# Patient Record
Sex: FEMALE | Race: WHITE | NOT HISPANIC OR LATINO | Employment: OTHER | URBAN - METROPOLITAN AREA
[De-identification: names, ages, dates, MRNs, and addresses within clinical notes are randomized per-mention and may not be internally consistent; named-entity substitution may affect disease eponyms.]

---

## 2017-05-23 ENCOUNTER — HOSPITAL ENCOUNTER (OUTPATIENT)
Dept: CARDIOLOGY | Facility: HOSPITAL | Age: 62
Discharge: HOME | End: 2017-05-23
Attending: SURGERY | Admitting: SURGERY

## 2017-12-20 ENCOUNTER — HOSPITAL ENCOUNTER (OUTPATIENT)
Dept: MAMMOGRAPHY | Facility: HOSPITAL | Age: 62
Discharge: HOME | End: 2017-12-20
Attending: FAMILY MEDICINE | Admitting: FAMILY MEDICINE

## 2018-01-25 ENCOUNTER — HOSPITAL ENCOUNTER (OUTPATIENT)
Dept: FAMILY MEDICINE CLINIC | Facility: CLINIC | Age: 63
Discharge: HOME | End: 2018-01-25
Attending: FAMILY MEDICINE | Admitting: FAMILY MEDICINE

## 2018-03-06 ENCOUNTER — HOSPITAL ENCOUNTER (OUTPATIENT)
Dept: OTHER | Facility: HOSPITAL | Age: 63
Setting detail: RECURRING SERIES
Discharge: HOME | End: 2018-03-07
Attending: FAMILY MEDICINE | Admitting: FAMILY MEDICINE

## 2018-03-15 ENCOUNTER — CLINICAL SUPPORT (OUTPATIENT)
Dept: ONCOLOGY | Facility: HOSPITAL | Age: 63
End: 2018-03-15

## 2018-03-15 ENCOUNTER — HOSPITAL ENCOUNTER (OUTPATIENT)
Dept: ONCOLOGY | Facility: CLINIC | Age: 63
Setting detail: INFUSION SERIES
Discharge: HOME | End: 2018-03-15
Attending: INTERNAL MEDICINE | Admitting: INTERNAL MEDICINE

## 2018-03-15 ENCOUNTER — HOSPITAL ENCOUNTER (OUTPATIENT)
Dept: ONCOLOGY | Facility: HOSPITAL | Age: 63
Discharge: HOME | End: 2018-03-15
Attending: INTERNAL MEDICINE | Admitting: INTERNAL MEDICINE

## 2018-03-15 NOTE — PROGRESS NOTES
PATIENTS ONCOLOGY RECORD LOCATED IN [facility]      Subjective     Name:  ALECIA STREETER     Date:  03/15/2018  Address:  [address] [place] [address]  Home: [phone]  :  1955 AGE:  62 y.o.        RECORDS OBTAINED:  Patients Oncology Record is located in [facility]

## 2018-03-27 ENCOUNTER — CLINICAL SUPPORT (OUTPATIENT)
Dept: ONCOLOGY | Facility: HOSPITAL | Age: 63
End: 2018-03-27

## 2018-03-27 ENCOUNTER — HOSPITAL ENCOUNTER (OUTPATIENT)
Dept: ONCOLOGY | Facility: CLINIC | Age: 63
Setting detail: INFUSION SERIES
Discharge: HOME | End: 2018-03-27
Attending: INTERNAL MEDICINE | Admitting: INTERNAL MEDICINE

## 2018-03-27 NOTE — PROGRESS NOTES
PATIENTS ONCOLOGY RECORD LOCATED IN [facility]      Subjective     Name:  ALECIA STREETER     Date:  2018  Address:  [address] [place] [address]  Home: [phone]  :  1955 AGE:  62 y.o.        RECORDS OBTAINED:  Patients Oncology Record is located in [facility]

## 2018-04-10 ENCOUNTER — HOSPITAL ENCOUNTER (OUTPATIENT)
Dept: ONCOLOGY | Facility: CLINIC | Age: 63
Discharge: HOME | End: 2018-04-10
Attending: INTERNAL MEDICINE | Admitting: INTERNAL MEDICINE

## 2018-05-03 ENCOUNTER — OFFICE (OUTPATIENT)
Dept: URBAN - METROPOLITAN AREA CLINIC 64 | Facility: CLINIC | Age: 63
End: 2018-05-03

## 2018-05-03 VITALS
SYSTOLIC BLOOD PRESSURE: 126 MMHG | HEIGHT: 61 IN | WEIGHT: 142 LBS | DIASTOLIC BLOOD PRESSURE: 56 MMHG | HEART RATE: 67 BPM

## 2018-05-03 DIAGNOSIS — D64.9 ANEMIA, UNSPECIFIED: ICD-10-CM

## 2018-05-03 DIAGNOSIS — N18.3 CHRONIC KIDNEY DISEASE, STAGE 3 (MODERATE): ICD-10-CM

## 2018-05-03 PROCEDURE — 99213 OFFICE O/P EST LOW 20 MIN: CPT | Performed by: NURSE PRACTITIONER

## 2018-06-04 ENCOUNTER — HOSPITAL ENCOUNTER (OUTPATIENT)
Dept: CARDIOLOGY | Facility: HOSPITAL | Age: 63
Discharge: HOME | End: 2018-06-04
Attending: SURGERY | Admitting: SURGERY

## 2018-06-14 ENCOUNTER — ON CAMPUS - OUTPATIENT (OUTPATIENT)
Dept: URBAN - METROPOLITAN AREA HOSPITAL 85 | Facility: HOSPITAL | Age: 63
End: 2018-06-14

## 2018-06-14 ENCOUNTER — HOSPITAL ENCOUNTER (OUTPATIENT)
Dept: PREOP | Facility: HOSPITAL | Age: 63
Setting detail: HOSPITAL OUTPATIENT SURGERY
Discharge: HOME | End: 2018-06-14
Attending: INTERNAL MEDICINE | Admitting: INTERNAL MEDICINE

## 2018-06-14 DIAGNOSIS — Q28.2 ARTERIOVENOUS MALFORMATION OF CEREBRAL VESSELS: ICD-10-CM

## 2018-06-14 DIAGNOSIS — K57.30 DIVERTICULOSIS OF LARGE INTESTINE WITHOUT PERFORATION OR ABS: ICD-10-CM

## 2018-06-14 DIAGNOSIS — K29.70 GASTRITIS, UNSPECIFIED, WITHOUT BLEEDING: ICD-10-CM

## 2018-06-14 DIAGNOSIS — K64.8 OTHER HEMORRHOIDS: ICD-10-CM

## 2018-06-14 DIAGNOSIS — D50.9 IRON DEFICIENCY ANEMIA, UNSPECIFIED: ICD-10-CM

## 2018-06-14 DIAGNOSIS — K25.4 CHRONIC OR UNSPECIFIED GASTRIC ULCER WITH HEMORRHAGE: ICD-10-CM

## 2018-06-14 LAB — GLUCOSE BLD-MCNC: 180 MG/DL (ref 70–105)

## 2018-06-14 PROCEDURE — 45382 COLONOSCOPY W/CONTROL BLEED: CPT | Performed by: INTERNAL MEDICINE

## 2018-06-14 PROCEDURE — 43255 EGD CONTROL BLEEDING ANY: CPT | Performed by: INTERNAL MEDICINE

## 2018-07-10 ENCOUNTER — HOSPITAL ENCOUNTER (OUTPATIENT)
Dept: ONCOLOGY | Facility: CLINIC | Age: 63
Discharge: HOME | End: 2018-07-10
Attending: INTERNAL MEDICINE | Admitting: INTERNAL MEDICINE

## 2019-01-29 ENCOUNTER — HOSPITAL ENCOUNTER (OUTPATIENT)
Dept: ONCOLOGY | Facility: CLINIC | Age: 64
Discharge: HOME | End: 2019-01-29
Attending: INTERNAL MEDICINE | Admitting: INTERNAL MEDICINE

## 2019-07-02 RX ORDER — CARVEDILOL 12.5 MG/1
TABLET ORAL
Qty: 180 TABLET | Refills: 1 | Status: SHIPPED | OUTPATIENT
Start: 2019-07-02 | End: 2019-12-23

## 2019-07-03 RX ORDER — AMLODIPINE BESYLATE 5 MG/1
TABLET ORAL
Qty: 90 TABLET | Refills: 1 | Status: SHIPPED | OUTPATIENT
Start: 2019-07-03 | End: 2021-04-15 | Stop reason: SDUPTHER

## 2019-07-10 PROBLEM — J44.9 CHRONIC OBSTRUCTIVE PULMONARY DISEASE (HCC): Status: ACTIVE | Noted: 2018-08-30

## 2019-07-10 PROBLEM — E11.9 TYPE 2 DIABETES MELLITUS: Status: ACTIVE | Noted: 2019-07-10

## 2019-07-10 PROBLEM — Z00.00 ENCOUNTER FOR GENERAL ADULT MEDICAL EXAMINATION WITHOUT ABNORMAL FINDINGS: Status: ACTIVE | Noted: 2018-01-25

## 2019-07-10 PROBLEM — E78.5 HYPERLIPIDEMIA: Status: ACTIVE | Noted: 2019-07-10

## 2019-07-10 PROBLEM — Q27.30 CONGENITAL ARTERIOVENOUS MALFORMATION: Status: ACTIVE | Noted: 2018-07-14

## 2019-07-10 PROBLEM — D50.9 IRON DEFICIENCY ANEMIA: Status: ACTIVE | Noted: 2017-11-15

## 2019-07-10 PROBLEM — N25.81 HYPERPARATHYROIDISM DUE TO RENAL INSUFFICIENCY (HCC): Status: ACTIVE | Noted: 2017-03-07

## 2019-07-10 PROBLEM — I73.9 PERIPHERAL VASCULAR DISEASE: Status: ACTIVE | Noted: 2018-06-06

## 2019-07-10 PROBLEM — I10 HYPERTENSION: Status: ACTIVE | Noted: 2019-07-10

## 2019-07-10 PROBLEM — M79.89 SWELLING OF LOWER EXTREMITY: Status: ACTIVE | Noted: 2018-06-25

## 2019-07-10 PROBLEM — Z23 NEED FOR OTHER PROPHYLACTIC VACCINATION AND INOCULATION AGAINST SINGLE DISEASES: Status: ACTIVE | Noted: 2018-10-04

## 2019-07-10 PROBLEM — J00 COMMON COLD: Status: ACTIVE | Noted: 2017-11-15

## 2019-07-10 PROBLEM — Z23 ENCOUNTER FOR IMMUNIZATION: Status: ACTIVE | Noted: 2018-10-04

## 2019-07-10 PROBLEM — R53.83 MALAISE AND FATIGUE: Status: ACTIVE | Noted: 2018-03-05

## 2019-07-10 PROBLEM — L03.119 CELLULITIS OF ANKLE: Status: ACTIVE | Noted: 2018-09-19

## 2019-07-10 PROBLEM — M10.9 GOUT: Status: ACTIVE | Noted: 2018-10-04

## 2019-07-10 PROBLEM — I25.10 CORONARY ARTERY DISEASE: Status: ACTIVE | Noted: 2019-07-10

## 2019-07-10 PROBLEM — I34.0 MITRAL REGURGITATION: Status: ACTIVE | Noted: 2018-10-04

## 2019-07-10 PROBLEM — M81.0 OSTEOPOROSIS: Status: ACTIVE | Noted: 2019-07-10

## 2019-07-10 PROBLEM — R53.81 MALAISE AND FATIGUE: Status: ACTIVE | Noted: 2018-03-05

## 2019-07-10 PROBLEM — R74.8 ELEVATED LIVER ENZYMES: Status: ACTIVE | Noted: 2018-06-25

## 2019-07-10 RX ORDER — ALBUTEROL SULFATE 90 UG/1
2 AEROSOL, METERED RESPIRATORY (INHALATION) 4 TIMES DAILY
COMMUNITY
Start: 2017-07-12 | End: 2019-07-10 | Stop reason: SDUPTHER

## 2019-07-10 RX ORDER — FUROSEMIDE 20 MG/1
1 TABLET ORAL EVERY 24 HOURS
COMMUNITY
Start: 2018-06-02 | End: 2019-11-07

## 2019-07-10 RX ORDER — ROSUVASTATIN CALCIUM 20 MG/1
1 TABLET, COATED ORAL EVERY 24 HOURS
COMMUNITY
Start: 2019-04-07 | End: 2019-08-05 | Stop reason: SDUPTHER

## 2019-07-10 RX ORDER — AMOXICILLIN 500 MG
1 CAPSULE ORAL 2 TIMES DAILY
COMMUNITY
Start: 2015-07-01 | End: 2019-07-11

## 2019-07-10 RX ORDER — CHOLECALCIFEROL (VITAMIN D3) 25 MCG
1 TABLET,CHEWABLE ORAL DAILY
COMMUNITY
Start: 2015-07-01

## 2019-07-10 RX ORDER — ALBUTEROL SULFATE 90 UG/1
2 AEROSOL, METERED RESPIRATORY (INHALATION) 4 TIMES DAILY
Qty: 3 INHALER | Refills: 1 | Status: SHIPPED | OUTPATIENT
Start: 2019-07-10 | End: 2019-07-11 | Stop reason: SDUPTHER

## 2019-07-10 RX ORDER — FERROUS SULFATE 325(65) MG
1 TABLET ORAL
COMMUNITY
Start: 2018-03-07 | End: 2020-08-04 | Stop reason: SDUPTHER

## 2019-07-10 NOTE — TELEPHONE ENCOUNTER
Pt calling stating that  She is coughing up phlegm and needs her albuterol solution sent in for her inhaler. I do not see it on medication list, but patient states that daisy has prescribed it in past for her nebulizer.  Pt also needing refill of her proair inhaler.

## 2019-07-11 ENCOUNTER — OFFICE VISIT (OUTPATIENT)
Dept: FAMILY MEDICINE CLINIC | Facility: CLINIC | Age: 64
End: 2019-07-11

## 2019-07-11 VITALS
HEART RATE: 63 BPM | HEIGHT: 62 IN | OXYGEN SATURATION: 95 % | TEMPERATURE: 97.5 F | WEIGHT: 136.8 LBS | DIASTOLIC BLOOD PRESSURE: 79 MMHG | RESPIRATION RATE: 20 BRPM | BODY MASS INDEX: 25.17 KG/M2 | SYSTOLIC BLOOD PRESSURE: 135 MMHG

## 2019-07-11 DIAGNOSIS — F17.210 CIGARETTE SMOKER: Primary | ICD-10-CM

## 2019-07-11 DIAGNOSIS — J30.9 ALLERGIC RHINITIS, UNSPECIFIED SEASONALITY, UNSPECIFIED TRIGGER: Chronic | ICD-10-CM

## 2019-07-11 DIAGNOSIS — J42 CHRONIC BRONCHITIS, UNSPECIFIED CHRONIC BRONCHITIS TYPE (HCC): ICD-10-CM

## 2019-07-11 DIAGNOSIS — J06.9 ACUTE UPPER RESPIRATORY INFECTION: ICD-10-CM

## 2019-07-11 PROCEDURE — 99213 OFFICE O/P EST LOW 20 MIN: CPT | Performed by: NURSE PRACTITIONER

## 2019-07-11 RX ORDER — CALCITRIOL 0.25 UG/1
1 CAPSULE, LIQUID FILLED ORAL DAILY
Refills: 3 | COMMUNITY
Start: 2019-07-03 | End: 2023-02-17

## 2019-07-11 RX ORDER — AZITHROMYCIN 250 MG/1
TABLET, FILM COATED ORAL
Qty: 6 TABLET | Refills: 0 | Status: SHIPPED | OUTPATIENT
Start: 2019-07-11 | End: 2019-09-03

## 2019-07-11 RX ORDER — IPRATROPIUM BROMIDE AND ALBUTEROL SULFATE 2.5; .5 MG/3ML; MG/3ML
3 SOLUTION RESPIRATORY (INHALATION) EVERY 4 HOURS PRN
Qty: 120 VIAL | Refills: 6 | Status: SHIPPED | OUTPATIENT
Start: 2019-07-11 | End: 2019-07-18 | Stop reason: SDUPTHER

## 2019-07-11 RX ORDER — CHLORAL HYDRATE 500 MG
1 CAPSULE ORAL 2 TIMES DAILY WITH MEALS
COMMUNITY

## 2019-07-11 RX ORDER — ALBUTEROL SULFATE 90 UG/1
2 AEROSOL, METERED RESPIRATORY (INHALATION) 4 TIMES DAILY
Qty: 3 INHALER | Refills: 1 | Status: SHIPPED | OUTPATIENT
Start: 2019-07-11 | End: 2019-07-18 | Stop reason: SDUPTHER

## 2019-07-11 NOTE — PROGRESS NOTES
Subjective   Nancy Suárez is a 63 y.o. female.       63-year-old white female smoker with history of COPD comes in with 1 week history of productive cough fever and dyspnea.  Exam reveals severe allergic rhinitis with diminished lungs but no wheezes.  She has not had any nebulizer medicine for while so I am going to reorder that for her as well as a Z-Kevan.  I encouraged her to use allergy medication as well  Blood pressure 134/78 heart rate 62 she denies any chest pain, tachycardia, dizziness or edema    Duo nebs q.i.d. As needed   Z-Kevan         The following portions of the patient's history were reviewed and updated as appropriate: allergies, current medications, past family history, past medical history, past social history, past surgical history and problem list.    Review of Systems   Constitutional: Positive for fever.   HENT: Positive for ear pain and postnasal drip.    Respiratory: Positive for cough and shortness of breath.    Cardiovascular: Negative.    Gastrointestinal: Negative.    Genitourinary: Negative.    Musculoskeletal: Negative.    Skin: Negative.    Neurological: Negative.    Psychiatric/Behavioral: Negative.        Objective   Physical Exam   Constitutional: She is oriented to person, place, and time. She appears well-developed and well-nourished.   HENT:    Heavy postnasal drip   Cardiovascular: Normal rate and regular rhythm.   Pulmonary/Chest: Effort normal.   Lungs very diminished without wheezes or rhonchi   Abdominal: Soft. Bowel sounds are normal.   Musculoskeletal: Normal range of motion.   Neurological: She is alert and oriented to person, place, and time.   Skin: Skin is warm and dry.         Assessment/Plan   Nancy was seen today for uri.    Diagnoses and all orders for this visit:    Cigarette smoker    Allergic rhinitis, unspecified seasonality, unspecified trigger    Acute upper respiratory infection    Chronic bronchitis, unspecified chronic bronchitis type (CMS/[facility])    Other  orders  -     ipratropium-albuterol (DUO-NEB) 0.5-2.5 mg/3 ml nebulizer; Take 3 mL by nebulization Every 4 (Four) Hours As Needed for Wheezing.  -     albuterol sulfate HFA (PROAIR HFA) 108 (90 Base) MCG/ACT inhaler; Inhale 2 puffs 4 (Four) Times a Day.  -     azithromycin (ZITHROMAX Z-HERB) 250 MG tablet; Take 2 tablets the first day, then 1 tablet daily for 4 days.

## 2019-07-11 NOTE — PATIENT INSTRUCTIONS
Take antibiotic as directed   Take allergy medication as directed  Nebulizer treatments 1 - 4 times a day

## 2019-07-17 ENCOUNTER — TELEPHONE (OUTPATIENT)
Dept: FAMILY MEDICINE CLINIC | Facility: CLINIC | Age: 64
End: 2019-07-17

## 2019-07-17 NOTE — TELEPHONE ENCOUNTER
Linda from [facility] in [place] called and said pts insurance will only cover Brand Name Proair Inhaler and they also don't want to cover the Duoneb.  They want Comvient Resp or Albuteral Sulfate.  SG

## 2019-07-18 RX ORDER — ALBUTEROL SULFATE 90 UG/1
2 AEROSOL, METERED RESPIRATORY (INHALATION) 4 TIMES DAILY
Qty: 3 INHALER | Refills: 1 | Status: SHIPPED | OUTPATIENT
Start: 2019-07-18 | End: 2022-07-05 | Stop reason: SDUPTHER

## 2019-07-18 RX ORDER — IPRATROPIUM BROMIDE AND ALBUTEROL SULFATE 2.5; .5 MG/3ML; MG/3ML
3 SOLUTION RESPIRATORY (INHALATION) EVERY 4 HOURS PRN
Qty: 120 VIAL | Refills: 6 | Status: SHIPPED | OUTPATIENT
Start: 2019-07-18 | End: 2019-07-19

## 2019-07-19 RX ORDER — ALBUTEROL SULFATE 2.5 MG/3ML
2.5 SOLUTION RESPIRATORY (INHALATION) EVERY 4 HOURS PRN
Qty: 120 VIAL | Refills: 5 | Status: SHIPPED | OUTPATIENT
Start: 2019-07-19 | End: 2019-12-28 | Stop reason: ALTCHOICE

## 2019-08-06 ENCOUNTER — APPOINTMENT (OUTPATIENT)
Dept: ONCOLOGY | Facility: CLINIC | Age: 64
End: 2019-08-06

## 2019-08-06 RX ORDER — ROSUVASTATIN CALCIUM 20 MG/1
TABLET, COATED ORAL
Qty: 30 TABLET | Refills: 2 | Status: SHIPPED | OUTPATIENT
Start: 2019-08-06 | End: 2019-10-30 | Stop reason: SDUPTHER

## 2019-08-28 LAB
BASOPHILS # BLD AUTO: 0 X10E3/UL (ref 0–0.2)
BASOPHILS NFR BLD AUTO: 0 %
EOSINOPHIL # BLD AUTO: 0.1 X10E3/UL (ref 0–0.4)
EOSINOPHIL NFR BLD AUTO: 3 %
ERYTHROCYTE [DISTWIDTH] IN BLOOD BY AUTOMATED COUNT: 14.1 % (ref 12.3–15.4)
FERRITIN SERPL-MCNC: 62 NG/ML (ref 15–150)
HCT VFR BLD AUTO: 37.4 % (ref 34–46.6)
HGB BLD-MCNC: 12.3 G/DL (ref 11.1–15.9)
IMM GRANULOCYTES # BLD AUTO: 0 X10E3/UL (ref 0–0.1)
IMM GRANULOCYTES NFR BLD AUTO: 0 %
IRON SATN MFR SERPL: 77 % (ref 15–55)
IRON SERPL-MCNC: 203 UG/DL (ref 27–139)
LYMPHOCYTES # BLD AUTO: 1.4 X10E3/UL (ref 0.7–3.1)
LYMPHOCYTES NFR BLD AUTO: 26 %
MCH RBC QN AUTO: 32 PG (ref 26.6–33)
MCHC RBC AUTO-ENTMCNC: 32.9 G/DL (ref 31.5–35.7)
MCV RBC AUTO: 97 FL (ref 79–97)
MONOCYTES # BLD AUTO: 0.4 X10E3/UL (ref 0.1–0.9)
MONOCYTES NFR BLD AUTO: 7 %
NEUTROPHILS # BLD AUTO: 3.4 X10E3/UL (ref 1.4–7)
NEUTROPHILS NFR BLD AUTO: 64 %
PLATELET # BLD AUTO: 189 X10E3/UL (ref 150–450)
RBC # BLD AUTO: 3.84 X10E6/UL (ref 3.77–5.28)
TIBC SERPL-MCNC: 263 UG/DL (ref 250–450)
UIBC SERPL-MCNC: 60 UG/DL (ref 118–369)
WBC # BLD AUTO: 5.3 X10E3/UL (ref 3.4–10.8)

## 2019-08-29 LAB
25(OH)D3+25(OH)D2 SERPL-MCNC: 25.9 NG/ML (ref 30–100)
BUN SERPL-MCNC: 14 MG/DL (ref 8–27)
BUN/CREAT SERPL: 10 (ref 12–28)
CALCIUM SERPL-MCNC: 9 MG/DL (ref 8.7–10.3)
CHLORIDE SERPL-SCNC: 103 MMOL/L (ref 96–106)
CO2 SERPL-SCNC: 22 MMOL/L (ref 20–29)
CREAT SERPL-MCNC: 1.38 MG/DL (ref 0.57–1)
ERYTHROCYTE [DISTWIDTH] IN BLOOD BY AUTOMATED COUNT: 14.5 % (ref 12.3–15.4)
GLUCOSE SERPL-MCNC: 109 MG/DL (ref 65–99)
HCT VFR BLD AUTO: 39.9 % (ref 34–46.6)
HGB BLD-MCNC: 13.2 G/DL (ref 11.1–15.9)
IRON SATN MFR SERPL: 64 % SATURATION
IRON SERPL-MCNC: 199 UG/DL
MCH RBC QN AUTO: 31.5 PG (ref 26.6–33)
MCHC RBC AUTO-ENTMCNC: 33.1 G/DL (ref 31.5–35.7)
MCV RBC AUTO: 95 FL (ref 79–97)
PHOSPHATE SERPL-MCNC: 3.8 MG/DL (ref 2.5–4.5)
POTASSIUM SERPL-SCNC: 4.3 MMOL/L (ref 3.5–5.2)
PTH-INTACT SERPL-MCNC: 45 PG/ML (ref 15–65)
RBC # BLD AUTO: 4.19 X10E6/UL (ref 3.77–5.28)
SODIUM SERPL-SCNC: 139 MMOL/L (ref 134–144)
TRANSFERRIN SERPL-MCNC: 223 MG/DL
URATE SERPL-MCNC: 6.2 MG/DL (ref 2.5–7.1)
WBC # BLD AUTO: 5.6 X10E3/UL (ref 3.4–10.8)

## 2019-09-03 ENCOUNTER — OFFICE VISIT (OUTPATIENT)
Dept: ONCOLOGY | Facility: CLINIC | Age: 64
End: 2019-09-03

## 2019-09-03 VITALS
HEART RATE: 62 BPM | SYSTOLIC BLOOD PRESSURE: 169 MMHG | DIASTOLIC BLOOD PRESSURE: 79 MMHG | BODY MASS INDEX: 25.86 KG/M2 | RESPIRATION RATE: 18 BRPM | WEIGHT: 137 LBS | TEMPERATURE: 97.7 F | HEIGHT: 61 IN

## 2019-09-03 DIAGNOSIS — D50.9 IRON DEFICIENCY ANEMIA, UNSPECIFIED IRON DEFICIENCY ANEMIA TYPE: Primary | ICD-10-CM

## 2019-09-03 DIAGNOSIS — R79.89 LOW VITAMIN D LEVEL: ICD-10-CM

## 2019-09-03 PROBLEM — R04.0 BLEEDING FROM THE NOSE: Status: ACTIVE | Noted: 2019-09-03

## 2019-09-03 PROCEDURE — 99213 OFFICE O/P EST LOW 20 MIN: CPT | Performed by: INTERNAL MEDICINE

## 2019-09-03 NOTE — PROGRESS NOTES
HEMATOLOGY ONCOLOGY FOLLOW UP        Patient name: Nancy Suárez  : 1955  MRN: [id number]  Primary Care Physician: Nya Peraza DO  Referring Physician: Nya Peraza DO  Reason For Consult:     Chief Complaint   Patient presents with   • Follow-up     hx of KIRIT       History of Present Illness:  Ms. Suárez is a 63-year-old female who presented to her Primary Care Physician, Dr. Garcia’s office with symptoms of fatigue and weakness.  She was initially seen on 6/23/15 and her hemoglobin was 10.6 with an MCV of 102 at that time.  Repeat CBC on 8/5/15 showed a declining hemoglobin to 7.5 and MCV was 99.4.  Platelets and hemoglobin were again normal.  She was started on iron supplementation and repeat CBC on 8/18/15 showed a slight increase to 7.9.  She does have chronic kidney disease.  Her ferritin was low at 20 on 9/1/15.      However, when CBC was repeated on 9/22/15 hemoglobin was down to 4.9 and MVC also trended down to 85.  Patient was referred to us for further evaluation.  She already was scheduled to see gastroenterology.  Dr. Garcia ordered a stool hemoccult test that was apparently positive as well.      9/22/15 - Patient presented for hematologic consultation.  She reported being very tired.  She also felt dizzy and had unsteady gait.  Dizziness was more profound when she stood up or sat up from a lying posture.  Her blood pressure had also been running low.  Hypertension medications were also being held by her Primary Care Physician.  The patient denied noticing any bleeding per rectum.  She did have black stools ever since she had been taking iron.  She did not report weight loss.  She had a good appetite.       • 6/23/15 - WBC 7.5, hemoglobin 10.6, .3, platelet count 184,000.    • 7/8/15 - Vitamin B12 491 (N).   • 8/5/15 - WBC 5.5, hemoglobin 7.5, platelet count 220,000, MCV 99.4.  • 8/18/15 - WBC 6.5, hemoglobin 7.9, platelet count 212,000.    • 9/22/15 -  Creatinine 1.72, hemoglobin 4.9, WBC 8.8, platelet count 246,000, MCV 85.0.    • 9/23/15 - Patient underwent EGD and colonoscopy.  EGD was unremarkable up to the third part of the duodenum.  There were no AVM’s or any source of bleeding.  Colonoscopy was unremarkable except for small to moderate sized hemorrhoids and scattered diverticulosis.  Patient also received IV Venofer in the hospital.    • 9/22/15 to 9/25/15 - Patient admitted to [facility] for severe anemia.  Hemoglobin 4.1.  She was transfused about 4 units.    • 9/25/15 - M2A capsule was performed in the hospital and that also was negative for any bleeding source.    • 9/29/15 - Creatinine 1.4, BUN 13, TSH 0.7.    • 10/1/15 - WBC 7.5, hemoglobin 11.1, platelet count 213,000, MCV 90.1.  Creatinine 1.7.   • 10/22/15 - Creatinine 1.68.  Iron saturation 49%.  TIBC 287.  Serum iron 140.  PTH intact 108 (H).   • 10/30/15 - WBC 5.3, hemoglobin 11.1, platelet count 171,000.  • 11/3/15 - WBC 4.8, hemoglobin 9.5, platelet count 159,000, MCV 99.2.  • 12/28/15 - Iron saturation 43%.  TIBC 316, serum iron 137.  WBC 6.4, hemoglobin 11.3, platelet count 151,000, .8.  • 4/26/16 - WBC 6.1, hemoglobin 12.1, platelet count 205,000, .1.  Iron saturation 21%, TIBC 302, serum iron 63 (patient taking two iron tablets a day).  • 10/26/16 - Ferritin 36, iron saturation 14%, TIBC 347, serum iron 49.  WBC 6.0, hemoglobin 10.9, .7, platelet count 222,000.  • November 2016 to March 2018 - Patient has not followed up in our office.    • 3/27/17 - Creatinine 1.4.    • 11/15/17 - Ferritin 34, iron saturation 88% (H), TIBC 308, serum iron 270 (H).    • 12/20/17 - DEXAscan:  No evidence of osteopenia or osteoporosis.    • 3/6/18 - Hemoglobin 6.3.  Patient transfused 2 units.    • 3/15/18 - WBC 6.1, hemoglobin 10.6, platelet count 235,000, MCV 90.7.  Iron saturation 49%, TIBC 346, serum iron 175.  B12 1500.  Ferritin 124.  Folate 15.9.  Retic count 5.29%  (H).  • 3/27/18 - Patient evaluated by ENT, Dr. Doshi.  Examination revealed small bleeding sites from the left mid and posterior septum.  Patient had cauterization.  She was also diagnosed with some allergic sinusitis.    • 3/27/18 - Stool Hemoccult test positive x3.    • 4/4/18 - WBC 4.6, hemoglobin 10.9, platelet count 188,000, MCV 94.7.  Ferritin 48.  Iron saturation 42%, TIBC 297, serum iron 126.  • 6/14/18 - EGD and colonoscopy:  Multiple AV malformations in the upper GI tract and ascending colon, which could be the source of her chronic blood loss.  The AVMs in the colon were cauterized after injecting epinephrine and one Endoclip was placed.  Another small AVM was cauterized with APC.  There was scattered diverticulosis and moderate sized hemorrhoid.  AVMs in the duodenum were also cauterized with APC.    • 6/25/18 - Ferritin 57.  Iron saturation 39%, TIBC 302, serum iron 118.  PTH intact 42.  Uric acid 7.2.  WBC 5.6, hemoglobin 14.1, platelet count 220,000, MCV 97.8.    • 1/17/19 - Creatinine 1.49, BUN 18.  LFTs are normal.    • 8/27/2019 creatinine 1.38, BUN is 14, PTH is 45, serum iron 203 high, ferritin 62, iron saturation 77% high, TIBC 263, WBC 5.3, hemoglobin 12.3, platelets 189, MCV 97, vitamin D 25.9 low          Subjective:09/03/19  Patient is here for a follow up appointment with her grand daughter. She states she feels good. She has not had any nose bleeds for awhile. She is taking iron twice daily and is tolerating that well.. She is having laser surgery on her eyes soon. She states that she has a leaky heart valve. She denies any blood in her stool. She is taking 1000 mg Vitamin D3 daily, but her vitamin D remains low..          The following portions of the patient's history were reviewed and updated as appropriate: allergies, current medications, past family history, past medical history, past social history, past surgical history and problem list.         Past Medical History:    Diagnosis Date   • Artery stenosis (CMS/HCC)     Bilateral subclavian s/p stent    • Arthritis    • AVM (arteriovenous malformation)    • CAD (coronary artery disease)    • CKD (chronic kidney disease)     Stage three    • COPD (chronic obstructive pulmonary disease) (CMS/HCC)    • DM2 (diabetes mellitus, type 2) (CMS/HCC)    • Eye pressure     Elevated eye pressure   • Gout    • Hepatitis     Hepatitis c    • HTN (hypertension)    • Hyperparathyroidism (CMS/HCC)    • KIRIT (iron deficiency anemia)    • Iron deficiency anemia    • Osteoporosis    • PVD (peripheral vascular disease) (CMS/HCC)        Past Surgical History:   Procedure Laterality Date   • CHOLECYSTECTOMY     • TOTAL ABDOMINAL HYSTERECTOMY WITH SALPINGO OOPHORECTOMY           Current Outpatient Medications:   •  albuterol (PROVENTIL) (2.5 MG/3ML) 0.083% nebulizer solution, Take 2.5 mg by nebulization Every 4 (Four) Hours As Needed for Wheezing or Shortness of Air. DX CODE J44.9, Disp: 120 vial, Rfl: 5  •  albuterol sulfate HFA (PROAIR HFA) 108 (90 Base) MCG/ACT inhaler, Inhale 2 puffs 4 (Four) Times a Day. Proair - DX CODE J44.9, Disp: 3 inhaler, Rfl: 1  •  amLODIPine (NORVASC) 5 MG tablet, TAKE 1 TABLET BY MOUTH EVERY DAY, Disp: 90 tablet, Rfl: 1  •  aspirin 81 MG tablet, Take 1 tablet by mouth Daily., Disp: , Rfl:   •  calcitriol (ROCALTROL) 0.25 MCG capsule, Take 1 capsule by mouth 3 (Three) Times a Week. Monday, Wednesday, AND Friday, Disp: , Rfl: 3  •  carvedilol (COREG) 12.5 MG tablet, TAKE 1 TABLET BY MOUTH TWICE A DAY, Disp: 180 tablet, Rfl: 1  •  Cholecalciferol 1000 units capsule, Take 1 capsule by mouth Daily., Disp: , Rfl:   •  Cyanocobalamin (B-12) 1000 MCG capsule, Take 1 tablet by mouth Daily., Disp: , Rfl:   •  ferrous sulfate 325 (65 FE) MG tablet, Take 1 tablet by mouth 2 (Two) Times a Day., Disp: , Rfl:   •  furosemide (LASIX) 20 MG tablet, Take 1 tablet by mouth Daily., Disp: , Rfl:   •  Omega-3 Fatty Acids (FISH OIL) 1000 MG capsule  capsule, Take 1 capsule by mouth 2 (Two) Times a Day With Meals., Disp: , Rfl:   •  rosuvastatin (CRESTOR) 20 MG tablet, TAKE 1 TABLET BY MOUTH EVERY DAY, Disp: 30 tablet, Rfl: 2    No Known Allergies    Family History   Problem Relation Age of Onset   • Diabetes Mother    • Coronary artery disease Sister    • Diabetes Sister    • Diabetes Other    • Hypertension Other    • Breast cancer Paternal Aunt 50       Cancer-related family history includes Breast cancer (age of onset: 50) in her paternal aunt.    Social History     Tobacco Use   • Smoking status: Current Every Day Smoker     Packs/day: 0.25     Types: Cigarettes     Start date: 1975   • Smokeless tobacco: Never Used   Substance Use Topics   • Alcohol use: No     Frequency: Never   • Drug use: No         I have reviewed the history of present illness, past medical history, family history, social history, lab results, all notes and other records since the patient was last seen on  Visit date not found.    ROS:     Review of Systems   Constitutional: Negative for chills and fever.   HENT: Negative for ear pain, mouth sores, nosebleeds and sore throat.    Eyes: Negative for photophobia and visual disturbance.   Respiratory: Negative for wheezing and stridor.    Cardiovascular: Negative for chest pain and palpitations.   Gastrointestinal: Negative for abdominal pain, blood in stool, constipation, diarrhea, nausea and vomiting.   Endocrine: Negative for cold intolerance and heat intolerance.   Genitourinary: Negative for dysuria and hematuria.   Musculoskeletal: Negative for joint swelling and neck stiffness.   Skin: Negative for color change and rash.   Neurological: Negative for seizures and syncope.   Hematological: Negative for adenopathy.        No obvious bleeding   Psychiatric/Behavioral: Negative for agitation, confusion and hallucinations.       Objective:    Vitals:    09/03/19 1056   BP: 169/79   Pulse: 62   Resp: 18   Temp: 97.7 °F (36.5 °C)  "  [facility]: Oral   Weight: 62.1 kg (137 lb)   Height: 154.9 cm (61\")   PainSc: 0-No pain     ECOG  (1) Restricted in physically strenuous activity, ambulatory and able to do work of light nature    Physical Exam:   Physical Exam   Constitutional: She is oriented to person, place, and time. She appears well-developed and well-nourished. No distress.   HENT:   Head: Normocephalic and atraumatic.   Eyes: Conjunctivae and EOM are normal. Right eye exhibits no discharge. Left eye exhibits no discharge. No scleral icterus.   Neck: Normal range of motion. Neck supple. No thyromegaly present.   Cardiovascular: Normal rate, regular rhythm and normal heart sounds. Exam reveals no gallop and no friction rub.   Ejection systolic murmur heard.   Pulmonary/Chest: Effort normal. No stridor. No respiratory distress. She has no wheezes.   Abdominal: Soft. Bowel sounds are normal. She exhibits no mass. There is no tenderness. There is no rebound and no guarding.   Musculoskeletal: Normal range of motion. She exhibits no tenderness.   Lymphadenopathy:     She has no cervical adenopathy.   Neurological: She is alert and oriented to person, place, and time. She exhibits normal muscle tone.   Skin: Skin is warm. No rash noted. She is not diaphoretic. No erythema.   Psychiatric: She has a normal mood and affect. Her behavior is normal.   Nursing note and vitals reviewed.            Lab Results - Last 18 Months   Lab Units 08/27/19  1006 08/27/19  1000 02/27/19  0915 09/20/18  0404   WBC x10E3/uL 5.3 5.6 5.1 THOUSAND/UL 4.3*   HEMOGLOBIN g/dL 12.3 13.2 11.3* 10.2*   HEMATOCRIT % 37.4 39.9 32.9* 30.3*   PLATELETS x10E3/uL 189  --  215 THOUSAND/   MCV fL 97 95 95.9 99.7*     Lab Results - Last 18 Months   Lab Units 08/27/19  1000 04/09/19  0841 02/27/19  0915 01/16/19  1054 09/20/18  0404   SODIUM mmol/L 139 138 139 138 141   POTASSIUM mmol/L 4.3 4.3 4.4 4.2 3.5*   CHLORIDE mmol/L 103 100 105 101 106   TOTAL CO2 mmol/L 22 23  --   --  " 26   BUN mg/dL 14 15 18 18 17   CREATININE mg/dL 1.38* 1.3* 1.41* 1.49* 1.4*   CALCIUM mg/dL 9.0 8.9 8.9 9.3 8.4*   BILIRUBIN mg/dL  --  0.8 0.3 0.3  --    ALK PHOS units/L  --  55 55 66  --    ALT (SGPT) units/L  --  14 13 8  --    AST (SGOT) units/L  --  18 14 16  --    GLUCOSE mg/dL  --  102* 133* 121* 97       Lab Results   Component Value Date    GLUCOSE 102 (H) 04/09/2019    BUN 14 08/27/2019    CREATININE 1.38 (H) 08/27/2019    EGFRIFNONA 40 (L) 08/27/2019    EGFRIFAFRI 46 (L) 08/27/2019    BCR 10 (L) 08/27/2019    K 4.3 08/27/2019    CO2 22 08/27/2019    CALCIUM 9.0 08/27/2019    ALBUMIN 3.9 04/09/2019    LABIL2 1.8 (calc) 04/09/2019    AST 18 04/09/2019    ALT 14 04/09/2019       No results for input(s): APTT, INR, PTT in the last 57477 hours.    Lab Results   Component Value Date    IRON 199 (H) 08/27/2019    TIBC 263 08/27/2019    FERRITIN 62 08/27/2019       Lab Results   Component Value Date    FOLATE 21.4 02/27/2019       No results found for: OCCULTBLD    Lab Results   Component Value Date    RETICCTPCT 3.4 03/05/2018       Lab Results   Component Value Date    TYDXNJLV64 >2000 pg/mL (H) 02/27/2019     No results found for: SPEP, UPEP  Uric Acid   Date Value Ref Range Status   08/27/2019 6.2 2.5 - 7.1 mg/dL Final     Comment:                Therapeutic target for gout patients: <6.0     Lab Results   Component Value Date    SEDRATE 115 (H) 09/19/2018     No results found for: FIBRINOGEN, HAPTOGLOBIN  No results found for: PTT, INR  No results found for:   No results found for: CEA  No components found for: CA-19-9  No results found for: PSA          Assessment/Plan     Assessment:  1. History of iron deficiency anemia:  She is currently taking iron p.o. two times a day.  Her EGD and colonoscopy showed several AVMs in the duodenum as well as in the colon, which were cauterized.  AVMs are in the mid ascending colon and in the duodenum.  There was also a small erosion in the stomach and gastritis  (June 2018).  She continues on iron supplements.    2. History of nose bleeds:  Resolved.    3. History of occult GI bleeding:  Stool Hemoccult test was positive x3 in March 2018 and she was sent to see GI.      PLAN:    1. Continue iron p.o. two times a day.  Check CBC and iron levels in 6 months.  2. Advised Patient to have his Vit D dose increased discussing with his primary care physician.  3. Repeat labs before next visit in six months.              Iron deficiency anemia, unspecified iron deficiency anemia type    No orders of the defined types were placed in this encounter.                    Thank you very much for providing the opportunity to participate in this patient’s care. Please do not hesitate to call if there are any other questions.    I have reviewed all relevant labs results, imaging,Outside reports,notes, vitals, medications and plan with the patient today.    Portions of the note have been Scribed by medical assistant/Nurse.  I have reviewed and made changes accordingly.

## 2019-09-04 LAB
APPEARANCE UR: CLEAR
BACTERIA #/AREA URNS HPF: NORMAL /[HPF]
BILIRUB UR QL STRIP: NEGATIVE
COLOR UR: YELLOW
EPI CELLS #/AREA URNS HPF: NORMAL /HPF
GLUCOSE UR QL: NEGATIVE
HGB UR QL STRIP: NEGATIVE
KETONES UR QL STRIP: NEGATIVE
LEUKOCYTE ESTERASE UR QL STRIP: NEGATIVE
MICRO URNS: (no result)
MICRO URNS: (no result)
MUCOUS THREADS URNS QL MICRO: PRESENT
NITRITE UR QL STRIP: NEGATIVE
PH UR STRIP: 6 [PH] (ref 5–7.5)
PROT UR QL STRIP: NEGATIVE
RBC #/AREA URNS HPF: NORMAL /HPF
SP GR UR: 1.01 (ref 1–1.03)
UROBILINOGEN UR STRIP-MCNC: 0.2 MG/DL (ref 0.2–1)
WBC #/AREA URNS HPF: NORMAL /HPF

## 2019-10-04 LAB
ALBUMIN SERPL-MCNC: 4.4 G/DL (ref 3.6–4.8)
ALBUMIN/GLOB SERPL: 1.9 {RATIO} (ref 1.2–2.2)
ALP SERPL-CCNC: 69 IU/L (ref 39–117)
ALT SERPL-CCNC: 12 IU/L (ref 0–32)
AMBIG ABBREV CMP14 DEFAULT: NORMAL
AMBIG ABBREV LP DEFAULT: NORMAL
AST SERPL-CCNC: 18 IU/L (ref 0–40)
BILIRUB SERPL-MCNC: 0.2 MG/DL (ref 0–1.2)
BUN SERPL-MCNC: 17 MG/DL (ref 8–27)
BUN/CREAT SERPL: 12 (ref 12–28)
CALCIUM SERPL-MCNC: 8.6 MG/DL (ref 8.7–10.3)
CHLORIDE SERPL-SCNC: 104 MMOL/L (ref 96–106)
CHOLEST SERPL-MCNC: 201 MG/DL (ref 100–199)
CK SERPL-CCNC: 89 U/L (ref 24–173)
CO2 SERPL-SCNC: 18 MMOL/L (ref 20–29)
CREAT SERPL-MCNC: 1.41 MG/DL (ref 0.57–1)
GLOBULIN SER CALC-MCNC: 2.3 G/DL (ref 1.5–4.5)
GLUCOSE SERPL-MCNC: 93 MG/DL (ref 65–99)
HDLC SERPL-MCNC: 50 MG/DL
LDLC SERPL CALC-MCNC: 101 MG/DL (ref 0–99)
POTASSIUM SERPL-SCNC: 4.5 MMOL/L (ref 3.5–5.2)
PROT SERPL-MCNC: 6.7 G/DL (ref 6–8.5)
SODIUM SERPL-SCNC: 144 MMOL/L (ref 134–144)
TRIGL SERPL-MCNC: 249 MG/DL (ref 0–149)
VLDLC SERPL CALC-MCNC: 50 MG/DL (ref 5–40)

## 2019-10-07 RX ORDER — PREDNISOLONE ACETATE 10 MG/ML
SUSPENSION/ DROPS OPHTHALMIC
Refills: 1 | COMMUNITY
Start: 2019-09-11 | End: 2020-06-11

## 2019-10-07 RX ORDER — DICLOFENAC SODIUM 1 MG/ML
SOLUTION/ DROPS OPHTHALMIC
Refills: 1 | COMMUNITY
Start: 2019-09-11 | End: 2019-11-07

## 2019-10-07 RX ORDER — OFLOXACIN 3 MG/ML
SOLUTION/ DROPS OPHTHALMIC
Refills: 1 | COMMUNITY
Start: 2019-09-11 | End: 2020-06-11

## 2019-10-07 NOTE — PROGRESS NOTES
Subjective:     Encounter Date:10/10/2019      Patient ID: Nancy Suárez is a 63 y.o. female.    Chief Complaint: 6 mo f/u for CAD  History of Present Illness     63 -year-old white female patient with a known history of CAD status post cardiac catheterization December 2014 showed chronically occluded RCA with left to right collaterals. patient left system has less than 30% disease.      patient was also diagnosed with severe anemia due to bone marrow problem rather than any GI bleed   EGD colposcopy and small-bowel capsule study   was negative. last hemoglobin 12          CAROLINE negative Bilateral  lower extremity  of 2/2015   patient known to have carotid disease status post PCI previously     The last  carotid Doppler showed 50-74 % bilateral disease  Patient followed by vascular surgeon    patient started smoking again   she does have some chronic renal insufficiency  followed by Nephrology   patient denies of any active cardiac symptoms,      echocardiogram 9/ 2018, EF is around 60% severely calcified posterior leaflet of the mitral valve with moderate mitral insufficiency noted no aortic stenosis   cardiac-wise she is doing well EKG today showed sinus rhythm left bundle-branch block unchanged   patient was strongly advised to stop smoking      Past Medical History:   Diagnosis Date   • Artery stenosis (CMS/HCC)     Bilateral subclavian s/p stent    • Arthritis    • AVM (arteriovenous malformation)    • CAD (coronary artery disease)    • CKD (chronic kidney disease)     Stage three    • COPD (chronic obstructive pulmonary disease) (CMS/HCC)    • DM2 (diabetes mellitus, type 2) (CMS/HCC)    • Eye pressure     Elevated eye pressure   • Gout    • Hepatitis     Hepatitis c    • HTN (hypertension)    • Hyperparathyroidism (CMS/HCC)    • KIRIT (iron deficiency anemia)    • Iron deficiency anemia    • Osteoporosis    • PVD (peripheral vascular disease) (CMS/HCC)      Past Surgical History:   Procedure Laterality Date    • CHOLECYSTECTOMY     • TOTAL ABDOMINAL HYSTERECTOMY WITH SALPINGO OOPHORECTOMY       There were no vitals taken for this visit.  Family History   Problem Relation Age of Onset   • Diabetes Mother    • Coronary artery disease Sister    • Diabetes Sister    • Diabetes Other    • Hypertension Other    • Breast cancer Paternal Aunt 50       Current Outpatient Medications:   •  albuterol (PROVENTIL) (2.5 MG/3ML) 0.083% nebulizer solution, Take 2.5 mg by nebulization Every 4 (Four) Hours As Needed for Wheezing or Shortness of Air. DX CODE J44.9, Disp: 120 vial, Rfl: 5  •  albuterol sulfate HFA (PROAIR HFA) 108 (90 Base) MCG/ACT inhaler, Inhale 2 puffs 4 (Four) Times a Day. Proair - DX CODE J44.9, Disp: 3 inhaler, Rfl: 1  •  amLODIPine (NORVASC) 5 MG tablet, TAKE 1 TABLET BY MOUTH EVERY DAY, Disp: 90 tablet, Rfl: 1  •  aspirin 81 MG tablet, Take 1 tablet by mouth Daily., Disp: , Rfl:   •  calcitriol (ROCALTROL) 0.25 MCG capsule, Take 1 capsule by mouth 3 (Three) Times a Week. Monday, Wednesday, AND Friday, Disp: , Rfl: 3  •  carvedilol (COREG) 12.5 MG tablet, TAKE 1 TABLET BY MOUTH TWICE A DAY, Disp: 180 tablet, Rfl: 1  •  Cholecalciferol 1000 units capsule, Take 1 capsule by mouth Daily., Disp: , Rfl:   •  Cyanocobalamin (B-12) 1000 MCG capsule, Take 1 tablet by mouth Daily., Disp: , Rfl:   •  diclofenac (VOLTAREN) 0.1 % ophthalmic solution, PLEASE SEE ATTACHED FOR DETAILED DIRECTIONS, Disp: , Rfl: 1  •  ferrous sulfate 325 (65 FE) MG tablet, Take 1 tablet by mouth 2 (Two) Times a Day., Disp: , Rfl:   •  furosemide (LASIX) 20 MG tablet, Take 1 tablet by mouth Daily., Disp: , Rfl:   •  ofloxacin (OCUFLOX) 0.3 % ophthalmic solution, PLEASE SEE ATTACHED FOR DETAILED DIRECTIONS, Disp: , Rfl: 1  •  Omega-3 Fatty Acids (FISH OIL) 1000 MG capsule capsule, Take 1 capsule by mouth 2 (Two) Times a Day With Meals., Disp: , Rfl:   •  prednisoLONE acetate (PRED FORTE) 1 % ophthalmic suspension, INSTILL 1 BY OPHTHALMIC ROUTE 1 GTTS  QID BEGINING AFTER SURGERY ACCORDING TO GTTS SHEET, Disp: , Rfl: 1  •  rosuvastatin (CRESTOR) 20 MG tablet, TAKE 1 TABLET BY MOUTH EVERY DAY, Disp: 30 tablet, Rfl: 2  Social History     Socioeconomic History   • Marital status: Legally      Spouse name: Not on file   • Number of children: Not on file   • Years of education: Not on file   • Highest education level: Not on file   Tobacco Use   • Smoking status: Current Every Day Smoker     Packs/day: 0.25     Types: Cigarettes     Start date: 1975   • Smokeless tobacco: Never Used   Substance and Sexual Activity   • Alcohol use: No     Frequency: Never   • Drug use: No   • Sexual activity: Defer   Social History Narrative    She is single, retired from [employer], she lives in [place] and has two grown daughters.     No Known Allergies  ROS           Objective:     Physical Exam    Procedures    Lab Review:       Assessment:         No diagnosis found.       Plan:

## 2019-10-10 ENCOUNTER — OFFICE VISIT (OUTPATIENT)
Dept: CARDIOLOGY | Facility: CLINIC | Age: 64
End: 2019-10-10

## 2019-10-10 VITALS
DIASTOLIC BLOOD PRESSURE: 79 MMHG | HEART RATE: 60 BPM | WEIGHT: 137.6 LBS | HEIGHT: 61 IN | OXYGEN SATURATION: 100 % | SYSTOLIC BLOOD PRESSURE: 170 MMHG | BODY MASS INDEX: 25.98 KG/M2

## 2019-10-10 DIAGNOSIS — E78.2 MIXED HYPERLIPIDEMIA: ICD-10-CM

## 2019-10-10 DIAGNOSIS — I34.0 NONRHEUMATIC MITRAL VALVE REGURGITATION: ICD-10-CM

## 2019-10-10 DIAGNOSIS — N28.9 RENAL INSUFFICIENCY: ICD-10-CM

## 2019-10-10 DIAGNOSIS — J42 CHRONIC BRONCHITIS, UNSPECIFIED CHRONIC BRONCHITIS TYPE (HCC): ICD-10-CM

## 2019-10-10 DIAGNOSIS — I25.10 CORONARY ARTERY DISEASE INVOLVING NATIVE CORONARY ARTERY OF NATIVE HEART WITHOUT ANGINA PECTORIS: ICD-10-CM

## 2019-10-10 DIAGNOSIS — I44.7 LEFT BUNDLE BRANCH BLOCK: Primary | ICD-10-CM

## 2019-10-10 DIAGNOSIS — I10 ESSENTIAL HYPERTENSION: ICD-10-CM

## 2019-10-10 PROCEDURE — 99214 OFFICE O/P EST MOD 30 MIN: CPT | Performed by: INTERNAL MEDICINE

## 2019-10-10 NOTE — PROGRESS NOTES
Subjective:     Encounter Date:10/10/2019      Patient ID: Nancy Suárez is a 63 y.o. female.    Chief Complaint: Follow-up for CAD  History of Present Illness     63 -year-old white female patient with a known history of CAD status post cardiac catheterization December 2014 showed chronically occluded RCA with left to right collaterals. patient left system has less than 30% disease.      patient was also diagnosed with severe anemia due to bone marrow problem rather than any GI bleed   EGD colposcopy and small-bowel capsule study   was negative. last hemoglobin 12          CAROLINE negative Bilateral  lower extremity  of 2/2015   patient known to have carotid disease status post PCI previously     The last  carotid Doppler showed 50-74 % bilateral disease  Patient followed by vascular surgeon    patient started smoking again   she does have some chronic renal insufficiency  followed by Nephrology   patient denies of any active cardiac symptoms,      echocardiogram 9/ 2018, EF is around 60% severely calcified posterior leaflet of the mitral valve with moderate mitral insufficiency noted no aortic stenosis  The blood pressure is running high Will start hydralazine 25 mg twice daily  Decrease the salt intake   patient was strongly advised to stop smoking  Follow-up in 6 months with labs and EKG  Recent labs showed creatinine is elevated LDL around 100 but triglycerides elevatedPatient will be followed by PCP    Past Medical History:   Diagnosis Date   • Artery stenosis (CMS/HCC)     Bilateral subclavian s/p stent    • Arthritis    • AVM (arteriovenous malformation)    • CAD (coronary artery disease)    • CKD (chronic kidney disease)     Stage three    • COPD (chronic obstructive pulmonary disease) (CMS/HCC)    • DM2 (diabetes mellitus, type 2) (CMS/HCC)    • Eye pressure     Elevated eye pressure   • Gout    • Hepatitis     Hepatitis c    • HTN (hypertension)    • Hyperparathyroidism (CMS/HCC)    • KIRIT (iron  "deficiency anemia)    • Iron deficiency anemia    • Osteoporosis    • PVD (peripheral vascular disease) (CMS/HCC)      Past Surgical History:   Procedure Laterality Date   • CHOLECYSTECTOMY     • TOTAL ABDOMINAL HYSTERECTOMY WITH SALPINGO OOPHORECTOMY       /79 (BP Location: Left arm, Patient Position: Sitting, Cuff Size: Large Adult)   Pulse 60   Ht 154.9 cm (61\")   Wt 62.4 kg (137 lb 9.6 oz)   SpO2 100%   BMI 26.00 kg/m²   Family History   Problem Relation Age of Onset   • Diabetes Mother    • Coronary artery disease Sister    • Diabetes Sister    • Diabetes Other    • Hypertension Other    • Breast cancer Paternal Aunt 50       Current Outpatient Medications:   •  albuterol (PROVENTIL) (2.5 MG/3ML) 0.083% nebulizer solution, Take 2.5 mg by nebulization Every 4 (Four) Hours As Needed for Wheezing or Shortness of Air. DX CODE J44.9, Disp: 120 vial, Rfl: 5  •  albuterol sulfate HFA (PROAIR HFA) 108 (90 Base) MCG/ACT inhaler, Inhale 2 puffs 4 (Four) Times a Day. Proair - DX CODE J44.9, Disp: 3 inhaler, Rfl: 1  •  amLODIPine (NORVASC) 5 MG tablet, TAKE 1 TABLET BY MOUTH EVERY DAY, Disp: 90 tablet, Rfl: 1  •  aspirin 81 MG tablet, Take 1 tablet by mouth Daily., Disp: , Rfl:   •  calcitriol (ROCALTROL) 0.25 MCG capsule, Take 1 capsule by mouth 3 (Three) Times a Week. Monday, Wednesday, AND Friday, Disp: , Rfl: 3  •  carvedilol (COREG) 12.5 MG tablet, TAKE 1 TABLET BY MOUTH TWICE A DAY, Disp: 180 tablet, Rfl: 1  •  Cholecalciferol 1000 units capsule, Take 1 capsule by mouth Daily., Disp: , Rfl:   •  Cyanocobalamin (B-12) 1000 MCG capsule, Take 1 tablet by mouth Daily., Disp: , Rfl:   •  diclofenac (VOLTAREN) 0.1 % ophthalmic solution, PLEASE SEE ATTACHED FOR DETAILED DIRECTIONS, Disp: , Rfl: 1  •  ferrous sulfate 325 (65 FE) MG tablet, Take 1 tablet by mouth 2 (Two) Times a Day., Disp: , Rfl:   •  furosemide (LASIX) 20 MG tablet, Take 1 tablet by mouth Daily., Disp: , Rfl:   •  ofloxacin (OCUFLOX) 0.3 % " ophthalmic solution, PLEASE SEE ATTACHED FOR DETAILED DIRECTIONS, Disp: , Rfl: 1  •  Omega-3 Fatty Acids (FISH OIL) 1000 MG capsule capsule, Take 1 capsule by mouth 2 (Two) Times a Day With Meals., Disp: , Rfl:   •  prednisoLONE acetate (PRED FORTE) 1 % ophthalmic suspension, INSTILL 1 BY OPHTHALMIC ROUTE 1 GTTS QID BEGINING AFTER SURGERY ACCORDING TO GTTS SHEET, Disp: , Rfl: 1  •  rosuvastatin (CRESTOR) 20 MG tablet, TAKE 1 TABLET BY MOUTH EVERY DAY, Disp: 30 tablet, Rfl: 2  Social History     Socioeconomic History   • Marital status: Legally      Spouse name: Not on file   • Number of children: Not on file   • Years of education: Not on file   • Highest education level: Not on file   Tobacco Use   • Smoking status: Current Every Day Smoker     Packs/day: 0.25     Types: Cigarettes     Start date: 1975   • Smokeless tobacco: Never Used   Substance and Sexual Activity   • Alcohol use: No     Frequency: Never   • Drug use: No   • Sexual activity: Defer   Social History Narrative    She is single, retired from [employer], she lives in [place] and has two grown daughters.     No Known Allergies  Review of Systems   Constitution: Negative for fever and malaise/fatigue.   HENT: Negative for congestion and hearing loss.    Eyes: Negative for double vision and visual disturbance.   Cardiovascular: Negative for chest pain, claudication, dyspnea on exertion, leg swelling and syncope.   Respiratory: Negative for cough and shortness of breath.    Endocrine: Negative for cold intolerance.   Skin: Negative for color change and rash.   Musculoskeletal: Negative for arthritis and joint pain.   Gastrointestinal: Negative for abdominal pain and heartburn.   Genitourinary: Negative for hematuria.   Neurological: Negative for excessive daytime sleepiness and dizziness.   Psychiatric/Behavioral: Negative for depression. The patient is not nervous/anxious.    All other systems reviewed and are negative.              Objective:     Physical Exam   Constitutional: She is oriented to person, place, and time. She appears well-developed and well-nourished. She is cooperative.   HENT:   Head: Normocephalic and atraumatic.   Mouth/Throat: Uvula is midline and oropharynx is clear and moist. No oral lesions.   Eyes: Conjunctivae are normal. No scleral icterus.   Neck: Trachea normal. Neck supple. No JVD present. Carotid bruit is not present. No thyromegaly present.   Cardiovascular: Normal rate, regular rhythm, S1 normal, S2 normal, normal heart sounds, intact distal pulses and normal pulses. PMI is not displaced. Exam reveals no gallop and no friction rub.   No murmur heard.  Pulmonary/Chest: Effort normal and breath sounds normal.   Abdominal: Soft. Bowel sounds are normal.   Musculoskeletal: Normal range of motion.   Neurological: She is alert and oriented to person, place, and time. She has normal strength.   No focal deficits   Skin: Skin is warm. No cyanosis.   Psychiatric: She has a normal mood and affect.       Procedures    Lab Review:       Assessment:          Diagnosis Plan   1. Left bundle branch block     2. Coronary artery disease involving native coronary artery of native heart without angina pectoris     3. Mixed hyperlipidemia     4. Essential hypertension     5. Nonrheumatic mitral valve regurgitation     6. Chronic bronchitis, unspecified chronic bronchitis type (CMS/HCC)     7. Renal insufficiency            Plan:     Chronic left bundle branch block stable  History of CAD patient was advised to modify cardiac risk factors aggressively stop smoking  Dyslipidemia elevated triglyceridesWill be followed by PCP  Hypertension uncontrolled we will start hydralazine  Chronic renal insufficiency followed by nephrology  COPD patient was strongly advised to stop smoking

## 2019-10-30 RX ORDER — ROSUVASTATIN CALCIUM 20 MG/1
TABLET, COATED ORAL
Qty: 90 TABLET | Refills: 0 | Status: SHIPPED | OUTPATIENT
Start: 2019-10-30 | End: 2020-08-04 | Stop reason: ALTCHOICE

## 2019-11-07 ENCOUNTER — OFFICE VISIT (OUTPATIENT)
Dept: FAMILY MEDICINE CLINIC | Facility: CLINIC | Age: 64
End: 2019-11-07

## 2019-11-07 VITALS
BODY MASS INDEX: 25.95 KG/M2 | DIASTOLIC BLOOD PRESSURE: 78 MMHG | TEMPERATURE: 97.4 F | HEIGHT: 62 IN | WEIGHT: 141 LBS | OXYGEN SATURATION: 100 % | HEART RATE: 67 BPM | SYSTOLIC BLOOD PRESSURE: 178 MMHG

## 2019-11-07 DIAGNOSIS — Z78.0 POSTMENOPAUSAL: ICD-10-CM

## 2019-11-07 DIAGNOSIS — J30.9 ALLERGIC RHINITIS, UNSPECIFIED SEASONALITY, UNSPECIFIED TRIGGER: Chronic | ICD-10-CM

## 2019-11-07 DIAGNOSIS — Z12.31 OTHER SCREENING MAMMOGRAM: ICD-10-CM

## 2019-11-07 DIAGNOSIS — E11.9 TYPE 2 DIABETES MELLITUS WITHOUT COMPLICATION, WITH LONG-TERM CURRENT USE OF INSULIN (HCC): Primary | ICD-10-CM

## 2019-11-07 DIAGNOSIS — Z79.4 TYPE 2 DIABETES MELLITUS WITHOUT COMPLICATION, WITH LONG-TERM CURRENT USE OF INSULIN (HCC): Primary | ICD-10-CM

## 2019-11-07 PROCEDURE — 99213 OFFICE O/P EST LOW 20 MIN: CPT | Performed by: NURSE PRACTITIONER

## 2019-11-07 RX ORDER — HYDRALAZINE HYDROCHLORIDE 25 MG/1
1 TABLET, FILM COATED ORAL 2 TIMES DAILY
Refills: 5 | COMMUNITY
Start: 2019-10-10 | End: 2020-01-27

## 2019-11-07 RX ORDER — PSEUDOEPHEDRINE HCL 30 MG
30 TABLET ORAL EVERY 4 HOURS PRN
Qty: 60 TABLET | Refills: 2 | Status: SHIPPED | OUTPATIENT
Start: 2019-11-07 | End: 2020-06-11

## 2019-11-07 RX ORDER — FUROSEMIDE 20 MG/1
20 TABLET ORAL DAILY
COMMUNITY
End: 2019-12-04 | Stop reason: SDUPTHER

## 2019-11-07 RX ORDER — LORATADINE 10 MG/1
10 TABLET ORAL DAILY
Qty: 30 TABLET | Refills: 2 | Status: SHIPPED | OUTPATIENT
Start: 2019-11-07 | End: 2020-01-27

## 2019-11-07 RX ORDER — FLUTICASONE PROPIONATE 50 MCG
2 SPRAY, SUSPENSION (ML) NASAL DAILY
Qty: 1 BOTTLE | Refills: 2 | Status: SHIPPED | OUTPATIENT
Start: 2019-11-07 | End: 2019-11-29 | Stop reason: SDUPTHER

## 2019-11-07 NOTE — PATIENT INSTRUCTIONS
Take allergy medication as directed if not better  Call office    Keep appointment for DEXA scan and mammogram

## 2019-11-07 NOTE — PROGRESS NOTES
Subjective   Nancy Suárez is a 64 y.o. female.     64-year-old white female smoker with history of COPD, CAD, CHF, hyperlipidemia, neurogenic claudication and iron deficiency anemia who comes in today with several day history of sinus pressure and a productive cough mostly at night when she first gets up in the morning.  Exam reveals severe allergic rhinitis and placing her on allergy medication if she is not better by Monday I will send her antibiotic  Blood pressure a little elevated 170/78 heart rate 64 but she states it runs lower at home she denies any chest pain, dyspnea, tachycardia, dizziness or edema  Patient is up-to-date on immunizations but needs a mammogram and DEXA scan and we are scheduling her for her       Claritin 10 mg liya/ly Flonase nasal spray/ Sudafed 30 mg three times a day as needed   DEXA scan/ mammogram         The following portions of the patient's history were reviewed and updated as appropriate: allergies, current medications, past family history, past medical history, past social history, past surgical history and problem list.    Review of Systems   Constitutional: Negative.    HENT: Positive for postnasal drip, rhinorrhea and sinus pressure.    Eyes: Negative.    Respiratory: Positive for cough.    Cardiovascular: Negative.    Gastrointestinal: Negative.    Genitourinary: Negative.    Musculoskeletal: Negative.    Psychiatric/Behavioral: Negative.        Objective   Physical Exam   Constitutional: She is oriented to person, place, and time. She appears well-developed and well-nourished.   HENT:   Heavy postnasal drip,  Inflamed terminates and fluid behind the ears   Cardiovascular: Normal rate and regular rhythm.   Pulmonary/Chest: Effort normal and breath sounds normal.   Abdominal: Soft. Bowel sounds are normal.   Musculoskeletal: Normal range of motion.   Neurological: She is oriented to person, place, and time.   Skin: Skin is warm and dry.   Psychiatric: She has a normal mood  and affect.         Assessment/Plan   Nancy was seen today for cough.    Diagnoses and all orders for this visit:    Type 2 diabetes mellitus without complication, with long-term current use of insulin (CMS/[facility])  -     Hemoglobin A1c    Allergic rhinitis, unspecified seasonality, unspecified trigger    Other screening mammogram  -     Mammo Screening Digital Tomosynthesis Bilateral With CAD; Future    Postmenopausal  -     DEXA Bone Density Axial; Future    Other orders  -     fluticasone (FLONASE) 50 MCG/ACT nasal spray; 2 sprays into the nostril(s) as directed by provider Daily.  -     pseudoephedrine (SUDAFED) 30 MG tablet; Take 1 tablet by mouth Every 4 (Four) Hours As Needed for Congestion.  -     loratadine (CLARITIN) 10 MG tablet; Take 1 tablet by mouth Daily.

## 2019-11-08 LAB — HBA1C MFR BLD: 5.5 % (ref 4.8–5.6)

## 2019-11-11 ENCOUNTER — TELEPHONE (OUTPATIENT)
Dept: FAMILY MEDICINE CLINIC | Facility: CLINIC | Age: 64
End: 2019-11-11

## 2019-11-12 RX ORDER — AZITHROMYCIN 250 MG/1
TABLET, FILM COATED ORAL
Qty: 6 TABLET | Refills: 0 | Status: SHIPPED | OUTPATIENT
Start: 2019-11-12 | End: 2020-03-03

## 2019-11-21 ENCOUNTER — HOSPITAL ENCOUNTER (OUTPATIENT)
Dept: BONE DENSITY | Facility: HOSPITAL | Age: 64
Discharge: HOME | End: 2019-11-21

## 2019-11-21 ENCOUNTER — HOSPITAL ENCOUNTER (OUTPATIENT)
Dept: MAMMOGRAPHY | Facility: HOSPITAL | Age: 64
Discharge: HOME | End: 2019-11-21
Admitting: NURSE PRACTITIONER

## 2019-11-21 DIAGNOSIS — Z12.31 OTHER SCREENING MAMMOGRAM: ICD-10-CM

## 2019-11-21 DIAGNOSIS — Z78.0 POSTMENOPAUSAL: ICD-10-CM

## 2019-11-21 PROCEDURE — 77067 SCR MAMMO BI INCL CAD: CPT

## 2019-11-21 PROCEDURE — 77063 BREAST TOMOSYNTHESIS BI: CPT

## 2019-12-02 RX ORDER — FLUTICASONE PROPIONATE 50 MCG
SPRAY, SUSPENSION (ML) NASAL
Qty: 16 ML | Refills: 2 | Status: SHIPPED | OUTPATIENT
Start: 2019-12-02 | End: 2020-06-11

## 2019-12-04 RX ORDER — FUROSEMIDE 20 MG/1
TABLET ORAL
Qty: 90 TABLET | Refills: 1 | Status: SHIPPED | OUTPATIENT
Start: 2019-12-04 | End: 2020-08-04 | Stop reason: SDUPTHER

## 2019-12-23 RX ORDER — CARVEDILOL 12.5 MG/1
TABLET ORAL
Qty: 180 TABLET | Refills: 1 | Status: SHIPPED | OUTPATIENT
Start: 2019-12-23 | End: 2020-06-15

## 2019-12-26 ENCOUNTER — TELEPHONE (OUTPATIENT)
Dept: FAMILY MEDICINE CLINIC | Facility: CLINIC | Age: 64
End: 2019-12-26

## 2019-12-26 NOTE — TELEPHONE ENCOUNTER
Received fax from [facility] stating that they had s/w this patient in re: to her asthma care and that she is not on a daily controller medication currently, but has been on Ipratropium Br 0.02% q6h prn and it was last filled in 2018. Would you like to place patient back on this medication? Thanks.

## 2019-12-28 RX ORDER — IPRATROPIUM BROMIDE AND ALBUTEROL SULFATE 2.5; .5 MG/3ML; MG/3ML
3 SOLUTION RESPIRATORY (INHALATION) EVERY 4 HOURS PRN
Qty: 120 VIAL | Refills: 6 | Status: SHIPPED | OUTPATIENT
Start: 2019-12-28 | End: 2020-03-06 | Stop reason: SDUPTHER

## 2019-12-28 NOTE — TELEPHONE ENCOUNTER
I stop her albuterol nebulizer treatment and placed her on duo nebs q.6 hours which were called to her pharmacy

## 2020-01-21 ENCOUNTER — HOSPITAL ENCOUNTER (OUTPATIENT)
Dept: BONE DENSITY | Facility: HOSPITAL | Age: 65
Discharge: HOME | End: 2020-01-21
Admitting: NURSE PRACTITIONER

## 2020-01-21 PROCEDURE — 77080 DXA BONE DENSITY AXIAL: CPT

## 2020-01-27 RX ORDER — HYDRALAZINE HYDROCHLORIDE 25 MG/1
TABLET, FILM COATED ORAL
Qty: 180 TABLET | Refills: 0 | Status: SHIPPED | OUTPATIENT
Start: 2020-01-27 | End: 2020-04-23

## 2020-01-27 RX ORDER — LORATADINE 10 MG/1
TABLET ORAL
Qty: 30 TABLET | Refills: 2 | Status: SHIPPED | OUTPATIENT
Start: 2020-01-27 | End: 2022-08-16

## 2020-02-24 ENCOUNTER — TELEPHONE (OUTPATIENT)
Dept: ONCOLOGY | Facility: CLINIC | Age: 65
End: 2020-02-24

## 2020-02-24 NOTE — TELEPHONE ENCOUNTER
Pt requesting her lab orders be sent over before her next appt.    Pt Contact: [phone]  Fax: [phone]

## 2020-02-28 LAB
BASOPHILS # BLD AUTO: 0 X10E3/UL (ref 0–0.2)
BASOPHILS NFR BLD AUTO: 0 %
EOSINOPHIL # BLD AUTO: 0.1 X10E3/UL (ref 0–0.4)
EOSINOPHIL NFR BLD AUTO: 3 %
ERYTHROCYTE [DISTWIDTH] IN BLOOD BY AUTOMATED COUNT: 13.9 % (ref 11.7–15.4)
FERRITIN SERPL-MCNC: 57 NG/ML (ref 15–150)
HCT VFR BLD AUTO: 39.1 % (ref 34–46.6)
HGB BLD-MCNC: 13.1 G/DL (ref 11.1–15.9)
IMM GRANULOCYTES # BLD AUTO: 0 X10E3/UL (ref 0–0.1)
IMM GRANULOCYTES NFR BLD AUTO: 0 %
IRON SATN MFR SERPL: 65 % (ref 15–55)
IRON SERPL-MCNC: 174 UG/DL (ref 27–139)
LYMPHOCYTES # BLD AUTO: 1.6 X10E3/UL (ref 0.7–3.1)
LYMPHOCYTES NFR BLD AUTO: 29 %
MCH RBC QN AUTO: 33.9 PG (ref 26.6–33)
MCHC RBC AUTO-ENTMCNC: 33.5 G/DL (ref 31.5–35.7)
MCV RBC AUTO: 101 FL (ref 79–97)
MONOCYTES # BLD AUTO: 0.5 X10E3/UL (ref 0.1–0.9)
MONOCYTES NFR BLD AUTO: 9 %
NEUTROPHILS # BLD AUTO: 3.3 X10E3/UL (ref 1.4–7)
NEUTROPHILS NFR BLD AUTO: 59 %
PLATELET # BLD AUTO: 198 X10E3/UL (ref 150–450)
RBC # BLD AUTO: 3.87 X10E6/UL (ref 3.77–5.28)
TIBC SERPL-MCNC: 269 UG/DL (ref 250–450)
UIBC SERPL-MCNC: 95 UG/DL (ref 118–369)
WBC # BLD AUTO: 5.6 X10E3/UL (ref 3.4–10.8)

## 2020-03-01 ENCOUNTER — RESULTS ENCOUNTER (OUTPATIENT)
Dept: ONCOLOGY | Facility: CLINIC | Age: 65
End: 2020-03-01

## 2020-03-01 DIAGNOSIS — D50.9 IRON DEFICIENCY ANEMIA, UNSPECIFIED IRON DEFICIENCY ANEMIA TYPE: ICD-10-CM

## 2020-03-02 NOTE — PROGRESS NOTES
HEMATOLOGY ONCOLOGY FOLLOW UP        Patient name: Nancy Suárez  : 1955  MRN: [id number]  Primary Care Physician: Ingrid De Anda APRN  Referring Physician: Ingrid De Anda APRN  Reason For Consult:     Chief Complaint   Patient presents with   • Follow-up     Iron deficiency anemia, unspecified iron deficiency anemia type       History of Present Illness:  Ms. Suárez is a 63-year-old female who presented to her Primary Care Physician, Dr. Garcia’s office with symptoms of fatigue and weakness.  She was initially seen on 6/23/15 and her hemoglobin was 10.6 with an MCV of 102 at that time.  Repeat CBC on 8/5/15 showed a declining hemoglobin to 7.5 and MCV was 99.4.  Platelets and hemoglobin were again normal.  She was started on iron supplementation and repeat CBC on 8/18/15 showed a slight increase to 7.9.  She does have chronic kidney disease.  Her ferritin was low at 20 on 9/1/15.      However, when CBC was repeated on 9/22/15 hemoglobin was down to 4.9 and MVC also trended down to 85.  Patient was referred to us for further evaluation.  She already was scheduled to see gastroenterology.  Dr. Garcia ordered a stool hemoccult test that was apparently positive as well.      9/22/15 - Patient presented for hematologic consultation.  She reported being very tired.  She also felt dizzy and had unsteady gait.  Dizziness was more profound when she stood up or sat up from a lying posture.  Her blood pressure had also been running low.  Hypertension medications were also being held by her Primary Care Physician.  The patient denied noticing any bleeding per rectum.  She did have black stools ever since she had been taking iron.  She did not report weight loss.  She had a good appetite.       • 6/23/15 - WBC 7.5, hemoglobin 10.6, .3, platelet count 184,000.    • 7/8/15 - Vitamin B12 491 (N).   • 8/5/15 - WBC 5.5, hemoglobin 7.5, platelet count 220,000, MCV 99.4.  • 8/18/15 - WBC 6.5,  hemoglobin 7.9, platelet count 212,000.    • 9/22/15 - Creatinine 1.72, hemoglobin 4.9, WBC 8.8, platelet count 246,000, MCV 85.0.    • 9/23/15 - Patient underwent EGD and colonoscopy.  EGD was unremarkable up to the third part of the duodenum.  There were no AVM’s or any source of bleeding.  Colonoscopy was unremarkable except for small to moderate sized hemorrhoids and scattered diverticulosis.  Patient also received IV Venofer in the hospital.    • 9/22/15 to 9/25/15 - Patient admitted to [facility] for severe anemia.  Hemoglobin 4.1.  She was transfused about 4 units.    • 9/25/15 - M2A capsule was performed in the hospital and that also was negative for any bleeding source.    • 9/29/15 - Creatinine 1.4, BUN 13, TSH 0.7.    • 10/1/15 - WBC 7.5, hemoglobin 11.1, platelet count 213,000, MCV 90.1.  Creatinine 1.7.   • 10/22/15 - Creatinine 1.68.  Iron saturation 49%.  TIBC 287.  Serum iron 140.  PTH intact 108 (H).   • 10/30/15 - WBC 5.3, hemoglobin 11.1, platelet count 171,000.  • 11/3/15 - WBC 4.8, hemoglobin 9.5, platelet count 159,000, MCV 99.2.  • 12/28/15 - Iron saturation 43%.  TIBC 316, serum iron 137.  WBC 6.4, hemoglobin 11.3, platelet count 151,000, .8.  • 4/26/16 - WBC 6.1, hemoglobin 12.1, platelet count 205,000, .1.  Iron saturation 21%, TIBC 302, serum iron 63 (patient taking two iron tablets a day).  • 10/26/16 - Ferritin 36, iron saturation 14%, TIBC 347, serum iron 49.  WBC 6.0, hemoglobin 10.9, .7, platelet count 222,000.  • November 2016 to March 2018 - Patient has not followed up in our office.    • 3/27/17 - Creatinine 1.4.    • 11/15/17 - Ferritin 34, iron saturation 88% (H), TIBC 308, serum iron 270 (H).    • 12/20/17 - DEXAscan:  No evidence of osteopenia or osteoporosis.    • 3/6/18 - Hemoglobin 6.3.  Patient transfused 2 units.    • 3/15/18 - WBC 6.1, hemoglobin 10.6, platelet count 235,000, MCV 90.7.  Iron saturation 49%, TIBC 346, serum iron 175.  B12  1500.  Ferritin 124.  Folate 15.9.  Retic count 5.29% (H).  • 3/27/18 - Patient evaluated by ENT, Dr. Doshi.  Examination revealed small bleeding sites from the left mid and posterior septum.  Patient had cauterization.  She was also diagnosed with some allergic sinusitis.    • 3/27/18 - Stool Hemoccult test positive x3.    • 4/4/18 - WBC 4.6, hemoglobin 10.9, platelet count 188,000, MCV 94.7.  Ferritin 48.  Iron saturation 42%, TIBC 297, serum iron 126.  • 6/14/18 - EGD and colonoscopy:  Multiple AV malformations in the upper GI tract and ascending colon, which could be the source of her chronic blood loss.  The AVMs in the colon were cauterized after injecting epinephrine and one Endoclip was placed.  Another small AVM was cauterized with APC.  There was scattered diverticulosis and moderate sized hemorrhoid.  AVMs in the duodenum were also cauterized with APC.    • 6/25/18 - Ferritin 57.  Iron saturation 39%, TIBC 302, serum iron 118.  PTH intact 42.  Uric acid 7.2.  WBC 5.6, hemoglobin 14.1, platelet count 220,000, MCV 97.8.    • 1/17/19 - Creatinine 1.49, BUN 18.  LFTs are normal.    • 8/27/2019 creatinine 1.38, BUN is 14, PTH is 45, serum iron 203 high, ferritin 62, iron saturation 77% high, TIBC 263, WBC 5.3, hemoglobin 12.3, platelets 189, MCV 97, vitamin D 25.9 low  • 10/3/2019: Creatinine 1.4   • 11/21/2019: DEXA scan: Osteopenia.  T score in favor is less than 1.3  • 2/27/2020: Iron 174 high, ferritin 57, iron saturation 65% high, TIBC 269, WBC 5.6, hemoglobin 13.1, platelets 198, ,          Subjective:03/03/2020  Patient is here for a follow up appointment.She states that she has had no changes in the past six months. She states she can tell when she gets low on blood and she is feeling good today. She states at the first of the year her insurance changed and would no longer cover the Iron we prescribed, so she has been using over the counter BID. She continues to take Vitamin D. She denies  any SOA or GI issues.  She has osteopenia on recent DEXA scan and is taking calcium.  Does not report any blood in the stool.    The following portions of the patient's history were reviewed and updated as appropriate: allergies, current medications, past family history, past medical history, past social history, past surgical history and problem list.         Past Medical History:   Diagnosis Date   • Artery stenosis (CMS/HCC)     Bilateral subclavian s/p stent    • Arthritis    • AVM (arteriovenous malformation)    • CAD (coronary artery disease)    • CKD (chronic kidney disease)     Stage three    • COPD (chronic obstructive pulmonary disease) (CMS/HCC)    • DM2 (diabetes mellitus, type 2) (CMS/HCC)    • Eye pressure     Elevated eye pressure   • Gout    • Hepatitis     Hepatitis c    • HTN (hypertension)    • Hyperparathyroidism (CMS/HCC)    • KIRIT (iron deficiency anemia)    • Iron deficiency anemia    • Osteoporosis    • PVD (peripheral vascular disease) (CMS/HCC)        Past Surgical History:   Procedure Laterality Date   • CATARACT EXTRACTION Left    • CHOLECYSTECTOMY     • EYE SURGERY     • TOTAL ABDOMINAL HYSTERECTOMY WITH SALPINGO OOPHORECTOMY           Current Outpatient Medications:   •  albuterol sulfate HFA (PROAIR HFA) 108 (90 Base) MCG/ACT inhaler, Inhale 2 puffs 4 (Four) Times a Day. Proair - DX CODE J44.9, Disp: 3 inhaler, Rfl: 1  •  amLODIPine (NORVASC) 5 MG tablet, TAKE 1 TABLET BY MOUTH EVERY DAY, Disp: 90 tablet, Rfl: 1  •  aspirin 81 MG tablet, Take 1 tablet by mouth Daily., Disp: , Rfl:   •  calcitriol (ROCALTROL) 0.25 MCG capsule, Take 1 capsule by mouth 3 (Three) Times a Week. Monday, Wednesday, AND Friday, Disp: , Rfl: 3  •  carvedilol (COREG) 12.5 MG tablet, TAKE 1 TABLET BY MOUTH TWICE A DAY, Disp: 180 tablet, Rfl: 1  •  Cholecalciferol 1000 units capsule, Take 1 capsule by mouth Daily., Disp: , Rfl:   •  Cyanocobalamin (B-12) 1000 MCG capsule, Take 1 tablet by mouth Daily., Disp: , Rfl:    •  ferrous sulfate 325 (65 FE) MG tablet, Take 1 tablet by mouth 2 (Two) Times a Day., Disp: , Rfl:   •  fluticasone (FLONASE) 50 MCG/ACT nasal spray, USE 2 SPRAYS IN EACH NOSTRIL DAILY AS DIRECTED, Disp: 16 mL, Rfl: 2  •  furosemide (LASIX) 20 MG tablet, TAKE 1 TABLET BY MOUTH EVERY DAY, Disp: 90 tablet, Rfl: 1  •  hydrALAZINE (APRESOLINE) 25 MG tablet, TAKE 1 TABLET TWICE DAILY, Disp: 180 tablet, Rfl: 0  •  ipratropium-albuterol (DUO-NEB) 0.5-2.5 mg/3 ml nebulizer, Take 3 mL by nebulization Every 4 (Four) Hours As Needed for Wheezing., Disp: 120 vial, Rfl: 6  •  loratadine (CLARITIN) 10 MG tablet, TAKE 1 TABLET BY MOUTH EVERY DAY, Disp: 30 tablet, Rfl: 2  •  ofloxacin (OCUFLOX) 0.3 % ophthalmic solution, PLEASE SEE ATTACHED FOR DETAILED DIRECTIONS, Disp: , Rfl: 1  •  Omega-3 Fatty Acids (FISH OIL) 1000 MG capsule capsule, Take 1 capsule by mouth 2 (Two) Times a Day With Meals., Disp: , Rfl:   •  prednisoLONE acetate (PRED FORTE) 1 % ophthalmic suspension, INSTILL 1 BY OPHTHALMIC ROUTE 1 GTTS QID BEGINING AFTER SURGERY ACCORDING TO GTTS SHEET, Disp: , Rfl: 1  •  pseudoephedrine (SUDAFED) 30 MG tablet, Take 1 tablet by mouth Every 4 (Four) Hours As Needed for Congestion., Disp: 60 tablet, Rfl: 2  •  rosuvastatin (CRESTOR) 20 MG tablet, TAKE 1 TABLET BY MOUTH EVERY DAY, Disp: 90 tablet, Rfl: 0    No Known Allergies    Family History   Problem Relation Age of Onset   • Diabetes Mother    • Coronary artery disease Sister    • Diabetes Sister    • Breast cancer Paternal Aunt 50       Cancer-related family history includes Breast cancer (age of onset: 50) in her paternal aunt.    Social History     Tobacco Use   • Smoking status: Current Every Day Smoker     Packs/day: 0.25     Types: Cigarettes     Start date: 1975   • Smokeless tobacco: Never Used   Substance Use Topics   • Alcohol use: No     Frequency: Never   • Drug use: No         I have reviewed the history of present illness, past medical history, family  "history, social history, lab results, all notes and other records since the patient was last seen on  Visit date not found.    ROS:     Review of Systems   Constitutional: Positive for fatigue. Negative for chills and fever.   HENT: Negative for ear pain, mouth sores, nosebleeds and sore throat.    Eyes: Negative for photophobia and visual disturbance.   Respiratory: Negative for wheezing and stridor.    Cardiovascular: Negative for chest pain and palpitations.   Gastrointestinal: Negative for abdominal pain, blood in stool, constipation, diarrhea, nausea and vomiting.   Endocrine: Negative for cold intolerance and heat intolerance.   Genitourinary: Negative for dysuria and hematuria.   Musculoskeletal: Negative for joint swelling and neck stiffness.   Skin: Negative for color change and rash.   Neurological: Negative for seizures and syncope.   Hematological: Negative for adenopathy.        No obvious bleeding   Psychiatric/Behavioral: Negative for agitation, confusion and hallucinations.       Objective:    Vitals:    03/03/20 1021   BP: 162/81   Pulse: 88   Resp: 18   Temp: 97.8 °F (36.6 °C)   Weight: 65.5 kg (144 lb 8 oz)   Height: 156.2 cm (61.5\")   PainSc: 0-No pain     ECOG  (1) Restricted in physically strenuous activity, ambulatory and able to do work of light nature    Physical Exam:   Physical Exam   Constitutional: She is oriented to person, place, and time. She appears well-developed and well-nourished. No distress.   HENT:   Head: Normocephalic and atraumatic.   Eyes: Conjunctivae and EOM are normal. Right eye exhibits no discharge. Left eye exhibits no discharge. No scleral icterus.   Neck: Normal range of motion. Neck supple. No thyromegaly present.   Cardiovascular: Normal rate, regular rhythm and normal heart sounds. Exam reveals no gallop and no friction rub.   Ejection systolic murmur heard.   Pulmonary/Chest: Effort normal. No stridor. No respiratory distress. She has no wheezes.   Abdominal: " Soft. Bowel sounds are normal. She exhibits no mass. There is no tenderness. There is no rebound and no guarding.   Musculoskeletal: Normal range of motion. She exhibits no tenderness.   Lymphadenopathy:     She has no cervical adenopathy.   Neurological: She is alert and oriented to person, place, and time. She exhibits normal muscle tone.   Skin: Skin is warm. No rash noted. She is not diaphoretic. No erythema.   Psychiatric: She has a normal mood and affect. Her behavior is normal.   Nursing note and vitals reviewed.            Lab Results - Last 18 Months   Lab Units 02/27/20  0906 08/27/19  1006 08/27/19  1000 02/27/19  0915   WBC x10E3/uL 5.6 5.3 5.6 5.1 THOUSAND/UL   HEMOGLOBIN g/dL 13.1 12.3 13.2 11.3*   HEMATOCRIT % 39.1 37.4 39.9 32.9*   PLATELETS x10E3/uL 198 189  --  215 THOUSAND/UL   MCV fL 101* 97 95 95.9     Lab Results - Last 18 Months   Lab Units 10/03/19  0903 08/27/19  1000 04/09/19  0841 02/27/19  0915 01/16/19  1054   SODIUM mmol/L 144 139 138 139 138   POTASSIUM mmol/L 4.5 4.3 4.3 4.4 4.2   CHLORIDE mmol/L 104 103 100 105 101   TOTAL CO2 mmol/L 18* 22 23  --   --    BUN mg/dL 17 14 15 18 18   CREATININE mg/dL 1.41* 1.38* 1.3* 1.41* 1.49*   CALCIUM mg/dL 8.6* 9.0 8.9 8.9 9.3   BILIRUBIN mg/dL 0.2  --  0.8 0.3 0.3   ALK PHOS IU/L 69  --  55 55 66   ALT (SGPT) IU/L 12  --  14 13 8   AST (SGOT) IU/L 18  --  18 14 16   GLUCOSE mg/dL  --   --  102* 133* 121*       Lab Results   Component Value Date    GLUCOSE 102 (H) 04/09/2019    BUN 17 10/03/2019    CREATININE 1.41 (H) 10/03/2019    EGFRIFNONA 39 (L) 10/03/2019    EGFRIFAFRI 45 (L) 10/03/2019    BCR 12 10/03/2019    K 4.5 10/03/2019    CO2 18 (L) 10/03/2019    CALCIUM 8.6 (L) 10/03/2019    PROTENTOTREF 6.7 10/03/2019    ALBUMIN 4.4 10/03/2019    LABIL2 1.9 10/03/2019    AST 18 10/03/2019    ALT 12 10/03/2019       No results for input(s): APTT, INR, PTT in the last 66780 hours.    Lab Results   Component Value Date    IRON 199 (H) 08/27/2019     TIBC 269 02/27/2020    FERRITIN 57 02/27/2020       Lab Results   Component Value Date    FOLATE 21.4 02/27/2019       No results found for: OCCULTBLD    Lab Results   Component Value Date    RETICCTPCT 3.4 03/05/2018       Lab Results   Component Value Date    AGVTXIIJ51 >2000 pg/mL (H) 02/27/2019     No results found for: SPEP, UPEP  Uric Acid   Date Value Ref Range Status   08/27/2019 6.2 2.5 - 7.1 mg/dL Final     Comment:                Therapeutic target for gout patients: <6.0     Lab Results   Component Value Date    SEDRATE 115 (H) 09/19/2018     No results found for: FIBRINOGEN, HAPTOGLOBIN  No results found for: PTT, INR  No results found for:   No results found for: CEA  No components found for: CA-19-9  No results found for: PSA          Assessment/Plan     Assessment:  1. History of iron deficiency anemia:  She is currently taking iron p.o. two times a day.  Her EGD and colonoscopy showed several AVMs in the duodenum as well as in the colon, which were cauterized.  AVMs are in the mid ascending colon and in the duodenum.  There was also a small erosion in the stomach and gastritis (June 2018).  She continues on iron supplements.    2. Chronic kidney disease: Creatinine has ranged around 1.3-1.4  3. History of nose bleeds:  Resolved.    4. History of occult GI bleeding:  Stool Hemoccult test was positive x3 in March 2018 and she was sent to see GI.  5. Osteopenia    PLAN:    1. Iron levels have been trending up.  Reduce iron supplement to 1 tablet daily  2.  Check CBC and iron levels in 6 months.  3. Recommended to take vitamin D and calcium for her osteopenia                Iron deficiency anemia, unspecified iron deficiency anemia type  - CBC & Differential  - Iron Profile  - Ferritin    Orders Placed This Encounter   Procedures   • Iron Profile     1 week prior to appt     Standing Status:   Future     Scheduling Instructions:      Please draw these labs prior to the next visit.   • Ferritin      1 week prior to appt     Standing Status:   Future     Scheduling Instructions:      Please draw these labs prior to the next visit.   • CBC & Differential     Please draw these labs prior to the next visit.  1 week prior to appt     Standing Status:   Future     Scheduling Instructions:      Please draw these labs prior to the next visit.     Order Specific Question:   Manual Differential     Answer:   No                     Thank you very much for providing the opportunity to participate in this patient’s care. Please do not hesitate to call if there are any other questions.    I have reviewed all relevant labs results, imaging,Outside reports,notes, vitals, medications and plan with the patient today.    Portions of the note have been Scribed by medical assistant/Nurse.  I have reviewed and made changes accordingly.

## 2020-03-03 ENCOUNTER — OFFICE VISIT (OUTPATIENT)
Dept: ONCOLOGY | Facility: CLINIC | Age: 65
End: 2020-03-03

## 2020-03-03 VITALS
BODY MASS INDEX: 26.59 KG/M2 | SYSTOLIC BLOOD PRESSURE: 162 MMHG | TEMPERATURE: 97.8 F | HEIGHT: 62 IN | HEART RATE: 88 BPM | WEIGHT: 144.5 LBS | RESPIRATION RATE: 18 BRPM | DIASTOLIC BLOOD PRESSURE: 81 MMHG

## 2020-03-03 DIAGNOSIS — D50.9 IRON DEFICIENCY ANEMIA, UNSPECIFIED IRON DEFICIENCY ANEMIA TYPE: Primary | ICD-10-CM

## 2020-03-03 PROCEDURE — 99213 OFFICE O/P EST LOW 20 MIN: CPT | Performed by: INTERNAL MEDICINE

## 2020-03-07 RX ORDER — IPRATROPIUM BROMIDE AND ALBUTEROL SULFATE 2.5; .5 MG/3ML; MG/3ML
3 SOLUTION RESPIRATORY (INHALATION) EVERY 4 HOURS PRN
Qty: 120 VIAL | Refills: 6 | Status: SHIPPED | OUTPATIENT
Start: 2020-03-07 | End: 2023-03-09 | Stop reason: SDUPTHER

## 2020-04-23 RX ORDER — HYDRALAZINE HYDROCHLORIDE 25 MG/1
TABLET, FILM COATED ORAL
Qty: 180 TABLET | Refills: 0 | Status: SHIPPED | OUTPATIENT
Start: 2020-04-23 | End: 2020-12-14

## 2020-05-27 RX ORDER — FUROSEMIDE 20 MG/1
TABLET ORAL
Qty: 90 TABLET | Refills: 1 | OUTPATIENT
Start: 2020-05-27

## 2020-06-10 LAB
BUN SERPL-MCNC: 17 MG/DL (ref 8–27)
BUN/CREAT SERPL: 11 (ref 12–28)
CALCIUM SERPL-MCNC: 8.8 MG/DL (ref 8.7–10.3)
CHLORIDE SERPL-SCNC: 102 MMOL/L (ref 96–106)
CHOLEST SERPL-MCNC: 355 MG/DL (ref 100–199)
CK BB CFR SERPL ELPH: 0 %
CK MACRO1 CFR SERPL: 0 %
CK MACRO2 CFR SERPL: 0 %
CK MB CFR SERPL ELPH: 0 % (ref 0–3)
CK MM CFR SERPL ELPH: 100 % (ref 97–100)
CK SERPL-CCNC: 86 U/L (ref 32–182)
CO2 SERPL-SCNC: 21 MMOL/L (ref 20–29)
CREAT SERPL-MCNC: 1.52 MG/DL (ref 0.57–1)
GLUCOSE SERPL-MCNC: 87 MG/DL (ref 65–99)
HDLC SERPL-MCNC: 38 MG/DL
LDLC SERPL CALC-MCNC: 262 MG/DL (ref 0–99)
POTASSIUM SERPL-SCNC: 4.5 MMOL/L (ref 3.5–5.2)
SODIUM SERPL-SCNC: 140 MMOL/L (ref 134–144)
TRIGL SERPL-MCNC: 277 MG/DL (ref 0–149)
VLDLC SERPL CALC-MCNC: 55 MG/DL (ref 5–40)

## 2020-06-11 ENCOUNTER — OFFICE VISIT (OUTPATIENT)
Dept: CARDIOLOGY | Facility: CLINIC | Age: 65
End: 2020-06-11

## 2020-06-11 VITALS
HEART RATE: 71 BPM | DIASTOLIC BLOOD PRESSURE: 66 MMHG | HEIGHT: 62 IN | SYSTOLIC BLOOD PRESSURE: 144 MMHG | OXYGEN SATURATION: 98 % | WEIGHT: 149 LBS | BODY MASS INDEX: 27.42 KG/M2

## 2020-06-11 DIAGNOSIS — I25.10 CORONARY ARTERY DISEASE INVOLVING NATIVE CORONARY ARTERY OF NATIVE HEART WITHOUT ANGINA PECTORIS: ICD-10-CM

## 2020-06-11 DIAGNOSIS — I10 ESSENTIAL HYPERTENSION: ICD-10-CM

## 2020-06-11 DIAGNOSIS — I73.9 PERIPHERAL VASCULAR DISEASE (HCC): ICD-10-CM

## 2020-06-11 DIAGNOSIS — I50.32 CHRONIC DIASTOLIC CONGESTIVE HEART FAILURE (HCC): Primary | ICD-10-CM

## 2020-06-11 DIAGNOSIS — I44.7 LEFT BUNDLE BRANCH BLOCK: ICD-10-CM

## 2020-06-11 DIAGNOSIS — E78.2 MIXED HYPERLIPIDEMIA: ICD-10-CM

## 2020-06-11 PROCEDURE — 99214 OFFICE O/P EST MOD 30 MIN: CPT | Performed by: INTERNAL MEDICINE

## 2020-06-11 PROCEDURE — 93010 ELECTROCARDIOGRAM REPORT: CPT | Performed by: INTERNAL MEDICINE

## 2020-06-11 NOTE — PROGRESS NOTES
Subjective:     Encounter Date:06/11/2020      Patient ID: Nancy Suárez is a 64 y.o. female.    Chief Complaint: Coronary Artery Disease  Shortness of breath  History of Present Illness     64 -year-old white female patient with a known history of CAD status post cardiac catheterization December 2014 showed chronically occluded RCA with left to right collaterals. patient left system has less than 30% disease.      patient was also diagnosed with severe anemia due to bone marrow problem rather than any GI bleed   EGD colposcopy and small-bowel capsule study   was negative.          CAROLINE negative Bilateral  lower extremity  of 2/2015   patient known to have carotid disease status post PCI previously     The last  carotid Doppler showed 50-74 % bilateral disease  Patient supposed to be followed by vascular surgeon has not done recently   patient started smoking again   she does have some chronic renal insufficiency  followed by Nephrology  Patient is complaining of shortness of breath     Follow-up echocardiogram and carotid Doppler in the next couple of weeks  EKG today sinus rhythm left bundle branch block unchanged   patient was strongly advised to stop smoking      The following portions of the patient's history were reviewed and updated as appropriate: Allergies current medications past family history past medical history past social history past surgical history problem list and review of systems  Past Medical History:   Diagnosis Date   • Artery stenosis (CMS/HCC)     Bilateral subclavian s/p stent    • Arthritis    • AVM (arteriovenous malformation)    • CAD (coronary artery disease)    • CKD (chronic kidney disease)     Stage three    • COPD (chronic obstructive pulmonary disease) (CMS/HCC)    • DM2 (diabetes mellitus, type 2) (CMS/HCC)    • Eye pressure     Elevated eye pressure   • Gout    • Hepatitis     Hepatitis c    • HTN (hypertension)    • Hyperparathyroidism (CMS/HCC)    • KIRIT (iron deficiency  "anemia)    • Iron deficiency anemia    • Osteoporosis    • PVD (peripheral vascular disease) (CMS/HCC)      Past Surgical History:   Procedure Laterality Date   • CATARACT EXTRACTION Left    • CHOLECYSTECTOMY     • EYE SURGERY     • TOTAL ABDOMINAL HYSTERECTOMY WITH SALPINGO OOPHORECTOMY       /66 (BP Location: Left arm, Patient Position: Sitting, Cuff Size: Adult)   Pulse 71   Ht 156.2 cm (61.5\")   Wt 67.6 kg (149 lb)   LMP  (LMP Unknown)   SpO2 98%   BMI 27.70 kg/m²   Family History   Problem Relation Age of Onset   • Diabetes Mother    • Coronary artery disease Sister    • Diabetes Sister    • Breast cancer Paternal Aunt 50       Current Outpatient Medications:   •  albuterol sulfate HFA (PROAIR HFA) 108 (90 Base) MCG/ACT inhaler, Inhale 2 puffs 4 (Four) Times a Day. Proair - DX CODE J44.9, Disp: 3 inhaler, Rfl: 1  •  amLODIPine (NORVASC) 5 MG tablet, TAKE 1 TABLET BY MOUTH EVERY DAY, Disp: 90 tablet, Rfl: 1  •  aspirin 81 MG tablet, Take 1 tablet by mouth Daily., Disp: , Rfl:   •  calcitriol (ROCALTROL) 0.25 MCG capsule, Take 1 capsule by mouth 3 (Three) Times a Week. Monday, Wednesday, AND Friday, Disp: , Rfl: 3  •  carvedilol (COREG) 12.5 MG tablet, TAKE 1 TABLET BY MOUTH TWICE A DAY, Disp: 180 tablet, Rfl: 1  •  Cholecalciferol 1000 units capsule, Take 1 capsule by mouth Daily., Disp: , Rfl:   •  Cyanocobalamin (B-12) 1000 MCG capsule, Take 1 tablet by mouth Daily., Disp: , Rfl:   •  ferrous sulfate 325 (65 FE) MG tablet, Take 1 tablet by mouth 2 (Two) Times a Day., Disp: , Rfl:   •  furosemide (LASIX) 20 MG tablet, TAKE 1 TABLET BY MOUTH EVERY DAY, Disp: 90 tablet, Rfl: 1  •  hydrALAZINE (APRESOLINE) 25 MG tablet, TAKE 1 TABLET BY MOUTH TWICE A DAY, Disp: 180 tablet, Rfl: 0  •  ipratropium-albuterol (DUO-NEB) 0.5-2.5 mg/3 ml nebulizer, Take 3 mL by nebulization Every 4 (Four) Hours As Needed for Wheezing., Disp: 120 vial, Rfl: 6  •  loratadine (CLARITIN) 10 MG tablet, TAKE 1 TABLET BY MOUTH EVERY " DAY, Disp: 30 tablet, Rfl: 2  •  Omega-3 Fatty Acids (FISH OIL) 1000 MG capsule capsule, Take 1 capsule by mouth 2 (Two) Times a Day With Meals., Disp: , Rfl:   •  rosuvastatin (CRESTOR) 20 MG tablet, TAKE 1 TABLET BY MOUTH EVERY DAY, Disp: 90 tablet, Rfl: 0  Social History     Socioeconomic History   • Marital status: Single     Spouse name: Not on file   • Number of children: Not on file   • Years of education: Not on file   • Highest education level: Not on file   Tobacco Use   • Smoking status: Current Every Day Smoker     Packs/day: 0.25     Types: Cigarettes     Start date: 1975   • Smokeless tobacco: Never Used   Substance and Sexual Activity   • Alcohol use: No     Frequency: Never   • Drug use: No   • Sexual activity: Defer   Social History Narrative    She is single, retired from [employer], she lives in [place] and has two grown daughters.     No Known Allergies  Review of Systems   Constitution: Negative for fever and malaise/fatigue.   HENT: Negative for congestion and hearing loss.    Eyes: Negative for double vision and visual disturbance.   Cardiovascular: Negative for chest pain, claudication, dyspnea on exertion, leg swelling and syncope.   Respiratory: Negative for cough and shortness of breath.    Endocrine: Negative for cold intolerance.   Skin: Negative for color change and rash.   Musculoskeletal: Negative for arthritis and joint pain.   Gastrointestinal: Negative for abdominal pain and heartburn.   Genitourinary: Negative for hematuria.   Neurological: Negative for excessive daytime sleepiness and dizziness.   Psychiatric/Behavioral: Negative for depression. The patient is not nervous/anxious.    All other systems reviewed and are negative.             Objective:     Physical Exam   Constitutional: She is oriented to person, place, and time. She appears well-developed and well-nourished. She is cooperative.   HENT:   Head: Normocephalic and atraumatic.   Mouth/Throat: Uvula is midline  and oropharynx is clear and moist. No oral lesions.   Eyes: Conjunctivae are normal. No scleral icterus.   Neck: Trachea normal. Neck supple. No JVD present. Carotid bruit is not present. No thyromegaly present.   Cardiovascular: Normal rate, regular rhythm, S1 normal, S2 normal, normal heart sounds, intact distal pulses and normal pulses. PMI is not displaced. Exam reveals no gallop and no friction rub.   No murmur heard.  Pulmonary/Chest: Effort normal and breath sounds normal.   Abdominal: Soft. Bowel sounds are normal.   Musculoskeletal: Normal range of motion.   Neurological: She is alert and oriented to person, place, and time. She has normal strength.   No focal deficits   Skin: Skin is warm. No cyanosis.   Psychiatric: She has a normal mood and affect.       Procedures    Lab Review:       Assessment:          Diagnosis Plan   1. Chronic diastolic congestive heart failure (CMS/HCC)     2. Left bundle branch block     3. Coronary artery disease involving native coronary artery of native heart without angina pectoris     4. Mixed hyperlipidemia     5. Essential hypertension     6. Peripheral vascular disease (CMS/HCC)            Plan:     Patient is having problems with increasing dyspnea on exertion will have a follow-up echocardiogram in the near future  History of peripheral vascular disease carotid stenosis follow-up carotid Doppler patient was advised to make an appointment with vascular surgery for regular follow-up  History of CAD needs to modify cardiac risk factors aggressively patient advised to stop smoking  Needs aggressive control of hypertension dyslipidemia

## 2020-06-15 RX ORDER — CARVEDILOL 12.5 MG/1
TABLET ORAL
Qty: 180 TABLET | Refills: 1 | Status: SHIPPED | OUTPATIENT
Start: 2020-06-15 | End: 2021-07-20

## 2020-07-09 ENCOUNTER — OFFICE VISIT (OUTPATIENT)
Dept: CARDIOLOGY | Facility: CLINIC | Age: 65
End: 2020-07-09

## 2020-07-09 VITALS
HEIGHT: 61 IN | WEIGHT: 149 LBS | BODY MASS INDEX: 28.13 KG/M2 | HEART RATE: 66 BPM | OXYGEN SATURATION: 98 % | SYSTOLIC BLOOD PRESSURE: 167 MMHG | DIASTOLIC BLOOD PRESSURE: 69 MMHG

## 2020-07-09 DIAGNOSIS — I34.0 NONRHEUMATIC MITRAL VALVE REGURGITATION: ICD-10-CM

## 2020-07-09 DIAGNOSIS — I10 ESSENTIAL HYPERTENSION: ICD-10-CM

## 2020-07-09 DIAGNOSIS — E78.2 MIXED HYPERLIPIDEMIA: ICD-10-CM

## 2020-07-09 DIAGNOSIS — I44.7 LEFT BUNDLE BRANCH BLOCK: ICD-10-CM

## 2020-07-09 DIAGNOSIS — I73.9 PVD (PERIPHERAL VASCULAR DISEASE) WITH CLAUDICATION (HCC): Primary | ICD-10-CM

## 2020-07-09 DIAGNOSIS — I25.10 CORONARY ARTERY DISEASE INVOLVING NATIVE CORONARY ARTERY OF NATIVE HEART WITHOUT ANGINA PECTORIS: ICD-10-CM

## 2020-07-09 DIAGNOSIS — I73.9 PERIPHERAL VASCULAR DISEASE (HCC): ICD-10-CM

## 2020-07-09 PROCEDURE — 99213 OFFICE O/P EST LOW 20 MIN: CPT | Performed by: INTERNAL MEDICINE

## 2020-07-09 NOTE — PROGRESS NOTES
Subjective:     Encounter Date:07/09/2020      Patient ID: Nancy Suárez is a 64 y.o. female.    Chief Complaint: Test Results  History of Present Illness     64 -year-old white female patient with a known history of CAD status post cardiac catheterization December 2014 showed chronically occluded RCA with left to right collaterals. patient left system has less than 30% disease.      patient was also diagnosed with severe anemia due to bone marrow problem rather than any GI bleed   EGD colposcopy and small-bowel capsule study   was negative.          CAROLINE negative Bilateral  lower extremity  of 2/2015   patient known to have carotid disease status post PCI previously     The last  carotid Doppler showed 50-74 % bilateral disease  Patient supposed to be followed by vascular surgeon has not done recently   patient started smoking again   she does have some chronic renal insufficiency  followed by Nephrology  Patient is complaining of shortness of breath     June 2020 follow-up echocardiogram low normal EF 50% severe bulky calcification of the posterior leaflet of the mitral valve causing mild mitral stenosis moderate mitral insufficiency diastolic LV dysfunction noted  June 2020 bilateral carotid Doppler showed 50 to 70% bilateral internal carotid artery stenosis  Patient was advised to follow-up with vascular surgery  I will see her back in 6 months with labs EKG  The following portions of the patient's history were reviewed and updated as appropriate: Allergies current medications past family history past medical history past social history past surgical history problem list and review of systems  Past Medical History:   Diagnosis Date   • Artery stenosis (CMS/HCC)     Bilateral subclavian s/p stent    • Arthritis    • AVM (arteriovenous malformation)    • CAD (coronary artery disease)    • CKD (chronic kidney disease)     Stage three    • COPD (chronic obstructive pulmonary disease) (CMS/HCC)    • DM2 (diabetes  "mellitus, type 2) (CMS/HCC)    • Eye pressure     Elevated eye pressure   • Gout    • Hepatitis     Hepatitis c    • HTN (hypertension)    • Hyperparathyroidism (CMS/HCC)    • KIRIT (iron deficiency anemia)    • Iron deficiency anemia    • Osteoporosis    • PVD (peripheral vascular disease) (CMS/HCC)      Past Surgical History:   Procedure Laterality Date   • CATARACT EXTRACTION Left    • CHOLECYSTECTOMY     • EYE SURGERY     • TOTAL ABDOMINAL HYSTERECTOMY WITH SALPINGO OOPHORECTOMY       /69 (BP Location: Left arm, Patient Position: Sitting, Cuff Size: Adult)   Pulse 66   Ht 154.9 cm (61\")   Wt 67.6 kg (149 lb)   LMP  (LMP Unknown)   SpO2 98%   BMI 28.15 kg/m²   Family History   Problem Relation Age of Onset   • Diabetes Mother    • Coronary artery disease Sister    • Diabetes Sister    • Breast cancer Paternal Aunt 50       Current Outpatient Medications:   •  albuterol sulfate HFA (PROAIR HFA) 108 (90 Base) MCG/ACT inhaler, Inhale 2 puffs 4 (Four) Times a Day. Proair - DX CODE J44.9, Disp: 3 inhaler, Rfl: 1  •  amLODIPine (NORVASC) 5 MG tablet, TAKE 1 TABLET BY MOUTH EVERY DAY, Disp: 90 tablet, Rfl: 1  •  aspirin 81 MG tablet, Take 1 tablet by mouth Daily., Disp: , Rfl:   •  calcitriol (ROCALTROL) 0.25 MCG capsule, Take 1 capsule by mouth 3 (Three) Times a Week. Monday, Wednesday, AND Friday, Disp: , Rfl: 3  •  carvedilol (COREG) 12.5 MG tablet, TAKE 1 TABLET BY MOUTH TWICE A DAY, Disp: 180 tablet, Rfl: 1  •  Cholecalciferol 1000 units capsule, Take 1 capsule by mouth Daily., Disp: , Rfl:   •  Cyanocobalamin (B-12) 1000 MCG capsule, Take 1 tablet by mouth Daily., Disp: , Rfl:   •  ferrous sulfate 325 (65 FE) MG tablet, Take 1 tablet by mouth 2 (Two) Times a Day., Disp: , Rfl:   •  furosemide (LASIX) 20 MG tablet, TAKE 1 TABLET BY MOUTH EVERY DAY, Disp: 90 tablet, Rfl: 1  •  hydrALAZINE (APRESOLINE) 25 MG tablet, TAKE 1 TABLET BY MOUTH TWICE A DAY, Disp: 180 tablet, Rfl: 0  •  ipratropium-albuterol " (DUO-NEB) 0.5-2.5 mg/3 ml nebulizer, Take 3 mL by nebulization Every 4 (Four) Hours As Needed for Wheezing., Disp: 120 vial, Rfl: 6  •  loratadine (CLARITIN) 10 MG tablet, TAKE 1 TABLET BY MOUTH EVERY DAY, Disp: 30 tablet, Rfl: 2  •  Omega-3 Fatty Acids (FISH OIL) 1000 MG capsule capsule, Take 1 capsule by mouth 2 (Two) Times a Day With Meals., Disp: , Rfl:   •  rosuvastatin (CRESTOR) 20 MG tablet, TAKE 1 TABLET BY MOUTH EVERY DAY, Disp: 90 tablet, Rfl: 0  Social History     Socioeconomic History   • Marital status: Single     Spouse name: Not on file   • Number of children: Not on file   • Years of education: Not on file   • Highest education level: Not on file   Tobacco Use   • Smoking status: Current Every Day Smoker     Packs/day: 0.25     Types: Cigarettes     Start date: 1975   • Smokeless tobacco: Never Used   Substance and Sexual Activity   • Alcohol use: No     Frequency: Never   • Drug use: No   • Sexual activity: Defer   Social History Narrative    She is single, retired from [employer], she lives in [place] and has two grown daughters.     No Known Allergies  Review of Systems   Constitution: Negative for fever and malaise/fatigue.   HENT: Negative for congestion and hearing loss.    Eyes: Negative for double vision and visual disturbance.   Cardiovascular: Negative for chest pain, claudication, dyspnea on exertion, leg swelling and syncope.   Respiratory: Negative for cough and shortness of breath.    Endocrine: Negative for cold intolerance.   Skin: Negative for color change and rash.   Musculoskeletal: Negative for arthritis and joint pain.   Gastrointestinal: Negative for abdominal pain and heartburn.   Genitourinary: Negative for hematuria.   Neurological: Negative for excessive daytime sleepiness and dizziness.   Psychiatric/Behavioral: Negative for depression. The patient is not nervous/anxious.    All other systems reviewed and are negative.             Objective:     Physical Exam    Constitutional: She is oriented to person, place, and time. She appears well-developed and well-nourished. She is cooperative.   HENT:   Head: Normocephalic and atraumatic.   Mouth/Throat: Uvula is midline and oropharynx is clear and moist. No oral lesions.   Eyes: Conjunctivae are normal. No scleral icterus.   Neck: Trachea normal. Neck supple. No JVD present. Carotid bruit is not present. No thyromegaly present.   Cardiovascular: Normal rate, regular rhythm, S1 normal, S2 normal, intact distal pulses and normal pulses. PMI is not displaced. Exam reveals no gallop and no friction rub.   Murmur heard.  Pulmonary/Chest: Effort normal and breath sounds normal.   Abdominal: Soft. Bowel sounds are normal.   Musculoskeletal: Normal range of motion.   Neurological: She is alert and oriented to person, place, and time. She has normal strength.   No focal deficits   Skin: Skin is warm. No cyanosis.   Psychiatric: She has a normal mood and affect.       Procedures    Lab Review:       Assessment:          Diagnosis Plan   1. PVD (peripheral vascular disease) with claudication (CMS/HCC)  Ambulatory Referral to Vascular Surgery   2. Left bundle branch block     3. Coronary artery disease involving native coronary artery of native heart without angina pectoris     4. Essential hypertension     5. Mixed hyperlipidemia     6. Peripheral vascular disease (CMS/HCC)     7. Nonrheumatic mitral valve regurgitation            Plan:       Carotid disease patient will follow-up with vascular surgery  Hypertension dyslipidemia needs aggressive control  Mitral insufficiency continue current conservative treatment  Coronary artery disease patient was advised to stop smoking modify cardiac risk factors

## 2020-08-04 ENCOUNTER — OFFICE VISIT (OUTPATIENT)
Dept: FAMILY MEDICINE CLINIC | Facility: CLINIC | Age: 65
End: 2020-08-04

## 2020-08-04 VITALS
HEIGHT: 61 IN | TEMPERATURE: 97.3 F | SYSTOLIC BLOOD PRESSURE: 130 MMHG | HEART RATE: 62 BPM | WEIGHT: 147 LBS | BODY MASS INDEX: 27.75 KG/M2 | OXYGEN SATURATION: 98 % | DIASTOLIC BLOOD PRESSURE: 74 MMHG

## 2020-08-04 DIAGNOSIS — M85.80 OSTEOPENIA, UNSPECIFIED LOCATION: ICD-10-CM

## 2020-08-04 DIAGNOSIS — F17.210 CIGARETTE SMOKER: ICD-10-CM

## 2020-08-04 DIAGNOSIS — Z12.31 OTHER SCREENING MAMMOGRAM: ICD-10-CM

## 2020-08-04 DIAGNOSIS — E78.2 MIXED HYPERLIPIDEMIA: Primary | ICD-10-CM

## 2020-08-04 PROBLEM — M81.0 OSTEOPOROSIS: Status: RESOLVED | Noted: 2019-07-10 | Resolved: 2020-08-04

## 2020-08-04 PROCEDURE — 99214 OFFICE O/P EST MOD 30 MIN: CPT | Performed by: NURSE PRACTITIONER

## 2020-08-04 RX ORDER — FERROUS SULFATE 325(65) MG
1 TABLET ORAL
Qty: 30 TABLET | Refills: 2 | Status: SHIPPED | OUTPATIENT
Start: 2020-08-04 | End: 2020-08-26

## 2020-08-04 RX ORDER — PRAVASTATIN SODIUM 40 MG
40 TABLET ORAL DAILY
Qty: 30 TABLET | Refills: 2 | Status: SHIPPED | OUTPATIENT
Start: 2020-08-04 | End: 2020-08-31 | Stop reason: SDUPTHER

## 2020-08-04 RX ORDER — FUROSEMIDE 20 MG/1
20 TABLET ORAL DAILY
Qty: 90 TABLET | Refills: 1 | Status: SHIPPED | OUTPATIENT
Start: 2020-08-04 | End: 2021-01-22

## 2020-08-04 NOTE — PROGRESS NOTES
"    Nancy Suárez is a 64 y.o. female.     64-year-old white female smoker with history of COPD, CAD/PTCA, CHF, hysterectomy, hyperlipidemia, neurogenic claudication and iron deficiency anemia who comes in today for follow-up visit  Blood pressure 130/74 heart rate 62 she denies any chest pain, dyspnea, tachycardia or dizziness.  Patient states she may be possibly having bowel surgery goes to see a surgeon in a couple of weeks.  She also has bilateral carotid blockage 70%  Patient's last bone scan showed osteopenia in place her on calcium vitamin D and weightbearing exercise DEXA scan due in 2 years  Patient has not been taking cholesterol medicine she went off it about 4 months ago did not get it refilled for no particular reason other than she does not like taking medications.  Her last cholesterol was 355 triglycerides 277 .  I reordered medication and explained to patient particularly with her blockage she needs to get a cholesterol panel on a control as she has agreed to start taking medications again.  We will recheck fasting in 3 months  Weight is up 6 pounds at 147 we discussed diet and weight loss  Patient up-to-date on eye exam colonoscopy was 2 years ago I am scheduling her mammogram she is up-to-date on DEXA scan    Mammogram at [place]  Follow-up fasting blood work 3 months  Take cholesterol medication as directed  Take vitamin D and calcium as directed with weightbearing exercise       The following portions of the patient's history were reviewed and updated as appropriate: allergies, current medications, past family history, past medical history, past social history, past surgical history and problem list.    Vitals:    08/04/20 0850   BP: 130/74   BP Location: Right arm   Patient Position: Sitting   Cuff Size: Adult   Pulse: 62   Temp: 97.3 °F (36.3 °C)   TempSrc: Temporal   SpO2: 98%   Weight: 66.7 kg (147 lb)   Height: 154.9 cm (61\")     Body mass index is 27.78 kg/m².    Past Medical " History:   Diagnosis Date   • Artery stenosis (CMS/HCC)     Bilateral subclavian s/p stent    • Arthritis    • AVM (arteriovenous malformation)    • CAD (coronary artery disease)    • CKD (chronic kidney disease)     Stage three    • COPD (chronic obstructive pulmonary disease) (CMS/HCC)    • DM2 (diabetes mellitus, type 2) (CMS/HCC)    • Eye pressure     Elevated eye pressure   • Gout    • Hepatitis     Hepatitis c    • HTN (hypertension)    • Hyperparathyroidism (CMS/HCC)    • KIRIT (iron deficiency anemia)    • Iron deficiency anemia    • Osteoporosis    • PVD (peripheral vascular disease) (CMS/HCC)      Past Surgical History:   Procedure Laterality Date   • CATARACT EXTRACTION Left    • CHOLECYSTECTOMY     • EYE SURGERY     • TOTAL ABDOMINAL HYSTERECTOMY WITH SALPINGO OOPHORECTOMY       Family History   Problem Relation Age of Onset   • Diabetes Mother    • Coronary artery disease Sister    • Diabetes Sister    • Breast cancer Paternal Aunt 50     Immunization History   Administered Date(s) Administered   • Flu Vaccine Intradermal Quad 18-64YR 10/04/2018   • Flu Vaccine Quad PF >36MO 10/10/2017   • Pneumococcal Conjugate 13-Valent (PCV13) 01/08/2016   • Pneumococcal Polysaccharide (PPSV23) 03/07/2017       Orders Only on 06/09/2020   Component Date Value Ref Range Status   • Glucose 06/09/2020 87  65 - 99 mg/dL Final   • BUN 06/09/2020 17  8 - 27 mg/dL Final   • Creatinine 06/09/2020 1.52* 0.57 - 1.00 mg/dL Final   • eGFR Non African Am 06/09/2020 35* >59 mL/min/1.73 Final   • eGFR African Am 06/09/2020 41* >59 mL/min/1.73 Final   • BUN/Creatinine Ratio 06/09/2020 11* 12 - 28 Final   • Sodium 06/09/2020 140  134 - 144 mmol/L Final   • Potassium 06/09/2020 4.5  3.5 - 5.2 mmol/L Final   • Chloride 06/09/2020 102  96 - 106 mmol/L Final   • Total CO2 06/09/2020 21  20 - 29 mmol/L Final   • Calcium 06/09/2020 8.8  8.7 - 10.3 mg/dL Final   • Creatine Kinase 06/09/2020 86  32 - 182 U/L Final                  **Please  note reference interval change**   • Macro Type 2 06/09/2020 0  Not Observed % Final   • CKMM 06/09/2020 100  97 - 100 % Final   • Macro Type 1 06/09/2020 0  Not Observed % Final   • CKMB 06/09/2020 0  0 - 3 % Final   • CKBB 06/09/2020 0  0 % Final   • Total Cholesterol 06/09/2020 355* 100 - 199 mg/dL Final   • Triglycerides 06/09/2020 277* 0 - 149 mg/dL Final   • HDL Cholesterol 06/09/2020 38* >39 mg/dL Final   • VLDL Cholesterol 06/09/2020 55* 5 - 40 mg/dL Final   • LDL Cholesterol  06/09/2020 262* 0 - 99 mg/dL Final         Review of Systems   Constitutional: Negative.    HENT: Negative.    Respiratory: Negative.    Cardiovascular: Negative.    Gastrointestinal: Negative.    Genitourinary: Negative.    Musculoskeletal: Negative.    Skin: Negative.    Neurological: Negative.    Psychiatric/Behavioral: Negative.        Objective   Physical Exam   Constitutional: She is oriented to person, place, and time. She appears well-developed and well-nourished.   Cardiovascular: Normal rate and regular rhythm.   Pulmonary/Chest: Effort normal and breath sounds normal.   Abdominal: Soft. Bowel sounds are normal.   Musculoskeletal: Normal range of motion.   Neurological: She is alert and oriented to person, place, and time.   Skin: Skin is warm and dry.   Psychiatric: She has a normal mood and affect.       Procedures    Assessment/Plan   Nancy was seen today for hypertension, hyperlipidemia and diabetes.    Diagnoses and all orders for this visit:    Mixed hyperlipidemia    Cigarette smoker    BMI 27.0-27.9,adult    Osteopenia, unspecified location    Other orders  -     pravastatin (Pravachol) 40 MG tablet; Take 1 tablet by mouth Daily.  -     furosemide (LASIX) 20 MG tablet; Take 1 tablet by mouth Daily.  -     ferrous sulfate 325 (65 FE) MG tablet; Take 1 tablet by mouth Daily With Breakfast.          Current Outpatient Medications:   •  albuterol sulfate HFA (PROAIR HFA) 108 (90 Base) MCG/ACT inhaler, Inhale 2 puffs 4  (Four) Times a Day. Proair - DX CODE J44.9, Disp: 3 inhaler, Rfl: 1  •  amLODIPine (NORVASC) 5 MG tablet, TAKE 1 TABLET BY MOUTH EVERY DAY, Disp: 90 tablet, Rfl: 1  •  aspirin 81 MG tablet, Take 1 tablet by mouth Daily., Disp: , Rfl:   •  calcitriol (ROCALTROL) 0.25 MCG capsule, Take 1 capsule by mouth Daily., Disp: , Rfl: 3  •  carvedilol (COREG) 12.5 MG tablet, TAKE 1 TABLET BY MOUTH TWICE A DAY, Disp: 180 tablet, Rfl: 1  •  Cholecalciferol 1000 units capsule, Take 1 capsule by mouth Daily., Disp: , Rfl:   •  Cyanocobalamin (B-12) 1000 MCG capsule, Take 1 tablet by mouth Daily., Disp: , Rfl:   •  ferrous sulfate 325 (65 FE) MG tablet, Take 1 tablet by mouth Daily With Breakfast., Disp: 30 tablet, Rfl: 2  •  furosemide (LASIX) 20 MG tablet, Take 1 tablet by mouth Daily., Disp: 90 tablet, Rfl: 1  •  hydrALAZINE (APRESOLINE) 25 MG tablet, TAKE 1 TABLET BY MOUTH TWICE A DAY, Disp: 180 tablet, Rfl: 0  •  ipratropium-albuterol (DUO-NEB) 0.5-2.5 mg/3 ml nebulizer, Take 3 mL by nebulization Every 4 (Four) Hours As Needed for Wheezing., Disp: 120 vial, Rfl: 6  •  loratadine (CLARITIN) 10 MG tablet, TAKE 1 TABLET BY MOUTH EVERY DAY, Disp: 30 tablet, Rfl: 2  •  Omega-3 Fatty Acids (FISH OIL) 1000 MG capsule capsule, Take 1 capsule by mouth 2 (Two) Times a Day With Meals., Disp: , Rfl:   •  pravastatin (Pravachol) 40 MG tablet, Take 1 tablet by mouth Daily., Disp: 30 tablet, Rfl: 2

## 2020-08-04 NOTE — PATIENT INSTRUCTIONS
Keep appointment for mammogram  Take cholesterol medication as directed  Fasting blood work in 3 months  Keep appointment with cardiovascular surgeon  Stop smoking , consider smoking cessation

## 2020-08-13 ENCOUNTER — APPOINTMENT (OUTPATIENT)
Dept: MAMMOGRAPHY | Facility: HOSPITAL | Age: 65
End: 2020-08-13

## 2020-08-26 RX ORDER — FERROUS SULFATE 325(65) MG
TABLET ORAL
Qty: 30 TABLET | Refills: 2 | Status: SHIPPED | OUTPATIENT
Start: 2020-08-26 | End: 2020-12-12

## 2020-08-31 RX ORDER — PRAVASTATIN SODIUM 40 MG
40 TABLET ORAL DAILY
Qty: 30 TABLET | Refills: 2 | Status: SHIPPED | OUTPATIENT
Start: 2020-08-31 | End: 2020-12-12

## 2020-09-01 ENCOUNTER — APPOINTMENT (OUTPATIENT)
Dept: ONCOLOGY | Facility: CLINIC | Age: 65
End: 2020-09-01

## 2020-09-01 ENCOUNTER — TELEPHONE (OUTPATIENT)
Dept: ONCOLOGY | Facility: CLINIC | Age: 65
End: 2020-09-01

## 2020-09-01 DIAGNOSIS — D50.9 IRON DEFICIENCY ANEMIA, UNSPECIFIED IRON DEFICIENCY ANEMIA TYPE: ICD-10-CM

## 2020-09-01 NOTE — TELEPHONE ENCOUNTER
Patient wants to get her labs today at Dr. De Anda's office within [facility]. Patient wanted to make sure these orders are put in so Dr. De Anda can access.    Callback# [phone]

## 2020-09-01 NOTE — TELEPHONE ENCOUNTER
Called patient to advise that the ordered labs are in the system.  As long as the provider is within our system those can be accessed.  She MULU.

## 2020-09-02 LAB
BASOPHILS # BLD AUTO: 0 X10E3/UL (ref 0–0.2)
BASOPHILS NFR BLD AUTO: 0 %
EOSINOPHIL # BLD AUTO: 0.1 X10E3/UL (ref 0–0.4)
EOSINOPHIL NFR BLD AUTO: 2 %
ERYTHROCYTE [DISTWIDTH] IN BLOOD BY AUTOMATED COUNT: 13.1 % (ref 11.7–15.4)
FERRITIN SERPL-MCNC: 50 NG/ML (ref 15–150)
HCT VFR BLD AUTO: 38.8 % (ref 34–46.6)
HGB BLD-MCNC: 12.9 G/DL (ref 11.1–15.9)
IMM GRANULOCYTES # BLD AUTO: 0 X10E3/UL (ref 0–0.1)
IMM GRANULOCYTES NFR BLD AUTO: 0 %
IRON SATN MFR SERPL: 56 % (ref 15–55)
IRON SERPL-MCNC: 168 UG/DL (ref 27–139)
LYMPHOCYTES # BLD AUTO: 1.4 X10E3/UL (ref 0.7–3.1)
LYMPHOCYTES NFR BLD AUTO: 21 %
MCH RBC QN AUTO: 33 PG (ref 26.6–33)
MCHC RBC AUTO-ENTMCNC: 33.2 G/DL (ref 31.5–35.7)
MCV RBC AUTO: 99 FL (ref 79–97)
MONOCYTES # BLD AUTO: 0.4 X10E3/UL (ref 0.1–0.9)
MONOCYTES NFR BLD AUTO: 6 %
NEUTROPHILS # BLD AUTO: 4.6 X10E3/UL (ref 1.4–7)
NEUTROPHILS NFR BLD AUTO: 71 %
PLATELET # BLD AUTO: 241 X10E3/UL (ref 150–450)
RBC # BLD AUTO: 3.91 X10E6/UL (ref 3.77–5.28)
TIBC SERPL-MCNC: 298 UG/DL (ref 250–450)
UIBC SERPL-MCNC: 130 UG/DL (ref 118–369)
WBC # BLD AUTO: 6.5 X10E3/UL (ref 3.4–10.8)

## 2020-09-08 ENCOUNTER — OFFICE VISIT (OUTPATIENT)
Dept: ONCOLOGY | Facility: CLINIC | Age: 65
End: 2020-09-08

## 2020-09-08 VITALS
SYSTOLIC BLOOD PRESSURE: 168 MMHG | HEART RATE: 84 BPM | HEIGHT: 61 IN | BODY MASS INDEX: 27.56 KG/M2 | TEMPERATURE: 98.5 F | RESPIRATION RATE: 16 BRPM | WEIGHT: 146 LBS | DIASTOLIC BLOOD PRESSURE: 86 MMHG

## 2020-09-08 DIAGNOSIS — D50.9 IRON DEFICIENCY ANEMIA, UNSPECIFIED IRON DEFICIENCY ANEMIA TYPE: Primary | ICD-10-CM

## 2020-09-08 DIAGNOSIS — R79.89 LOW VITAMIN D LEVEL: ICD-10-CM

## 2020-09-08 PROCEDURE — 99213 OFFICE O/P EST LOW 20 MIN: CPT | Performed by: INTERNAL MEDICINE

## 2020-09-08 NOTE — PROGRESS NOTES
HEMATOLOGY ONCOLOGY FOLLOW UP        Patient name: Nancy Suárez  : 1955  MRN: [id number]  Primary Care Physician: Ingrid De Anda APRN  Referring Physician: Ingrid De Anda APRN  Reason For Consult:     Chief Complaint   Patient presents with   • Follow-up     Iron deficiency anemia, unspecified iron deficiency anemia type       History of Present Illness:  Ms. Suárez is a 64-year-old female who presented to her Primary Care Physician, Dr. Garcia’s office with symptoms of fatigue and weakness.  She was initially seen on 6/23/15 and her hemoglobin was 10.6 with an MCV of 102 at that time.  Repeat CBC on 8/5/15 showed a declining hemoglobin to 7.5 and MCV was 99.4.  Platelets and hemoglobin were again normal.  She was started on iron supplementation and repeat CBC on 8/18/15 showed a slight increase to 7.9.  She does have chronic kidney disease.  Her ferritin was low at 20 on 9/1/15.      However, when CBC was repeated on 9/22/15 hemoglobin was down to 4.9 and MVC also trended down to 85.  Patient was referred to us for further evaluation.  She already was scheduled to see gastroenterology.  Dr. Garcia ordered a stool hemoccult test that was apparently positive as well.      9/22/15 - Patient presented for hematologic consultation.  She reported being very tired.  She also felt dizzy and had unsteady gait.  Dizziness was more profound when she stood up or sat up from a lying posture.  Her blood pressure had also been running low.  Hypertension medications were also being held by her Primary Care Physician.  The patient denied noticing any bleeding per rectum.  She did have black stools ever since she had been taking iron.  She did not report weight loss.  She had a good appetite.       • 6/23/15 - WBC 7.5, hemoglobin 10.6, .3, platelet count 184,000.    • 7/8/15 - Vitamin B12 491 (N).   • 8/5/15 - WBC 5.5, hemoglobin 7.5, platelet count 220,000, MCV 99.4.  • 8/18/15 - WBC 6.5,  hemoglobin 7.9, platelet count 212,000.    • 9/22/15 - Creatinine 1.72, hemoglobin 4.9, WBC 8.8, platelet count 246,000, MCV 85.0.    • 9/23/15 - Patient underwent EGD and colonoscopy.  EGD was unremarkable up to the third part of the duodenum.  There were no AVM’s or any source of bleeding.  Colonoscopy was unremarkable except for small to moderate sized hemorrhoids and scattered diverticulosis.  Patient also received IV Venofer in the hospital.    • 9/22/15 to 9/25/15 - Patient admitted to [facility] for severe anemia.  Hemoglobin 4.1.  She was transfused about 4 units.    • 9/25/15 - M2A capsule was performed in the hospital and that also was negative for any bleeding source.    • 9/29/15 - Creatinine 1.4, BUN 13, TSH 0.7.    • 10/1/15 - WBC 7.5, hemoglobin 11.1, platelet count 213,000, MCV 90.1.  Creatinine 1.7.   • 10/22/15 - Creatinine 1.68.  Iron saturation 49%.  TIBC 287.  Serum iron 140.  PTH intact 108 (H).   • 10/30/15 - WBC 5.3, hemoglobin 11.1, platelet count 171,000.  • 11/3/15 - WBC 4.8, hemoglobin 9.5, platelet count 159,000, MCV 99.2.  • 12/28/15 - Iron saturation 43%.  TIBC 316, serum iron 137.  WBC 6.4, hemoglobin 11.3, platelet count 151,000, .8.  • 4/26/16 - WBC 6.1, hemoglobin 12.1, platelet count 205,000, .1.  Iron saturation 21%, TIBC 302, serum iron 63 (patient taking two iron tablets a day).  • 10/26/16 - Ferritin 36, iron saturation 14%, TIBC 347, serum iron 49.  WBC 6.0, hemoglobin 10.9, .7, platelet count 222,000.  • November 2016 to March 2018 - Patient has not followed up in our office.    • 3/27/17 - Creatinine 1.4.    • 11/15/17 - Ferritin 34, iron saturation 88% (H), TIBC 308, serum iron 270 (H).    • 12/20/17 - DEXAscan:  No evidence of osteopenia or osteoporosis.    • 3/6/18 - Hemoglobin 6.3.  Patient transfused 2 units.    • 3/15/18 - WBC 6.1, hemoglobin 10.6, platelet count 235,000, MCV 90.7.  Iron saturation 49%, TIBC 346, serum iron 175.  B12  1500.  Ferritin 124.  Folate 15.9.  Retic count 5.29% (H).  • 3/27/18 - Patient evaluated by ENT, Dr. Doshi.  Examination revealed small bleeding sites from the left mid and posterior septum.  Patient had cauterization.  She was also diagnosed with some allergic sinusitis.    • 3/27/18 - Stool Hemoccult test positive x3.    • 4/4/18 - WBC 4.6, hemoglobin 10.9, platelet count 188,000, MCV 94.7.  Ferritin 48.  Iron saturation 42%, TIBC 297, serum iron 126.  • 6/14/18 - EGD and colonoscopy:  Multiple AV malformations in the upper GI tract and ascending colon, which could be the source of her chronic blood loss.  The AVMs in the colon were cauterized after injecting epinephrine and one Endoclip was placed.  Another small AVM was cauterized with APC.  There was scattered diverticulosis and moderate sized hemorrhoid.  AVMs in the duodenum were also cauterized with APC.    • 6/25/18 - Ferritin 57.  Iron saturation 39%, TIBC 302, serum iron 118.  PTH intact 42.  Uric acid 7.2.  WBC 5.6, hemoglobin 14.1, platelet count 220,000, MCV 97.8.    • 1/17/19 - Creatinine 1.49, BUN 18.  LFTs are normal.    • 8/27/2019 creatinine 1.38, BUN is 14, PTH is 45, serum iron 203 high, ferritin 62, iron saturation 77% high, TIBC 263, WBC 5.3, hemoglobin 12.3, platelets 189, MCV 97, vitamin D 25.9 low  • 10/3/2019: Creatinine 1.4   • 11/21/2019: DEXA scan: Osteopenia.  T score in favor is less than 1.3  • 2/27/2020: Iron 174 high, ferritin 57, iron saturation 65% high, TIBC 269, WBC 5.6, hemoglobin 13.1, platelets 198,   • 9/1/2020: Ferritin 50, hemoglobin 12.9, MCV 99, platelets 241, serum iron 168 high, iron saturation 56% high, TIBC 298, creatinine 1.52, BUN 17,        Subjective:  Patient is here for a follow up appointment regarding iron deficiency anemia.  She is taking 1 iron tablet daily.  Does not have any problems.  Denies any bleeding per rectum.  She is smoking half pack a day .. Does not have any bleeding per rectum.   She continues to take Vitamin D. She denies any SOA or GI issues.  She has osteopenia on recent DEXA scan and is taking calcium.  Does not report any blood in the stool.    The following portions of the patient's history were reviewed and updated as appropriate: allergies, current medications, past family history, past medical history, past social history, past surgical history and problem list.       Past Medical History:   Diagnosis Date   • Artery stenosis (CMS/HCC)     Bilateral subclavian s/p stent    • Arthritis    • AVM (arteriovenous malformation)    • CAD (coronary artery disease)    • CKD (chronic kidney disease)     Stage three    • COPD (chronic obstructive pulmonary disease) (CMS/HCC)    • DM2 (diabetes mellitus, type 2) (CMS/HCC)    • Eye pressure     Elevated eye pressure   • Gout    • Hepatitis     Hepatitis c    • HTN (hypertension)    • Hyperparathyroidism (CMS/HCC)    • KIRIT (iron deficiency anemia)    • Iron deficiency anemia    • Osteoporosis    • PVD (peripheral vascular disease) (CMS/HCC)        Past Surgical History:   Procedure Laterality Date   • CATARACT EXTRACTION Left    • CHOLECYSTECTOMY     • EYE SURGERY     • TOTAL ABDOMINAL HYSTERECTOMY WITH SALPINGO OOPHORECTOMY         Current Outpatient Medications:   •  albuterol sulfate HFA (PROAIR HFA) 108 (90 Base) MCG/ACT inhaler, Inhale 2 puffs 4 (Four) Times a Day. Proair - DX CODE J44.9, Disp: 3 inhaler, Rfl: 1  •  amLODIPine (NORVASC) 5 MG tablet, TAKE 1 TABLET BY MOUTH EVERY DAY, Disp: 90 tablet, Rfl: 1  •  aspirin 81 MG tablet, Take 1 tablet by mouth Daily., Disp: , Rfl:   •  calcitriol (ROCALTROL) 0.25 MCG capsule, Take 1 capsule by mouth Daily., Disp: , Rfl: 3  •  carvedilol (COREG) 12.5 MG tablet, TAKE 1 TABLET BY MOUTH TWICE A DAY, Disp: 180 tablet, Rfl: 1  •  Cholecalciferol 1000 units capsule, Take 1 capsule by mouth Daily., Disp: , Rfl:   •  Cyanocobalamin (B-12) 1000 MCG capsule, Take 1 tablet by mouth Daily., Disp: , Rfl:   •   ferrous sulfate 325 (65 FE) MG tablet, TAKE 1 TABLET BY MOUTH EVERY DAY WITH BREAKFAST, Disp: 30 tablet, Rfl: 2  •  furosemide (LASIX) 20 MG tablet, Take 1 tablet by mouth Daily., Disp: 90 tablet, Rfl: 1  •  hydrALAZINE (APRESOLINE) 25 MG tablet, TAKE 1 TABLET BY MOUTH TWICE A DAY, Disp: 180 tablet, Rfl: 0  •  ipratropium-albuterol (DUO-NEB) 0.5-2.5 mg/3 ml nebulizer, Take 3 mL by nebulization Every 4 (Four) Hours As Needed for Wheezing., Disp: 120 vial, Rfl: 6  •  loratadine (CLARITIN) 10 MG tablet, TAKE 1 TABLET BY MOUTH EVERY DAY, Disp: 30 tablet, Rfl: 2  •  Omega-3 Fatty Acids (FISH OIL) 1000 MG capsule capsule, Take 1 capsule by mouth 2 (Two) Times a Day With Meals., Disp: , Rfl:   •  pravastatin (Pravachol) 40 MG tablet, Take 1 tablet by mouth Daily., Disp: 30 tablet, Rfl: 2    No Known Allergies    Family History   Problem Relation Age of Onset   • Diabetes Mother    • Coronary artery disease Sister    • Diabetes Sister    • Breast cancer Paternal Aunt 50     Cancer-related family history includes Breast cancer (age of onset: 50) in her paternal aunt.    Social History     Tobacco Use   • Smoking status: Current Every Day Smoker     Packs/day: 0.25     Types: Cigarettes     Start date: 1975   • Smokeless tobacco: Never Used   Substance Use Topics   • Alcohol use: No     Frequency: Never   • Drug use: No     I have reviewed the history of present illness, past medical history, family history, social history, lab results, all notes and other records since the patient was last seen on  Visit date not found.    ROS:   Review of Systems   Constitutional: Positive for fatigue. Negative for chills and fever.   HENT: Negative for ear pain, mouth sores, nosebleeds and sore throat.    Eyes: Negative for photophobia and visual disturbance.   Respiratory: Negative for wheezing and stridor.    Cardiovascular: Negative for chest pain and palpitations.   Gastrointestinal: Negative for abdominal pain, blood in stool,  "constipation, diarrhea, nausea and vomiting.   Endocrine: Negative for cold intolerance and heat intolerance.   Genitourinary: Negative for dysuria and hematuria.   Musculoskeletal: Negative for joint swelling and neck stiffness.   Skin: Negative for color change and rash.   Neurological: Negative for seizures and syncope.   Hematological: Negative for adenopathy.        No obvious bleeding   Psychiatric/Behavioral: Negative for agitation, confusion and hallucinations.       Objective:    Vitals:    09/08/20 1102   BP: 168/86   Pulse: 84   Resp: 16   Temp: 98.5 °F (36.9 °C)   Weight: 66.2 kg (146 lb)   Height: 154.9 cm (61\")     ECOG  (1) Restricted in physically strenuous activity, ambulatory and able to do work of light nature    Physical Exam:   Physical Exam   Constitutional: She is oriented to person, place, and time. She appears well-developed and well-nourished. No distress.   HENT:   Head: Normocephalic and atraumatic.   Eyes: Conjunctivae and EOM are normal. Right eye exhibits no discharge. Left eye exhibits no discharge. No scleral icterus.   Neck: Normal range of motion. Neck supple. No thyromegaly present.   Cardiovascular: Normal rate, regular rhythm and normal heart sounds. Exam reveals no gallop and no friction rub.   Ejection systolic murmur heard.   Pulmonary/Chest: Effort normal. No stridor. No respiratory distress. She has no wheezes.   Decreased air entry bilaterally   Abdominal: Soft. Bowel sounds are normal. She exhibits no mass. There is no tenderness. There is no rebound and no guarding.   Musculoskeletal: Normal range of motion. She exhibits no tenderness.   Lymphadenopathy:     She has no cervical adenopathy.   Neurological: She is alert and oriented to person, place, and time. She exhibits normal muscle tone.   Skin: Skin is warm. No rash noted. She is not diaphoretic. No erythema.   Psychiatric: She has a normal mood and affect. Her behavior is normal.   Nursing note and vitals " reviewed.    I have reexamined the patient and the results are consistent with the previously documented exam. James Manley MD       Lab Results - Last 18 Months   Lab Units 09/01/20  1030 02/27/20  0906 08/27/19  1006   WBC x10E3/uL 6.5 5.6 5.3   HEMOGLOBIN g/dL 12.9 13.1 12.3   HEMATOCRIT % 38.8 39.1 37.4   PLATELETS x10E3/uL 241 198 189   MCV fL 99* 101* 97     Lab Results - Last 18 Months   Lab Units 06/09/20  0859 10/03/19  0903 08/27/19  1000 04/09/19  0841   SODIUM mmol/L 140 144 139 138   POTASSIUM mmol/L 4.5 4.5 4.3 4.3   CHLORIDE mmol/L 102 104 103 100   TOTAL CO2 mmol/L 21 18* 22 23   BUN mg/dL 17 17 14 15   CREATININE mg/dL 1.52* 1.41* 1.38* 1.3*   CALCIUM mg/dL 8.8 8.6* 9.0 8.9   BILIRUBIN mg/dL  --  0.2  --  0.8   ALK PHOS IU/L  --  69  --  55   ALT (SGPT) IU/L  --  12  --  14   AST (SGOT) IU/L  --  18  --  18   GLUCOSE mg/dL  --   --   --  102*       Lab Results   Component Value Date    GLUCOSE 102 (H) 04/09/2019    BUN 17 06/09/2020    CREATININE 1.52 (H) 06/09/2020    EGFRIFNONA 35 (L) 06/09/2020    EGFRIFAFRI 41 (L) 06/09/2020    BCR 11 (L) 06/09/2020    K 4.5 06/09/2020    CO2 21 06/09/2020    CALCIUM 8.8 06/09/2020    PROTENTOTREF 6.7 10/03/2019    ALBUMIN 4.4 10/03/2019    LABIL2 1.9 10/03/2019    AST 18 10/03/2019    ALT 12 10/03/2019       No results for input(s): APTT, INR, PTT in the last 18640 hours.    Lab Results   Component Value Date    IRON 199 (H) 08/27/2019    TIBC 298 09/01/2020    FERRITIN 50 09/01/2020       Lab Results   Component Value Date    FOLATE 21.4 02/27/2019       No results found for: OCCULTBLD    Lab Results   Component Value Date    RETICCTPCT 3.4 03/05/2018       Lab Results   Component Value Date    VNGBRPXN38 >2000 pg/mL (H) 02/27/2019     No results found for: SPEP, UPEP  Uric Acid   Date Value Ref Range Status   08/27/2019 6.2 2.5 - 7.1 mg/dL Final     Comment:                Therapeutic target for gout patients: <6.0     Lab Results   Component Value Date     SEDRATE 115 (H) 09/19/2018     No results found for: FIBRINOGEN, HAPTOGLOBIN  No results found for: PTT, INR  No results found for:   No results found for: CEA  No components found for: CA-19-9  No results found for: PSA          Assessment/Plan     Assessment:  1. History of iron deficiency anemia:  She is currently taking iron p.o. two times a day.  Her EGD and colonoscopy showed several AVMs in the duodenum as well as in the colon, which were cauterized.  AVMs are in the mid ascending colon and in the duodenum.  There was also a small erosion in the stomach and gastritis (June 2018).  She continues on iron supplements.  Iron levels have improved.  2. Chronic kidney disease: Creatinine has ranged around 1.3-1.4  3. History of nose bleeds:  Resolved.    4. History of occult GI bleeding:  Stool Hemoccult test was positive x3 in March 2018 and she was sent to see GI.  5. Osteopenia    PLAN:    1. Iron levels continues to stay in a good range.  Iron saturation is high but ferritin is normal.  Continue 1 tablet daily.  2.  Check CBC and iron levels in 6 months.  3. Recommended to take vitamin D and calcium for her osteopenia  4. Smoking cessation counseling provided.  Patient not interested in any nicotine patches or any pharmacotherapy.  She would like to quit cold turkey by herself.  She did not pick a quit date.    I have reviewed and confirmed the accuracy of the patient's history: Chief complaint, HPI, ROS and Subjective as entered by the MA/RAINAN/RN. James Manley MD 09/08/20       I counselled Nancy of the risks of continuing to use tobacco and cessation.    During this visit, I spent 3-10 minutes counseling the patient regarding tobacco cessation.     Iron deficiency anemia, unspecified iron deficiency anemia type  - CBC & Differential  - Ferritin  - Iron Profile    Orders Placed This Encounter   Procedures   • Ferritin     Standing Status:   Future     Standing Expiration Date:   9/8/2021   • Iron Profile      Standing Status:   Future     Standing Expiration Date:   9/8/2021   • CBC & Differential     Standing Status:   Future     Standing Expiration Date:   9/8/2021     Order Specific Question:   Manual Differential     Answer:   No        Thank you very much for providing the opportunity to participate in this patient’s care. Please do not hesitate to call if there are any other questions.    I have reviewed all relevant labs results, imaging,Outside reports,notes, vitals, medications and plan with the patient today.    Portions of the note have been Scribed by medical assistant/Nurse.  I have reviewed and made changes accordingly.      Electronically signed by James Manley MD, 09/08/20, 11:14 AM.

## 2020-09-10 ENCOUNTER — TELEPHONE (OUTPATIENT)
Dept: FAMILY MEDICINE CLINIC | Facility: CLINIC | Age: 65
End: 2020-09-10

## 2020-09-10 DIAGNOSIS — F17.210 CIGARETTE SMOKER: Primary | ICD-10-CM

## 2020-09-10 DIAGNOSIS — J42 CHRONIC BRONCHITIS, UNSPECIFIED CHRONIC BRONCHITIS TYPE (HCC): ICD-10-CM

## 2020-09-29 ENCOUNTER — APPOINTMENT (OUTPATIENT)
Dept: VASCULAR SURGERY | Facility: HOSPITAL | Age: 65
End: 2020-09-29

## 2020-09-29 PROCEDURE — G0463 HOSPITAL OUTPT CLINIC VISIT: HCPCS

## 2020-09-30 ENCOUNTER — TRANSCRIBE ORDERS (OUTPATIENT)
Dept: ADMINISTRATIVE | Facility: HOSPITAL | Age: 65
End: 2020-09-30

## 2020-09-30 DIAGNOSIS — I65.23 BILATERAL CAROTID ARTERY OCCLUSION: Primary | ICD-10-CM

## 2020-09-30 DIAGNOSIS — I73.9 PERIPHERAL VASCULAR DISEASE, UNSPECIFIED (HCC): ICD-10-CM

## 2020-10-21 ENCOUNTER — TELEPHONE (OUTPATIENT)
Dept: FAMILY MEDICINE CLINIC | Facility: CLINIC | Age: 65
End: 2020-10-21

## 2020-10-21 NOTE — TELEPHONE ENCOUNTER
Patient coming in November for a follow up, she said you wanted fasting labs but there are no orders in chart.

## 2020-10-28 ENCOUNTER — HOSPITAL ENCOUNTER (OUTPATIENT)
Dept: CARDIOLOGY | Facility: HOSPITAL | Age: 65
Discharge: HOME | End: 2020-10-28

## 2020-10-28 DIAGNOSIS — I73.9 PERIPHERAL VASCULAR DISEASE, UNSPECIFIED (HCC): ICD-10-CM

## 2020-10-28 DIAGNOSIS — I65.23 BILATERAL CAROTID ARTERY OCCLUSION: ICD-10-CM

## 2020-10-28 LAB
BH CV LOWER ARTERIAL LEFT ABI RATIO: 0.97
BH CV LOWER ARTERIAL LEFT DORSALIS PEDIS SYS MAX: 157 MMHG
BH CV LOWER ARTERIAL LEFT GREAT TOE SYS MAX: 94 MMHG
BH CV LOWER ARTERIAL LEFT POST TIBIAL SYS MAX: 166 MMHG
BH CV LOWER ARTERIAL LEFT TBI RATIO: 0.55
BH CV LOWER ARTERIAL RIGHT ABI RATIO: 0.99
BH CV LOWER ARTERIAL RIGHT DORSALIS PEDIS SYS MAX: 161 MMHG
BH CV LOWER ARTERIAL RIGHT GREAT TOE SYS MAX: 124 MMHG
BH CV LOWER ARTERIAL RIGHT POST TIBIAL SYS MAX: 170 MMHG
BH CV LOWER ARTERIAL RIGHT TBI RATIO: 0.73
BH CV XLRA MEAS LEFT BULB EDV: 38.1 CM/SEC
BH CV XLRA MEAS LEFT BULB PSV: 179 CM/SEC
BH CV XLRA MEAS LEFT CCA RATIO VEL: -140 CM/SEC
BH CV XLRA MEAS LEFT DIST CCA EDV: -30.8 CM/SEC
BH CV XLRA MEAS LEFT DIST CCA PSV: -140 CM/SEC
BH CV XLRA MEAS LEFT DIST ICA EDV: -19.7 CM/SEC
BH CV XLRA MEAS LEFT DIST ICA PSV: -81.6 CM/SEC
BH CV XLRA MEAS LEFT ICA RATIO VEL: 195 CM/SEC
BH CV XLRA MEAS LEFT ICA/CCA RATIO: -1.4
BH CV XLRA MEAS LEFT MID ICA EDV: -38.5 CM/SEC
BH CV XLRA MEAS LEFT MID ICA PSV: -131 CM/SEC
BH CV XLRA MEAS LEFT PROX CCA EDV: 26.6 CM/SEC
BH CV XLRA MEAS LEFT PROX CCA PSV: 123 CM/SEC
BH CV XLRA MEAS LEFT PROX ECA PSV: -248 CM/SEC
BH CV XLRA MEAS LEFT PROX ICA EDV: 31 CM/SEC
BH CV XLRA MEAS LEFT PROX ICA PSV: 195 CM/SEC
BH CV XLRA MEAS LEFT PROX SCLA PSV: 248 CM/SEC
BH CV XLRA MEAS LEFT VERTEBRAL A PSV: 76.2 CM/SEC
BH CV XLRA MEAS RIGHT DIST CCA EDV: -18.6 CM/SEC
BH CV XLRA MEAS RIGHT DIST CCA PSV: -105 CM/SEC
BH CV XLRA MEAS RIGHT DIST ICA EDV: -19.8 CM/SEC
BH CV XLRA MEAS RIGHT DIST ICA PSV: -93.3 CM/SEC
BH CV XLRA MEAS RIGHT ICA/CCA RATIO: 1.4
BH CV XLRA MEAS RIGHT MID ICA EDV: 34.8 CM/SEC
BH CV XLRA MEAS RIGHT MID ICA PSV: 150 CM/SEC
BH CV XLRA MEAS RIGHT PROX CCA EDV: -15.5 CM/SEC
BH CV XLRA MEAS RIGHT PROX CCA PSV: -101 CM/SEC
BH CV XLRA MEAS RIGHT PROX ECA PSV: 217 CM/SEC
BH CV XLRA MEAS RIGHT PROX ICA EDV: -30.8 CM/SEC
BH CV XLRA MEAS RIGHT PROX ICA PSV: -139 CM/SEC
BH CV XLRA MEAS RIGHT PROX SCLA PSV: 272 CM/SEC
BH CV XLRA MEAS RIGHT VERTEBRAL A PSV: -84.5 CM/SEC
UPPER ARTERIAL LEFT ARM BRACHIAL SYS MAX: 171 MMHG
UPPER ARTERIAL RIGHT ARM BRACHIAL SYS MAX: 167 MMHG

## 2020-10-28 PROCEDURE — 93922 UPR/L XTREMITY ART 2 LEVELS: CPT

## 2020-10-28 PROCEDURE — 93880 EXTRACRANIAL BILAT STUDY: CPT

## 2020-11-03 ENCOUNTER — APPOINTMENT (OUTPATIENT)
Dept: VASCULAR SURGERY | Facility: HOSPITAL | Age: 65
End: 2020-11-03

## 2020-11-04 ENCOUNTER — APPOINTMENT (OUTPATIENT)
Dept: VASCULAR SURGERY | Facility: HOSPITAL | Age: 65
End: 2020-11-04

## 2020-11-04 ENCOUNTER — OFFICE VISIT (OUTPATIENT)
Dept: FAMILY MEDICINE CLINIC | Facility: CLINIC | Age: 65
End: 2020-11-04

## 2020-11-04 VITALS
SYSTOLIC BLOOD PRESSURE: 134 MMHG | BODY MASS INDEX: 27.56 KG/M2 | OXYGEN SATURATION: 97 % | TEMPERATURE: 97.1 F | HEIGHT: 61 IN | DIASTOLIC BLOOD PRESSURE: 74 MMHG | HEART RATE: 66 BPM | WEIGHT: 146 LBS

## 2020-11-04 DIAGNOSIS — Z79.4 TYPE 2 DIABETES MELLITUS WITHOUT COMPLICATION, WITH LONG-TERM CURRENT USE OF INSULIN (HCC): ICD-10-CM

## 2020-11-04 DIAGNOSIS — E78.2 MIXED HYPERLIPIDEMIA: Primary | ICD-10-CM

## 2020-11-04 DIAGNOSIS — I10 ESSENTIAL HYPERTENSION: ICD-10-CM

## 2020-11-04 DIAGNOSIS — D64.9 ANEMIA, UNSPECIFIED TYPE: ICD-10-CM

## 2020-11-04 DIAGNOSIS — N18.31 CHRONIC RENAL IMPAIRMENT, STAGE 3A (HCC): ICD-10-CM

## 2020-11-04 DIAGNOSIS — F17.210 CIGARETTE SMOKER: ICD-10-CM

## 2020-11-04 DIAGNOSIS — E11.9 TYPE 2 DIABETES MELLITUS WITHOUT COMPLICATION, WITH LONG-TERM CURRENT USE OF INSULIN (HCC): ICD-10-CM

## 2020-11-04 PROCEDURE — 99213 OFFICE O/P EST LOW 20 MIN: CPT | Performed by: NURSE PRACTITIONER

## 2020-11-04 RX ORDER — ALLOPURINOL 100 MG/1
100 TABLET ORAL DAILY
COMMUNITY
Start: 2020-10-29

## 2020-11-04 NOTE — PROGRESS NOTES
"    Nancy Suárez is a 65 y.o. female.     65-year-old white female smoker with history of osteopenia, COPD, CAD/PTCA, CHF, hysterectomy, hyperlipidemia, neurogenic claudication and iron deficiency anemia who comes in today for follow-up visit  Blood pressure 134/74 heart rate 66 denies any chest pain, dyspnea, tachycardia or dizziness  Patient has no complaints is doing well  Her last cholesterol was 355 triglycerides 277  will be checking today  Weight is 146 with a BMI of 27.6  Patient is up-to-date on all preventative but she refused a flu shot today     fasting blood work  Reconsider flu shot  Low-cholesterol diet  Follow-up 6 months           The following portions of the patient's history were reviewed and updated as appropriate: allergies, current medications, past family history, past medical history, past social history, past surgical history and problem list.    Vitals:    11/04/20 0853   BP: 134/74   BP Location: Right arm   Patient Position: Sitting   Cuff Size: Adult   Pulse: 66   Temp: 97.1 °F (36.2 °C)   TempSrc: Temporal   SpO2: 97%   Weight: 66.2 kg (146 lb)   Height: 154.9 cm (61\")     Body mass index is 27.59 kg/m².    Past Medical History:   Diagnosis Date   • Artery stenosis (CMS/HCC)     Bilateral subclavian s/p stent    • Arthritis    • AVM (arteriovenous malformation)    • CAD (coronary artery disease)    • CKD (chronic kidney disease)     Stage three    • COPD (chronic obstructive pulmonary disease) (CMS/HCC)    • DM2 (diabetes mellitus, type 2) (CMS/HCC)    • Eye pressure     Elevated eye pressure   • Gout    • Hepatitis     Hepatitis c    • HTN (hypertension)    • Hyperparathyroidism (CMS/HCC)    • KIRIT (iron deficiency anemia)    • Iron deficiency anemia    • Osteoporosis    • PVD (peripheral vascular disease) (CMS/HCC)      Past Surgical History:   Procedure Laterality Date   • CATARACT EXTRACTION Left    • CHOLECYSTECTOMY     • EYE SURGERY     • TOTAL ABDOMINAL HYSTERECTOMY WITH " SALPINGO OOPHORECTOMY       Family History   Problem Relation Age of Onset   • Diabetes Mother    • Coronary artery disease Sister    • Diabetes Sister    • Breast cancer Paternal Aunt 50     Immunization History   Administered Date(s) Administered   • Flu Vaccine Intradermal Quad 18-64YR 10/04/2018   • Flu Vaccine Quad PF >36MO 10/10/2017   • Pneumococcal Conjugate 13-Valent (PCV13) 01/08/2016   • Pneumococcal Polysaccharide (PPSV23) 03/07/2017       Hospital Outpatient Visit on 10/28/2020   Component Date Value Ref Range Status   • RIGHT DORSALIS PEDIS SYS MAX 10/28/2020 161  mmHg Final   • RIGHT POST TIBIAL SYS MAX 10/28/2020 170  mmHg Final   • RIGHT GREAT TOE SYS MAX 10/28/2020 124  mmHg Final   • RIGHT CAROLINE RATIO 10/28/2020 0.99   Final   • RIGHT TBI RATIO 10/28/2020 0.73   Final   • LEFT DORSALIS PEDIS SYS MAX 10/28/2020 157  mmHg Final   • LEFT POST TIBIAL SYS MAX 10/28/2020 166  mmHg Final   • LEFT GREAT TOE SYS MAX 10/28/2020 94  mmHg Final   • LEFT CAROLINE RATIO 10/28/2020 0.97   Final   • LEFT TBI RATIO 10/28/2020 0.55   Final   • Upper arterial right arm brachial * 10/28/2020 167  mmHg Final   • Upper arterial left arm brachial s* 10/28/2020 171  mmHg Final         Review of Systems   Constitutional: Negative.    HENT: Negative.    Respiratory: Negative.    Cardiovascular: Negative.    Gastrointestinal: Negative.    Genitourinary: Negative.    Musculoskeletal: Negative.    Skin: Negative.    Neurological: Negative.    Psychiatric/Behavioral: Negative.        Objective   Physical Exam  Constitutional:       Appearance: Normal appearance.   Neck:      Musculoskeletal: Normal range of motion.   Cardiovascular:      Rate and Rhythm: Normal rate and regular rhythm.      Pulses: Normal pulses.      Heart sounds: Normal heart sounds.   Pulmonary:      Effort: Pulmonary effort is normal.      Breath sounds: Normal breath sounds.   Musculoskeletal: Normal range of motion.   Skin:     General: Skin is warm and dry.    Neurological:      General: No focal deficit present.      Mental Status: She is alert and oriented to person, place, and time.   Psychiatric:         Mood and Affect: Mood normal.         Procedures    Assessment/Plan   Diagnoses and all orders for this visit:    1. Mixed hyperlipidemia (Primary)  -     Lipid Panel With LDL / HDL Ratio    2. Type 2 diabetes mellitus without complication, with long-term current use of insulin (CMS/[facility])  -     Comprehensive Metabolic Panel    3. Anemia, unspecified type  -     CBC & Differential    4. Essential hypertension    5. Chronic renal impairment, stage 3a    6. Cigarette smoker    7. BMI 27.0-27.9,adult          Current Outpatient Medications:   •  albuterol sulfate HFA (PROAIR HFA) 108 (90 Base) MCG/ACT inhaler, Inhale 2 puffs 4 (Four) Times a Day. Proair - DX CODE J44.9, Disp: 3 inhaler, Rfl: 1  •  allopurinol (ZYLOPRIM) 100 MG tablet, Take 100 mg by mouth Daily., Disp: , Rfl:   •  amLODIPine (NORVASC) 5 MG tablet, TAKE 1 TABLET BY MOUTH EVERY DAY, Disp: 90 tablet, Rfl: 1  •  aspirin 81 MG tablet, Take 1 tablet by mouth Daily., Disp: , Rfl:   •  calcitriol (ROCALTROL) 0.25 MCG capsule, Take 1 capsule by mouth Daily., Disp: , Rfl: 3  •  carvedilol (COREG) 12.5 MG tablet, TAKE 1 TABLET BY MOUTH TWICE A DAY, Disp: 180 tablet, Rfl: 1  •  Cholecalciferol 1000 units capsule, Take 1 capsule by mouth Daily., Disp: , Rfl:   •  Cyanocobalamin (B-12) 1000 MCG capsule, Take 1 tablet by mouth Daily., Disp: , Rfl:   •  ferrous sulfate 325 (65 FE) MG tablet, TAKE 1 TABLET BY MOUTH EVERY DAY WITH BREAKFAST, Disp: 30 tablet, Rfl: 2  •  furosemide (LASIX) 20 MG tablet, Take 1 tablet by mouth Daily., Disp: 90 tablet, Rfl: 1  •  hydrALAZINE (APRESOLINE) 25 MG tablet, TAKE 1 TABLET BY MOUTH TWICE A DAY, Disp: 180 tablet, Rfl: 0  •  ipratropium-albuterol (DUO-NEB) 0.5-2.5 mg/3 ml nebulizer, Take 3 mL by nebulization Every 4 (Four) Hours As Needed for Wheezing., Disp: 120 vial, Rfl: 6  •   loratadine (CLARITIN) 10 MG tablet, TAKE 1 TABLET BY MOUTH EVERY DAY, Disp: 30 tablet, Rfl: 2  •  Omega-3 Fatty Acids (FISH OIL) 1000 MG capsule capsule, Take 1 capsule by mouth 2 (Two) Times a Day With Meals., Disp: , Rfl:   •  pravastatin (Pravachol) 40 MG tablet, Take 1 tablet by mouth Daily., Disp: 30 tablet, Rfl: 2

## 2020-11-04 NOTE — PATIENT INSTRUCTIONS
Fasting blood work today  Flu shot  Low-cholesterol diet  Follow-up 6 months  Stop smoking , consider smoking cessation

## 2020-11-05 LAB
ALBUMIN SERPL-MCNC: 4.4 G/DL (ref 3.8–4.8)
ALBUMIN/GLOB SERPL: 1.6 {RATIO} (ref 1.2–2.2)
ALP SERPL-CCNC: 70 IU/L (ref 39–117)
ALT SERPL-CCNC: 12 IU/L (ref 0–32)
AST SERPL-CCNC: 18 IU/L (ref 0–40)
BASOPHILS # BLD AUTO: 0 X10E3/UL (ref 0–0.2)
BASOPHILS NFR BLD AUTO: 0 %
BILIRUB SERPL-MCNC: 0.2 MG/DL (ref 0–1.2)
BUN SERPL-MCNC: 16 MG/DL (ref 8–27)
BUN/CREAT SERPL: 11 (ref 12–28)
CALCIUM SERPL-MCNC: 9 MG/DL (ref 8.7–10.3)
CHLORIDE SERPL-SCNC: 102 MMOL/L (ref 96–106)
CHOLEST SERPL-MCNC: 255 MG/DL (ref 100–199)
CO2 SERPL-SCNC: 18 MMOL/L (ref 20–29)
CREAT SERPL-MCNC: 1.47 MG/DL (ref 0.57–1)
EOSINOPHIL # BLD AUTO: 0.2 X10E3/UL (ref 0–0.4)
EOSINOPHIL NFR BLD AUTO: 2 %
ERYTHROCYTE [DISTWIDTH] IN BLOOD BY AUTOMATED COUNT: 13 % (ref 11.7–15.4)
GLOBULIN SER CALC-MCNC: 2.8 G/DL (ref 1.5–4.5)
GLUCOSE SERPL-MCNC: 85 MG/DL (ref 65–99)
HCT VFR BLD AUTO: 37.7 % (ref 34–46.6)
HDLC SERPL-MCNC: 43 MG/DL
HGB BLD-MCNC: 12.6 G/DL (ref 11.1–15.9)
IMM GRANULOCYTES # BLD AUTO: 0 X10E3/UL (ref 0–0.1)
IMM GRANULOCYTES NFR BLD AUTO: 0 %
LDLC SERPL CALC-MCNC: 155 MG/DL (ref 0–99)
LDLC/HDLC SERPL: 3.6 RATIO (ref 0–3.2)
LYMPHOCYTES # BLD AUTO: 1.6 X10E3/UL (ref 0.7–3.1)
LYMPHOCYTES NFR BLD AUTO: 26 %
MCH RBC QN AUTO: 33.2 PG (ref 26.6–33)
MCHC RBC AUTO-ENTMCNC: 33.4 G/DL (ref 31.5–35.7)
MCV RBC AUTO: 100 FL (ref 79–97)
MONOCYTES # BLD AUTO: 0.6 X10E3/UL (ref 0.1–0.9)
MONOCYTES NFR BLD AUTO: 9 %
NEUTROPHILS # BLD AUTO: 3.9 X10E3/UL (ref 1.4–7)
NEUTROPHILS NFR BLD AUTO: 63 %
PLATELET # BLD AUTO: 236 X10E3/UL (ref 150–450)
POTASSIUM SERPL-SCNC: 4.2 MMOL/L (ref 3.5–5.2)
PROT SERPL-MCNC: 7.2 G/DL (ref 6–8.5)
RBC # BLD AUTO: 3.79 X10E6/UL (ref 3.77–5.28)
SODIUM SERPL-SCNC: 142 MMOL/L (ref 134–144)
TRIGL SERPL-MCNC: 306 MG/DL (ref 0–149)
VLDLC SERPL CALC-MCNC: 57 MG/DL (ref 5–40)
WBC # BLD AUTO: 6.2 X10E3/UL (ref 3.4–10.8)

## 2020-11-10 ENCOUNTER — APPOINTMENT (OUTPATIENT)
Dept: VASCULAR SURGERY | Facility: HOSPITAL | Age: 65
End: 2020-11-10

## 2020-11-17 ENCOUNTER — APPOINTMENT (OUTPATIENT)
Dept: VASCULAR SURGERY | Facility: HOSPITAL | Age: 65
End: 2020-11-17

## 2020-11-17 PROCEDURE — G0463 HOSPITAL OUTPT CLINIC VISIT: HCPCS

## 2020-11-24 ENCOUNTER — APPOINTMENT (OUTPATIENT)
Dept: MAMMOGRAPHY | Facility: HOSPITAL | Age: 65
End: 2020-11-24

## 2020-12-12 RX ORDER — PRAVASTATIN SODIUM 40 MG
TABLET ORAL
Qty: 90 TABLET | Refills: 0 | Status: SHIPPED | OUTPATIENT
Start: 2020-12-12 | End: 2020-12-14 | Stop reason: SDUPTHER

## 2020-12-12 RX ORDER — FERROUS SULFATE 325(65) MG
TABLET ORAL
Qty: 90 TABLET | Refills: 0 | Status: SHIPPED | OUTPATIENT
Start: 2020-12-12 | End: 2021-03-18

## 2020-12-14 RX ORDER — PRAVASTATIN SODIUM 40 MG
40 TABLET ORAL DAILY
Qty: 90 TABLET | Refills: 0 | Status: SHIPPED | OUTPATIENT
Start: 2020-12-14 | End: 2021-05-20

## 2020-12-14 RX ORDER — HYDRALAZINE HYDROCHLORIDE 25 MG/1
25 TABLET, FILM COATED ORAL 2 TIMES DAILY
Qty: 180 TABLET | Refills: 1 | Status: SHIPPED | OUTPATIENT
Start: 2020-12-14 | End: 2020-12-17 | Stop reason: SDUPTHER

## 2020-12-19 RX ORDER — HYDRALAZINE HYDROCHLORIDE 25 MG/1
25 TABLET, FILM COATED ORAL 2 TIMES DAILY
Qty: 180 TABLET | Refills: 1 | Status: SHIPPED | OUTPATIENT
Start: 2020-12-19 | End: 2021-09-07

## 2020-12-24 ENCOUNTER — TRANSCRIBE ORDERS (OUTPATIENT)
Dept: ADMINISTRATIVE | Facility: HOSPITAL | Age: 65
End: 2020-12-24

## 2020-12-24 DIAGNOSIS — I65.23 BILATERAL CAROTID ARTERY OCCLUSION: Primary | ICD-10-CM

## 2021-01-07 ENCOUNTER — OFFICE VISIT (OUTPATIENT)
Dept: CARDIOLOGY | Facility: CLINIC | Age: 66
End: 2021-01-07

## 2021-01-07 VITALS
HEART RATE: 68 BPM | HEIGHT: 61 IN | WEIGHT: 148 LBS | OXYGEN SATURATION: 99 % | DIASTOLIC BLOOD PRESSURE: 63 MMHG | SYSTOLIC BLOOD PRESSURE: 127 MMHG | BODY MASS INDEX: 27.94 KG/M2

## 2021-01-07 DIAGNOSIS — N18.31 CHRONIC RENAL IMPAIRMENT, STAGE 3A (HCC): Primary | ICD-10-CM

## 2021-01-07 DIAGNOSIS — I73.9 PERIPHERAL VASCULAR DISEASE (HCC): ICD-10-CM

## 2021-01-07 DIAGNOSIS — F17.210 CIGARETTE SMOKER: ICD-10-CM

## 2021-01-07 DIAGNOSIS — I44.7 LEFT BUNDLE BRANCH BLOCK: ICD-10-CM

## 2021-01-07 DIAGNOSIS — E78.2 MIXED HYPERLIPIDEMIA: ICD-10-CM

## 2021-01-07 DIAGNOSIS — I25.10 CORONARY ARTERY DISEASE INVOLVING NATIVE CORONARY ARTERY OF NATIVE HEART WITHOUT ANGINA PECTORIS: ICD-10-CM

## 2021-01-07 DIAGNOSIS — I10 ESSENTIAL HYPERTENSION: ICD-10-CM

## 2021-01-07 PROCEDURE — 99214 OFFICE O/P EST MOD 30 MIN: CPT | Performed by: INTERNAL MEDICINE

## 2021-01-07 NOTE — PROGRESS NOTES
Subjective:     Encounter Date:01/07/2021      Patient ID: aNncy Suárez is a 65 y.o. female.    Chief Complaint: Coronary Artery Disease & LBBB  History of Present Illness     65 -year-old white female patient with a known history of CAD status post cardiac catheterization December 2014 showed chronically occluded RCA with left to right collaterals. patient left system has less than 30% disease.      patient was also diagnosed with severe anemia due to bone marrow problem rather than any GI bleed   EGD colposcopy and small-bowel capsule study   was negative.          CAROLINE negative Bilateral  lower extremity  of 2/2015   patient known to have carotid disease status post PCI previously     The last  carotid Doppler showed 50-74 % bilateral disease  Patient will be followed by vascular surgery   she does have some chronic renal insufficiency  followed by Nephrology  Patient is complaining of shortness of breath     June 2020 follow-up echocardiogram low normal EF 50% severe bulky calcification of the posterior leaflet of the mitral valve causing mild mitral stenosis moderate mitral insufficiency diastolic LV dysfunction noted    Labs done in November showed elevated  triglycerides 306  Patient was seen by PCP started on Crestor advised her to have follow-up labs in 3 months  Patient was strongly advised to stop smoking modify cardiac risk factors    I will see her back in 6 months with labs EKG    The following portions of the patient's history were reviewed and updated as appropriate: Allergies current medications past family history past medical history past social history past surgical history problem list and review of systems  Past Medical History:   Diagnosis Date   • Artery stenosis (CMS/HCC)     Bilateral subclavian s/p stent    • Arthritis    • AVM (arteriovenous malformation)    • CAD (coronary artery disease)    • CKD (chronic kidney disease)     Stage three    • COPD (chronic obstructive  "pulmonary disease) (CMS/HCC)    • DM2 (diabetes mellitus, type 2) (CMS/HCC)    • Eye pressure     Elevated eye pressure   • Gout    • Hepatitis     Hepatitis c    • HTN (hypertension)    • Hyperparathyroidism (CMS/HCC)    • KIRIT (iron deficiency anemia)    • Iron deficiency anemia    • Osteoporosis    • PVD (peripheral vascular disease) (CMS/HCC)      Past Surgical History:   Procedure Laterality Date   • CATARACT EXTRACTION Left    • CHOLECYSTECTOMY     • EYE SURGERY     • TOTAL ABDOMINAL HYSTERECTOMY WITH SALPINGO OOPHORECTOMY       /63 (BP Location: Left arm, Patient Position: Sitting, Cuff Size: Adult)   Pulse 68   Ht 154.9 cm (61\")   Wt 67.1 kg (148 lb)   LMP  (LMP Unknown)   SpO2 99%   Breastfeeding No   BMI 27.96 kg/m²   Family History   Problem Relation Age of Onset   • Diabetes Mother    • Coronary artery disease Sister    • Diabetes Sister    • Breast cancer Paternal Aunt 50       Current Outpatient Medications:   •  albuterol sulfate HFA (PROAIR HFA) 108 (90 Base) MCG/ACT inhaler, Inhale 2 puffs 4 (Four) Times a Day. Proair - DX CODE J44.9, Disp: 3 inhaler, Rfl: 1  •  allopurinol (ZYLOPRIM) 100 MG tablet, Take 100 mg by mouth Daily., Disp: , Rfl:   •  amLODIPine (NORVASC) 5 MG tablet, TAKE 1 TABLET BY MOUTH EVERY DAY, Disp: 90 tablet, Rfl: 1  •  aspirin 81 MG tablet, Take 1 tablet by mouth Daily., Disp: , Rfl:   •  calcitriol (ROCALTROL) 0.25 MCG capsule, Take 1 capsule by mouth Daily., Disp: , Rfl: 3  •  carvedilol (COREG) 12.5 MG tablet, TAKE 1 TABLET BY MOUTH TWICE A DAY, Disp: 180 tablet, Rfl: 1  •  Cholecalciferol 1000 units capsule, Take 1 capsule by mouth Daily., Disp: , Rfl:   •  Cyanocobalamin (B-12) 1000 MCG capsule, Take 1 tablet by mouth Daily., Disp: , Rfl:   •  ferrous sulfate 325 (65 FE) MG tablet, TAKE 1 TABLET BY MOUTH EVERY DAY WITH BREAKFAST, Disp: 90 tablet, Rfl: 0  •  furosemide (LASIX) 20 MG tablet, Take 1 tablet by mouth Daily., Disp: 90 tablet, Rfl: 1  •  hydrALAZINE " (APRESOLINE) 25 MG tablet, Take 1 tablet by mouth 2 (Two) Times a Day. Please schedule appt to follow up, Disp: 180 tablet, Rfl: 1  •  ipratropium-albuterol (DUO-NEB) 0.5-2.5 mg/3 ml nebulizer, Take 3 mL by nebulization Every 4 (Four) Hours As Needed for Wheezing., Disp: 120 vial, Rfl: 6  •  loratadine (CLARITIN) 10 MG tablet, TAKE 1 TABLET BY MOUTH EVERY DAY, Disp: 30 tablet, Rfl: 2  •  Omega-3 Fatty Acids (FISH OIL) 1000 MG capsule capsule, Take 1 capsule by mouth 2 (Two) Times a Day With Meals., Disp: , Rfl:   •  pravastatin (PRAVACHOL) 40 MG tablet, Take 1 tablet by mouth Daily., Disp: 90 tablet, Rfl: 0  Social History     Socioeconomic History   • Marital status: Single     Spouse name: Not on file   • Number of children: Not on file   • Years of education: Not on file   • Highest education level: Not on file   Tobacco Use   • Smoking status: Current Every Day Smoker     Packs/day: 0.25     Types: Cigarettes     Start date: 1975   • Smokeless tobacco: Never Used   Substance and Sexual Activity   • Alcohol use: No     Frequency: Never   • Drug use: No   • Sexual activity: Defer   Social History Narrative    She is single, retired from [employer], she lives in [place] and has two grown daughters.     No Known Allergies  Review of Systems   Constitution: Negative for fever and malaise/fatigue.   HENT: Negative for congestion and hearing loss.    Eyes: Negative for double vision and visual disturbance.   Cardiovascular: Negative for chest pain, claudication, dyspnea on exertion, leg swelling and syncope.   Respiratory: Negative for cough and shortness of breath.    Endocrine: Negative for cold intolerance.   Skin: Negative for color change and rash.   Musculoskeletal: Negative for arthritis and joint pain.   Gastrointestinal: Negative for abdominal pain and heartburn.   Genitourinary: Negative for hematuria.   Neurological: Negative for excessive daytime sleepiness and dizziness.   Psychiatric/Behavioral:  Negative for depression. The patient is not nervous/anxious.    All other systems reviewed and are negative.             Objective:     Physical Exam  Patient is alert not in any acute distress vital stable  Neck carotid bruit noted no JVP elevation lungs bilateral entry mostly clear heart sounds S1-S2 regular 2/6 systolic murmur lower left sternal border extremities no edema pulses present bilateral equal  Procedures    Lab Review:       Assessment:          Diagnosis Plan   1. Chronic renal impairment, stage 3a     2. Cigarette smoker     3. Coronary artery disease involving native coronary artery of native heart without angina pectoris     4. Left bundle branch block     5. Essential hypertension     6. Mixed hyperlipidemia     7. Peripheral vascular disease (CMS/HCC)            Plan:       MDM  Number of Diagnoses or Management Options  Chronic renal impairment, stage 3a: established, improving  Cigarette smoker: established, worsening  Coronary artery disease involving native coronary artery of native heart without angina pectoris: established, improving  Essential hypertension: established, improving  Left bundle branch block: minor  Mixed hyperlipidemia: established, worsening  Peripheral vascular disease (CMS/HCC): established, improving     Amount and/or Complexity of Data Reviewed  Clinical lab tests: ordered and reviewed  Review and summarize past medical records: yes    Risk of Complications, Morbidity, and/or Mortality  Presenting problems: moderate  Management options: moderate        Carotid stenosis will be followed by vascular surgery stable  Dyslipidemia uncontrolled restarted statins we'll check labs in 3 months  History of CAD and hypertension stable  Left bundle branch block stable

## 2021-01-12 ENCOUNTER — APPOINTMENT (OUTPATIENT)
Dept: MAMMOGRAPHY | Facility: HOSPITAL | Age: 66
End: 2021-01-12

## 2021-01-22 RX ORDER — FUROSEMIDE 20 MG/1
TABLET ORAL
Qty: 90 TABLET | Refills: 1 | Status: SHIPPED | OUTPATIENT
Start: 2021-01-22 | End: 2021-07-19

## 2021-02-04 ENCOUNTER — OFFICE VISIT (OUTPATIENT)
Dept: FAMILY MEDICINE CLINIC | Facility: CLINIC | Age: 66
End: 2021-02-04

## 2021-02-04 VITALS
TEMPERATURE: 96.9 F | SYSTOLIC BLOOD PRESSURE: 136 MMHG | WEIGHT: 146.2 LBS | HEIGHT: 61 IN | OXYGEN SATURATION: 100 % | HEART RATE: 69 BPM | BODY MASS INDEX: 27.6 KG/M2 | DIASTOLIC BLOOD PRESSURE: 80 MMHG

## 2021-02-04 DIAGNOSIS — N18.31 CHRONIC RENAL IMPAIRMENT, STAGE 3A (HCC): ICD-10-CM

## 2021-02-04 DIAGNOSIS — D64.9 ANEMIA, UNSPECIFIED TYPE: ICD-10-CM

## 2021-02-04 DIAGNOSIS — F17.210 CIGARETTE SMOKER: ICD-10-CM

## 2021-02-04 DIAGNOSIS — Z79.4 TYPE 2 DIABETES MELLITUS WITHOUT COMPLICATION, WITH LONG-TERM CURRENT USE OF INSULIN (HCC): ICD-10-CM

## 2021-02-04 DIAGNOSIS — Z12.31 OTHER SCREENING MAMMOGRAM: ICD-10-CM

## 2021-02-04 DIAGNOSIS — E11.9 TYPE 2 DIABETES MELLITUS WITHOUT COMPLICATION, WITH LONG-TERM CURRENT USE OF INSULIN (HCC): ICD-10-CM

## 2021-02-04 DIAGNOSIS — E78.2 MIXED HYPERLIPIDEMIA: Primary | ICD-10-CM

## 2021-02-04 PROCEDURE — 99213 OFFICE O/P EST LOW 20 MIN: CPT | Performed by: NURSE PRACTITIONER

## 2021-02-04 RX ORDER — ISOPROPYL ALCOHOL 0.75 G/1
SWAB TOPICAL
Qty: 100 EACH | Refills: 6 | Status: SHIPPED | OUTPATIENT
Start: 2021-02-04

## 2021-02-04 NOTE — PATIENT INSTRUCTIONS
Fasting blood work  Keep appointment with nephrology and cardiology  Keep appointment for mammogram  Stop smoking , consider smoking cessation    Follow-up 6 months  
How Severe Are They?: moderate
Is This A New Presentation, Or A Follow-Up?: Skin Lesions

## 2021-02-04 NOTE — PROGRESS NOTES
"    Nancy Suárez is a 65 y.o. female.     65-year-old white female smoker with history of osteopenia, COPD, CAD/PTCA, CHF, glaucoma, hysterectomy, hyperlipidemia, neurogenic claudication and iron deficiency anemia that has currently resolved who comes in today for follow-up visit  Blood pressure 136/80 heart rate 68 she denies any chest pain, dyspnea, tachycardia or dizziness  Patient states blood sugars are always under 120 fasting in the morning  Patient has renal insufficiency her last creatinine was 1.5 and she does have an upcoming nephrology appointment will be checking blood work today  Patient is up-to-date on eye exam she did not get her mammogram as planned in August of last year we are going to reschedule and her DEXA scan is due in November.  She had a colonoscopy in 2018  Weight is 146 with a BMI of 27.6 fasting blood work today        Keep appointment with nephrology and cardiology  Keep appointment for mammogram  Follow-up 6 months           The following portions of the patient's history were reviewed and updated as appropriate: allergies, current medications, past family history, past medical history, past social history, past surgical history and problem list.    Vitals:    02/04/21 0852   BP: 136/80   BP Location: Right arm   Patient Position: Sitting   Cuff Size: Adult   Pulse: 69   Temp: 96.9 °F (36.1 °C)   TempSrc: Temporal   SpO2: 100%   Weight: 66.3 kg (146 lb 3.2 oz)   Height: 154.9 cm (61\")     Body mass index is 27.62 kg/m².    Past Medical History:   Diagnosis Date   • Artery stenosis (CMS/HCC)     Bilateral subclavian s/p stent    • Arthritis    • AVM (arteriovenous malformation)    • CAD (coronary artery disease)    • CKD (chronic kidney disease)     Stage three    • COPD (chronic obstructive pulmonary disease) (CMS/HCC)    • DM2 (diabetes mellitus, type 2) (CMS/HCC)    • Eye pressure     Elevated eye pressure   • Gout    • Hepatitis     Hepatitis c    • HTN (hypertension)    • " Hyperparathyroidism (CMS/HCC)    • KIRIT (iron deficiency anemia)    • Iron deficiency anemia    • Osteoporosis    • PVD (peripheral vascular disease) (CMS/HCC)      Past Surgical History:   Procedure Laterality Date   • CATARACT EXTRACTION Left    • CHOLECYSTECTOMY     • EYE SURGERY     • TOTAL ABDOMINAL HYSTERECTOMY WITH SALPINGO OOPHORECTOMY       Family History   Problem Relation Age of Onset   • Diabetes Mother    • Coronary artery disease Sister    • Diabetes Sister    • Breast cancer Paternal Aunt 50     Immunization History   Administered Date(s) Administered   • Flu Vaccine Intradermal Quad 18-64YR 10/04/2018   • Flu Vaccine Quad PF >36MO 10/10/2017   • Pneumococcal Conjugate 13-Valent (PCV13) 01/08/2016   • Pneumococcal Polysaccharide (PPSV23) 03/07/2017       Office Visit on 11/04/2020   Component Date Value Ref Range Status   • WBC 11/04/2020 6.2  3.4 - 10.8 x10E3/uL Final   • RBC 11/04/2020 3.79  3.77 - 5.28 x10E6/uL Final   • Hemoglobin 11/04/2020 12.6  11.1 - 15.9 g/dL Final   • Hematocrit 11/04/2020 37.7  34.0 - 46.6 % Final   • MCV 11/04/2020 100* 79 - 97 fL Final   • MCH 11/04/2020 33.2* 26.6 - 33.0 pg Final   • MCHC 11/04/2020 33.4  31.5 - 35.7 g/dL Final   • RDW 11/04/2020 13.0  11.7 - 15.4 % Final   • Platelets 11/04/2020 236  150 - 450 x10E3/uL Final   • Neutrophil Rel % 11/04/2020 63  Not Estab. % Final   • Lymphocyte Rel % 11/04/2020 26  Not Estab. % Final   • Monocyte Rel % 11/04/2020 9  Not Estab. % Final   • Eosinophil Rel % 11/04/2020 2  Not Estab. % Final   • Basophil Rel % 11/04/2020 0  Not Estab. % Final   • Neutrophils Absolute 11/04/2020 3.9  1.4 - 7.0 x10E3/uL Final   • Lymphocytes Absolute 11/04/2020 1.6  0.7 - 3.1 x10E3/uL Final   • Monocytes Absolute 11/04/2020 0.6  0.1 - 0.9 x10E3/uL Final   • Eosinophils Absolute 11/04/2020 0.2  0.0 - 0.4 x10E3/uL Final   • Basophils Absolute 11/04/2020 0.0  0.0 - 0.2 x10E3/uL Final   • Immature Granulocyte Rel % 11/04/2020 0  Not Estab. % Final    • Immature Grans Absolute 11/04/2020 0.0  0.0 - 0.1 x10E3/uL Final   • Glucose 11/04/2020 85  65 - 99 mg/dL Final   • BUN 11/04/2020 16  8 - 27 mg/dL Final   • Creatinine 11/04/2020 1.47* 0.57 - 1.00 mg/dL Final   • eGFR Non  Am 11/04/2020 37* >59 mL/min/1.73 Final   • eGFR African Am 11/04/2020 42* >59 mL/min/1.73 Final   • BUN/Creatinine Ratio 11/04/2020 11* 12 - 28 Final   • Sodium 11/04/2020 142  134 - 144 mmol/L Final   • Potassium 11/04/2020 4.2  3.5 - 5.2 mmol/L Final   • Chloride 11/04/2020 102  96 - 106 mmol/L Final   • Total CO2 11/04/2020 18* 20 - 29 mmol/L Final   • Calcium 11/04/2020 9.0  8.7 - 10.3 mg/dL Final   • Total Protein 11/04/2020 7.2  6.0 - 8.5 g/dL Final   • Albumin 11/04/2020 4.4  3.8 - 4.8 g/dL Final   • Globulin 11/04/2020 2.8  1.5 - 4.5 g/dL Final   • A/G Ratio 11/04/2020 1.6  1.2 - 2.2 Final   • Total Bilirubin 11/04/2020 0.2  0.0 - 1.2 mg/dL Final   • Alkaline Phosphatase 11/04/2020 70  39 - 117 IU/L Final   • AST (SGOT) 11/04/2020 18  0 - 40 IU/L Final   • ALT (SGPT) 11/04/2020 12  0 - 32 IU/L Final   • Total Cholesterol 11/04/2020 255* 100 - 199 mg/dL Final   • Triglycerides 11/04/2020 306* 0 - 149 mg/dL Final   • HDL Cholesterol 11/04/2020 43  >39 mg/dL Final   • VLDL Cholesterol Rui 11/04/2020 57* 5 - 40 mg/dL Final   • LDL Chol Calc (NIH) 11/04/2020 155* 0 - 99 mg/dL Final   • LDL/HDL RATIO 11/04/2020 3.6* 0.0 - 3.2 ratio Final    Comment:                                     LDL/HDL Ratio                                              Men  Women                                1/2 Avg.Risk  1.0    1.5                                    Avg.Risk  3.6    3.2                                 2X Avg.Risk  6.2    5.0                                 3X Avg.Risk  8.0    6.1           Review of Systems   Constitutional: Negative.    HENT: Negative.    Respiratory: Negative.    Cardiovascular: Negative.    Gastrointestinal: Negative.    Genitourinary: Negative.    Musculoskeletal:  Negative.    Skin: Negative.    Neurological: Negative.    Psychiatric/Behavioral: Negative.        Objective   Physical Exam  Constitutional:       Appearance: Normal appearance.   HENT:      Head: Normocephalic.   Neck:      Musculoskeletal: Normal range of motion.   Cardiovascular:      Rate and Rhythm: Normal rate and regular rhythm.      Pulses: Normal pulses.      Heart sounds: Normal heart sounds.   Pulmonary:      Effort: Pulmonary effort is normal.      Breath sounds: Normal breath sounds.   Abdominal:      General: Bowel sounds are normal.   Musculoskeletal: Normal range of motion.   Skin:     General: Skin is warm and dry.   Neurological:      General: No focal deficit present.      Mental Status: She is alert and oriented to person, place, and time.   Psychiatric:         Mood and Affect: Mood normal.         Behavior: Behavior normal.         Procedures    Assessment/Plan   Diagnoses and all orders for this visit:    1. Mixed hyperlipidemia (Primary)  -     Lipid Panel With LDL / HDL Ratio; Future    2. Type 2 diabetes mellitus without complication, with long-term current use of insulin (CMS/[facility])  -     Comprehensive Metabolic Panel; Future  -     Hemoglobin A1c; Future    3. Anemia, unspecified type  -     CBC & Differential; Future    4. Chronic renal impairment, stage 3a (CMS/[facility])    5. Cigarette smoker    6. BMI 27.0-27.9,adult          Current Outpatient Medications:   •  albuterol sulfate HFA (PROAIR HFA) 108 (90 Base) MCG/ACT inhaler, Inhale 2 puffs 4 (Four) Times a Day. Proair - DX CODE J44.9, Disp: 3 inhaler, Rfl: 1  •  allopurinol (ZYLOPRIM) 100 MG tablet, Take 100 mg by mouth Daily., Disp: , Rfl:   •  amLODIPine (NORVASC) 5 MG tablet, TAKE 1 TABLET BY MOUTH EVERY DAY, Disp: 90 tablet, Rfl: 1  •  aspirin 81 MG tablet, Take 1 tablet by mouth Daily., Disp: , Rfl:   •  calcitriol (ROCALTROL) 0.25 MCG capsule, Take 1 capsule by mouth Daily., Disp: , Rfl: 3  •  carvedilol (COREG) 12.5 MG tablet,  TAKE 1 TABLET BY MOUTH TWICE A DAY, Disp: 180 tablet, Rfl: 1  •  Cholecalciferol 1000 units capsule, Take 1 capsule by mouth Daily., Disp: , Rfl:   •  Cyanocobalamin (B-12) 1000 MCG capsule, Take 1 tablet by mouth Daily., Disp: , Rfl:   •  ferrous sulfate 325 (65 FE) MG tablet, TAKE 1 TABLET BY MOUTH EVERY DAY WITH BREAKFAST, Disp: 90 tablet, Rfl: 0  •  furosemide (LASIX) 20 MG tablet, TAKE 1 TABLET BY MOUTH EVERY DAY, Disp: 90 tablet, Rfl: 1  •  hydrALAZINE (APRESOLINE) 25 MG tablet, Take 1 tablet by mouth 2 (Two) Times a Day. Please schedule appt to follow up, Disp: 180 tablet, Rfl: 1  •  ipratropium-albuterol (DUO-NEB) 0.5-2.5 mg/3 ml nebulizer, Take 3 mL by nebulization Every 4 (Four) Hours As Needed for Wheezing., Disp: 120 vial, Rfl: 6  •  loratadine (CLARITIN) 10 MG tablet, TAKE 1 TABLET BY MOUTH EVERY DAY, Disp: 30 tablet, Rfl: 2  •  Omega-3 Fatty Acids (FISH OIL) 1000 MG capsule capsule, Take 1 capsule by mouth 2 (Two) Times a Day With Meals., Disp: , Rfl:   •  pravastatin (PRAVACHOL) 40 MG tablet, Take 1 tablet by mouth Daily., Disp: 90 tablet, Rfl: 0

## 2021-03-09 ENCOUNTER — HOSPITAL ENCOUNTER (OUTPATIENT)
Dept: MAMMOGRAPHY | Facility: HOSPITAL | Age: 66
Discharge: HOME | End: 2021-03-09
Admitting: NURSE PRACTITIONER

## 2021-03-09 DIAGNOSIS — Z12.31 OTHER SCREENING MAMMOGRAM: ICD-10-CM

## 2021-03-09 PROCEDURE — 77067 SCR MAMMO BI INCL CAD: CPT

## 2021-03-09 PROCEDURE — 77063 BREAST TOMOSYNTHESIS BI: CPT

## 2021-03-18 RX ORDER — FERROUS SULFATE 325(65) MG
TABLET ORAL
Qty: 90 TABLET | Refills: 0 | Status: SHIPPED | OUTPATIENT
Start: 2021-03-18 | End: 2021-06-08

## 2021-04-15 ENCOUNTER — OFFICE VISIT (OUTPATIENT)
Dept: CARDIOLOGY | Facility: CLINIC | Age: 66
End: 2021-04-15

## 2021-04-15 VITALS
HEIGHT: 61 IN | OXYGEN SATURATION: 99 % | SYSTOLIC BLOOD PRESSURE: 163 MMHG | BODY MASS INDEX: 27.94 KG/M2 | HEART RATE: 68 BPM | WEIGHT: 148 LBS | DIASTOLIC BLOOD PRESSURE: 69 MMHG

## 2021-04-15 DIAGNOSIS — I50.32 CHRONIC DIASTOLIC CONGESTIVE HEART FAILURE (HCC): Primary | ICD-10-CM

## 2021-04-15 DIAGNOSIS — E78.2 MIXED HYPERLIPIDEMIA: ICD-10-CM

## 2021-04-15 DIAGNOSIS — I44.7 LEFT BUNDLE BRANCH BLOCK: ICD-10-CM

## 2021-04-15 DIAGNOSIS — I10 ESSENTIAL HYPERTENSION: ICD-10-CM

## 2021-04-15 DIAGNOSIS — I25.10 CORONARY ARTERY DISEASE INVOLVING NATIVE CORONARY ARTERY OF NATIVE HEART WITHOUT ANGINA PECTORIS: ICD-10-CM

## 2021-04-15 PROCEDURE — 99214 OFFICE O/P EST MOD 30 MIN: CPT | Performed by: INTERNAL MEDICINE

## 2021-04-15 RX ORDER — AMLODIPINE BESYLATE 5 MG/1
5 TABLET ORAL DAILY
Qty: 90 TABLET | Refills: 3 | Status: SHIPPED | OUTPATIENT
Start: 2021-04-15 | End: 2021-04-22

## 2021-04-15 NOTE — PROGRESS NOTES
Subjective:     Encounter Date:04/15/2021      Patient ID: Nancy Suárez is a 65 y.o. female.    Chief Complaint: Coronary Artery Disease  History of Present Illness     65 -year-old white female patient with a known history of CAD status post cardiac catheterization December 2014 showed chronically occluded RCA with left to right collaterals. patient left system has less than 30% disease.      patient was also diagnosed with severe anemia due to bone marrow problem rather than any GI bleed   EGD colposcopy and small-bowel capsule study   was negative.          CAROLINE negative Bilateral  lower extremity  of 2/2015   patient known to have carotid disease status post PCI previously     The last  carotid Doppler showed 50-74 % bilateral disease  Patient will be followed by vascular surgery   she does have some chronic renal insufficiency  followed by Nephrology  Patient is complaining of shortness of breath     June 2020 follow-up echocardiogram low normal EF 50% severe bulky calcification of the posterior leaflet of the mitral valve causing mild mitral stenosis moderate mitral insufficiency diastolic LV dysfunction noted    Patient did labs with PCP regarding dyslipidemia  Her blood pressure is uncontrolled somehow the amlodipine was missing from her medication list will restart that  If still not controlled well advised her to increase the hydralazine  Patient was strongly advised to stop smoking modify cardiac risk factors    I will see her back in 6 months EKG    The following portions of the patient's history were reviewed and updated as appropriate: Allergies current medications past family history past medical history past social history past surgical history problem list and review of systems  Past Medical History:   Diagnosis Date   • Artery stenosis (CMS/HCC)     Bilateral subclavian s/p stent    • Arthritis    • AVM (arteriovenous malformation)    • CAD (coronary artery disease)    • CKD (chronic kidney  "disease)     Stage three    • COPD (chronic obstructive pulmonary disease) (CMS/HCC)    • DM2 (diabetes mellitus, type 2) (CMS/HCC)    • Eye pressure     Elevated eye pressure   • Gout    • Hepatitis     Hepatitis c    • HTN (hypertension)    • Hyperparathyroidism (CMS/HCC)    • KIRIT (iron deficiency anemia)    • Iron deficiency anemia    • Osteoporosis    • PVD (peripheral vascular disease) (CMS/HCC)      Past Surgical History:   Procedure Laterality Date   • CATARACT EXTRACTION Left    • CHOLECYSTECTOMY     • EYE SURGERY     • TOTAL ABDOMINAL HYSTERECTOMY WITH SALPINGO OOPHORECTOMY       /69 (BP Location: Left arm, Patient Position: Sitting, Cuff Size: Adult)   Pulse 68   Ht 154.9 cm (61\")   Wt 67.1 kg (148 lb)   LMP  (LMP Unknown)   SpO2 99%   BMI 27.96 kg/m²   Family History   Problem Relation Age of Onset   • Diabetes Mother    • Coronary artery disease Sister    • Diabetes Sister    • Breast cancer Paternal Aunt 50       Current Outpatient Medications:   •  albuterol sulfate HFA (PROAIR HFA) 108 (90 Base) MCG/ACT inhaler, Inhale 2 puffs 4 (Four) Times a Day. Proair - DX CODE J44.9, Disp: 3 inhaler, Rfl: 1  •  Alcohol Swabs (B-D SINGLE USE SWABS REGULAR) pads, CHECK BLOOD SUGAR ONE TIME DAILY, Disp: 100 each, Rfl: 6  •  allopurinol (ZYLOPRIM) 100 MG tablet, Take 100 mg by mouth Daily., Disp: , Rfl:   •  amLODIPine (NORVASC) 5 MG tablet, Take 1 tablet by mouth Daily., Disp: 90 tablet, Rfl: 3  •  aspirin 81 MG tablet, Take 1 tablet by mouth Daily., Disp: , Rfl:   •  calcitriol (ROCALTROL) 0.25 MCG capsule, Take 1 capsule by mouth Daily., Disp: , Rfl: 3  •  carvedilol (COREG) 12.5 MG tablet, TAKE 1 TABLET BY MOUTH TWICE A DAY, Disp: 180 tablet, Rfl: 1  •  Cholecalciferol 1000 units capsule, Take 1 capsule by mouth Daily., Disp: , Rfl:   •  Cyanocobalamin (B-12) 1000 MCG capsule, Take 1 tablet by mouth Daily., Disp: , Rfl:   •  ferrous sulfate 325 (65 FE) MG tablet, TAKE 1 TABLET BY MOUTH EVERY DAY WITH " BREAKFAST, Disp: 90 tablet, Rfl: 0  •  furosemide (LASIX) 20 MG tablet, TAKE 1 TABLET BY MOUTH EVERY DAY, Disp: 90 tablet, Rfl: 1  •  hydrALAZINE (APRESOLINE) 25 MG tablet, Take 1 tablet by mouth 2 (Two) Times a Day. Please schedule appt to follow up, Disp: 180 tablet, Rfl: 1  •  ipratropium-albuterol (DUO-NEB) 0.5-2.5 mg/3 ml nebulizer, Take 3 mL by nebulization Every 4 (Four) Hours As Needed for Wheezing., Disp: 120 vial, Rfl: 6  •  loratadine (CLARITIN) 10 MG tablet, TAKE 1 TABLET BY MOUTH EVERY DAY, Disp: 30 tablet, Rfl: 2  •  Omega-3 Fatty Acids (FISH OIL) 1000 MG capsule capsule, Take 1 capsule by mouth 2 (Two) Times a Day With Meals., Disp: , Rfl:   •  pravastatin (PRAVACHOL) 40 MG tablet, Take 1 tablet by mouth Daily., Disp: 90 tablet, Rfl: 0  Social History     Socioeconomic History   • Marital status: Single     Spouse name: Not on file   • Number of children: Not on file   • Years of education: Not on file   • Highest education level: Not on file   Tobacco Use   • Smoking status: Current Every Day Smoker     Packs/day: 0.25     Types: Cigarettes     Start date: 1975   • Smokeless tobacco: Never Used   Vaping Use   • Vaping Use: Never used   Substance and Sexual Activity   • Alcohol use: No   • Drug use: No   • Sexual activity: Defer     No Known Allergies  Review of Systems   Constitutional: Negative for fever and malaise/fatigue.   HENT: Negative for congestion and hearing loss.    Eyes: Negative for double vision and visual disturbance.   Cardiovascular: Negative for chest pain, claudication, dyspnea on exertion, leg swelling and syncope.   Respiratory: Negative for cough and shortness of breath.    Endocrine: Negative for cold intolerance.   Skin: Negative for color change and rash.   Musculoskeletal: Negative for arthritis and joint pain.   Gastrointestinal: Negative for abdominal pain and heartburn.   Genitourinary: Negative for hematuria.   Neurological: Negative for excessive daytime sleepiness and  dizziness.   Psychiatric/Behavioral: Negative for depression. The patient is not nervous/anxious.    All other systems reviewed and are negative.             Objective:     Physical Exam  Vitals reviewed blood pressure elevated neck no JVP elevation lungs bilateral and mostly clear heart sounds S1-S2 regular extremities no edema bilateral pulses present equal  Procedures    Lab Review:       Assessment:          Diagnosis Plan   1. Chronic diastolic congestive heart failure (CMS/HCC)     2. Left bundle branch block     3. Coronary artery disease involving native coronary artery of native heart without angina pectoris     4. Mixed hyperlipidemia     5. Essential hypertension            Plan:       MDM  Number of Diagnoses or Management Options  Chronic diastolic congestive heart failure (CMS/HCC): established, improving  Coronary artery disease involving native coronary artery of native heart without angina pectoris: established, improving  Essential hypertension: established, worsening  Left bundle branch block: established, improving  Mixed hyperlipidemia: established, improving     Amount and/or Complexity of Data Reviewed  Review and summarize past medical records: yes    Risk of Complications, Morbidity, and/or Mortality  Presenting problems: moderate  Management options: moderate

## 2021-04-22 NOTE — TELEPHONE ENCOUNTER
Patient can continue her current medications take amlodipine as needed if the blood pressure systolic more than 160

## 2021-04-22 NOTE — TELEPHONE ENCOUNTER
Pt granddaughter Lit and patient called was concerned about taking the new added amlodipine along with the carvedilol and hydralazine both BID.  Pt stated had forgot to take the 2nd dose of hydralazine the day she was in for appt 4/15.  She is afraid it will be to many BP meds and needed to know what to do. Please advise    [phone]

## 2021-04-23 RX ORDER — AMLODIPINE BESYLATE 5 MG/1
TABLET ORAL
Qty: 90 TABLET | Refills: 3 | Status: SHIPPED | OUTPATIENT
Start: 2021-04-23 | End: 2022-08-16 | Stop reason: SDUPTHER

## 2021-05-19 ENCOUNTER — HOSPITAL ENCOUNTER (OUTPATIENT)
Dept: CARDIOLOGY | Facility: HOSPITAL | Age: 66
Discharge: HOME | End: 2021-05-19
Admitting: PHYSICIAN ASSISTANT

## 2021-05-19 DIAGNOSIS — I65.23 BILATERAL CAROTID ARTERY OCCLUSION: ICD-10-CM

## 2021-05-19 LAB
BH CV XLRA MEAS LEFT DIST CCA EDV: 28 CM/SEC
BH CV XLRA MEAS LEFT DIST CCA PSV: 82 CM/SEC
BH CV XLRA MEAS LEFT DIST ICA EDV: -31.7 CM/SEC
BH CV XLRA MEAS LEFT DIST ICA PSV: -92.6 CM/SEC
BH CV XLRA MEAS LEFT PROX CCA EDV: 32.1 CM/SEC
BH CV XLRA MEAS LEFT PROX CCA PSV: 120 CM/SEC
BH CV XLRA MEAS LEFT PROX ECA PSV: -252 CM/SEC
BH CV XLRA MEAS LEFT PROX ICA EDV: 37.3 CM/SEC
BH CV XLRA MEAS LEFT PROX ICA PSV: 213 CM/SEC
BH CV XLRA MEAS LEFT PROX SCLA PSV: 303 CM/SEC
BH CV XLRA MEAS LEFT VERTEBRAL A EDV: 16.8 CM/SEC
BH CV XLRA MEAS LEFT VERTEBRAL A PSV: 67.7 CM/SEC
BH CV XLRA MEAS RIGHT DIST CCA EDV: -21 CM/SEC
BH CV XLRA MEAS RIGHT DIST CCA PSV: -96 CM/SEC
BH CV XLRA MEAS RIGHT DIST ICA EDV: -25.5 CM/SEC
BH CV XLRA MEAS RIGHT DIST ICA PSV: -82.6 CM/SEC
BH CV XLRA MEAS RIGHT ICA/CCA RATIO: 0.6
BH CV XLRA MEAS RIGHT PROX CCA EDV: 36.2 CM/SEC
BH CV XLRA MEAS RIGHT PROX CCA PSV: 220 CM/SEC
BH CV XLRA MEAS RIGHT PROX ECA PSV: 198 CM/SEC
BH CV XLRA MEAS RIGHT PROX ICA EDV: 40 CM/SEC
BH CV XLRA MEAS RIGHT PROX ICA PSV: 135 CM/SEC
BH CV XLRA MEAS RIGHT PROX SCLA PSV: 251 CM/SEC
BH CV XLRA MEAS RIGHT VERTEBRAL A EDV: 15.7 CM/SEC
BH CV XLRA MEAS RIGHT VERTEBRAL A PSV: 50.6 CM/SEC
LEFT ARM BP: NORMAL MMHG
MAXIMAL PREDICTED HEART RATE: 153 BPM
RIGHT ARM BP: NORMAL MMHG
STRESS TARGET HR: 130 BPM

## 2021-05-19 PROCEDURE — 93880 EXTRACRANIAL BILAT STUDY: CPT

## 2021-05-20 RX ORDER — PRAVASTATIN SODIUM 40 MG
TABLET ORAL
Qty: 90 TABLET | Refills: 0 | Status: SHIPPED | OUTPATIENT
Start: 2021-05-20 | End: 2021-09-08

## 2021-05-26 ENCOUNTER — APPOINTMENT (OUTPATIENT)
Dept: VASCULAR SURGERY | Facility: HOSPITAL | Age: 66
End: 2021-05-26

## 2021-05-26 PROCEDURE — G0463 HOSPITAL OUTPT CLINIC VISIT: HCPCS

## 2021-06-04 ENCOUNTER — TELEPHONE (OUTPATIENT)
Dept: FAMILY MEDICINE CLINIC | Facility: CLINIC | Age: 66
End: 2021-06-04

## 2021-06-04 NOTE — TELEPHONE ENCOUNTER
Patient called said that this morning and said that she was in the shower and her ear started to bleed, said she got out of the shower and stuck a Qtip in her ear to clean it out and stopped after a little while. Patient wanting to know what could of caused this, I advised patient to go to the er over the weekend if the issue got worse.

## 2021-06-08 ENCOUNTER — TELEPHONE (OUTPATIENT)
Dept: FAMILY MEDICINE CLINIC | Facility: CLINIC | Age: 66
End: 2021-06-08

## 2021-06-08 RX ORDER — FERROUS SULFATE 325(65) MG
TABLET ORAL
Qty: 90 TABLET | Refills: 0 | Status: SHIPPED | OUTPATIENT
Start: 2021-06-08 | End: 2021-09-07
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
## 2021-06-28 ENCOUNTER — TRANSCRIBE ORDERS (OUTPATIENT)
Dept: ADMINISTRATIVE | Facility: HOSPITAL | Age: 66
End: 2021-06-28

## 2021-06-28 DIAGNOSIS — I65.23 BILATERAL CAROTID ARTERY OCCLUSION: Primary | ICD-10-CM

## 2021-07-19 RX ORDER — FUROSEMIDE 20 MG/1
TABLET ORAL
Qty: 90 TABLET | Refills: 0 | Status: SHIPPED | OUTPATIENT
Start: 2021-07-19 | End: 2021-09-08

## 2021-07-20 RX ORDER — CARVEDILOL 12.5 MG/1
TABLET ORAL
Qty: 180 TABLET | Refills: 3 | Status: SHIPPED | OUTPATIENT
Start: 2021-07-20 | End: 2022-08-05 | Stop reason: SDUPTHER

## 2021-08-03 ENCOUNTER — OFFICE VISIT (OUTPATIENT)
Dept: FAMILY MEDICINE CLINIC | Facility: CLINIC | Age: 66
End: 2021-08-03

## 2021-08-03 VITALS
TEMPERATURE: 97.6 F | SYSTOLIC BLOOD PRESSURE: 136 MMHG | HEIGHT: 61 IN | WEIGHT: 149.2 LBS | HEART RATE: 67 BPM | OXYGEN SATURATION: 99 % | BODY MASS INDEX: 28.17 KG/M2 | DIASTOLIC BLOOD PRESSURE: 82 MMHG

## 2021-08-03 DIAGNOSIS — N18.31 CHRONIC RENAL IMPAIRMENT, STAGE 3A (HCC): ICD-10-CM

## 2021-08-03 DIAGNOSIS — E66.3 OVERWEIGHT WITH BODY MASS INDEX (BMI) OF 28 TO 28.9 IN ADULT: ICD-10-CM

## 2021-08-03 DIAGNOSIS — Z00.00 PREVENTATIVE HEALTH CARE: ICD-10-CM

## 2021-08-03 DIAGNOSIS — R74.8 ELEVATED LIVER ENZYMES: ICD-10-CM

## 2021-08-03 DIAGNOSIS — E78.2 MIXED HYPERLIPIDEMIA: Primary | ICD-10-CM

## 2021-08-03 DIAGNOSIS — F17.210 CIGARETTE SMOKER: ICD-10-CM

## 2021-08-03 PROCEDURE — 1159F MED LIST DOCD IN RCRD: CPT | Performed by: NURSE PRACTITIONER

## 2021-08-03 PROCEDURE — G0439 PPPS, SUBSEQ VISIT: HCPCS | Performed by: NURSE PRACTITIONER

## 2021-08-03 PROCEDURE — 1170F FXNL STATUS ASSESSED: CPT | Performed by: NURSE PRACTITIONER

## 2021-08-03 PROCEDURE — 96160 PT-FOCUSED HLTH RISK ASSMT: CPT | Performed by: NURSE PRACTITIONER

## 2021-08-03 NOTE — PROGRESS NOTES
"    Nancy Suárez is a 65 y.o. female.     65-year-old white female smoker with history of osteopenia, COPD, CAD/angioplasty, CHF, glaucoma, hysterectomy, hyperlipidemia, neurogenic claudication, and iron deficiency anemia who comes in for Medicare wellness visit  Blood pressure 136/82 heart rate 66 she denies any chest pain, dyspnea, tachycardia or dizziness    Patient's last creatinine elevated 1.54 will be rechecking labs today.  Patient's weight is 149 with a BMI 28.2.  She has not had any Covid vaccines and I strongly encouraged her to get Pfizer vaccine and to be very careful with her COPD.  She is up-to-date on mammogram bone scan and colonoscopy she has had hysterectomy      Fasting blood work  Get vaccinated for Covid  Follow-up 6 months       The following portions of the patient's history were reviewed and updated as appropriate: allergies, current medications, past family history, past medical history, past social history, past surgical history and problem list.    Vitals:    08/03/21 1022   BP: 136/82   BP Location: Right arm   Patient Position: Sitting   Cuff Size: Large Adult   Pulse: 67   Temp: 97.6 °F (36.4 °C)   TempSrc: Temporal   SpO2: 99%   Weight: 67.7 kg (149 lb 3.2 oz)   Height: 154.9 cm (61\")     Body mass index is 28.19 kg/m².    Past Medical History:   Diagnosis Date   • Artery stenosis (CMS/HCC)     Bilateral subclavian s/p stent    • Arthritis    • AVM (arteriovenous malformation)    • CAD (coronary artery disease)    • CKD (chronic kidney disease)     Stage three    • COPD (chronic obstructive pulmonary disease) (CMS/HCC)    • DM2 (diabetes mellitus, type 2) (CMS/HCC)    • Eye pressure     Elevated eye pressure   • Gout    • Hepatitis     Hepatitis c    • HTN (hypertension)    • Hyperparathyroidism (CMS/HCC)    • KIRIT (iron deficiency anemia)    • Iron deficiency anemia    • Osteoporosis    • PVD (peripheral vascular disease) (CMS/HCC)      Past Surgical History:   Procedure Laterality " Date   • CATARACT EXTRACTION Left    • CHOLECYSTECTOMY     • EYE SURGERY     • TOTAL ABDOMINAL HYSTERECTOMY WITH SALPINGO OOPHORECTOMY       Family History   Problem Relation Age of Onset   • Diabetes Mother    • Coronary artery disease Sister    • Diabetes Sister    • Breast cancer Paternal Aunt 50     Immunization History   Administered Date(s) Administered   • Flu Vaccine Intradermal Quad 18-64YR 10/04/2018   • Flu Vaccine Quad PF 6-35MO 01/05/2016, 10/26/2016   • Flu Vaccine Quad PF >36MO 10/10/2017   • H1N1 Inj 11/21/2009   • Influenza Quad Vaccine (Inpatient) 10/10/2017   • Influenza, Unspecified 10/04/2018   • Pneumococcal Conjugate 13-Valent (PCV13) 01/08/2016   • Pneumococcal Polysaccharide (PPSV23) 03/07/2017       Hospital Outpatient Visit on 05/19/2021   Component Date Value Ref Range Status   • Prox CCA PSV 05/19/2021 120.0  cm/sec Final   • Prox CCA PSV 05/19/2021 220.0  cm/sec Final   • Prox CCA EDV 05/19/2021 32.1  cm/sec Final   • Prox CCA EDV 05/19/2021 36.2  cm/sec Final   • Dist CCA PSV 05/19/2021 82.0  cm/sec Final   • Dist CCA PSV 05/19/2021 -96.0  cm/sec Final   • Dist CCA EDV 05/19/2021 28.0  cm/sec Final   • Dist CCA EDV 05/19/2021 -21.0  cm/sec Final   • Prox ECA PSV 05/19/2021 -252.0  cm/sec Final   • Prox ECA PSV 05/19/2021 198.0  cm/sec Final   • Prox ICA PSV 05/19/2021 213.0  cm/sec Final   • Prox ICA PSV 05/19/2021 135  cm/sec Final   • Prox ICA EDV 05/19/2021 37.3  cm/sec Final   • Prox ICA EDV 05/19/2021 40  cm/sec Final   • Dist ICA PSV 05/19/2021 -92.6  cm/sec Final   • Dist ICA PSV 05/19/2021 -82.6  cm/sec Final   • Dist ICA EDV 05/19/2021 -31.7  cm/sec Final   • Dist ICA EDV 05/19/2021 -25.5  cm/sec Final   • Vertebral A PSV 05/19/2021 67.7  cm/sec Final   • Vertebral A PSV 05/19/2021 50.6  cm/sec Final   • Vertebral A EDV 05/19/2021 16.8  cm/sec Final   • Vertebral A EDV 05/19/2021 15.7  cm/sec Final   • Prox SCLA PSV 05/19/2021 303.0  cm/sec Final   • Prox SCLA PSV 05/19/2021  251.0  cm/sec Final   • Target HR (85%) 05/19/2021 130  bpm Final   • Max. Pred. HR (100%) 05/19/2021 153  bpm Final   • ICA/CCA ratio 05/19/2021 0.6   Final   • Left arm BP 05/19/2021 157/72  mmHg Final   • Right arm BP 05/19/2021 154/73  mmHg Final         Review of Systems   Constitutional: Negative.    HENT: Negative.    Respiratory: Negative.    Gastrointestinal: Negative.    Genitourinary: Negative.    Musculoskeletal: Negative.    Neurological: Negative.    Psychiatric/Behavioral: Negative.        Objective   Physical Exam  Constitutional:       Appearance: Normal appearance.   HENT:      Head: Normocephalic.   Cardiovascular:      Rate and Rhythm: Normal rate and regular rhythm.      Pulses: Normal pulses.      Heart sounds: Normal heart sounds.   Pulmonary:      Effort: Pulmonary effort is normal.   Abdominal:      General: Bowel sounds are normal.   Musculoskeletal:         General: Normal range of motion.   Skin:     General: Skin is warm and dry.   Neurological:      General: No focal deficit present.      Mental Status: She is alert and oriented to person, place, and time.   Psychiatric:         Mood and Affect: Mood normal.         Procedures    Assessment/Plan   Diagnoses and all orders for this visit:    1. Mixed hyperlipidemia (Primary)  -     Lipid Panel With LDL / HDL Ratio    2. Elevated liver enzymes  -     Hepatic Function Panel; Future    3. Preventative health care  -     Hepatitis C antibody; Future    4. Chronic renal impairment, stage 3a (CMS/HCC)    5. Cigarette smoker    6. Overweight with body mass index (BMI) of 28 to 28.9 in adult          Current Outpatient Medications:   •  albuterol sulfate HFA (PROAIR HFA) 108 (90 Base) MCG/ACT inhaler, Inhale 2 puffs 4 (Four) Times a Day. Proair - DX CODE J44.9, Disp: 3 inhaler, Rfl: 1  •  Alcohol Swabs (B-D SINGLE USE SWABS REGULAR) pads, CHECK BLOOD SUGAR ONE TIME DAILY, Disp: 100 each, Rfl: 6  •  allopurinol (ZYLOPRIM) 100 MG tablet, Take 100  mg by mouth Daily., Disp: , Rfl:   •  amLODIPine (NORVASC) 5 MG tablet, Take one tablet daily only if systolic BP is greater than 160, Disp: 90 tablet, Rfl: 3  •  aspirin 81 MG tablet, Take 1 tablet by mouth Daily., Disp: , Rfl:   •  calcitriol (ROCALTROL) 0.25 MCG capsule, Take 1 capsule by mouth Daily., Disp: , Rfl: 3  •  carvedilol (COREG) 12.5 MG tablet, TAKE 1 TABLET BY MOUTH TWICE A DAY, Disp: 180 tablet, Rfl: 3  •  Cholecalciferol 1000 units capsule, Take 1 capsule by mouth Daily., Disp: , Rfl:   •  Cyanocobalamin (B-12) 1000 MCG capsule, Take 1 tablet by mouth Daily., Disp: , Rfl:   •  ferrous sulfate 325 (65 FE) MG tablet, TAKE 1 TABLET BY MOUTH EVERY DAY WITH BREAKFAST, Disp: 90 tablet, Rfl: 0  •  furosemide (LASIX) 20 MG tablet, TAKE 1 TABLET BY MOUTH EVERY DAY, Disp: 90 tablet, Rfl: 0  •  hydrALAZINE (APRESOLINE) 25 MG tablet, Take 1 tablet by mouth 2 (Two) Times a Day. Please schedule appt to follow up, Disp: 180 tablet, Rfl: 1  •  ipratropium-albuterol (DUO-NEB) 0.5-2.5 mg/3 ml nebulizer, Take 3 mL by nebulization Every 4 (Four) Hours As Needed for Wheezing., Disp: 120 vial, Rfl: 6  •  loratadine (CLARITIN) 10 MG tablet, TAKE 1 TABLET BY MOUTH EVERY DAY, Disp: 30 tablet, Rfl: 2  •  Omega-3 Fatty Acids (FISH OIL) 1000 MG capsule capsule, Take 1 capsule by mouth 2 (Two) Times a Day With Meals., Disp: , Rfl:   •  pravastatin (PRAVACHOL) 40 MG tablet, TAKE 1 TABLET BY MOUTH EVERY DAY, Disp: 90 tablet, Rfl: 0

## 2021-08-04 LAB
CHOLEST SERPL-MCNC: 216 MG/DL (ref 100–199)
HDLC SERPL-MCNC: 39 MG/DL
LDLC SERPL CALC-MCNC: 127 MG/DL (ref 0–99)
LDLC/HDLC SERPL: 3.3 RATIO (ref 0–3.2)
TRIGL SERPL-MCNC: 284 MG/DL (ref 0–149)
VLDLC SERPL CALC-MCNC: 50 MG/DL (ref 5–40)

## 2021-08-12 ENCOUNTER — OFFICE VISIT (OUTPATIENT)
Dept: FAMILY MEDICINE CLINIC | Facility: CLINIC | Age: 66
End: 2021-08-12

## 2021-08-12 VITALS
SYSTOLIC BLOOD PRESSURE: 131 MMHG | WEIGHT: 149 LBS | HEIGHT: 61 IN | HEART RATE: 79 BPM | BODY MASS INDEX: 28.13 KG/M2 | OXYGEN SATURATION: 97 % | TEMPERATURE: 97.8 F | DIASTOLIC BLOOD PRESSURE: 65 MMHG

## 2021-08-12 DIAGNOSIS — R51.9 SEVERE HEADACHE: Primary | ICD-10-CM

## 2021-08-12 PROCEDURE — 99213 OFFICE O/P EST LOW 20 MIN: CPT | Performed by: NURSE PRACTITIONER

## 2021-08-12 NOTE — PROGRESS NOTES
"    Nancy Suárez is a 65 y.o. female.     65-year-old white female smoker with history of osteopenia, COPD, CAD/angioplasty, CHF, glaucoma, hysterectomy, hyperlipidemia, neurogenic claudication and iron deficiency anemia who comes in today with complaints of severe left-sided headaches that started on Tuesday that lasted about 10 minutes.  She states the pain is severe and stabbing and is almost intolerable.  She denies any other symptoms and denies any neck pain or injuries.  No neuro deficits noted patient normally never has headaches.  I will try to get a CT the head ordered to rule out any serious issues since she does have clotting issues    Blood pressure 130/64 heart rate 78 she denies any chest pain, dyspnea, tachycardia or dizziness    Patient states she did have a small amount of blood come out of her right ear but on examination nothing was noted such as a punctured eardrum          CT head without contrast       The following portions of the patient's history were reviewed and updated as appropriate: allergies, current medications, past family history, past medical history, past social history, past surgical history and problem list.    Vitals:    08/12/21 1122   BP: 131/65   BP Location: Left arm   Patient Position: Sitting   Cuff Size: Adult   Pulse: 79   Temp: 97.8 °F (36.6 °C)   TempSrc: Temporal   SpO2: 97%   Weight: 67.6 kg (149 lb)   Height: 154.9 cm (61\")     Body mass index is 28.15 kg/m².    Past Medical History:   Diagnosis Date   • Artery stenosis (CMS/HCC)     Bilateral subclavian s/p stent    • Arthritis    • AVM (arteriovenous malformation)    • CAD (coronary artery disease)    • CKD (chronic kidney disease)     Stage three    • COPD (chronic obstructive pulmonary disease) (CMS/HCC)    • DM2 (diabetes mellitus, type 2) (CMS/HCC)    • Eye pressure     Elevated eye pressure   • Gout    • Hepatitis     Hepatitis c    • HTN (hypertension)    • Hyperparathyroidism (CMS/HCC)    • KIRIT (iron " deficiency anemia)    • Iron deficiency anemia    • Osteoporosis    • PVD (peripheral vascular disease) (CMS/HCC)      Past Surgical History:   Procedure Laterality Date   • CATARACT EXTRACTION Left    • CHOLECYSTECTOMY     • EYE SURGERY     • TOTAL ABDOMINAL HYSTERECTOMY WITH SALPINGO OOPHORECTOMY       Family History   Problem Relation Age of Onset   • Diabetes Mother    • Coronary artery disease Sister    • Diabetes Sister    • Breast cancer Paternal Aunt 50     Immunization History   Administered Date(s) Administered   • Flu Vaccine Intradermal Quad 18-64YR 10/04/2018   • Flu Vaccine Quad PF 6-35MO 01/05/2016, 10/26/2016   • Flu Vaccine Quad PF >36MO 10/10/2017   • H1N1 Inj 11/21/2009   • Influenza Quad Vaccine (Inpatient) 10/10/2017   • Influenza, Unspecified 10/04/2018   • Pneumococcal Conjugate 13-Valent (PCV13) 01/08/2016   • Pneumococcal Polysaccharide (PPSV23) 03/07/2017       Office Visit on 08/03/2021   Component Date Value Ref Range Status   • Total Cholesterol 08/03/2021 216* 100 - 199 mg/dL Final   • Triglycerides 08/03/2021 284* 0 - 149 mg/dL Final   • HDL Cholesterol 08/03/2021 39* >39 mg/dL Final   • VLDL Cholesterol Rui 08/03/2021 50* 5 - 40 mg/dL Final   • LDL Chol Calc ([facility]) 08/03/2021 127* 0 - 99 mg/dL Final   • LDL/HDL RATIO 08/03/2021 3.3* 0.0 - 3.2 ratio Final    Comment:                                     LDL/HDL Ratio                                              Men  Women                                1/2 Avg.Risk  1.0    1.5                                    Avg.Risk  3.6    3.2                                 2X Avg.Risk  6.2    5.0                                 3X Avg.Risk  8.0    6.1           Review of Systems   Constitutional: Negative.    HENT: Positive for ear discharge.    Respiratory: Negative.    Cardiovascular: Negative.    Gastrointestinal: Negative.    Genitourinary: Negative.    Neurological: Positive for headache.   Psychiatric/Behavioral: Negative.        Objective    Physical Exam  Constitutional:       Appearance: Normal appearance.   HENT:      Head: Normocephalic.      Ears:      Comments: TMs bulging bilaterally clear fluid  Cardiovascular:      Rate and Rhythm: Normal rate and regular rhythm.      Pulses: Normal pulses.      Heart sounds: Normal heart sounds.   Pulmonary:      Effort: Pulmonary effort is normal.      Breath sounds: Normal breath sounds.   Abdominal:      General: Bowel sounds are normal.   Musculoskeletal:         General: Normal range of motion.   Skin:     General: Skin is warm and dry.   Neurological:      General: No focal deficit present.      Mental Status: She is alert and oriented to person, place, and time.   Psychiatric:         Mood and Affect: Mood normal.         Behavior: Behavior normal.         Procedures    Assessment/Plan   Diagnoses and all orders for this visit:    1. Severe headache (Primary)  -     CT Head Without Contrast; Future          Current Outpatient Medications:   •  albuterol sulfate HFA (PROAIR HFA) 108 (90 Base) MCG/ACT inhaler, Inhale 2 puffs 4 (Four) Times a Day. Proair - DX CODE J44.9, Disp: 3 inhaler, Rfl: 1  •  Alcohol Swabs (B-D SINGLE USE SWABS REGULAR) pads, CHECK BLOOD SUGAR ONE TIME DAILY, Disp: 100 each, Rfl: 6  •  allopurinol (ZYLOPRIM) 100 MG tablet, Take 100 mg by mouth Daily., Disp: , Rfl:   •  amLODIPine (NORVASC) 5 MG tablet, Take one tablet daily only if systolic BP is greater than 160, Disp: 90 tablet, Rfl: 3  •  aspirin 81 MG tablet, Take 1 tablet by mouth Daily., Disp: , Rfl:   •  calcitriol (ROCALTROL) 0.25 MCG capsule, Take 1 capsule by mouth Daily., Disp: , Rfl: 3  •  carvedilol (COREG) 12.5 MG tablet, TAKE 1 TABLET BY MOUTH TWICE A DAY, Disp: 180 tablet, Rfl: 3  •  Cholecalciferol 1000 units capsule, Take 1 capsule by mouth Daily., Disp: , Rfl:   •  Cyanocobalamin (B-12) 1000 MCG capsule, Take 1 tablet by mouth Daily., Disp: , Rfl:   •  ferrous sulfate 325 (65 FE) MG tablet, TAKE 1 TABLET BY MOUTH  EVERY DAY WITH BREAKFAST, Disp: 90 tablet, Rfl: 0  •  furosemide (LASIX) 20 MG tablet, TAKE 1 TABLET BY MOUTH EVERY DAY, Disp: 90 tablet, Rfl: 0  •  hydrALAZINE (APRESOLINE) 25 MG tablet, Take 1 tablet by mouth 2 (Two) Times a Day. Please schedule appt to follow up, Disp: 180 tablet, Rfl: 1  •  ipratropium-albuterol (DUO-NEB) 0.5-2.5 mg/3 ml nebulizer, Take 3 mL by nebulization Every 4 (Four) Hours As Needed for Wheezing., Disp: 120 vial, Rfl: 6  •  loratadine (CLARITIN) 10 MG tablet, TAKE 1 TABLET BY MOUTH EVERY DAY, Disp: 30 tablet, Rfl: 2  •  Omega-3 Fatty Acids (FISH OIL) 1000 MG capsule capsule, Take 1 capsule by mouth 2 (Two) Times a Day With Meals., Disp: , Rfl:   •  pravastatin (PRAVACHOL) 40 MG tablet, TAKE 1 TABLET BY MOUTH EVERY DAY, Disp: 90 tablet, Rfl: 0

## 2021-08-13 ENCOUNTER — TELEPHONE (OUTPATIENT)
Dept: FAMILY MEDICINE CLINIC | Facility: CLINIC | Age: 66
End: 2021-08-13

## 2021-08-13 DIAGNOSIS — R51.9 SEVERE HEADACHE: Primary | ICD-10-CM

## 2021-08-13 DIAGNOSIS — R51.9 SEVERE HEADACHE: ICD-10-CM

## 2021-08-13 NOTE — TELEPHONE ENCOUNTER
Caller: Nancy Suárez    Relationship: Self    Best call back number: [phone]    Caller requesting test results: PATIENT    What test was performed: CAT SCANS    When was the test performed: 08/12/2021    Where was the test performed: [facility]    Additional notes: PATIENT CALLED TO CHECK ON THE STATUS OF THESE TESTS.  PLEASE CALL AND ADVISE

## 2021-08-13 NOTE — TELEPHONE ENCOUNTER
Spoke with pt.  Let her know there was no abnormality.  Ingrid said to put in a Neurology Referral if pt wanted.  Pt wants to see Neuro.  SG

## 2021-08-13 NOTE — TELEPHONE ENCOUNTER
Ingrid is out of the office today.  I have a call into her, just waiting to hear back from her.  SG

## 2021-08-16 ENCOUNTER — TELEPHONE (OUTPATIENT)
Dept: FAMILY MEDICINE CLINIC | Facility: CLINIC | Age: 66
End: 2021-08-16

## 2021-08-16 DIAGNOSIS — M54.6 ACUTE LEFT-SIDED THORACIC BACK PAIN: Primary | ICD-10-CM

## 2021-08-16 DIAGNOSIS — M54.2 NECK PAIN: ICD-10-CM

## 2021-08-16 NOTE — TELEPHONE ENCOUNTER
Spoke with pts daughter.  She said if she keeps having them they will take her to ER @ [facility].  SG

## 2021-08-16 NOTE — TELEPHONE ENCOUNTER
Her daughter said they are stabbing pain in the back Left Side.  Sometimes they last a sec and sometimes a min.  SG

## 2021-08-16 NOTE — TELEPHONE ENCOUNTER
CT was normal it is probably referred pain coming from her spine  If not a whole lot more I can do I could call her in a narcotic pain pills which she will have to sign a narcotics contract and come in every 3 months    She could go to the emergency room and demanded to be admitted not sure they would go

## 2021-08-16 NOTE — TELEPHONE ENCOUNTER
Caller: JARRET CARDENAS    Relationship: DAUGHTER     Best call back number: [phone]    What was the call regarding: PATIENT HAS APPT WITH NEURO  ON 9/15  AM IN [place]. PATIENT NEEDS TO BRING LAST SCAN RESULTS WITH HER, WOULD LIKE TO  A DISC WITH THOSE IMAGES. PLEASE ADVISE WHEN THEY ARE READY TO .     PATIENTS DAUGHTER IS REQUESTING A CALL BACK FROM [facility] REGARDING HEADACHES IN THE BACK OF PATIENTS HEAD. SHE IS CONCERNED THAT WAITING UNTIL 9/15 IS NOT A GOOD IDEA. IF PATIENT CONTINUES TO HAVE THESE HEADACHES, SHE NEEDS TO KNOW WHAT TO DO.     Do you require a callback: YES

## 2021-09-07 RX ORDER — FERROUS SULFATE 325(65) MG
TABLET ORAL
Qty: 90 TABLET | Refills: 0 | Status: SHIPPED | OUTPATIENT
Start: 2021-09-07 | End: 2021-11-14

## 2021-09-07 RX ORDER — HYDRALAZINE HYDROCHLORIDE 25 MG/1
25 TABLET, FILM COATED ORAL 2 TIMES DAILY
Qty: 180 TABLET | Refills: 1 | Status: SHIPPED | OUTPATIENT
Start: 2021-09-07 | End: 2022-01-03

## 2021-09-08 RX ORDER — PRAVASTATIN SODIUM 40 MG
TABLET ORAL
Qty: 90 TABLET | Refills: 0 | Status: SHIPPED | OUTPATIENT
Start: 2021-09-08 | End: 2021-12-14

## 2021-09-08 RX ORDER — FUROSEMIDE 20 MG/1
TABLET ORAL
Qty: 90 TABLET | Refills: 0 | Status: SHIPPED | OUTPATIENT
Start: 2021-09-08 | End: 2022-03-28

## 2021-10-28 ENCOUNTER — OFFICE VISIT (OUTPATIENT)
Dept: CARDIOLOGY | Facility: CLINIC | Age: 66
End: 2021-10-28

## 2021-10-28 VITALS
HEIGHT: 61 IN | OXYGEN SATURATION: 99 % | SYSTOLIC BLOOD PRESSURE: 132 MMHG | WEIGHT: 148.2 LBS | HEART RATE: 68 BPM | BODY MASS INDEX: 27.98 KG/M2 | DIASTOLIC BLOOD PRESSURE: 62 MMHG

## 2021-10-28 DIAGNOSIS — I51.9 CARDIAC DISEASE: ICD-10-CM

## 2021-10-28 DIAGNOSIS — F17.210 CIGARETTE SMOKER: ICD-10-CM

## 2021-10-28 DIAGNOSIS — I25.10 CORONARY ARTERY DISEASE INVOLVING NATIVE CORONARY ARTERY OF NATIVE HEART WITHOUT ANGINA PECTORIS: ICD-10-CM

## 2021-10-28 DIAGNOSIS — I44.7 LEFT BUNDLE BRANCH BLOCK: Primary | ICD-10-CM

## 2021-10-28 DIAGNOSIS — I70.213 ATHEROSCLEROSIS OF NATIVE ARTERIES OF EXTREMITIES WITH INTERMITTENT CLAUDICATION, BILATERAL LEGS (HCC): ICD-10-CM

## 2021-10-28 DIAGNOSIS — I50.32 CHRONIC DIASTOLIC CONGESTIVE HEART FAILURE (HCC): ICD-10-CM

## 2021-10-28 PROCEDURE — 99214 OFFICE O/P EST MOD 30 MIN: CPT | Performed by: INTERNAL MEDICINE

## 2021-10-28 PROCEDURE — 93010 ELECTROCARDIOGRAM REPORT: CPT | Performed by: INTERNAL MEDICINE

## 2021-10-28 NOTE — PROGRESS NOTES
HP      Name: Nancy Suárez ADMIT: (Not on file)   : 1955  PCP: Ingrid De Anda APRN    MRN: [id number] LOS: 0 days   AGE/SEX: 65 y.o. female  ROOM: Room/bed info not found     Chief Complaint   Patient presents with   • Congestive Heart Failure     6 mo f/u       Subjective         History of present illness  Nancy Suárez is a 65-year-old female patient who has history of coronary artery disease likely of the RCA on heart catheterization  and 30% LAD disease.  Patient also has carotid artery disease.  And he follows with a vascular surgeon.  Patient denies having active chest pain or shortness of breath fortunately she continues to smoke but is actively trying to eat.  Lower extremity edema no syncopal episodes.  No new medication change.    Past Medical History:   Diagnosis Date   • Artery stenosis (HCC)     Bilateral subclavian s/p stent    • Arthritis    • AVM (arteriovenous malformation)    • CAD (coronary artery disease)    • CKD (chronic kidney disease)     Stage three    • COPD (chronic obstructive pulmonary disease) (HCC)    • DM2 (diabetes mellitus, type 2) (HCC)    • Eye pressure     Elevated eye pressure   • Gout    • Hepatitis     Hepatitis c    • HTN (hypertension)    • Hyperparathyroidism (HCC)    • KIRIT (iron deficiency anemia)    • Iron deficiency anemia    • Osteoporosis    • PVD (peripheral vascular disease) (HCC)      Past Surgical History:   Procedure Laterality Date   • CATARACT EXTRACTION Left    • CHOLECYSTECTOMY     • EYE SURGERY     • TOTAL ABDOMINAL HYSTERECTOMY WITH SALPINGO OOPHORECTOMY       Family History   Problem Relation Age of Onset   • Diabetes Mother    • Coronary artery disease Sister    • Diabetes Sister    • Breast cancer Paternal Aunt 50     Social History     Tobacco Use   • Smoking status: Current Some Day Smoker     Packs/day: 0.25     Types: Cigarettes     Start date:    • Smokeless tobacco: Never Used   Vaping Use   • Vaping Use: Never used   Substance  Use Topics   • Alcohol use: No   • Drug use: No     (Not in a hospital admission)    Allergies:  Patient has no known allergies.      Physical Exam  VITALS REVIEWED    General:      well developed, in no acute distress.    Head:      normocephalic and atraumatic.    Eyes:      PERRL/EOM intact, conjunctiva and sclera clear with out nystagmus.    Neck:      no masses, thyromegaly,  trachea central with normal respiratory effort and PMI displaced laterally  Lungs:      Clear to auscultation bilaterally  Heart:       Regular rhythm  Msk:      no deformity or scoliosis noted of thoracic or lumbar spine.    Pulses:      pulses normal in all 4 extremities.    Extremities:       No lower extremity edema  Neurologic:      no focal deficits.   alert oriented x3  Skin:      intact without lesions or rashes.    Psych:      alert and cooperative; normal mood and affect; normal attention span and concentration.      Result Review :                Pertinent cardiac workup    1. EKG 10/28/2021 sinus rhythm left bundle branch block  2. Echocardiogram 6/5/2020 oh low normal at 50%        Procedures        Assessment and Plan      Nancy Suárez a 62 patient with coronary disease with RCA occlusion detailed above, not having any anginal symptoms at this time.  No CHF symptoms.  Her EKG shows sinus rhythm with chronic left bundle block.  I also reviewed her cholesterol panel her cholesterol it seems that it always has been-with LDL was 127.  She said that she was tried Lipitor but was discontinued because of muscle aches.  It might be worth to try a different statin such as Crestor.  I had a long discussion with her about smoking cessation.  We will see her in 6 follow-up.    Diagnoses and all orders for this visit:    1. Left bundle branch block (Primary)    2. Atherosclerosis of native arteries of extremities with intermittent claudication, bilateral legs (HCC)    3. Cardiac disease    4. Chronic diastolic congestive heart failure  (HCC)    5. Coronary artery disease involving native coronary artery of native heart without angina pectoris    6. Cigarette smoker           No follow-ups on file.  Patient was given instructions and counseling regarding her condition or for health maintenance advice. Please see specific information pulled into the AVS if appropriate.

## 2021-11-14 RX ORDER — FERROUS SULFATE 325(65) MG
TABLET ORAL
Qty: 30 TABLET | Refills: 2 | Status: SHIPPED | OUTPATIENT
Start: 2021-11-14 | End: 2022-01-07 | Stop reason: SDUPTHER

## 2021-11-24 ENCOUNTER — APPOINTMENT (OUTPATIENT)
Dept: CARDIOLOGY | Facility: HOSPITAL | Age: 66
End: 2021-11-24

## 2021-11-24 ENCOUNTER — APPOINTMENT (OUTPATIENT)
Dept: VASCULAR SURGERY | Facility: HOSPITAL | Age: 66
End: 2021-11-24

## 2021-12-14 RX ORDER — PRAVASTATIN SODIUM 40 MG
TABLET ORAL
Qty: 90 TABLET | Refills: 0 | Status: SHIPPED | OUTPATIENT
Start: 2021-12-14 | End: 2022-01-03

## 2021-12-20 ENCOUNTER — APPOINTMENT (OUTPATIENT)
Dept: CARDIOLOGY | Facility: HOSPITAL | Age: 66
End: 2021-12-20

## 2022-01-03 ENCOUNTER — APPOINTMENT (OUTPATIENT)
Dept: VASCULAR SURGERY | Facility: HOSPITAL | Age: 67
End: 2022-01-03

## 2022-01-03 ENCOUNTER — APPOINTMENT (OUTPATIENT)
Dept: CARDIOLOGY | Facility: HOSPITAL | Age: 67
End: 2022-01-03

## 2022-01-03 RX ORDER — HYDRALAZINE HYDROCHLORIDE 25 MG/1
25 TABLET, FILM COATED ORAL 2 TIMES DAILY
Qty: 180 TABLET | Refills: 0 | Status: SHIPPED | OUTPATIENT
Start: 2022-01-03 | End: 2022-07-11

## 2022-01-03 RX ORDER — PRAVASTATIN SODIUM 40 MG
TABLET ORAL
Qty: 90 TABLET | Refills: 0 | Status: SHIPPED | OUTPATIENT
Start: 2022-01-03 | End: 2022-01-18

## 2022-01-09 RX ORDER — FERROUS SULFATE 325(65) MG
1 TABLET ORAL
Qty: 30 TABLET | Refills: 2 | Status: SHIPPED | OUTPATIENT
Start: 2022-01-09 | End: 2022-03-02 | Stop reason: SDUPTHER

## 2022-01-13 ENCOUNTER — OFFICE VISIT (OUTPATIENT)
Dept: FAMILY MEDICINE CLINIC | Facility: CLINIC | Age: 67
End: 2022-01-13

## 2022-01-13 VITALS
SYSTOLIC BLOOD PRESSURE: 162 MMHG | WEIGHT: 147.8 LBS | OXYGEN SATURATION: 100 % | HEIGHT: 61 IN | DIASTOLIC BLOOD PRESSURE: 80 MMHG | HEART RATE: 69 BPM | TEMPERATURE: 97.1 F | BODY MASS INDEX: 27.9 KG/M2

## 2022-01-13 DIAGNOSIS — Z79.4 TYPE 2 DIABETES MELLITUS WITHOUT COMPLICATION, WITH LONG-TERM CURRENT USE OF INSULIN: ICD-10-CM

## 2022-01-13 DIAGNOSIS — M25.512 CHRONIC LEFT SHOULDER PAIN: ICD-10-CM

## 2022-01-13 DIAGNOSIS — M19.90 ARTHRITIS: ICD-10-CM

## 2022-01-13 DIAGNOSIS — M79.642 PAIN IN BOTH HANDS: ICD-10-CM

## 2022-01-13 DIAGNOSIS — G89.29 CHRONIC LEFT SHOULDER PAIN: ICD-10-CM

## 2022-01-13 DIAGNOSIS — E78.2 MIXED HYPERLIPIDEMIA: Primary | ICD-10-CM

## 2022-01-13 DIAGNOSIS — M79.641 PAIN IN BOTH HANDS: ICD-10-CM

## 2022-01-13 DIAGNOSIS — E11.9 TYPE 2 DIABETES MELLITUS WITHOUT COMPLICATION, WITH LONG-TERM CURRENT USE OF INSULIN: ICD-10-CM

## 2022-01-13 PROCEDURE — 99214 OFFICE O/P EST MOD 30 MIN: CPT | Performed by: NURSE PRACTITIONER

## 2022-01-13 RX ORDER — TRAMADOL HYDROCHLORIDE 50 MG/1
50 TABLET ORAL EVERY 6 HOURS PRN
Qty: 30 TABLET | Refills: 0 | Status: SHIPPED | OUTPATIENT
Start: 2022-01-13 | End: 2022-08-16

## 2022-01-13 NOTE — PATIENT INSTRUCTIONS
Blood work  Bilateral hand and left shoulder x-ray  Hand specialist referral  DEXA scan mammogram in March or April  Tramadol 50 mg 4 times daily as needed pain may take with Tylenol  Reconsider getting vaccinated with Pfizer  Follow-up 3 months

## 2022-01-13 NOTE — PROGRESS NOTES
"    Nancy Suárez is a 66 y.o. female.     66-year-old white female smoker with history of osteopenia, COPD, CAD/angioplasty, CHF, glaucoma, hysterectomy, hyperlipidemia, neurogenic claudication and iron deficiency anemia who comes in today for follow-up visit.    Blood pressure 162/80 heart rate 68 she denies any chest pain, dyspnea, tachycardia or dizziness.  Patient states he just now took her blood pressure medication due to being fasting and had a bit of road rage on the way in which would explain her blood pressure    Patient's main complaint is worsening arthritis pain especially in hands and left shoulder.  We had discussed on past visits going on tramadol due to her renal disease and we are going to try a trial of tramadol.  I am also referring her to [facility] and getting x-rays today's on her hands and shoulder.  I am also doing an arthritis panel    Patient has not had COVID-vaccine, is up-to-date on eye exam and colonoscopy in 2018.  I am scheduling her mammogram and DEXA scan in March              Blood work  Bilateral hand and left shoulder x-ray  Hand specialist referral  DEXA scan mammogram in March or April  Tramadol 50 mg 4 times daily as needed pain may take with Tylenol  Reconsider getting vaccinated with Pfizer  Follow-up 3 months           The following portions of the patient's history were reviewed and updated as appropriate: allergies, current medications, past family history, past medical history, past social history, past surgical history and problem list.    Vitals:    01/13/22 0855 01/13/22 0857   BP: 158/85 162/80   BP Location: Right arm Left arm   Patient Position: Sitting Sitting   Cuff Size: Adult Adult   Pulse: 69    Temp: 97.1 °F (36.2 °C)    TempSrc: Infrared    SpO2: 100%    Weight: 67 kg (147 lb 12.8 oz)    Height: 154.9 cm (60.98\")      Body mass index is 27.94 kg/m².    Past Medical History:   Diagnosis Date   • Artery stenosis (HCC)     Bilateral subclavian s/p " stent    • Arthritis    • AVM (arteriovenous malformation)    • CAD (coronary artery disease)    • CKD (chronic kidney disease)     Stage three    • COPD (chronic obstructive pulmonary disease) (HCC)    • DM2 (diabetes mellitus, type 2) (HCC)    • Eye pressure     Elevated eye pressure   • Gout    • Hepatitis     Hepatitis c    • HTN (hypertension)    • Hyperparathyroidism (HCC)    • KIRIT (iron deficiency anemia)    • Iron deficiency anemia    • Osteoporosis    • PVD (peripheral vascular disease) (HCC)      Past Surgical History:   Procedure Laterality Date   • CATARACT EXTRACTION Left    • CHOLECYSTECTOMY     • EYE SURGERY     • TOTAL ABDOMINAL HYSTERECTOMY WITH SALPINGO OOPHORECTOMY       Family History   Problem Relation Age of Onset   • Diabetes Mother    • Coronary artery disease Sister    • Diabetes Sister    • Breast cancer Paternal Aunt 50     Immunization History   Administered Date(s) Administered   • Flu Vaccine Intradermal Quad 18-64YR 10/04/2018   • Flu Vaccine Quad PF 6-35MO 01/05/2016, 10/26/2016   • Flu Vaccine Quad PF >36MO 10/10/2017   • H1N1 Inj 11/21/2009   • Influenza Quad Vaccine (Inpatient) 10/10/2017   • Influenza, Unspecified 10/04/2018   • Pneumococcal Conjugate 13-Valent (PCV13) 01/08/2016   • Pneumococcal Polysaccharide (PPSV23) 03/07/2017       Office Visit on 08/03/2021   Component Date Value Ref Range Status   • Total Cholesterol 08/03/2021 216* 100 - 199 mg/dL Final   • Triglycerides 08/03/2021 284* 0 - 149 mg/dL Final   • HDL Cholesterol 08/03/2021 39* >39 mg/dL Final   • VLDL Cholesterol Rui 08/03/2021 50* 5 - 40 mg/dL Final   • LDL Chol Calc ([facility]) 08/03/2021 127* 0 - 99 mg/dL Final   • LDL/HDL RATIO 08/03/2021 3.3* 0.0 - 3.2 ratio Final    Comment:                                     LDL/HDL Ratio                                              Men  Women                                1/2 Avg.Risk  1.0    1.5                                    Avg.Risk  3.6    3.2                                  2X Avg.Risk  6.2    5.0                                 3X Avg.Risk  8.0    6.1           Review of Systems   Constitutional: Negative.    HENT: Negative.    Respiratory: Negative.    Cardiovascular: Negative.    Gastrointestinal: Negative.    Genitourinary: Negative.    Musculoskeletal:        Left shoulder and hand pain   Skin: Negative.    Neurological: Negative.    Psychiatric/Behavioral: Negative.        Objective   Physical Exam  Constitutional:       Appearance: Normal appearance.   HENT:      Head: Normocephalic.   Cardiovascular:      Rate and Rhythm: Normal rate and regular rhythm.      Pulses: Normal pulses.   Pulmonary:      Effort: Pulmonary effort is normal.   Abdominal:      General: Bowel sounds are normal.   Musculoskeletal:      Comments: Left shoulder pain and patient having difficult time with arthritis in her hands   Skin:     General: Skin is warm and dry.   Neurological:      General: No focal deficit present.      Mental Status: She is alert and oriented to person, place, and time.   Psychiatric:         Mood and Affect: Mood normal.         Behavior: Behavior normal.         Procedures    Assessment/Plan   Diagnoses and all orders for this visit:    1. Mixed hyperlipidemia (Primary)  -     Lipid Panel With LDL / HDL Ratio    2. Type 2 diabetes mellitus without complication, with long-term current use of insulin (HCC)  -     Comprehensive Metabolic Panel    3. Pain in both hands  -     Cancel: XR Hand 2 View Bilateral; Future  -     XR Joint Survey AP 2+ Joints; Future    4. Chronic left shoulder pain  -     XR Shoulder 2+ View Left    5. Arthritis  -     traMADol (ULTRAM) 50 MG tablet; Take 1 tablet by mouth Every 6 (Six) Hours As Needed for Moderate Pain .  Dispense: 30 tablet; Refill: 0  -     TRUNG  -     CK Total & CKMB  -     C-reactive Protein  -     Rheumatoid Factor  -     Sedimentation Rate          Current Outpatient Medications:   •  albuterol sulfate HFA (PROAIR HFA) 108  (90 Base) MCG/ACT inhaler, Inhale 2 puffs 4 (Four) Times a Day. Proair - DX CODE J44.9, Disp: 3 inhaler, Rfl: 1  •  Alcohol Swabs (B-D SINGLE USE SWABS REGULAR) pads, CHECK BLOOD SUGAR ONE TIME DAILY, Disp: 100 each, Rfl: 6  •  allopurinol (ZYLOPRIM) 100 MG tablet, Take 100 mg by mouth Daily., Disp: , Rfl:   •  amLODIPine (NORVASC) 5 MG tablet, Take one tablet daily only if systolic BP is greater than 160, Disp: 90 tablet, Rfl: 3  •  aspirin 81 MG tablet, Take 1 tablet by mouth Daily., Disp: , Rfl:   •  calcitriol (ROCALTROL) 0.25 MCG capsule, Take 1 capsule by mouth Daily., Disp: , Rfl: 3  •  carvedilol (COREG) 12.5 MG tablet, TAKE 1 TABLET BY MOUTH TWICE A DAY, Disp: 180 tablet, Rfl: 3  •  Cholecalciferol 1000 units capsule, Take 1 capsule by mouth Daily., Disp: , Rfl:   •  Cyanocobalamin (B-12) 1000 MCG capsule, Take 1 tablet by mouth Daily., Disp: , Rfl:   •  ferrous sulfate 325 (65 FE) MG tablet, Take 1 tablet by mouth Daily With Breakfast., Disp: 30 tablet, Rfl: 2  •  furosemide (LASIX) 20 MG tablet, TAKE 1 TABLET BY MOUTH EVERY DAY, Disp: 90 tablet, Rfl: 0  •  hydrALAZINE (APRESOLINE) 25 MG tablet, TAKE 1 TABLET BY MOUTH 2 (TWO) TIMES A DAY. PLEASE SCHEDULE APPT TO FOLLOW UP, Disp: 180 tablet, Rfl: 0  •  ipratropium-albuterol (DUO-NEB) 0.5-2.5 mg/3 ml nebulizer, Take 3 mL by nebulization Every 4 (Four) Hours As Needed for Wheezing., Disp: 120 vial, Rfl: 6  •  loratadine (CLARITIN) 10 MG tablet, TAKE 1 TABLET BY MOUTH EVERY DAY, Disp: 30 tablet, Rfl: 2  •  Omega-3 Fatty Acids (FISH OIL) 1000 MG capsule capsule, Take 1 capsule by mouth 2 (Two) Times a Day With Meals., Disp: , Rfl:   •  pravastatin (PRAVACHOL) 40 MG tablet, TAKE 1 TABLET BY MOUTH EVERY DAY, Disp: 90 tablet, Rfl: 0  •  traMADol (ULTRAM) 50 MG tablet, Take 1 tablet by mouth Every 6 (Six) Hours As Needed for Moderate Pain ., Disp: 30 tablet, Rfl: 0           Ingrid De Anda, APRN1/13/202210:09 EST  This note has been electronically signed

## 2022-01-15 LAB
ALBUMIN SERPL-MCNC: 4.5 G/DL (ref 3.8–4.8)
ALBUMIN/GLOB SERPL: 2 {RATIO} (ref 1.2–2.2)
ALP SERPL-CCNC: 66 IU/L (ref 44–121)
ALT SERPL-CCNC: 8 IU/L (ref 0–32)
AST SERPL-CCNC: 11 IU/L (ref 0–40)
BILIRUB SERPL-MCNC: <0.2 MG/DL (ref 0–1.2)
BUN SERPL-MCNC: 25 MG/DL (ref 8–27)
BUN/CREAT SERPL: 15 (ref 12–28)
CALCIUM SERPL-MCNC: 9.2 MG/DL (ref 8.7–10.3)
CHLORIDE SERPL-SCNC: 101 MMOL/L (ref 96–106)
CHOLEST SERPL-MCNC: 248 MG/DL (ref 100–199)
CK MB SERPL-MCNC: 1.8 NG/ML (ref 0–5.3)
CK SERPL-CCNC: 60 U/L (ref 32–182)
CO2 SERPL-SCNC: 25 MMOL/L (ref 20–29)
CREAT SERPL-MCNC: 1.72 MG/DL (ref 0.57–1)
CRP SERPL-MCNC: 6 MG/L (ref 0–10)
ERYTHROCYTE [SEDIMENTATION RATE] IN BLOOD BY WESTERGREN METHOD: 33 MM/HR (ref 0–40)
GLOBULIN SER CALC-MCNC: 2.2 G/DL (ref 1.5–4.5)
GLUCOSE SERPL-MCNC: 125 MG/DL (ref 65–99)
HDLC SERPL-MCNC: 43 MG/DL
LDLC SERPL CALC-MCNC: 152 MG/DL (ref 0–99)
LDLC/HDLC SERPL: 3.5 RATIO (ref 0–3.2)
POTASSIUM SERPL-SCNC: 4.7 MMOL/L (ref 3.5–5.2)
PROT SERPL-MCNC: 6.7 G/DL (ref 6–8.5)
RHEUMATOID FACT SERPL-ACNC: 11.6 IU/ML
SODIUM SERPL-SCNC: 141 MMOL/L (ref 134–144)
TRIGL SERPL-MCNC: 286 MG/DL (ref 0–149)
VLDLC SERPL CALC-MCNC: 53 MG/DL (ref 5–40)

## 2022-01-17 LAB — ANA SER QL: NEGATIVE

## 2022-01-18 RX ORDER — PRAVASTATIN SODIUM 40 MG
80 TABLET ORAL DAILY
Qty: 180 TABLET | Refills: 1 | Status: SHIPPED | OUTPATIENT
Start: 2022-01-18 | End: 2022-07-11

## 2022-01-25 DIAGNOSIS — M79.643 CHRONIC HAND PAIN, UNSPECIFIED LATERALITY: Primary | ICD-10-CM

## 2022-01-25 DIAGNOSIS — G89.29 CHRONIC HAND PAIN, UNSPECIFIED LATERALITY: Primary | ICD-10-CM

## 2022-03-01 ENCOUNTER — TELEPHONE (OUTPATIENT)
Dept: FAMILY MEDICINE CLINIC | Facility: CLINIC | Age: 67
End: 2022-03-01

## 2022-03-01 DIAGNOSIS — N18.31 CHRONIC RENAL IMPAIRMENT, STAGE 3A: Primary | ICD-10-CM

## 2022-03-01 NOTE — TELEPHONE ENCOUNTER
PATIENT CALLED TO REQUEST LAB ORDERS TO BE FAXED TO [facility]. [facility] IS OUT OF NETWORK FOR THEIR INSURANCE AND IS VERY EXPENSIVE.  PLEASE CALL BACK ONCE LAB ORDERS HAS BEEN SENT TO [facility]    [phone]

## 2022-03-02 RX ORDER — FERROUS SULFATE 325(65) MG
1 TABLET ORAL
Qty: 90 TABLET | Refills: 1 | Status: SHIPPED | OUTPATIENT
Start: 2022-03-02 | End: 2022-08-04

## 2022-03-28 RX ORDER — FUROSEMIDE 20 MG/1
TABLET ORAL
Qty: 90 TABLET | Refills: 0 | Status: SHIPPED | OUTPATIENT
Start: 2022-03-28 | End: 2022-06-23

## 2022-03-29 ENCOUNTER — TELEPHONE (OUTPATIENT)
Dept: FAMILY MEDICINE CLINIC | Facility: CLINIC | Age: 67
End: 2022-03-29

## 2022-04-27 ENCOUNTER — OFFICE VISIT (OUTPATIENT)
Dept: CARDIOLOGY | Facility: CLINIC | Age: 67
End: 2022-04-27

## 2022-04-27 VITALS
DIASTOLIC BLOOD PRESSURE: 68 MMHG | BODY MASS INDEX: 27.75 KG/M2 | OXYGEN SATURATION: 100 % | WEIGHT: 147 LBS | HEART RATE: 61 BPM | SYSTOLIC BLOOD PRESSURE: 146 MMHG | HEIGHT: 61 IN

## 2022-04-27 DIAGNOSIS — I44.7 LEFT BUNDLE BRANCH BLOCK: ICD-10-CM

## 2022-04-27 DIAGNOSIS — F17.210 CIGARETTE SMOKER: ICD-10-CM

## 2022-04-27 DIAGNOSIS — I25.10 CORONARY ARTERY DISEASE INVOLVING NATIVE CORONARY ARTERY OF NATIVE HEART WITHOUT ANGINA PECTORIS: Primary | ICD-10-CM

## 2022-04-27 DIAGNOSIS — I10 PRIMARY HYPERTENSION: ICD-10-CM

## 2022-04-27 PROCEDURE — 99214 OFFICE O/P EST MOD 30 MIN: CPT | Performed by: INTERNAL MEDICINE

## 2022-04-27 RX ORDER — ACETAMINOPHEN 500 MG
500 TABLET ORAL EVERY 6 HOURS PRN
COMMUNITY

## 2022-04-27 RX ORDER — FUROSEMIDE 20 MG/1
20 TABLET ORAL DAILY
COMMUNITY
Start: 2022-01-17 | End: 2022-08-16 | Stop reason: SDUPTHER

## 2022-04-27 NOTE — PROGRESS NOTES
Progress note      Name: Nancy Suárez ADMIT: (Not on file)   : 1955  PCP: Ingrid De Anda APRN    MRN: [id number] LOS: 0 days   AGE/SEX: 66 y.o. female  ROOM: Room/bed info not found     Chief Complaint   Patient presents with   • Congestive Heart Failure   • Hypertension   • Hyperlipidemia   • Coronary Artery Disease       Subjective       History of present illness  Nancy Suárez is a 66-year-old female patient who admittedly of coronary artery disease with chronically occluded RCA with left-to-right collaterals on heart catheterization on 2014, also have carotid disease follows with vascular surgery, hypertension, active smoking, here today for follow-up.  Cardiac wise she is doing well, denies any active chest pain or shortness of breath no palpitations no lower extremity edema no syncopal episodes.      Past Medical History:   Diagnosis Date   • Artery stenosis (HCC)     Bilateral subclavian s/p stent    • Arthritis    • AVM (arteriovenous malformation)    • CAD (coronary artery disease)    • CKD (chronic kidney disease)     Stage three    • COPD (chronic obstructive pulmonary disease) (HCC)    • DM2 (diabetes mellitus, type 2) (HCC)    • Eye pressure     Elevated eye pressure   • Gout    • Hepatitis     Hepatitis c    • HTN (hypertension)    • Hyperparathyroidism (HCC)    • KIRIT (iron deficiency anemia)    • Iron deficiency anemia    • Osteoporosis    • PVD (peripheral vascular disease) (HCC)      Past Surgical History:   Procedure Laterality Date   • CATARACT EXTRACTION Left    • CHOLECYSTECTOMY     • EYE SURGERY     • TOTAL ABDOMINAL HYSTERECTOMY WITH SALPINGO OOPHORECTOMY       Family History   Problem Relation Age of Onset   • Diabetes Mother    • Coronary artery disease Sister    • Diabetes Sister    • Breast cancer Paternal Aunt 50     Social History     Tobacco Use   • Smoking status: Current Some Day Smoker     Packs/day: 0.25     Types: Cigarettes     Start date:    • Smokeless  tobacco: Never Used   Vaping Use   • Vaping Use: Never used   Substance Use Topics   • Alcohol use: No   • Drug use: No     (Not in a hospital admission)    Allergies:  Patient has no known allergies.      Physical Exam  VITALS REVIEWED    General:      well developed, in no acute distress.    Head:      normocephalic and atraumatic.    Eyes:      PERRL/EOM intact, conjunctiva and sclera clear with out nystagmus.    Neck:      no masses, thyromegaly,  trachea central with normal respiratory effort and PMI displaced laterally  Lungs:      Clear to auscultation bilaterally  Heart:       regular rate and rhythm  Msk:      no deformity or scoliosis noted of thoracic or lumbar spine.    Pulses:      pulses normal in all 4 extremities.    Extremities:       no lower extremity edema  Neurologic:      no focal deficits.   alert oriented x3  Skin:      intact without lesions or rashes.    Psych:      alert and cooperative; normal mood and affect; normal attention span and concentration.      Result Review :               Pertinent cardiac workup      1. EKG 10/28/2021 sinus rhythm left bundle branch block  2. Echocardiogram 6/5/2020 oh low normal at 50%        Procedures        Assessment and Plan      Nancy Suárez is a 66-year-old female patient who has history of coronary artery disease with occluded RCA as detailed above, hypertension, peripheral vascular disease, is here today for follow-up.  Patient denies any anginal symptoms or CHF symptoms, her blood pressure is well controlled.  During this visit we discussed about smoking cessation.  No need for ischemic work-up at this time since she is not having any cardiac symptoms, patient is on good therapy for her CAD.  We will see her in follow-up in 8 months.    Diagnoses and all orders for this visit:    1. Coronary artery disease involving native coronary artery of native heart without angina pectoris (Primary)    2. Left bundle branch block    3. Cigarette  smoker    4. Primary hypertension           No follow-ups on file.  Patient was given instructions and counseling regarding her condition or for health maintenance advice. Please see specific information pulled into the AVS if appropriate.

## 2022-06-23 RX ORDER — FUROSEMIDE 20 MG/1
TABLET ORAL
Qty: 90 TABLET | Refills: 0 | Status: SHIPPED | OUTPATIENT
Start: 2022-06-23 | End: 2022-08-04

## 2022-07-05 RX ORDER — ALBUTEROL SULFATE 90 UG/1
2 AEROSOL, METERED RESPIRATORY (INHALATION) 4 TIMES DAILY
Qty: 18 G | Refills: 2 | Status: SHIPPED | OUTPATIENT
Start: 2022-07-05 | End: 2023-03-09 | Stop reason: SDUPTHER

## 2022-07-05 NOTE — TELEPHONE ENCOUNTER
Relationship: Self    Best call back number: [phone]  Requested Prescriptions:   Requested Prescriptions     Pending Prescriptions Disp Refills   • albuterol sulfate HFA (ProAir HFA) 108 (90 Base) MCG/ACT inhaler       Sig: Inhale 2 puffs 4 (Four) Times a Day. Proair - DX CODE J44.9        Pharmacy where request should be sent: [facility]/PHARMACY #[id number] - [place], [place] [address] [phone] [facility] [phone] FX       Does the patient have less than a 3 day supply:  [x] Yes  [] No    Flavio Marcano Rep   07/05/22 09:40 EDT

## 2022-07-11 RX ORDER — PRAVASTATIN SODIUM 40 MG
TABLET ORAL
Qty: 90 TABLET | Refills: 0 | Status: SHIPPED | OUTPATIENT
Start: 2022-07-11 | End: 2022-07-15

## 2022-07-11 RX ORDER — HYDRALAZINE HYDROCHLORIDE 25 MG/1
25 TABLET, FILM COATED ORAL 2 TIMES DAILY
Qty: 180 TABLET | Refills: 0 | Status: SHIPPED | OUTPATIENT
Start: 2022-07-11 | End: 2022-08-04

## 2022-07-15 RX ORDER — PRAVASTATIN SODIUM 40 MG
TABLET ORAL
Qty: 180 TABLET | Refills: 1 | Status: SHIPPED | OUTPATIENT
Start: 2022-07-15 | End: 2022-08-04

## 2022-07-28 ENCOUNTER — TELEPHONE (OUTPATIENT)
Dept: FAMILY MEDICINE CLINIC | Facility: CLINIC | Age: 67
End: 2022-07-28

## 2022-07-28 DIAGNOSIS — U07.1 COVID-19: Primary | ICD-10-CM

## 2022-07-28 NOTE — TELEPHONE ENCOUNTER
PATIENT CALLED TO LET OFFICE KNOW THAT SHE IS STILL NOT FEELING BETTER AND HAS HAD COVID FOR 10 DAY NOW       SHE WANTED TO KNOW IF YOU COULD SEND AN XRAY ORDER TO HOSPITAL/ER TO MAKE SURE IT'S NOT PNEUMONIA     BACK HURTING, STILL COUGHING  WITH LITTLE MUCUS       PLEASE CALL AND ADVISE  Nancy Suárez (Self) [phone] (M)

## 2022-07-29 ENCOUNTER — OFFICE VISIT (OUTPATIENT)
Dept: FAMILY MEDICINE CLINIC | Facility: CLINIC | Age: 67
End: 2022-07-29

## 2022-07-29 VITALS
OXYGEN SATURATION: 97 % | DIASTOLIC BLOOD PRESSURE: 98 MMHG | TEMPERATURE: 97.3 F | WEIGHT: 147 LBS | HEART RATE: 63 BPM | BODY MASS INDEX: 27.75 KG/M2 | SYSTOLIC BLOOD PRESSURE: 160 MMHG | HEIGHT: 61 IN

## 2022-07-29 DIAGNOSIS — U07.1 COVID-19: ICD-10-CM

## 2022-07-29 DIAGNOSIS — U09.9 COVID-19 LONG HAULER: ICD-10-CM

## 2022-07-29 DIAGNOSIS — R10.9 FLANK PAIN: Primary | ICD-10-CM

## 2022-07-29 LAB
BILIRUB BLD-MCNC: NEGATIVE MG/DL
CLARITY, POC: CLEAR
COLOR UR: YELLOW
EXPIRATION DATE: NORMAL
GLUCOSE UR STRIP-MCNC: NEGATIVE MG/DL
KETONES UR QL: NEGATIVE
LEUKOCYTE EST, POC: NEGATIVE
Lab: NORMAL
NITRITE UR-MCNC: NEGATIVE MG/ML
PH UR: 5.5 [PH] (ref 5–8)
PROT UR STRIP-MCNC: NEGATIVE MG/DL
RBC # UR STRIP: NEGATIVE /UL
SP GR UR: 1.02 (ref 1–1.03)
UROBILINOGEN UR QL: NORMAL

## 2022-07-29 PROCEDURE — 81003 URINALYSIS AUTO W/O SCOPE: CPT | Performed by: NURSE PRACTITIONER

## 2022-07-29 PROCEDURE — 99213 OFFICE O/P EST LOW 20 MIN: CPT | Performed by: NURSE PRACTITIONER

## 2022-07-29 RX ORDER — PROMETHAZINE HYDROCHLORIDE AND CODEINE PHOSPHATE 6.25; 1 MG/5ML; MG/5ML
5 SYRUP ORAL EVERY 4 HOURS PRN
Qty: 75 ML | Refills: 0 | Status: SHIPPED | OUTPATIENT
Start: 2022-07-29 | End: 2022-08-16

## 2022-07-29 RX ORDER — AZITHROMYCIN 250 MG/1
TABLET, FILM COATED ORAL
Qty: 6 TABLET | Refills: 0 | Status: SHIPPED | OUTPATIENT
Start: 2022-07-29 | End: 2022-08-03

## 2022-07-29 RX ORDER — METHYLPREDNISOLONE 4 MG/1
TABLET ORAL
Qty: 21 EACH | Refills: 0 | Status: SHIPPED | OUTPATIENT
Start: 2022-07-29 | End: 2022-08-03

## 2022-07-29 NOTE — TELEPHONE ENCOUNTER
Caller: Nancy Suárez    Relationship: Self    Best call back number: [phone]    What test was performed: CHEST XRAY    When was the test performed: 7/28    Where was the test performed: [facility]    Additional notes: PLEASE CALL PATIENT TO REVIEW TEST RESULTS

## 2022-07-29 NOTE — PROGRESS NOTES
"Chief Complaint  Flank Pain and Shortness of Breath    Subjective          Nancy Suárez presents to [facility]      History of Present Illness    Nancy is a 66-year-old female patient of Ingrid De Anda who presents today with complaints of back pain and shortness of air.    Patient was diagnosed with COVID 11 or 12 days ago.  She went to [facility] yesterday as she was not feeling better.  Ingrid had ordered her a chest x-ray to rule out pneumonia.    She tells me her back pain is getting worse. She has no energy. She is SOB, she is tired. She is SOB at times even with sitting. She is using her albuterol inhaler and her nebulizer. It has helped some.     She has been advised not to take Mucinex.     Low back pain with stage 3 kidney disease.                 Objective     Vital Signs:   /98 (BP Location: Left arm, Patient Position: Sitting, Cuff Size: Adult)   Pulse 63   Temp 97.3 °F (36.3 °C) (Infrared)   Ht 154.9 cm (60.98\")   Wt 66.7 kg (147 lb)   SpO2 97%   BMI 27.79 kg/m²           Physical Exam           Result Review :                                   Assessment and Plan      Diagnoses and all orders for this visit:    1. Flank pain (Primary)  -     POC Urinalysis Dipstick, Automated            Follow Up       No follow-ups on file.      Patient was given instructions and counseling regarding her condition or for health maintenance advice. Please see specific information pulled into the AVS if appropriate.     Lyssa Pineda, APRN7/29/202213:28 EDT  This note has been electronically signed      "

## 2022-07-29 NOTE — TELEPHONE ENCOUNTER
Called patient, tod her no results yet, rey out today, offered patient appt bc she declined going to er, she states soa and back pain. Patient made an appt

## 2022-08-04 RX ORDER — HYDRALAZINE HYDROCHLORIDE 25 MG/1
25 TABLET, FILM COATED ORAL 2 TIMES DAILY
Qty: 180 TABLET | Refills: 0 | Status: SHIPPED | OUTPATIENT
Start: 2022-08-04 | End: 2022-08-16 | Stop reason: SDUPTHER

## 2022-08-04 RX ORDER — FUROSEMIDE 20 MG/1
TABLET ORAL
Qty: 90 TABLET | Refills: 0 | Status: SHIPPED | OUTPATIENT
Start: 2022-08-04 | End: 2022-08-16 | Stop reason: SDUPTHER

## 2022-08-04 RX ORDER — PRAVASTATIN SODIUM 40 MG
TABLET ORAL
Qty: 90 TABLET | Refills: 0 | Status: SHIPPED | OUTPATIENT
Start: 2022-08-04 | End: 2023-02-23

## 2022-08-04 RX ORDER — FERROUS SULFATE 325(65) MG
TABLET ORAL
Qty: 90 TABLET | Refills: 1 | Status: SHIPPED | OUTPATIENT
Start: 2022-08-04 | End: 2022-11-28

## 2022-08-05 RX ORDER — CARVEDILOL 12.5 MG/1
12.5 TABLET ORAL 2 TIMES DAILY
Qty: 180 TABLET | Refills: 3 | Status: SHIPPED | OUTPATIENT
Start: 2022-08-05 | End: 2022-08-16 | Stop reason: SDUPTHER

## 2022-08-05 NOTE — TELEPHONE ENCOUNTER
Rx Refill Note  Requested Prescriptions     Pending Prescriptions Disp Refills   • carvedilol (COREG) 12.5 MG tablet 180 tablet 3     Sig: Take 1 tablet by mouth 2 (Two) Times a Day.      Last office visit with prescribing clinician: 4/27/2022      Next office visit with prescribing clinician:  F/u in Sariah Paulino MA  08/05/22, 10:00 EDT

## 2022-08-05 NOTE — TELEPHONE ENCOUNTER
Caller: Nancy Suárez    Relationship: Self    Best call back number: [phone]    Requested Prescriptions:   Requested Prescriptions     Pending Prescriptions Disp Refills   • carvedilol (COREG) 12.5 MG tablet 180 tablet 3     Sig: Take 1 tablet by mouth 2 (Two) Times a Day.        Pharmacy where request should be sent: [facility]/PHARMACY #[id number] - [place], [place] [address] [phone] [facility] [phone] FX     Additional details provided by patient:     Does the patient have less than a 3 day supply:  [x] Yes  [] No    Flavio Samuels Rep   08/05/22 09:49 EDT

## 2022-08-16 ENCOUNTER — OFFICE VISIT (OUTPATIENT)
Dept: FAMILY MEDICINE CLINIC | Facility: CLINIC | Age: 67
End: 2022-08-16

## 2022-08-16 VITALS
HEIGHT: 61 IN | SYSTOLIC BLOOD PRESSURE: 136 MMHG | OXYGEN SATURATION: 100 % | HEART RATE: 71 BPM | WEIGHT: 140.6 LBS | DIASTOLIC BLOOD PRESSURE: 78 MMHG | BODY MASS INDEX: 26.55 KG/M2 | TEMPERATURE: 97.6 F

## 2022-08-16 DIAGNOSIS — R53.1 LEFT-SIDED WEAKNESS: ICD-10-CM

## 2022-08-16 DIAGNOSIS — F17.210 CIGARETTE SMOKER: ICD-10-CM

## 2022-08-16 DIAGNOSIS — Z12.31 OTHER SCREENING MAMMOGRAM: ICD-10-CM

## 2022-08-16 DIAGNOSIS — Z78.0 POSTMENOPAUSAL: ICD-10-CM

## 2022-08-16 DIAGNOSIS — D64.9 ANEMIA, UNSPECIFIED TYPE: Primary | ICD-10-CM

## 2022-08-16 DIAGNOSIS — I65.23 BILATERAL CAROTID ARTERY STENOSIS: ICD-10-CM

## 2022-08-16 DIAGNOSIS — I63.9 ACUTE ISCHEMIC STROKE: ICD-10-CM

## 2022-08-16 DIAGNOSIS — E66.3 OVERWEIGHT WITH BODY MASS INDEX (BMI) OF 26 TO 26.9 IN ADULT: ICD-10-CM

## 2022-08-16 PROCEDURE — 99213 OFFICE O/P EST LOW 20 MIN: CPT | Performed by: NURSE PRACTITIONER

## 2022-08-16 RX ORDER — CLOPIDOGREL BISULFATE 75 MG/1
75 TABLET ORAL DAILY
Qty: 21 TABLET | Refills: 0 | Status: SHIPPED | OUTPATIENT
Start: 2022-08-16 | End: 2022-09-03

## 2022-08-16 RX ORDER — HYDRALAZINE HYDROCHLORIDE 25 MG/1
25 TABLET, FILM COATED ORAL 2 TIMES DAILY
Qty: 180 TABLET | Refills: 1 | Status: SHIPPED | OUTPATIENT
Start: 2022-08-16

## 2022-08-16 RX ORDER — AMLODIPINE BESYLATE 5 MG/1
TABLET ORAL
Qty: 90 TABLET | Refills: 1 | Status: SHIPPED | OUTPATIENT
Start: 2022-08-16 | End: 2022-09-15

## 2022-08-16 RX ORDER — CLOPIDOGREL BISULFATE 75 MG/1
75 TABLET ORAL DAILY
COMMUNITY
Start: 2022-08-08 | End: 2022-08-16 | Stop reason: SDUPTHER

## 2022-08-16 RX ORDER — DEXLANSOPRAZOLE 60 MG/1
1 CAPSULE, DELAYED RELEASE ORAL DAILY
COMMUNITY
Start: 2022-08-09 | End: 2022-09-06 | Stop reason: SDUPTHER

## 2022-08-16 RX ORDER — CARVEDILOL 12.5 MG/1
12.5 TABLET ORAL 2 TIMES DAILY
Qty: 180 TABLET | Refills: 1 | Status: SHIPPED | OUTPATIENT
Start: 2022-08-16

## 2022-08-16 RX ORDER — FUROSEMIDE 20 MG/1
20 TABLET ORAL DAILY
Qty: 90 TABLET | Refills: 1 | Status: SHIPPED | OUTPATIENT
Start: 2022-08-16 | End: 2023-03-09

## 2022-08-16 NOTE — PATIENT INSTRUCTIONS
Find out from neurology if it is okay to go off antiplatelets for EGD and carotid enterectomy  Cut back on foods that are hard on your stomach as discussed during exam  Need to schedule EGD and carotid endarterectomy  Follow-up 3 months

## 2022-08-16 NOTE — PROGRESS NOTES
Nancy Suárez is a 66 y.o. female.     66-year-old white female smoker with osteopenia, COPD, CAD/angioplasty, CHF, glaucoma, hysterectomy, hyperlipidemia, neurogenic claudication and iron deficiency anemia who comes in posthospitalization for CVA.  Patient states she developed left-sided weakness after getting up in the middle the night was taken to [facility] where she was diagnosed with acute ischemic stroke very small right basal ganglia lacunar infarct.  She was placed on aspirin and Plavix for 21 days and then will go down to aspirin only.  She has a follow-up visit with neurology on September 8.  Patient was encouraged to get carotid stenosis repaired.  We will wait and see if neurology clears her to be taken off of antiplatelets.  We discussed smoking cessation and patient states she is down to half a pack a day    Patient also has anemia which they felt was due to chronic blood loss.   there was suspicion patient may have bleeding ulcers.  She was placed on Dexilant and was recommended to get a carotid endarterectomy.  Once again we need to make sure is safe for patient to go off antiplatelets first before scheduling.  We discussed patient limiting caffeine, juices, tomatos and spicy greasy foods to assist Dexilant    Patient currently doing physical therapy and states she is getting more strength back on her left side.    Blood pressure 136/78 heart rate 70 she denies any chest pain, dyspnea, tachycardia or dizziness.    Weight is down 6 pounds at 141 for BMI 26.6.  Patient has not been vaccinated for COVID she is up-to-date on her eye exam and I am going to schedule her mammogram and DEXA scan at [facility].  She is up-to-date on colonoscopy          Find out from neurology if it is okay to go off antiplatelets for EGD and carotid enterectomy  Cut back on foods that are hard on your stomach as discussed during exam  Need to schedule EGD and carotid endarterectomy  Stop smoking  Follow-up 3 months    "    The following portions of the patient's history were reviewed and updated as appropriate: allergies, current medications, past family history, past medical history, past social history, past surgical history and problem list.    Vitals:    08/16/22 0916   BP: 136/78   BP Location: Left arm   Patient Position: Sitting   Cuff Size: Adult   Pulse: 71   Temp: 97.6 °F (36.4 °C)   TempSrc: Temporal   SpO2: 100%   Weight: 63.8 kg (140 lb 9.6 oz)   Height: 154.9 cm (61\")     Body mass index is 26.57 kg/m².    Past Medical History:   Diagnosis Date   • Artery stenosis (HCC)     Bilateral subclavian s/p stent    • Arthritis    • AVM (arteriovenous malformation)    • CAD (coronary artery disease)    • CKD (chronic kidney disease)     Stage three    • COPD (chronic obstructive pulmonary disease) (HCC)    • DM2 (diabetes mellitus, type 2) (HCC)    • Eye pressure     Elevated eye pressure   • Gout    • Hepatitis     Hepatitis c    • HTN (hypertension)    • Hyperparathyroidism (HCC)    • KIRIT (iron deficiency anemia)    • Iron deficiency anemia    • Osteoporosis    • PVD (peripheral vascular disease) (HCC)      Past Surgical History:   Procedure Laterality Date   • CATARACT EXTRACTION Left    • CHOLECYSTECTOMY     • EYE SURGERY     • TOTAL ABDOMINAL HYSTERECTOMY WITH SALPINGO OOPHORECTOMY       Family History   Problem Relation Age of Onset   • Diabetes Mother    • Coronary artery disease Sister    • Diabetes Sister    • Breast cancer Paternal Aunt 50     Immunization History   Administered Date(s) Administered   • Flu Vaccine Intradermal Quad 18-64YR 10/04/2018   • Flu Vaccine Quad PF 6-35MO 01/05/2016, 10/26/2016   • Flu Vaccine Quad PF >36MO 10/10/2017   • H1N1 Inj 11/21/2009   • Influenza Quad Vaccine (Inpatient) 10/10/2017   • Influenza, Unspecified 10/04/2018   • Pneumococcal Conjugate 13-Valent (PCV13) 01/08/2016   • Pneumococcal Polysaccharide (PPSV23) 03/07/2017       Office Visit on 07/29/2022   Component Date Value " Ref Range Status   • Color 07/29/2022 Yellow  Yellow, Straw, Dark Yellow, Lynne Final   • Clarity, UA 07/29/2022 Clear  Clear Final   • Specific Gravity  07/29/2022 1.020  1.005 - 1.030 Final   • pH, Urine 07/29/2022 5.5  5.0 - 8.0 Final   • Leukocytes 07/29/2022 Negative  Negative Final   • Nitrite, UA 07/29/2022 Negative  Negative Final   • Protein, POC 07/29/2022 Negative  Negative mg/dL Final   • Glucose, UA 07/29/2022 Negative  Negative mg/dL Final   • Ketones, UA 07/29/2022 Negative  Negative Final   • Urobilinogen, UA 07/29/2022 Normal  Normal Final   • Bilirubin 07/29/2022 Negative  Negative Final   • Blood, UA 07/29/2022 Negative  Negative Final   • Lot Number 07/29/2022 [id number]   Final   • Expiration Date 07/29/2022 [id number]   Final         Review of Systems   HENT: Negative.    Respiratory: Negative.    Cardiovascular: Negative.    Gastrointestinal: Negative.    Genitourinary: Negative.    Musculoskeletal: Positive for gait problem.   Skin: Negative.    Neurological: Positive for weakness.   Psychiatric/Behavioral: Negative.        Objective   Physical Exam  Constitutional:       Appearance: Normal appearance.   HENT:      Head: Normocephalic.   Cardiovascular:      Rate and Rhythm: Normal rate and regular rhythm.      Pulses: Normal pulses.      Heart sounds: Murmur heard.   Pulmonary:      Effort: Pulmonary effort is normal.      Breath sounds: Normal breath sounds.   Abdominal:      General: Bowel sounds are normal.   Musculoskeletal:      Comments: Still has some mild left-sided weakness and ambulates with walker   Skin:     General: Skin is warm and dry.   Neurological:      General: No focal deficit present.      Mental Status: She is alert and oriented to person, place, and time.   Psychiatric:         Mood and Affect: Mood normal.         Behavior: Behavior normal.         Procedures    Assessment & Plan   Diagnoses and all orders for this visit:    1. Anemia, unspecified type (Primary)  -      CBC & Differential; Future    2. Cigarette smoker    3. Bilateral carotid artery stenosis    4. BMI 26.0-26.9,adult    5. Acute ischemic stroke (HCC)    6. Left-sided weakness    7. Postmenopausal  -     DEXA Bone Density Axial; Future    8. Other screening mammogram  -     Mammo Screening Digital Tomosynthesis Bilateral With CAD; Future    Other orders  -     clopidogrel (PLAVIX) 75 MG tablet; Take 1 tablet by mouth Daily.  Dispense: 21 tablet; Refill: 0  -     hydrALAZINE (APRESOLINE) 25 MG tablet; Take 1 tablet by mouth 2 (Two) Times a Day. Please schedule appt to follow up  Dispense: 180 tablet; Refill: 1  -     furosemide (LASIX) 20 MG tablet; Take 1 tablet by mouth Daily.  Dispense: 90 tablet; Refill: 1  -     carvedilol (COREG) 12.5 MG tablet; Take 1 tablet by mouth 2 (Two) Times a Day.  Dispense: 180 tablet; Refill: 1  -     amLODIPine (NORVASC) 5 MG tablet; Take one tablet daily only if systolic BP is greater than 160  Dispense: 90 tablet; Refill: 1          Current Outpatient Medications:   •  acetaminophen (TYLENOL) 500 MG tablet, Take 500 mg by mouth Every 6 (Six) Hours As Needed for Mild Pain ., Disp: , Rfl:   •  albuterol sulfate HFA (ProAir HFA) 108 (90 Base) MCG/ACT inhaler, Inhale 2 puffs 4 (Four) Times a Day. Proair - DX CODE J44.9, Disp: 18 g, Rfl: 2  •  allopurinol (ZYLOPRIM) 100 MG tablet, Take 100 mg by mouth Daily., Disp: , Rfl:   •  amLODIPine (NORVASC) 5 MG tablet, Take one tablet daily only if systolic BP is greater than 160, Disp: 90 tablet, Rfl: 1  •  aspirin 81 MG tablet, Take 1 tablet by mouth Daily., Disp: , Rfl:   •  calcitriol (ROCALTROL) 0.25 MCG capsule, Take 1 capsule by mouth Daily., Disp: , Rfl: 3  •  carvedilol (COREG) 12.5 MG tablet, Take 1 tablet by mouth 2 (Two) Times a Day., Disp: 180 tablet, Rfl: 1  •  Cholecalciferol 1000 units capsule, Take 1 capsule by mouth Daily., Disp: , Rfl:   •  clopidogrel (PLAVIX) 75 MG tablet, Take 1 tablet by mouth Daily., Disp: 21 tablet, Rfl:  0  •  Cyanocobalamin (B-12) 1000 MCG capsule, Take 1 tablet by mouth Daily., Disp: , Rfl:   •  dexlansoprazole (DEXILANT) 60 MG capsule, Take 1 capsule by mouth Daily., Disp: , Rfl:   •  ferrous sulfate 325 (65 FE) MG tablet, TAKE 1 TABLET BY MOUTH EVERY DAY WITH BREAKFAST, Disp: 90 tablet, Rfl: 1  •  furosemide (LASIX) 20 MG tablet, Take 1 tablet by mouth Daily., Disp: 90 tablet, Rfl: 1  •  hydrALAZINE (APRESOLINE) 25 MG tablet, Take 1 tablet by mouth 2 (Two) Times a Day. Please schedule appt to follow up, Disp: 180 tablet, Rfl: 1  •  ipratropium-albuterol (DUO-NEB) 0.5-2.5 mg/3 ml nebulizer, Take 3 mL by nebulization Every 4 (Four) Hours As Needed for Wheezing., Disp: 120 vial, Rfl: 6  •  Omega-3 Fatty Acids (FISH OIL) 1000 MG capsule capsule, Take 1 capsule by mouth 2 (Two) Times a Day With Meals., Disp: , Rfl:   •  pravastatin (PRAVACHOL) 40 MG tablet, TAKE 1 TABLET BY MOUTH EVERY DAY, Disp: 90 tablet, Rfl: 0  •  Alcohol Swabs (B-D SINGLE USE SWABS REGULAR) pads, CHECK BLOOD SUGAR ONE TIME DAILY, Disp: 100 each, Rfl: 6           LOVE Florian 8/16/2022 11:58 EDT  This note has been electronically signed

## 2022-08-22 DIAGNOSIS — Z78.0 POSTMENOPAUSAL: ICD-10-CM

## 2022-08-22 DIAGNOSIS — Z12.31 OTHER SCREENING MAMMOGRAM: ICD-10-CM

## 2022-08-23 ENCOUNTER — TRANSCRIBE ORDERS (OUTPATIENT)
Dept: ADMINISTRATIVE | Facility: HOSPITAL | Age: 67
End: 2022-08-23

## 2022-08-23 ENCOUNTER — APPOINTMENT (OUTPATIENT)
Dept: VASCULAR SURGERY | Facility: HOSPITAL | Age: 67
End: 2022-08-23

## 2022-08-23 DIAGNOSIS — I65.23 CAROTID STENOSIS, BILATERAL: Primary | ICD-10-CM

## 2022-08-23 PROCEDURE — G0463 HOSPITAL OUTPT CLINIC VISIT: HCPCS

## 2022-08-25 ENCOUNTER — OUTSIDE FACILITY SERVICE (OUTPATIENT)
Dept: FAMILY MEDICINE CLINIC | Facility: CLINIC | Age: 67
End: 2022-08-25
Payer: MEDICARE

## 2022-08-25 PROBLEM — E66.3 OVERWEIGHT WITH BODY MASS INDEX (BMI) OF 26 TO 26.9 IN ADULT: Status: ACTIVE | Noted: 2022-08-25

## 2022-08-25 PROCEDURE — G0180 MD CERTIFICATION HHA PATIENT: HCPCS | Performed by: NURSE PRACTITIONER

## 2022-08-26 ENCOUNTER — TELEPHONE (OUTPATIENT)
Dept: FAMILY MEDICINE CLINIC | Facility: CLINIC | Age: 67
End: 2022-08-26

## 2022-08-26 NOTE — TELEPHONE ENCOUNTER
Caller: KRISTIN     Relationship: Other    Best call back number: [phone]    What is the best time to reach you: ANY TIME     Who are you requesting to speak with (clinical staff, provider,  specific staff member): CLINICAL    What was the call regarding: PLEASE CONTACT KRISTIN WITH A VERBAL ORDER FOR HOME HEALTH AIDE TO ASSIST PATIENT WITH BATHING 2 HOURS PER DAY 5 DAYS PER WEEK.      Do you require a callback: YES

## 2022-09-03 RX ORDER — CLOPIDOGREL BISULFATE 75 MG/1
TABLET ORAL
Qty: 21 TABLET | Refills: 0 | Status: SHIPPED | OUTPATIENT
Start: 2022-09-03 | End: 2022-09-26

## 2022-09-06 ENCOUNTER — TELEPHONE (OUTPATIENT)
Dept: FAMILY MEDICINE CLINIC | Facility: CLINIC | Age: 67
End: 2022-09-06

## 2022-09-06 RX ORDER — DEXLANSOPRAZOLE 60 MG/1
1 CAPSULE, DELAYED RELEASE ORAL DAILY
Qty: 30 CAPSULE | Refills: 0 | Status: SHIPPED | OUTPATIENT
Start: 2022-09-06 | End: 2022-09-29 | Stop reason: SDUPTHER

## 2022-09-06 NOTE — TELEPHONE ENCOUNTER
Caller: Genna Carvajal    Relationship: Emergency Contact    Best call back number:[phone]    What medication are you requesting: dexlansoprazole (DEXILANT) 60 MG capsule    What are your current symptoms: THIS WAS PRESCRIBED A THE HOSPITAL AND SHE WILL BE SEEING HER GASTRO DOCTOR SOON.    If a prescription is needed, what is your preferred pharmacy and phone number: [id number] [place] [facility] [address] [place] [address] [phone] [facility] [phone]      Additional notes:    PLEASE CONTACT PATIENT -[phone] TO ADVISE.

## 2022-09-08 ENCOUNTER — TELEPHONE (OUTPATIENT)
Dept: FAMILY MEDICINE CLINIC | Facility: CLINIC | Age: 67
End: 2022-09-08

## 2022-09-08 DIAGNOSIS — E78.2 MIXED HYPERLIPIDEMIA: Primary | ICD-10-CM

## 2022-09-08 DIAGNOSIS — E11.9 TYPE 2 DIABETES MELLITUS WITHOUT COMPLICATION, WITH LONG-TERM CURRENT USE OF INSULIN: ICD-10-CM

## 2022-09-08 DIAGNOSIS — Z79.4 TYPE 2 DIABETES MELLITUS WITHOUT COMPLICATION, WITH LONG-TERM CURRENT USE OF INSULIN: ICD-10-CM

## 2022-09-08 DIAGNOSIS — I10 ESSENTIAL HYPERTENSION: ICD-10-CM

## 2022-09-08 NOTE — TELEPHONE ENCOUNTER
PATIENT IS REQUESTING A PAPER COPY OF LAB ORDERS SO SHE CAN GO GET HER LABS DONE.     PATIENT WOULD LIKE TO PICK THESE ORDERS UP ON 9/13/22    PLEASE ADVISE [phone]

## 2022-09-09 ENCOUNTER — TRANSCRIBE ORDERS (OUTPATIENT)
Dept: ADMINISTRATIVE | Facility: HOSPITAL | Age: 67
End: 2022-09-09

## 2022-09-09 DIAGNOSIS — I65.23 CAROTID STENOSIS, BILATERAL: Primary | ICD-10-CM

## 2022-09-13 ENCOUNTER — TELEPHONE (OUTPATIENT)
Dept: FAMILY MEDICINE CLINIC | Facility: CLINIC | Age: 67
End: 2022-09-13

## 2022-09-13 DIAGNOSIS — E11.9 TYPE 2 DIABETES MELLITUS WITHOUT COMPLICATION, WITH LONG-TERM CURRENT USE OF INSULIN: ICD-10-CM

## 2022-09-13 DIAGNOSIS — Z79.4 TYPE 2 DIABETES MELLITUS WITHOUT COMPLICATION, WITH LONG-TERM CURRENT USE OF INSULIN: ICD-10-CM

## 2022-09-13 DIAGNOSIS — I10 ESSENTIAL HYPERTENSION: ICD-10-CM

## 2022-09-13 DIAGNOSIS — E78.2 MIXED HYPERLIPIDEMIA: ICD-10-CM

## 2022-09-13 NOTE — TELEPHONE ENCOUNTER
Patient calling with blood pressures issues. Says it is like a yo yo. First pressure this morning 154/78 at 6am and at 8 it was feeling like it was dropping, real tired, lightheaded, couldn't really focus, it was 103/54.  0840 it was 113/67

## 2022-09-13 NOTE — TELEPHONE ENCOUNTER
It is normal for blood pressure to be higher first thing in the morning, if it is dropping in the afternoon I would stop the hydralazine and see what her pressures do

## 2022-09-14 NOTE — TELEPHONE ENCOUNTER
Spoke with patient and her granddaughter.  Nancy said that she stopped the Hydralazine already.   They are awaiting results of the labs she had done yesterday at [facility].

## 2022-09-15 ENCOUNTER — TELEPHONE (OUTPATIENT)
Dept: FAMILY MEDICINE CLINIC | Facility: CLINIC | Age: 67
End: 2022-09-15

## 2022-09-15 DIAGNOSIS — D64.9 ANEMIA, UNSPECIFIED TYPE: Primary | ICD-10-CM

## 2022-09-15 RX ORDER — POTASSIUM CHLORIDE 750 MG/1
TABLET, FILM COATED, EXTENDED RELEASE ORAL
Qty: 15 TABLET | Refills: 2 | Status: SHIPPED | OUTPATIENT
Start: 2022-09-15 | End: 2022-12-13

## 2022-09-15 NOTE — TELEPHONE ENCOUNTER
Patient states she has stopped the amlodipine.  She stopped that at the discharged from the hospital.

## 2022-09-15 NOTE — TELEPHONE ENCOUNTER
With her kidney function it would actually be better to stop the amlodipine and adjust her blood pressure with the hydralazine if possible

## 2022-09-15 NOTE — TELEPHONE ENCOUNTER
Spoke with pt.  She is taking the Carvedilol BID and she is back on the Hydralazine.  SG    BP today 11:30 136/76

## 2022-09-15 NOTE — TELEPHONE ENCOUNTER
Patient called in for labs results. I gave her the results. She is complaining of being very weak and her blood pressure is dropping. When I talked to her about the labs she was upset because the cbc was not done to check her hemoglobin. I tried to reach out to jp roche but she was with another patient at the time so talked to the supervisor on what she would suggest. She suggested she go to the emergency room to be evaluated and so if they do labs and her hemoglobin is low they can hopefully get her admitted and give her blood and do test to hopefully do test to see where the bleeding is coming from

## 2022-09-19 ENCOUNTER — APPOINTMENT (OUTPATIENT)
Dept: MRI IMAGING | Facility: HOSPITAL | Age: 67
End: 2022-09-19

## 2022-09-22 ENCOUNTER — TELEPHONE (OUTPATIENT)
Dept: FAMILY MEDICINE CLINIC | Facility: CLINIC | Age: 67
End: 2022-09-22

## 2022-09-26 RX ORDER — CLOPIDOGREL BISULFATE 75 MG/1
TABLET ORAL
Qty: 21 TABLET | Refills: 0 | Status: SHIPPED | OUTPATIENT
Start: 2022-09-26 | End: 2022-10-23

## 2022-09-29 RX ORDER — DEXLANSOPRAZOLE 60 MG/1
1 CAPSULE, DELAYED RELEASE ORAL DAILY
Qty: 30 CAPSULE | Refills: 0 | Status: SHIPPED | OUTPATIENT
Start: 2022-09-29 | End: 2022-10-03 | Stop reason: SDUPTHER

## 2022-10-03 RX ORDER — DEXLANSOPRAZOLE 60 MG/1
1 CAPSULE, DELAYED RELEASE ORAL DAILY
Qty: 90 CAPSULE | Refills: 0 | Status: ON HOLD | OUTPATIENT
Start: 2022-10-03 | End: 2023-01-26

## 2022-10-04 ENCOUNTER — HOSPITAL ENCOUNTER (OUTPATIENT)
Dept: MRI IMAGING | Facility: HOSPITAL | Age: 67
Discharge: HOME | End: 2022-10-04

## 2022-10-04 DIAGNOSIS — I65.23 CAROTID STENOSIS, BILATERAL: ICD-10-CM

## 2022-10-04 LAB
CREAT BLDA-MCNC: 1.5 MG/DL (ref 0.6–1.3)
EGFRCR SERPLBLD CKD-EPI 2021: 37.8 ML/MIN/1.73

## 2022-10-04 PROCEDURE — 70544 MR ANGIOGRAPHY HEAD W/O DYE: CPT

## 2022-10-04 PROCEDURE — 82565 ASSAY OF CREATININE: CPT

## 2022-10-04 PROCEDURE — 70549 MR ANGIOGRAPH NECK W/O&W/DYE: CPT

## 2022-10-04 PROCEDURE — A9579 GAD-BASE MR CONTRAST NOS,1ML: HCPCS | Performed by: SURGERY

## 2022-10-04 PROCEDURE — 25010000002 GADOTERIDOL PER 1 ML: Performed by: SURGERY

## 2022-10-04 RX ADMIN — GADOTERIDOL 20 ML: 279.3 INJECTION, SOLUTION INTRAVENOUS at 10:58

## 2022-10-11 ENCOUNTER — APPOINTMENT (OUTPATIENT)
Dept: VASCULAR SURGERY | Facility: HOSPITAL | Age: 67
End: 2022-10-11

## 2022-10-11 PROCEDURE — G0463 HOSPITAL OUTPT CLINIC VISIT: HCPCS

## 2022-10-17 ENCOUNTER — TRANSCRIBE ORDERS (OUTPATIENT)
Dept: ADMINISTRATIVE | Facility: HOSPITAL | Age: 67
End: 2022-10-17

## 2022-10-17 DIAGNOSIS — I65.23 BILATERAL CAROTID ARTERY OCCLUSION: Primary | ICD-10-CM

## 2022-10-23 RX ORDER — CLOPIDOGREL BISULFATE 75 MG/1
TABLET ORAL
Qty: 21 TABLET | Refills: 0 | Status: SHIPPED | OUTPATIENT
Start: 2022-10-23 | End: 2023-02-17

## 2022-10-24 ENCOUNTER — OFFICE (OUTPATIENT)
Dept: URBAN - METROPOLITAN AREA CLINIC 64 | Facility: CLINIC | Age: 67
End: 2022-10-24

## 2022-10-24 VITALS
WEIGHT: 135 LBS | HEART RATE: 71 BPM | SYSTOLIC BLOOD PRESSURE: 115 MMHG | DIASTOLIC BLOOD PRESSURE: 44 MMHG | HEIGHT: 61 IN

## 2022-10-24 DIAGNOSIS — D50.0 IRON DEFICIENCY ANEMIA SECONDARY TO BLOOD LOSS (CHRONIC): ICD-10-CM

## 2022-10-24 DIAGNOSIS — K21.9 GASTRO-ESOPHAGEAL REFLUX DISEASE WITHOUT ESOPHAGITIS: ICD-10-CM

## 2022-10-24 DIAGNOSIS — R19.7 DIARRHEA, UNSPECIFIED: ICD-10-CM

## 2022-10-24 PROCEDURE — 99204 OFFICE O/P NEW MOD 45 MIN: CPT | Performed by: INTERNAL MEDICINE

## 2022-10-24 RX ORDER — COLESTIPOL HYDROCHLORIDE 1 G/1
TABLET ORAL
Qty: 60 | Refills: 3 | Status: ACTIVE
Start: 2022-10-24

## 2022-10-27 ENCOUNTER — TELEPHONE (OUTPATIENT)
Dept: FAMILY MEDICINE CLINIC | Facility: CLINIC | Age: 67
End: 2022-10-27

## 2022-10-27 NOTE — TELEPHONE ENCOUNTER
Caller: KRISTIN    Relationship: Home Health    Best call back number: [phone]    What orders are you requesting (i.e. lab or imaging): VERBAL ORDERS    In what timeframe would the patient need to come in: ASAP    Where will you receive your lab/imaging services: HOME    Additional notes: VERBAL ORDERS TO CONTINUE CARE.     PLEASE CALL TO DISCUSS AND ADVISE

## 2022-11-08 ENCOUNTER — HOSPITAL ENCOUNTER (OUTPATIENT)
Dept: CARDIOLOGY | Facility: HOSPITAL | Age: 67
Discharge: HOME | End: 2022-11-08

## 2022-11-08 ENCOUNTER — TRANSCRIBE ORDERS (OUTPATIENT)
Dept: ADMINISTRATIVE | Facility: HOSPITAL | Age: 67
End: 2022-11-08

## 2022-11-08 ENCOUNTER — APPOINTMENT (OUTPATIENT)
Dept: VASCULAR SURGERY | Facility: HOSPITAL | Age: 67
End: 2022-11-08

## 2022-11-08 DIAGNOSIS — I65.23 BILATERAL CAROTID ARTERY OCCLUSION: ICD-10-CM

## 2022-11-08 DIAGNOSIS — I65.23 BILATERAL CAROTID ARTERY OCCLUSION: Primary | ICD-10-CM

## 2022-11-08 LAB
BH CV XLRA MEAS LEFT DIST CCA EDV: 30.8 CM/SEC
BH CV XLRA MEAS LEFT DIST CCA PSV: 110 CM/SEC
BH CV XLRA MEAS LEFT DIST ICA EDV: -28.6 CM/SEC
BH CV XLRA MEAS LEFT DIST ICA PSV: -107 CM/SEC
BH CV XLRA MEAS LEFT ICA/CCA RATIO: 2
BH CV XLRA MEAS LEFT PROX CCA EDV: 21.8 CM/SEC
BH CV XLRA MEAS LEFT PROX CCA PSV: 81.3 CM/SEC
BH CV XLRA MEAS LEFT PROX ECA EDV: -24.7 CM/SEC
BH CV XLRA MEAS LEFT PROX ECA PSV: -240 CM/SEC
BH CV XLRA MEAS LEFT PROX ICA EDV: 46.6 CM/SEC
BH CV XLRA MEAS LEFT PROX ICA PSV: 221 CM/SEC
BH CV XLRA MEAS LEFT PROX SCLA PSV: 347 CM/SEC
BH CV XLRA MEAS LEFT VERTEBRAL A EDV: 17.4 CM/SEC
BH CV XLRA MEAS LEFT VERTEBRAL A PSV: 74.6 CM/SEC
BH CV XLRA MEAS RIGHT DIST CCA EDV: 19.6 CM/SEC
BH CV XLRA MEAS RIGHT DIST CCA PSV: 88.6 CM/SEC
BH CV XLRA MEAS RIGHT DIST ICA EDV: -43.6 CM/SEC
BH CV XLRA MEAS RIGHT DIST ICA PSV: -142 CM/SEC
BH CV XLRA MEAS RIGHT ICA/CCA RATIO: 1.1
BH CV XLRA MEAS RIGHT PROX CCA EDV: 34.5 CM/SEC
BH CV XLRA MEAS RIGHT PROX CCA PSV: 162 CM/SEC
BH CV XLRA MEAS RIGHT PROX ECA EDV: 24.3 CM/SEC
BH CV XLRA MEAS RIGHT PROX ECA PSV: 166 CM/SEC
BH CV XLRA MEAS RIGHT PROX ICA EDV: -46.5 CM/SEC
BH CV XLRA MEAS RIGHT PROX ICA PSV: -171 CM/SEC
BH CV XLRA MEAS RIGHT PROX SCLA PSV: -129 CM/SEC
BH CV XLRA MEAS RIGHT VERTEBRAL A EDV: 13 CM/SEC
BH CV XLRA MEAS RIGHT VERTEBRAL A PSV: 59 CM/SEC
MAXIMAL PREDICTED HEART RATE: 151 BPM
STRESS TARGET HR: 128 BPM

## 2022-11-08 PROCEDURE — G0463 HOSPITAL OUTPT CLINIC VISIT: HCPCS

## 2022-11-08 PROCEDURE — 93880 EXTRACRANIAL BILAT STUDY: CPT

## 2022-11-17 NOTE — TELEPHONE ENCOUNTER
Caller:ALECIA STREETER    Relationship: SELF    Best call back number: [phone]    Requested Prescriptions:   Requested Prescriptions     Pending Prescriptions Disp Refills   • clopidogrel (PLAVIX) 75 MG tablet [Pharmacy Med Name: CLOPIDOGREL 75 MG TABLET] 21 tablet 0     Sig: TAKE 1 TABLET BY MOUTH EVERY DAY        Pharmacy where request should be sent: [facility]/PHARMACY #[id number] - [place], [place] - [address] [place] [address] [phone] [facility] [phone] FX       Does the patient have less than a 3 day supply:  [x] Yes  [] No    Flavio Hudson Rep   11/17/22 12:02 EST

## 2022-11-18 RX ORDER — CLOPIDOGREL BISULFATE 75 MG/1
TABLET ORAL
Qty: 21 TABLET | Refills: 0 | OUTPATIENT
Start: 2022-11-18

## 2022-11-21 ENCOUNTER — TELEPHONE (OUTPATIENT)
Dept: FAMILY MEDICINE CLINIC | Facility: CLINIC | Age: 67
End: 2022-11-21

## 2022-11-21 NOTE — TELEPHONE ENCOUNTER
Caller: KRISTIN     Relationship: Home Health    Best call back number: [phone]     KRISTIN, WITH [facility] CALLED, TO INFORM SENG BHATIA THAT PATENT HAS HAD SOME MEDICATION CHANGES.    SHE HAS ADDED A EYE DROP SOLUTION FOR GLAUCOMA, CALLED Latanoprost     ALSO HAD CHANGES TO THE DEXALANT 60, SHE IS NOW TAKING OMEPRAZOLE 40 MG INSTEAD.      THEY WILL FAX THIS UPDATE TO THE OFFICE

## 2022-11-28 RX ORDER — FERROUS SULFATE 325(65) MG
TABLET ORAL
Qty: 90 TABLET | Refills: 1 | Status: SHIPPED | OUTPATIENT
Start: 2022-11-28 | End: 2023-03-23

## 2022-12-02 ENCOUNTER — OUTSIDE FACILITY SERVICE (OUTPATIENT)
Dept: FAMILY MEDICINE CLINIC | Facility: CLINIC | Age: 67
End: 2022-12-02
Payer: MEDICARE

## 2022-12-02 PROCEDURE — G0179 MD RECERTIFICATION HHA PT: HCPCS | Performed by: FAMILY MEDICINE

## 2022-12-13 RX ORDER — POTASSIUM CHLORIDE 750 MG/1
TABLET, FILM COATED, EXTENDED RELEASE ORAL
Qty: 15 TABLET | Refills: 2 | Status: SHIPPED | OUTPATIENT
Start: 2022-12-13 | End: 2023-03-06

## 2023-01-03 ENCOUNTER — OUTSIDE FACILITY SERVICE (OUTPATIENT)
Dept: FAMILY MEDICINE CLINIC | Facility: CLINIC | Age: 68
End: 2023-01-03
Payer: MEDICARE

## 2023-01-03 PROCEDURE — OUTSIDEPOS PR OUTSIDE POS PLACEHOLDER: Performed by: FAMILY MEDICINE

## 2023-01-04 ENCOUNTER — OFFICE VISIT (OUTPATIENT)
Dept: CARDIOLOGY | Facility: CLINIC | Age: 68
End: 2023-01-04
Payer: MEDICARE

## 2023-01-04 VITALS
SYSTOLIC BLOOD PRESSURE: 137 MMHG | DIASTOLIC BLOOD PRESSURE: 67 MMHG | BODY MASS INDEX: 26.9 KG/M2 | HEIGHT: 60 IN | HEART RATE: 60 BPM | OXYGEN SATURATION: 98 % | WEIGHT: 137 LBS

## 2023-01-04 DIAGNOSIS — I10 PRIMARY HYPERTENSION: ICD-10-CM

## 2023-01-04 DIAGNOSIS — I63.9 ACUTE ISCHEMIC STROKE: ICD-10-CM

## 2023-01-04 DIAGNOSIS — I25.10 CORONARY ARTERY DISEASE INVOLVING NATIVE CORONARY ARTERY OF NATIVE HEART WITHOUT ANGINA PECTORIS: ICD-10-CM

## 2023-01-04 DIAGNOSIS — I73.9 PERIPHERAL VASCULAR DISEASE: ICD-10-CM

## 2023-01-04 DIAGNOSIS — I44.7 LEFT BUNDLE BRANCH BLOCK: Primary | ICD-10-CM

## 2023-01-04 DIAGNOSIS — I67.850 CEREBRAL AUTOSOMAL DOMINANT ARTERIOPATHY WITH SUBCORTICAL INFARCTS AND LEUKOENCEPHALOPATHY: ICD-10-CM

## 2023-01-04 PROCEDURE — 1160F RVW MEDS BY RX/DR IN RCRD: CPT | Performed by: INTERNAL MEDICINE

## 2023-01-04 PROCEDURE — 1159F MED LIST DOCD IN RCRD: CPT | Performed by: INTERNAL MEDICINE

## 2023-01-04 PROCEDURE — 99214 OFFICE O/P EST MOD 30 MIN: CPT | Performed by: INTERNAL MEDICINE

## 2023-01-04 RX ORDER — LATANOPROST 50 UG/ML
SOLUTION/ DROPS OPHTHALMIC
COMMUNITY
Start: 2022-10-17

## 2023-01-04 RX ORDER — POTASSIUM CHLORIDE 20 MEQ/1
40 TABLET, EXTENDED RELEASE ORAL
COMMUNITY
Start: 2022-11-08 | End: 2023-02-17

## 2023-01-04 RX ORDER — ALLOPURINOL 100 MG/1
200 TABLET ORAL
COMMUNITY
Start: 2022-10-28 | End: 2023-01-19

## 2023-01-04 RX ORDER — MONTELUKAST SODIUM 4 MG/1
1 TABLET, CHEWABLE ORAL 2 TIMES DAILY
COMMUNITY
Start: 2022-12-18

## 2023-01-04 RX ORDER — CALCITRIOL 0.25 UG/1
0.25 CAPSULE, LIQUID FILLED ORAL
COMMUNITY
Start: 2022-08-29 | End: 2023-02-17

## 2023-01-04 RX ORDER — AMLODIPINE BESYLATE 10 MG/1
10 TABLET ORAL DAILY
Qty: 90 TABLET | Refills: 3 | Status: SHIPPED | OUTPATIENT
Start: 2023-01-04 | End: 2023-02-17 | Stop reason: SDUPTHER

## 2023-01-04 RX ORDER — OMEPRAZOLE 40 MG/1
40 CAPSULE, DELAYED RELEASE ORAL DAILY
COMMUNITY
Start: 2022-11-09 | End: 2023-03-09

## 2023-01-04 RX ORDER — CLOPIDOGREL BISULFATE 75 MG/1
1 TABLET ORAL
COMMUNITY
Start: 2022-08-16 | End: 2023-01-19

## 2023-01-04 RX ORDER — AMLODIPINE BESYLATE 5 MG/1
TABLET ORAL
COMMUNITY
Start: 2022-11-11 | End: 2023-01-04 | Stop reason: SDUPTHER

## 2023-01-04 NOTE — PROGRESS NOTES
Progress note      Name: Nancy Suárez ADMIT: (Not on file)   : 1955  PCP: Ingrid De Anda APRN    MRN: [id number] LOS: 0 days   AGE/SEX: 67 y.o. female  ROOM: Room/bed info not found     Chief Complaint   Patient presents with   • Coronary Artery Disease   • Hypertension   • Hyperlipidemia       Subjective       History of present illness  Nancy Suárez is a 67-year-old female patient who has history of coronary artery disease with chronically occluded RCA with left-to-right collaterals on heart cath in 2018, has carotid disease followed by vascular surgery, hypertension, active smoking, TIA, here today for follow-up.  Patient had an episode of TIA about 1 month ago which affected her left side luckily she recovered completely.  She denies any chest pain or shortness of breath, no palpitations no lower extremity edema no syncopal episodes.    Past Medical History:   Diagnosis Date   • Artery stenosis (HCC)     Bilateral subclavian s/p stent    • Arthritis    • AVM (arteriovenous malformation)    • CAD (coronary artery disease)    • CKD (chronic kidney disease)     Stage three    • COPD (chronic obstructive pulmonary disease) (HCC)    • DM2 (diabetes mellitus, type 2) (HCC)    • Eye pressure     Elevated eye pressure   • Gout    • Hepatitis     Hepatitis c    • HTN (hypertension)    • Hyperparathyroidism (HCC)    • KIRIT (iron deficiency anemia)    • Iron deficiency anemia    • Osteoporosis    • PVD (peripheral vascular disease) (HCC)      Past Surgical History:   Procedure Laterality Date   • CATARACT EXTRACTION Left    • CHOLECYSTECTOMY     • EYE SURGERY     • TOTAL ABDOMINAL HYSTERECTOMY WITH SALPINGO OOPHORECTOMY       Family History   Problem Relation Age of Onset   • Diabetes Mother    • Coronary artery disease Sister    • Diabetes Sister    • Breast cancer Paternal Aunt 50     Social History     Tobacco Use   • Smoking status: Every Day     Packs/day: 0.50     Types: Cigarettes     Start  date: 1975   • Smokeless tobacco: Never   Vaping Use   • Vaping Use: Never used   Substance Use Topics   • Alcohol use: No   • Drug use: No     (Not in a hospital admission)    Allergies:  Patient has no known allergies.      Physical Exam  VITALS REVIEWED    General:      well developed, in no acute distress.    Head:      normocephalic and atraumatic.    Eyes:      PERRL/EOM intact, conjunctiva and sclera clear with out nystagmus.    Neck:      no masses, thyromegaly,  trachea central with normal respiratory effort and PMI displaced laterally  Lungs:      Clear to auscultation bilaterally  Heart:       Regular rate and rhythm  Msk:      no deformity or scoliosis noted of thoracic or lumbar spine.    Pulses:      pulses normal in all 4 extremities.    Extremities:       No lower extremity edema  Neurologic:      no focal deficits.   alert oriented x3  Skin:      intact without lesions or rashes.    Psych:      alert and cooperative; normal mood and affect; normal attention span and concentration.      Result Review :               Pertinent cardiac workup    1. EKG 10/28/2021 sinus rhythm left bundle branch block  2. Echocardiogram 6/5/2020 [facility] low normal at 50%      Procedures        Assessment and Plan      Nancy Suárez is a 67-year-old female patient who has history of coronary artery disease with occluded RCA, hypertension, peripheral vascular disease, smoking, TIA, here today for follow-up.  Patient denies any anginal symptoms or CHF symptoms at this time.  Due to her recent TIA, I would like to get an event monitor to make sure that she does not have A. fib, if so then she will need to be on anticoagulants.  Also her blood pressure is elevated usually up to 160 in the mornings, I will go ahead and increase Norvasc to 10 mg, continue Coreg and hydralazine.  Also during this visit we discussed about importance of smoking cessation.  I will see her after completion of the event monitor.    Diagnoses and all  orders for this visit:    1. Left bundle branch block (Primary)    2. Coronary artery disease involving native coronary artery of native heart without angina pectoris    3. Primary hypertension    4. Peripheral vascular disease (HCC)    5. Acute ischemic stroke (HCC)           No follow-ups on file.  Patient was given instructions and counseling regarding her condition or for health maintenance advice. Please see specific information pulled into the AVS if appropriate.

## 2023-01-26 ENCOUNTER — HOSPITAL ENCOUNTER (OUTPATIENT)
Facility: HOSPITAL | Age: 68
Setting detail: HOSPITAL OUTPATIENT SURGERY
Discharge: HOME | End: 2023-01-26
Attending: INTERNAL MEDICINE | Admitting: INTERNAL MEDICINE
Payer: MEDICARE

## 2023-01-26 ENCOUNTER — ANESTHESIA EVENT (OUTPATIENT)
Dept: GASTROENTEROLOGY | Facility: HOSPITAL | Age: 68
End: 2023-01-26
Payer: MEDICARE

## 2023-01-26 ENCOUNTER — ANESTHESIA (OUTPATIENT)
Dept: GASTROENTEROLOGY | Facility: HOSPITAL | Age: 68
End: 2023-01-26
Payer: MEDICARE

## 2023-01-26 ENCOUNTER — ON CAMPUS - OUTPATIENT (OUTPATIENT)
Dept: URBAN - METROPOLITAN AREA HOSPITAL 85 | Facility: HOSPITAL | Age: 68
End: 2023-01-26
Payer: MEDICAID

## 2023-01-26 VITALS
HEART RATE: 72 BPM | RESPIRATION RATE: 16 BRPM | HEIGHT: 61 IN | OXYGEN SATURATION: 97 % | SYSTOLIC BLOOD PRESSURE: 134 MMHG | BODY MASS INDEX: 25.39 KG/M2 | DIASTOLIC BLOOD PRESSURE: 60 MMHG | WEIGHT: 134.48 LBS | TEMPERATURE: 97.8 F

## 2023-01-26 DIAGNOSIS — R19.7 DIARRHEA: ICD-10-CM

## 2023-01-26 DIAGNOSIS — D50.9 IRON DEFICIENCY ANEMIA, UNSPECIFIED: ICD-10-CM

## 2023-01-26 DIAGNOSIS — K21.9 GERD (GASTROESOPHAGEAL REFLUX DISEASE): ICD-10-CM

## 2023-01-26 DIAGNOSIS — K21.9 GASTRO-ESOPHAGEAL REFLUX DISEASE WITHOUT ESOPHAGITIS: ICD-10-CM

## 2023-01-26 DIAGNOSIS — K44.9 DIAPHRAGMATIC HERNIA WITHOUT OBSTRUCTION OR GANGRENE: ICD-10-CM

## 2023-01-26 DIAGNOSIS — R19.7 DIARRHEA, UNSPECIFIED: ICD-10-CM

## 2023-01-26 DIAGNOSIS — K64.8 OTHER HEMORRHOIDS: ICD-10-CM

## 2023-01-26 DIAGNOSIS — K57.30 DIVERTICULOSIS OF LARGE INTESTINE WITHOUT PERFORATION OR ABS: ICD-10-CM

## 2023-01-26 DIAGNOSIS — D12.2 BENIGN NEOPLASM OF ASCENDING COLON: ICD-10-CM

## 2023-01-26 DIAGNOSIS — K29.60 OTHER GASTRITIS WITHOUT BLEEDING: ICD-10-CM

## 2023-01-26 LAB — GLUCOSE BLDC GLUCOMTR-MCNC: 115 MG/DL (ref 70–105)

## 2023-01-26 PROCEDURE — 45380 COLONOSCOPY AND BIOPSY: CPT | Performed by: INTERNAL MEDICINE

## 2023-01-26 PROCEDURE — 43235 EGD DIAGNOSTIC BRUSH WASH: CPT | Performed by: INTERNAL MEDICINE

## 2023-01-26 PROCEDURE — 45385 COLONOSCOPY W/LESION REMOVAL: CPT | Performed by: INTERNAL MEDICINE

## 2023-01-26 PROCEDURE — 93010 ELECTROCARDIOGRAM REPORT: CPT | Performed by: INTERNAL MEDICINE

## 2023-01-26 PROCEDURE — 25010000002 PROPOFOL 200 MG/20ML EMULSION: Performed by: NURSE ANESTHETIST, CERTIFIED REGISTERED

## 2023-01-26 PROCEDURE — C1769 GUIDE WIRE: HCPCS | Performed by: INTERNAL MEDICINE

## 2023-01-26 PROCEDURE — 93005 ELECTROCARDIOGRAM TRACING: CPT | Performed by: ANESTHESIOLOGY

## 2023-01-26 PROCEDURE — 88305 TISSUE EXAM BY PATHOLOGIST: CPT | Performed by: INTERNAL MEDICINE

## 2023-01-26 PROCEDURE — 82962 GLUCOSE BLOOD TEST: CPT

## 2023-01-26 RX ORDER — SODIUM CHLORIDE 0.9 % (FLUSH) 0.9 %
10 SYRINGE (ML) INJECTION AS NEEDED
Status: DISCONTINUED | OUTPATIENT
Start: 2023-01-26 | End: 2023-01-26 | Stop reason: HOSPADM

## 2023-01-26 RX ORDER — SODIUM CHLORIDE, SODIUM LACTATE, POTASSIUM CHLORIDE, CALCIUM CHLORIDE 600; 310; 30; 20 MG/100ML; MG/100ML; MG/100ML; MG/100ML
INJECTION, SOLUTION INTRAVENOUS CONTINUOUS PRN
Status: DISCONTINUED | OUTPATIENT
Start: 2023-01-26 | End: 2023-01-26 | Stop reason: SURG

## 2023-01-26 RX ORDER — LIDOCAINE HYDROCHLORIDE 20 MG/ML
INJECTION, SOLUTION INFILTRATION; PERINEURAL AS NEEDED
Status: DISCONTINUED | OUTPATIENT
Start: 2023-01-26 | End: 2023-01-26 | Stop reason: SURG

## 2023-01-26 RX ORDER — SODIUM CHLORIDE 0.9 % (FLUSH) 0.9 %
10 SYRINGE (ML) INJECTION EVERY 12 HOURS SCHEDULED
Status: DISCONTINUED | OUTPATIENT
Start: 2023-01-26 | End: 2023-01-26 | Stop reason: HOSPADM

## 2023-01-26 RX ORDER — MIDAZOLAM HYDROCHLORIDE 1 MG/ML
0.5 INJECTION INTRAMUSCULAR; INTRAVENOUS
Status: DISCONTINUED | OUTPATIENT
Start: 2023-01-26 | End: 2023-01-26 | Stop reason: HOSPADM

## 2023-01-26 RX ORDER — PROPOFOL 10 MG/ML
INJECTION, EMULSION INTRAVENOUS AS NEEDED
Status: DISCONTINUED | OUTPATIENT
Start: 2023-01-26 | End: 2023-01-26 | Stop reason: SURG

## 2023-01-26 RX ORDER — DEXLANSOPRAZOLE 60 MG/1
CAPSULE, DELAYED RELEASE ORAL
Qty: 90 CAPSULE | Refills: 0 | Status: SHIPPED | OUTPATIENT
Start: 2023-01-26 | End: 2023-02-17

## 2023-01-26 RX ORDER — SODIUM CHLORIDE 9 MG/ML
40 INJECTION, SOLUTION INTRAVENOUS AS NEEDED
Status: DISCONTINUED | OUTPATIENT
Start: 2023-01-26 | End: 2023-01-26 | Stop reason: HOSPADM

## 2023-01-26 RX ADMIN — SODIUM CHLORIDE, SODIUM LACTATE, POTASSIUM CHLORIDE, AND CALCIUM CHLORIDE: .6; .31; .03; .02 INJECTION, SOLUTION INTRAVENOUS at 13:32

## 2023-01-26 RX ADMIN — PROPOFOL 400 MG: 10 INJECTION, EMULSION INTRAVENOUS at 13:36

## 2023-01-26 RX ADMIN — SODIUM CHLORIDE 1000 ML: 9 INJECTION, SOLUTION INTRAVENOUS at 11:50

## 2023-01-26 RX ADMIN — LIDOCAINE HYDROCHLORIDE 60 MG: 20 INJECTION, SOLUTION INFILTRATION; PERINEURAL at 13:36

## 2023-01-26 NOTE — DISCHARGE INSTRUCTIONS
A responsible adult should stay with you and you should rest quietly for the rest of the day.    Do not drink alcohol, drive, operate any heavy machinery or power tools or make any legal/important decisions for the next 24 hours.     Progress your diet as tolerated.  If you begin to experience severe pain, increased shortness of breath, racing heartbeat or a fever above 101 F, seek immediate medical attention.     Follow up with MD as instructed. Call office for results in 3 to 5 days if needed. [phone]    Impression:  1.  Erosive gastritis.  2.  Small hiatal hernia.  3.  No obvious AVM was seen.  4.  4 polyp removed from ascending colon area with hot snare as detailed above.  5.  Extensive diverticulosis moderate-sized hemorrhoid     Recommendations:  Follow up with GI clinic as needed  Follow up with PCP as scheduled  Follow up with biopsy report  Repeat colonoscopy in 3 years  Benefiber 1 scoop 2x/day   PPI p.o. daily  Okay to restart Plavix in 3 days

## 2023-01-26 NOTE — OP NOTE
ESOPHAGOGASTRODUODENOSCOPY, COLONOSCOPY Procedure Report    Patient Name:  Nancy Suárez  YOB: 1955    Date of Surgery:  1/26/2023     Pre-Op Diagnosis:  Iron deficiency anemia, unspecified [D50.9]  Diarrhea [R19.7]  GERD (gastroesophageal reflux disease) [K21.9]         Procedure/CPT® Codes:      Procedure(s):  ESOPHAGOGASTRODUODENOSCOPY  COLONOSCOPY with biopsy x 2 areas and polypectomy x 4 and cautery of polyps x 2    Staff:  Surgeon(s):  Hero Webster MD      Anesthesia: Monitored Anesthesia Care    Description of Procedure:  A description of the procedure as well as risks, benefits and alternative methods were explained to the patient who voiced understanding and signed the corresponding consent form. A physical exam was performed and vital signs were monitored throughout the procedure.    Initially  scope advanced effluxing from orifice, esophagus stomach and second part duodenum.  Retroflexion lamination was performed.  A rectal exam was performed which was normal. An Olympus colonoscope was placed into the rectum and proceeded under direct visualization through the colon until the cecum and appendiceal orifice were identified. Careful visualization occurred upon slow withdraw of the scope. The scope was then retroflexed and the distal rectum was visualized. The quality of the prep was good. The procedure was not difficult and there were no immediate complications.    Findings:   Hiatal hernia noticed with erosive gastritis.  Duodenal mucosa looks normal first and second part duodenum.  Terminal ileum cecum ascending transverse; mucosa looks normal random colon biopsies performed right and left side of the colon.  4 polyp were removed from the ascending colon area Mehringer 6 mm and 8 mm with a hot snare.    Impression:  1.  Erosive gastritis.  2.  Small hiatal hernia.  3.  No obvious AVM was seen.  4.  4 polyp removed from ascending colon area with hot snare as detailed above.  5.   Extensive diverticulosis moderate-sized hemorrhoid    Recommendations:  Follow up with GI clinic as needed  Follow up with PCP as scheduled  Follow up with biopsy report  Repeat colonoscopy in 3 years  Benefiber 1 scoop 2x/day   PPI p.o. daily  Okay to restart Plavix in 3    Hero Webster MD     Date: 1/26/2023    Time: 14:01 EST

## 2023-01-26 NOTE — ANESTHESIA POSTPROCEDURE EVALUATION
Patient: Nancy Suárez    Procedure Summary     Date: 01/26/23 Room / Location: [facility] ENDOSCOPY 4 / [facility] ENDOSCOPY    Anesthesia Start: 1332 Anesthesia Stop: 1403    Procedures:       ESOPHAGOGASTRODUODENOSCOPY      COLONOSCOPY with biopsy x 2 areas and polypectomy x 4 and cautery of polyps x 2 (Rectum) Diagnosis:       Iron deficiency anemia, unspecified      Diarrhea      GERD (gastroesophageal reflux disease)      (Iron deficiency anemia, unspecified [D50.9])      (Diarrhea [R19.7])      (GERD (gastroesophageal reflux disease) [K21.9])    Surgeons: Hero Webster MD Provider: Larry Brooks MD    Anesthesia Type: Not recorded ASA Status: Not recorded          Anesthesia Type: No value filed.    Vitals  Vitals Value Taken Time   /75 01/26/23 1416   Temp     Pulse 70 01/26/23 1417   Resp 16 01/26/23 1412   SpO2 97 % 01/26/23 1417   Vitals shown include unvalidated device data.        Post Anesthesia Care and Evaluation    Patient location during evaluation: PACU  Patient participation: complete - patient cannot participate  Level of consciousness: responsive to light touch, responsive to physical stimuli and awake  Pain score: 0  Pain management: adequate    Airway patency: patent  Anesthetic complications: No anesthetic complications  PONV Status: controlled  Cardiovascular status: acceptable and hemodynamically stable  Respiratory status: acceptable  Hydration status: acceptable    Comments: Satisfactory progress.Patient seen and examined postoperatively; vital signs stable; SpO2 greater than or equal to 90%; cardiopulmonary status stable; nausea/vomiting adequately controlled; pain adequately controlled; no apparent anesthesia complications; patient discharged from anesthesia care when discharge criteria were met

## 2023-01-26 NOTE — H&P
Patient Care Team:  Ingrid De Anda APRN as PCP - General (Nurse Practitioner)  Lupillo Domingo MD as Consulting Physician (Cardiology)      Subjective .     History of present illness:    Nancy Suárez is a 67 y.o. female who presents today for Procedure(s):  ESOPHAGOGASTRODUODENOSCOPY  COLONOSCOPY for the indications listed below.     The updated Patient Profile was reviewed prior to the procedure, in conjunction with the Physical Exam, including medical conditions, surgical procedures, medications, allergies, family history and social history.     Pre-operatively, I reviewed the indication(s) for the procedure, the risks of the procedure [including but not limited to: unexpected bleeding possibly requiring hospitalization and/or unplanned repeat procedures, perforation possibly requiring surgical treatment, missed lesions and complications of sedation/MAC (also explained by anesthesia staff)].     I have evaluated the patient for risks associated with the planned anesthesia and the procedure to be performed and find the patient an acceptable candidate for IV sedation.    Multiple opportunities were provided for any questions or concerns, and all questions were answered satisfactorily before any anesthesia was administered. We will proceed with the planned procedure.      ASSESSMENT/PLAN:  EGD  COLON        Past Medical History:  Past Medical History:   Diagnosis Date   • Artery stenosis (HCC)     Bilateral subclavian s/p stent    • Arthritis    • AVM (arteriovenous malformation)    • CAD (coronary artery disease)    • CKD (chronic kidney disease)     Stage three    • COPD (chronic obstructive pulmonary disease) (HCC)    • DM2 (diabetes mellitus, type 2) (HCC)    • Eye pressure     Elevated eye pressure   • GERD (gastroesophageal reflux disease)    • Gout    • Hepatitis     Hepatitis c    • HTN (hypertension)    • Hyperparathyroidism (HCC)    • KIRIT (iron deficiency anemia)    • Iron deficiency anemia    •  Osteoporosis    • PVD (peripheral vascular disease) (HCC)    • Stroke (HCC)     TIA       Past Surgical History:  Past Surgical History:   Procedure Laterality Date   • CATARACT EXTRACTION Left    • CHOLECYSTECTOMY     • EYE SURGERY     • TOTAL ABDOMINAL HYSTERECTOMY WITH SALPINGO OOPHORECTOMY         Social History:  Social History     Tobacco Use   • Smoking status: Every Day     Packs/day: 0.50     Types: Cigarettes     Start date: 1975   • Smokeless tobacco: Never   Vaping Use   • Vaping Use: Never used   Substance Use Topics   • Alcohol use: No   • Drug use: No       Family History:  Family History   Problem Relation Age of Onset   • Diabetes Mother    • Coronary artery disease Sister    • Diabetes Sister    • Breast cancer Paternal Aunt 50       Medications:  Medications Prior to Admission   Medication Sig Dispense Refill Last Dose   • acetaminophen (TYLENOL) 500 MG tablet Take 500 mg by mouth Every 6 (Six) Hours As Needed for Mild Pain .      • albuterol sulfate HFA (ProAir HFA) 108 (90 Base) MCG/ACT inhaler Inhale 2 puffs 4 (Four) Times a Day. Proair - DX CODE J44.9 (Patient taking differently: Inhale 2 puffs Every 4 (Four) Hours As Needed. Proair - DX CODE J44.9) 18 g 2    • allopurinol (ZYLOPRIM) 100 MG tablet Take 100 mg by mouth Daily.      • amLODIPine (NORVASC) 10 MG tablet Take 1 tablet by mouth Daily. (Patient taking differently: Take 10 mg by mouth As Needed (TAKE IF SBP > 160).) 90 tablet 3    • aspirin 81 MG tablet Take 1 tablet by mouth Daily.      • calcitriol (ROCALTROL) 0.25 MCG capsule Take 1 capsule by mouth Daily.  3    • carvedilol (COREG) 12.5 MG tablet Take 1 tablet by mouth 2 (Two) Times a Day. 180 tablet 1    • clopidogrel (PLAVIX) 75 MG tablet TAKE 1 TABLET BY MOUTH EVERY DAY (Patient taking differently: Take 75 mg by mouth Daily.) 21 tablet 0    • colestipol (COLESTID) 1 g tablet Take 1 g by mouth 2 (Two) Times a Day.      • Cyanocobalamin (B-12) 1000 MCG capsule Take 1 tablet by  mouth Daily.      • ferrous sulfate 325 (65 FE) MG tablet TAKE 1 TABLET BY MOUTH EVERY DAY WITH BREAKFAST 90 tablet 1    • furosemide (LASIX) 20 MG tablet Take 1 tablet by mouth Daily. (Patient taking differently: Take 20 mg by mouth Every Other Day.) 90 tablet 1    • hydrALAZINE (APRESOLINE) 25 MG tablet Take 1 tablet by mouth 2 (Two) Times a Day. Please schedule appt to follow up 180 tablet 1    • latanoprost (XALATAN) 0.005 % ophthalmic solution       • Omega-3 Fatty Acids (FISH OIL) 1000 MG capsule capsule Take 1 capsule by mouth 2 (Two) Times a Day With Meals.      • omeprazole (priLOSEC) 40 MG capsule Take 40 mg by mouth Daily.      • potassium chloride (K-DUR,KLOR-CON) 20 MEQ CR tablet Take 40 mEq by mouth.      • potassium chloride 10 MEQ CR tablet TAKE 1 TABLET BY MOUTH EVERY OTHER DAY (Patient taking differently: 10 mEq Daily. TAKE 1 TABLET BY MOUTH EVERY OTHER DAY) 15 tablet 2    • pravastatin (PRAVACHOL) 40 MG tablet TAKE 1 TABLET BY MOUTH EVERY DAY 90 tablet 0    • Alcohol Swabs (B-D SINGLE USE SWABS REGULAR) pads CHECK BLOOD SUGAR ONE TIME DAILY 100 each 6    • calcitriol (ROCALTROL) 0.25 MCG capsule Take 0.25 mcg by mouth. (Patient not taking: Reported on 1/19/2023)   Not Taking   • Cholecalciferol 1000 units capsule Take 1 capsule by mouth Daily.      • dexlansoprazole (DEXILANT) 60 MG capsule Take 1 capsule by mouth Daily. (Patient not taking: Reported on 1/19/2023) 90 capsule 0 Not Taking   • ipratropium-albuterol (DUO-NEB) 0.5-2.5 mg/3 ml nebulizer Take 3 mL by nebulization Every 4 (Four) Hours As Needed for Wheezing. (Patient not taking: Reported on 1/19/2023) 120 vial 6 Not Taking       Scheduled Meds:  Continuous Infusions:No current facility-administered medications for this encounter.    PRN Meds:.    ALLERGIES:  Patient has no known allergies.        Objective     Vital Signs:        Physical Exam:      General Appearance:    Awake and alert, in no acute distress   Lungs:     Clear to  auscultation bilaterally, respirations regular, even and unlabored    Heart:    Regular rhythm and normal rate, normal S1 and S2, no            murmur, no gallop, no rub   Abdomen:     Normal bowel sounds, soft, non-tender, no rebound or guarding, non-distended, no hepatosplenomegaly        Results Review:   I reviewed the patient's labs and imaging.  Lab Results (last 24 hours)     ** No results found for the last 24 hours. **          Imaging Results (Last 24 Hours)     ** No results found for the last 24 hours. **             I discussed the patients findings and my recommendations with the patient.  Hero Webster MD  01/26/23  10:42 EST

## 2023-01-26 NOTE — ANESTHESIA PREPROCEDURE EVALUATION
Anesthesia Evaluation     Patient summary reviewed and Nursing notes reviewed   NPO Solid Status: > 6 hours  NPO Liquid Status: > 6 hours           Airway   Mallampati: II  TM distance: >3 FB  Neck ROM: full  No difficulty expected  Dental    (+) lower dentures, upper dentures and poor dentition    Pulmonary - normal exam    breath sounds clear to auscultation  (+) COPD,   Cardiovascular - normal exam    ECG reviewed  Rhythm: regular  Rate: normal    (+) hypertension, valvular problems/murmurs, CAD, dysrhythmias, CHF , hyperlipidemia,       Neuro/Psych- negative ROS  GI/Hepatic/Renal/Endo    (+)  GERD,  hepatitis, liver disease, renal disease, diabetes mellitus,     Musculoskeletal (-) negative ROS    Abdominal  - normal exam   Substance History - negative use     OB/GYN          Other                      Anesthesia Plan    ASA 3       total IV anesthesia  (Patient identified; pre-operative vital signs, all relevant labs/studies, complete medical/surgical/anesthetic history, full medication list, full allergy list, and NPO status obtained/reviewed; physical assessment performed; anesthetic options, side effects, potential complications, risks, and benefits discussed; questions answered; written anesthesia consent obtained; patient cleared for procedure; anesthesia machine and equipment checked and functioning)  intravenous induction     Anesthetic plan, risks, benefits, and alternatives have been provided, discussed and informed consent has been obtained with: patient.    Plan discussed with CRNA.        CODE STATUS:

## 2023-01-30 LAB
LAB AP CASE REPORT: NORMAL
PATH REPORT.FINAL DX SPEC: NORMAL
PATH REPORT.GROSS SPEC: NORMAL
QT INTERVAL: 475 MS

## 2023-02-17 ENCOUNTER — OFFICE VISIT (OUTPATIENT)
Dept: FAMILY MEDICINE CLINIC | Facility: CLINIC | Age: 68
End: 2023-02-17
Payer: MEDICARE

## 2023-02-17 VITALS
HEIGHT: 61 IN | HEART RATE: 65 BPM | TEMPERATURE: 97.5 F | WEIGHT: 140.2 LBS | OXYGEN SATURATION: 97 % | SYSTOLIC BLOOD PRESSURE: 108 MMHG | DIASTOLIC BLOOD PRESSURE: 60 MMHG | BODY MASS INDEX: 26.47 KG/M2

## 2023-02-17 DIAGNOSIS — L81.9 PIGMENTED SKIN LESION SUSPICIOUS FOR MALIGNANT NEOPLASM: Primary | ICD-10-CM

## 2023-02-17 PROCEDURE — 99213 OFFICE O/P EST LOW 20 MIN: CPT | Performed by: NURSE PRACTITIONER

## 2023-02-17 RX ORDER — AMLODIPINE BESYLATE 10 MG/1
10 TABLET ORAL DAILY
Qty: 90 TABLET | Refills: 3 | Status: SHIPPED | OUTPATIENT
Start: 2023-02-17

## 2023-02-17 NOTE — PROGRESS NOTES
"Chief Complaint  Skin Lesion    Subjective          Nancy Suárez presents to [facility]      History of Present Illness    Nancy is a 67 year old female patient who presents today to discuss skin lesion concern.  She is also asking to switch to my care today from her previous PCP.  Granddaughter is present and has been given permission to discuss care.  She is patient Monday through Friday due to recent stroke and needing assistance daily.  She goes to all medical appointments and assist with ADLs.    She has a past medical history of atherosclerosis in bilateral legs, congenital AV malformation, CHF, LBBB, CAD, MI, hypertension, PVD, mitral regurg, carotid stenosis, subclavian stenosis, type 2 diabetes, anemia, gout, ischemic stroke, COPD, cigarette smoker.    Patient has had an elevated nevus on mid back for years.  She reports lately has been changing.  Reports itching, getting larger, changes in skin around the area.Thoracic spine between shoulder blades.     She is currently with 30 days Holter monitor and she takes it off today. Will go back to Dr. Crouch in March 15, 2023.         Objective     Vital Signs:   /60 (BP Location: Left arm, Patient Position: Sitting, Cuff Size: Adult)   Pulse 65   Temp 97.5 °F (36.4 °C) (Oral)   Ht 154.9 cm (60.98\")   Wt 63.6 kg (140 lb 3.2 oz)   SpO2 97%   BMI 26.50 kg/m²           Physical Exam  Vitals reviewed.   Constitutional:       Appearance: She is well-developed.      Comments: Wearing a face mask     HENT:      Head: Normocephalic and atraumatic.      Nose: Nose normal.      Mouth/Throat:      Mouth: Mucous membranes are moist.      Pharynx: Oropharynx is clear.   Eyes:      Conjunctiva/sclera: Conjunctivae normal.      Pupils: Pupils are equal, round, and reactive to light.   Cardiovascular:      Rate and Rhythm: Normal rate and regular rhythm.      Pulses: Normal pulses.      Heart sounds: Normal heart sounds. "   Pulmonary:      Effort: Pulmonary effort is normal. No respiratory distress.      Breath sounds: Normal breath sounds.   Musculoskeletal:         General: Normal range of motion.      Cervical back: Normal range of motion.   Skin:     General: Skin is warm and dry.      Findings: Lesion present. No rash.             Comments: Single nevi mid back between shoulder blades.  Lesion is asymmetrical, borders are regular, color variations, diameter slightly larger than eraser, lesion elevated.  Surrounding nevi is a erythematous irregularly-shaped macular lesion with demarcation visible.   Neurological:      Mental Status: She is alert and oriented to person, place, and time.   Psychiatric:         Behavior: Behavior normal.                Result Review :                                   Assessment and Plan      Diagnoses and all orders for this visit:    1. Pigmented skin lesion suspicious for malignant neoplasm (Primary)  -     Ambulatory Referral to Dermatology    Other orders  -     amLODIPine (NORVASC) 10 MG tablet; Take 1 tablet by mouth Daily.  Dispense: 90 tablet; Refill: 3      Patient's skin lesion suspicious for malignancy.  [facility] for evaluation and biopsy.  Advised patient to take first appointment available            Follow Up       No follow-ups on file.      Patient was given instructions and counseling regarding her condition or for health maintenance advice. Please see specific information pulled into the AVS if appropriate.     Lyssa Pineda, APRN2/17/202310:51 EST  This note has been electronically signed      Answers for HPI/ROS submitted by the patient on 2/10/2023  What is the primary reason for your visit?: Rash

## 2023-02-20 DIAGNOSIS — R79.0 ABNORMAL BLOOD LEVEL OF IRON: ICD-10-CM

## 2023-02-20 DIAGNOSIS — R79.0 RAISED SERUM IRON: Primary | ICD-10-CM

## 2023-02-22 ENCOUNTER — TELEPHONE (OUTPATIENT)
Dept: FAMILY MEDICINE CLINIC | Facility: CLINIC | Age: 68
End: 2023-02-22

## 2023-02-22 NOTE — TELEPHONE ENCOUNTER
Caller: [facility]    Relationship:     Best call back number:[phone]    What was the call regarding:  NEED VERBAL ORDERS:  CONTINUE HOME HEALTH AID SERVICES.      Do you require a callback:YES

## 2023-02-23 RX ORDER — PRAVASTATIN SODIUM 40 MG
TABLET ORAL
Qty: 90 TABLET | Refills: 0 | Status: SHIPPED | OUTPATIENT
Start: 2023-02-23

## 2023-03-06 RX ORDER — POTASSIUM CHLORIDE 750 MG/1
10 TABLET, FILM COATED, EXTENDED RELEASE ORAL EVERY OTHER DAY
Qty: 60 TABLET | Refills: 1 | Status: SHIPPED | OUTPATIENT
Start: 2023-03-06

## 2023-03-07 ENCOUNTER — TELEPHONE (OUTPATIENT)
Dept: FAMILY MEDICINE CLINIC | Facility: CLINIC | Age: 68
End: 2023-03-07
Payer: MEDICARE

## 2023-03-07 NOTE — TELEPHONE ENCOUNTER
Caller: Nancy Suárez    Relationship: Self    Best call back number: [phone]    What medication are you requesting: ANTIBIOTIC     What are your current symptoms: SINUS CONGESTION, HEADACHE, AND PRODUCTIVE COUGH WITH YELLOW SPUTUM     How long have you been experiencing symptoms: SINCE Saturday 3/4/23    Have you had these symptoms before:    [x] Yes  [] No    Have you been treated for these symptoms before:   [x] Yes  [] No    If a prescription is needed, what is your preferred pharmacy and phone number: [facility] #[id number] - [place] - [address] [place] [address] [phone] [facility] [phone] FX     Additional notes: PLEASE CALL PATIENT TO DISCUSS AND ADVISE.

## 2023-03-08 ENCOUNTER — TELEPHONE (OUTPATIENT)
Dept: FAMILY MEDICINE CLINIC | Facility: CLINIC | Age: 68
End: 2023-03-08
Payer: MEDICARE

## 2023-03-08 NOTE — TELEPHONE ENCOUNTER
Caller: Nancy Suárez    Relationship: Self    Best call back number: [phone]     What medication are you requesting: ANTIBIOTICS     What are your current symptoms: SINUS CONGESTION, HEADACHE, AND PRODUCTIVE COUGH WITH YELLOW MUCUS     How long have you been experiencing symptoms: SINCE MARCH 4 TH 2023.    Have you had these symptoms before:  [x] Yes  [] No    Have you been treated for these symptoms before:  [x] Yes  [] No    If a prescription is needed, what is your preferred pharmacy and phone number:      [facility]ACY #[id number] - [place] - [address] [place] [address] - [phone] [facility] [phone] FX     Additional notes:    PATIENT CALLED TO TRY AND GET AN APPOINTMENT TODAY. NONE ARE AVAILBLE WITH ANY PROVIDER IN OFFICE, VIRTUAL OPTIONS WERE OPEN FOR TOMORROW- BUT PATIENT DECLINED.    SHE WAS TRYING TO AVOID THIS GOING INTO BRONCHITIS AND STATES NORMALLY IT JUST TAKES ABOUT 3-4 DAYS ON AN ANTIBIOTIC TO CLEAR IT UP    HUB ATTEMPTED TO WARM TRANSFER TO THE OFFICE AND WAS UNSUCCESSFUL    PLEASE GIVE PATIENT A CALLBACK

## 2023-03-08 NOTE — TELEPHONE ENCOUNTER
Called and spoke with granddaughter per [employer], advised dominique out today and would be best to make an appt, said that she will talk to her grandmother and give us a call back and possibly schedule with dominique tomorrow.

## 2023-03-08 NOTE — TELEPHONE ENCOUNTER
Hub staff attempted to follow warm transfer process and was unsuccessful     Caller: Nancy Suárez    Relationship to patient: Self    Best call back number: [phone] (Mobile)    Patient is needing: PATIENT CALLED TO CHECK THE STATUS OF THE PRESCRIPTION BEING CALLED INTO HER PHARMACY.     PLEASE CONTACT PATIENT TO ADVISE.      THANKS

## 2023-03-09 ENCOUNTER — OFFICE VISIT (OUTPATIENT)
Dept: FAMILY MEDICINE CLINIC | Facility: CLINIC | Age: 68
End: 2023-03-09
Payer: MEDICARE

## 2023-03-09 VITALS
HEART RATE: 60 BPM | HEIGHT: 61 IN | TEMPERATURE: 98 F | DIASTOLIC BLOOD PRESSURE: 72 MMHG | BODY MASS INDEX: 26.13 KG/M2 | WEIGHT: 138.4 LBS | OXYGEN SATURATION: 98 % | SYSTOLIC BLOOD PRESSURE: 134 MMHG

## 2023-03-09 DIAGNOSIS — E78.2 MIXED HYPERLIPIDEMIA: ICD-10-CM

## 2023-03-09 DIAGNOSIS — E11.9 TYPE 2 DIABETES MELLITUS WITHOUT COMPLICATION, WITH LONG-TERM CURRENT USE OF INSULIN: ICD-10-CM

## 2023-03-09 DIAGNOSIS — R06.2 WHEEZING: ICD-10-CM

## 2023-03-09 DIAGNOSIS — J06.9 UPPER RESPIRATORY TRACT INFECTION, UNSPECIFIED TYPE: Primary | ICD-10-CM

## 2023-03-09 DIAGNOSIS — R05.1 ACUTE COUGH: ICD-10-CM

## 2023-03-09 DIAGNOSIS — Z79.4 TYPE 2 DIABETES MELLITUS WITHOUT COMPLICATION, WITH LONG-TERM CURRENT USE OF INSULIN: ICD-10-CM

## 2023-03-09 PROCEDURE — 1159F MED LIST DOCD IN RCRD: CPT | Performed by: NURSE PRACTITIONER

## 2023-03-09 PROCEDURE — 99214 OFFICE O/P EST MOD 30 MIN: CPT | Performed by: NURSE PRACTITIONER

## 2023-03-09 PROCEDURE — 1160F RVW MEDS BY RX/DR IN RCRD: CPT | Performed by: NURSE PRACTITIONER

## 2023-03-09 PROCEDURE — 3075F SYST BP GE 130 - 139MM HG: CPT | Performed by: NURSE PRACTITIONER

## 2023-03-09 PROCEDURE — 3078F DIAST BP <80 MM HG: CPT | Performed by: NURSE PRACTITIONER

## 2023-03-09 RX ORDER — OMEPRAZOLE 40 MG/1
1 CAPSULE, DELAYED RELEASE ORAL DAILY
COMMUNITY
Start: 2022-11-09

## 2023-03-09 RX ORDER — BROMPHENIRAMINE MALEATE, PSEUDOEPHEDRINE HYDROCHLORIDE, AND DEXTROMETHORPHAN HYDROBROMIDE 2; 30; 10 MG/5ML; MG/5ML; MG/5ML
5 SYRUP ORAL 4 TIMES DAILY PRN
Qty: 75 ML | Refills: 0 | Status: SHIPPED | OUTPATIENT
Start: 2023-03-09 | End: 2023-03-30

## 2023-03-09 RX ORDER — HYDRALAZINE HYDROCHLORIDE 25 MG/1
25 TABLET, FILM COATED ORAL 2 TIMES DAILY
Qty: 60 TABLET | Refills: 5 | COMMUNITY
Start: 2023-02-24 | End: 2023-03-09

## 2023-03-09 RX ORDER — AMOXICILLIN AND CLAVULANATE POTASSIUM 875; 125 MG/1; MG/1
1 TABLET, FILM COATED ORAL 2 TIMES DAILY
Qty: 10 TABLET | Refills: 0 | Status: SHIPPED | OUTPATIENT
Start: 2023-03-09 | End: 2023-03-30

## 2023-03-09 RX ORDER — FUROSEMIDE 20 MG/1
20 TABLET ORAL EVERY OTHER DAY
Qty: 45 TABLET | Refills: 1 | Status: SHIPPED | OUTPATIENT
Start: 2023-03-09 | End: 2023-06-08

## 2023-03-09 RX ORDER — CALCITRIOL 0.25 UG/1
0.25 CAPSULE, LIQUID FILLED ORAL DAILY
COMMUNITY
Start: 2023-02-22

## 2023-03-09 RX ORDER — ALBUTEROL SULFATE 90 UG/1
2 AEROSOL, METERED RESPIRATORY (INHALATION) EVERY 4 HOURS PRN
Qty: 18 G | Refills: 4 | Status: SHIPPED | OUTPATIENT
Start: 2023-03-09

## 2023-03-09 RX ORDER — IPRATROPIUM BROMIDE AND ALBUTEROL SULFATE 2.5; .5 MG/3ML; MG/3ML
3 SOLUTION RESPIRATORY (INHALATION) EVERY 4 HOURS PRN
Qty: 120 ML | Refills: 3 | Status: SHIPPED | OUTPATIENT
Start: 2023-03-09

## 2023-03-09 RX ORDER — METHYLPREDNISOLONE 4 MG/1
TABLET ORAL
Qty: 21 EACH | Refills: 0 | Status: SHIPPED | OUTPATIENT
Start: 2023-03-09 | End: 2023-03-14

## 2023-03-09 NOTE — PROGRESS NOTES
"Chief Complaint  Sinusitis (Started this past Sat 3/4/23), Cough, URI, Wheezing, Nasal Congestion, and Med Refill    Subjective          Nancy Suárez presents to [facility]      History of Present Illness      Nancy is a 67-year-old female patient who presents today with URI symptoms.    Symptoms began 3/4/2023. Thought was alergies and took antihistamine OTC. Cough began on Monday. She is with pressure in sinuses. She took sudafed and it helped with sinus issues but cough is productive and wheezing. She is with tender chest with coughing. Cough is worse at night and firs tthing in the morning. No fevers, chills, N/V/D. She has had two negative home Covid tests.       Objective     Vital Signs:   /72 (BP Location: Left arm, Patient Position: Sitting, Cuff Size: Adult)   Pulse 60   Temp 98 °F (36.7 °C) (Oral)   Ht 154.9 cm (60.98\")   Wt 62.8 kg (138 lb 6.4 oz)   SpO2 98%   BMI 26.16 kg/m²           Physical Exam  Constitutional:       Appearance: She is well-developed.      Comments: Wearing a face mask     HENT:      Head: Normocephalic and atraumatic.      Nose: Congestion present. No rhinorrhea.      Mouth/Throat:      Mouth: Mucous membranes are moist.      Pharynx: Oropharynx is clear. No posterior oropharyngeal erythema.   Eyes:      Conjunctiva/sclera: Conjunctivae normal.      Pupils: Pupils are equal, round, and reactive to light.   Cardiovascular:      Rate and Rhythm: Normal rate.   Pulmonary:      Effort: Pulmonary effort is normal.      Breath sounds: Examination of the right-lower field reveals wheezing and rhonchi. Wheezing and rhonchi present.   Musculoskeletal:         General: Normal range of motion.      Cervical back: Normal range of motion.   Skin:     General: Skin is warm and dry.      Findings: No rash.   Neurological:      Mental Status: She is alert and oriented to person, place, and time.   Psychiatric:         Behavior: Behavior normal. "                Result Review :                                   Assessment and Plan      Diagnoses and all orders for this visit:    1. Upper respiratory tract infection, unspecified type (Primary)  -     methylPREDNISolone (MEDROL) 4 MG dose pack; Take as directed on package instructions.  Dispense: 21 each; Refill: 0  -     amoxicillin-clavulanate (Augmentin) 875-125 MG per tablet; Take 1 tablet by mouth 2 (Two) Times a Day.  Dispense: 10 tablet; Refill: 0  -     brompheniramine-pseudoephedrine-DM 30-2-10 MG/5ML syrup; Take 5 mL by mouth 4 (Four) Times a Day As Needed for Congestion, Cough or Allergies.  Dispense: 75 mL; Refill: 0    2. Wheezing  -     methylPREDNISolone (MEDROL) 4 MG dose pack; Take as directed on package instructions.  Dispense: 21 each; Refill: 0    3. Acute cough  -     brompheniramine-pseudoephedrine-DM 30-2-10 MG/5ML syrup; Take 5 mL by mouth 4 (Four) Times a Day As Needed for Congestion, Cough or Allergies.  Dispense: 75 mL; Refill: 0    4. Mixed hyperlipidemia    5. Type 2 diabetes mellitus without complication, with long-term current use of insulin ([facility])  -     CBC w AUTO Differential; Future  -     Comprehensive metabolic panel; Future  -     Lipid Panel With LDL / HDL Ratio; Future  -     Hemoglobin A1c; Future    Other orders  -     albuterol sulfate HFA (ProAir HFA) 108 (90 Base) MCG/ACT inhaler; Inhale 2 puffs Every 4 (Four) Hours As Needed for Wheezing or Shortness of Air. Proair - DX CODE J44.9  Dispense: 18 g; Refill: 4  -     ipratropium-albuterol (DUO-NEB) 0.5-2.5 mg/3 ml nebulizer; Take 3 mL by nebulization Every 4 (Four) Hours As Needed for Wheezing.  Dispense: 120 mL; Refill: 3        Treating patient for URI.  She does have a history of bronchitis and pneumonia.  We will treat with Augmentin for 5 days twice daily, Medrol pack, Bromfed-DM.  Patient also needing refills of her respiratory inhaler and nebulizer treatments.  She is due for labs but cannot get them done at our  lab so we have given her orders to have at the lab of her choice which is in her insurance network.        Follow Up       No follow-ups on file.      Patient was given instructions and counseling regarding her condition or for health maintenance advice. Please see specific information pulled into the AVS if appropriate.     Lyssa Pineda, APRN3/9/202312:12 EST  This note has been electronically signed

## 2023-03-14 ENCOUNTER — TELEPHONE (OUTPATIENT)
Dept: FAMILY MEDICINE CLINIC | Facility: CLINIC | Age: 68
End: 2023-03-14
Payer: MEDICARE

## 2023-03-14 NOTE — TELEPHONE ENCOUNTER
Caller: Nancy Suárez    Relationship: Self    Best call back number: [phone]    What medication are you requesting: ANTIBIOTIC     What are your current symptoms: WHEEZING, COUGH, CONGESTION     Have you had these symptoms before:    [x] Yes  [] No    Have you been treated for these symptoms before:   [x] Yes  [] No    If a prescription is needed, what is your preferred pharmacy and phone number: [facility]ACY #[id number] - [place], [place] - [address] [place] [address] [phone] [facility] [phone]      Additional notes:PATIENT STATES SHE IS FEELING A LITTLE BETTER BUT SHE WOULD LIKE TO KNOW IF ANOTHER MEDICATION COULD BE CALLED IN    PLEASE CALL AND ADVISE

## 2023-03-15 ENCOUNTER — OFFICE VISIT (OUTPATIENT)
Dept: CARDIOLOGY | Facility: CLINIC | Age: 68
End: 2023-03-15
Payer: MEDICARE

## 2023-03-15 VITALS
HEART RATE: 60 BPM | BODY MASS INDEX: 25.75 KG/M2 | SYSTOLIC BLOOD PRESSURE: 147 MMHG | OXYGEN SATURATION: 98 % | HEIGHT: 61 IN | DIASTOLIC BLOOD PRESSURE: 74 MMHG | WEIGHT: 136.4 LBS

## 2023-03-15 DIAGNOSIS — I25.2 HISTORY OF MYOCARDIAL INFARCTION: ICD-10-CM

## 2023-03-15 DIAGNOSIS — I44.7 LEFT BUNDLE BRANCH BLOCK: ICD-10-CM

## 2023-03-15 DIAGNOSIS — E78.2 MIXED HYPERLIPIDEMIA: ICD-10-CM

## 2023-03-15 DIAGNOSIS — I25.10 CORONARY ARTERY DISEASE INVOLVING NATIVE CORONARY ARTERY OF NATIVE HEART WITHOUT ANGINA PECTORIS: Primary | ICD-10-CM

## 2023-03-15 DIAGNOSIS — I10 PRIMARY HYPERTENSION: ICD-10-CM

## 2023-03-15 PROCEDURE — 3077F SYST BP >= 140 MM HG: CPT | Performed by: INTERNAL MEDICINE

## 2023-03-15 PROCEDURE — 99214 OFFICE O/P EST MOD 30 MIN: CPT | Performed by: INTERNAL MEDICINE

## 2023-03-15 PROCEDURE — 1160F RVW MEDS BY RX/DR IN RCRD: CPT | Performed by: INTERNAL MEDICINE

## 2023-03-15 PROCEDURE — 3078F DIAST BP <80 MM HG: CPT | Performed by: INTERNAL MEDICINE

## 2023-03-15 PROCEDURE — 1159F MED LIST DOCD IN RCRD: CPT | Performed by: INTERNAL MEDICINE

## 2023-03-15 NOTE — PROGRESS NOTES
Progress note      Name: Nancy Suárez ADMIT: (Not on file)   : 1955  PCP: Lyssa Pineda APRN    MRN: [id number] LOS: 0 days   AGE/SEX: 67 y.o. female  ROOM: Room/bed info not found     Chief Complaint   Patient presents with   • Follow-up     MCOT results   • Coronary Artery Disease       Subjective       History of present illness  Nancy Suárez is a 67-year-old female patient who has history of coronary artery disease with chronically occluded RCA with left-to-right collaterals on heart cath in 2018, has carotid disease followed by vascular surgery, hypertension, active smoking, TIA, here today for follow-up.  Patient had an episode of TIA about 1 month ago which affected her left side luckily she recovered completely.  Patient is here today for follow-up with an event monitor to rule out atrial fibrillation.  She is doing well, denies having any chest pain or shortness of breath or other symptoms.    Past Medical History:   Diagnosis Date   • Artery stenosis (HCC)     Bilateral subclavian s/p stent    • Arthritis    • AVM (arteriovenous malformation)    • CAD (coronary artery disease)    • CKD (chronic kidney disease)     Stage three    • COPD (chronic obstructive pulmonary disease) (HCC)    • DM2 (diabetes mellitus, type 2) (HCC)    • Eye pressure     Elevated eye pressure   • GERD (gastroesophageal reflux disease)    • Gout    • Hepatitis     Hepatitis c    • HTN (hypertension)    • Hyperlipidemia    • Hyperparathyroidism (HCC)    • KIRIT (iron deficiency anemia)    • Iron deficiency anemia    • Myocardial infarction (HCC)    • Osteoporosis    • PVD (peripheral vascular disease) (HCC)    • Stroke (HCC)     TIA     Past Surgical History:   Procedure Laterality Date   • CATARACT EXTRACTION Left    • CHOLECYSTECTOMY     • COLONOSCOPY N/A 2023    Procedure: COLONOSCOPY with biopsy x 2 areas and polypectomy x 4 and cautery of polyps x 2;  Surgeon: Hero Webster MD;  Location: [facility]  ENDOSCOPY;  Service: Gastroenterology;  Laterality: N/A;  post op: polyps, diverticulosis, hemorrhoids   • ENDOSCOPY N/A 01/26/2023    Procedure: ESOPHAGOGASTRODUODENOSCOPY;  Surgeon: Hero Webster MD;  Location: [facility] ENDOSCOPY;  Service: Gastroenterology;  Laterality: N/A;  post op: erosive gasritis, small hiatal hernia   • EYE SURGERY     • TOTAL ABDOMINAL HYSTERECTOMY WITH SALPINGO OOPHORECTOMY       Family History   Problem Relation Age of Onset   • Diabetes Mother    • Anemia Mother    • Heart attack Mother    • Heart disease Mother    • Heart failure Mother    • Hypertension Mother    • Coronary artery disease Sister    • Diabetes Sister    • Breast cancer Paternal Aunt 50     Social History     Tobacco Use   • Smoking status: Some Days     Packs/day: 0.50     Years: 50.00     Pack years: 25.00     Types: Cigarettes     Start date: 1/1/1975   • Smokeless tobacco: Never   Vaping Use   • Vaping Use: Never used   Substance Use Topics   • Alcohol use: No   • Drug use: No     (Not in a hospital admission)    Allergies:  Patient has no known allergies.      Physical Exam  VITALS REVIEWED    General:      well developed, in no acute distress.    Head:      normocephalic and atraumatic.    Eyes:      PERRL/EOM intact, conjunctiva and sclera clear with out nystagmus.    Neck:      no masses, thyromegaly,  trachea central with normal respiratory effort and PMI displaced laterally  Lungs:      Clear to auscultation bilaterally  Heart:       Regular rate and rhythm  Msk:      no deformity or scoliosis noted of thoracic or lumbar spine.    Pulses:      pulses normal in all 4 extremities.    Extremities:       No lower extremity edema  Neurologic:      no focal deficits.   alert oriented x3  Skin:      intact without lesions or rashes.    Psych:      alert and cooperative; normal mood and affect; normal attention span and concentration.      Result Review :               Pertinent cardiac workup    1. EKG 10/28/2021  sinus rhythm left bundle branch block  2. Echocardiogram 6/5/2020 oh low normal at 50%  3. Holter monitor for 17 days starting on 1/17/2023 no atrial fibrillation.      Procedures        Assessment and Plan      Nancy Suárez is a 67-year-old female patient who has history of coronary artery disease with occluded RCA, hypertension, peripheral vascular disease, smoking, TIA, here today for follow-up.  Patient denies any anginal symptoms or CHF symptoms at this time.  Her monitor did not show any atrial fibrillation, she is on 2 antihypertensives and Lasix as well, her blood pressure is usually within range.  Sometimes elevated in the morning.  No need for ischemic work-up at this time, she has known occluded RCA.  She is on good therapy for her CAD.  We will see her in follow-up in 8 months.    Diagnoses and all orders for this visit:    1. Coronary artery disease involving native coronary artery of native heart without angina pectoris (Primary)    2. Left bundle branch block    3. History of myocardial infarction    4. Primary hypertension    5. Mixed hyperlipidemia           No follow-ups on file.  Patient was given instructions and counseling regarding her condition or for health maintenance advice. Please see specific information pulled into the AVS if appropriate.

## 2023-03-23 RX ORDER — FERROUS SULFATE 325(65) MG
TABLET ORAL
Qty: 90 TABLET | Refills: 1 | Status: SHIPPED | OUTPATIENT
Start: 2023-03-23

## 2023-03-30 ENCOUNTER — OFFICE VISIT (OUTPATIENT)
Dept: FAMILY MEDICINE CLINIC | Facility: CLINIC | Age: 68
End: 2023-03-30
Payer: MEDICARE

## 2023-03-30 VITALS
RESPIRATION RATE: 18 BRPM | BODY MASS INDEX: 25.98 KG/M2 | HEART RATE: 67 BPM | SYSTOLIC BLOOD PRESSURE: 132 MMHG | WEIGHT: 137.6 LBS | OXYGEN SATURATION: 97 % | TEMPERATURE: 96.9 F | DIASTOLIC BLOOD PRESSURE: 70 MMHG | HEIGHT: 61 IN

## 2023-03-30 DIAGNOSIS — M54.50 ACUTE BILATERAL LOW BACK PAIN, UNSPECIFIED WHETHER SCIATICA PRESENT: ICD-10-CM

## 2023-03-30 DIAGNOSIS — N18.31 CHRONIC RENAL IMPAIRMENT, STAGE 3A: ICD-10-CM

## 2023-03-30 DIAGNOSIS — R79.0 ABNORMAL BLOOD LEVEL OF IRON: ICD-10-CM

## 2023-03-30 DIAGNOSIS — D50.9 IRON DEFICIENCY ANEMIA, UNSPECIFIED IRON DEFICIENCY ANEMIA TYPE: Primary | ICD-10-CM

## 2023-03-30 LAB
BILIRUB BLD-MCNC: NEGATIVE MG/DL
CLARITY, POC: CLEAR
COLOR UR: YELLOW
EXPIRATION DATE: NORMAL
GLUCOSE UR STRIP-MCNC: NEGATIVE MG/DL
KETONES UR QL: NEGATIVE
LEUKOCYTE EST, POC: NEGATIVE
Lab: NORMAL
NITRITE UR-MCNC: NEGATIVE MG/ML
PH UR: 5.5 [PH] (ref 5–8)
PROT UR STRIP-MCNC: NEGATIVE MG/DL
RBC # UR STRIP: NEGATIVE /UL
SP GR UR: 1.01 (ref 1–1.03)
UROBILINOGEN UR QL: NORMAL

## 2023-03-30 PROCEDURE — 3075F SYST BP GE 130 - 139MM HG: CPT | Performed by: NURSE PRACTITIONER

## 2023-03-30 PROCEDURE — 81003 URINALYSIS AUTO W/O SCOPE: CPT | Performed by: NURSE PRACTITIONER

## 2023-03-30 PROCEDURE — 1160F RVW MEDS BY RX/DR IN RCRD: CPT | Performed by: NURSE PRACTITIONER

## 2023-03-30 PROCEDURE — 99214 OFFICE O/P EST MOD 30 MIN: CPT | Performed by: NURSE PRACTITIONER

## 2023-03-30 PROCEDURE — 3078F DIAST BP <80 MM HG: CPT | Performed by: NURSE PRACTITIONER

## 2023-03-30 PROCEDURE — 1159F MED LIST DOCD IN RCRD: CPT | Performed by: NURSE PRACTITIONER

## 2023-03-30 NOTE — ASSESSMENT & PLAN NOTE
We do not have a recent iron panel.  Last noted panel was September 2020 when she was with an elevated iron level, likely from supplementation.  Going to repeat CBC as her hematocrit was low and repeat anemia profile.

## 2023-03-30 NOTE — PROGRESS NOTES
"Chief Complaint  Back Pain    Subjective          Nancy Suárez presents to [facility]      History of Present Illness    Nancy is a 67-year-old female patient who presents today to follow-up on iron levels and discuss low back pain.    Patient has a history of KIRIT with iron supplementation therapy. 3/23/2023 patient labs were drawn at [facility].  Cholesterol 92, HDL 44, triglycerides 153, LDL cholesterol 121, A1c 5.8.  CBC indicated RBC of 3.5 and hemoglobin of 11.  She denies any joint pain, abdominal pain, fatigue, weakness, memory fog.      She is with lower back/flank pain. UA is negative. She does have stage III kidney dysfunction and follows Dr. Rodgers on June 9, 2023.     She did get lesion removed from midback per dermatology and biopsy will be back in two weeks. Reports dermatology said no cause for concern.       Objective     Vital Signs:   /70 (BP Location: Left arm, Patient Position: Sitting, Cuff Size: Adult)   Pulse 67   Temp 96.9 °F (36.1 °C) (Infrared)   Resp 18   Ht 154.9 cm (60.98\")   Wt 62.4 kg (137 lb 9.6 oz)   SpO2 97%   BMI 26.02 kg/m²           Physical Exam  Vitals reviewed.   Constitutional:       Appearance: She is well-developed.      Comments: Wearing a face mask     HENT:      Head: Normocephalic and atraumatic.   Eyes:      Conjunctiva/sclera: Conjunctivae normal.   Cardiovascular:      Rate and Rhythm: Normal rate and regular rhythm.      Pulses: Normal pulses.      Heart sounds: Normal heart sounds.   Pulmonary:      Effort: Pulmonary effort is normal.      Breath sounds: Normal breath sounds.   Abdominal:      General: Abdomen is flat. Bowel sounds are normal.      Palpations: Abdomen is soft.      Tenderness: There is no right CVA tenderness or left CVA tenderness.   Musculoskeletal:         General: Normal range of motion.      Cervical back: Normal range of motion.   Skin:     General: Skin is warm and dry.      " Findings: No rash.   Neurological:      Mental Status: She is alert and oriented to person, place, and time.   Psychiatric:         Behavior: Behavior normal.                Result Review :                                   Assessment and Plan      Diagnoses and all orders for this visit:    1. Iron deficiency anemia, unspecified iron deficiency anemia type (Primary)  Assessment & Plan:  We do not have a recent iron panel.  Last noted panel was September 2020 when she was with an elevated iron level, likely from supplementation.  Going to repeat CBC as her hematocrit was low and repeat anemia profile.       Orders:  -     CBC (No Diff)    2. Abnormal blood level of iron  -     CBC (No Diff)    3. Chronic renal impairment, stage 3a (HCC)  -     Comprehensive metabolic panel    4. Acute bilateral low back pain, unspecified whether sciatica present  -     POCT urinalysis dipstick, automated  -     Comprehensive metabolic panel    Will call patient when labs return.  Low back pain likely related to lumbar arthritis, exam overall negative today.                  Follow Up       No follow-ups on file.      Patient was given instructions and counseling regarding her condition or for health maintenance advice. Please see specific information pulled into the AVS if appropriate.     Lyssa Pineda, APRN3/30/202310:44 EDT  This note has been electronically signed

## 2023-03-31 ENCOUNTER — TRANSCRIBE ORDERS (OUTPATIENT)
Dept: ADMINISTRATIVE | Facility: HOSPITAL | Age: 68
End: 2023-03-31
Payer: MEDICARE

## 2023-03-31 DIAGNOSIS — D63.1 ANEMIA OF RENAL DISEASE: Primary | ICD-10-CM

## 2023-03-31 DIAGNOSIS — N18.30 STAGE 3 CHRONIC KIDNEY DISEASE, UNSPECIFIED WHETHER STAGE 3A OR 3B CKD: ICD-10-CM

## 2023-03-31 DIAGNOSIS — N18.9 ANEMIA OF RENAL DISEASE: Primary | ICD-10-CM

## 2023-03-31 LAB
ALBUMIN SERPL-MCNC: 3.7 G/DL (ref 3.8–4.8)
ALBUMIN/GLOB SERPL: 1.5 {RATIO} (ref 1.2–2.2)
ALP SERPL-CCNC: 75 IU/L (ref 44–121)
ALT SERPL-CCNC: 8 IU/L (ref 0–32)
AST SERPL-CCNC: 12 IU/L (ref 0–40)
BILIRUB SERPL-MCNC: <0.2 MG/DL (ref 0–1.2)
BUN SERPL-MCNC: 18 MG/DL (ref 8–27)
BUN/CREAT SERPL: 13 (ref 12–28)
CALCIUM SERPL-MCNC: 8.7 MG/DL (ref 8.7–10.3)
CHLORIDE SERPL-SCNC: 102 MMOL/L (ref 96–106)
CO2 SERPL-SCNC: 20 MMOL/L (ref 20–29)
CREAT SERPL-MCNC: 1.38 MG/DL (ref 0.57–1)
EGFRCR SERPLBLD CKD-EPI 2021: 41 ML/MIN/1.73
GLOBULIN SER CALC-MCNC: 2.5 G/DL (ref 1.5–4.5)
GLUCOSE SERPL-MCNC: 156 MG/DL (ref 70–99)
POTASSIUM SERPL-SCNC: 4.4 MMOL/L (ref 3.5–5.2)
PROT SERPL-MCNC: 6.2 G/DL (ref 6–8.5)
SODIUM SERPL-SCNC: 139 MMOL/L (ref 134–144)

## 2023-03-31 NOTE — PROGRESS NOTES
Please notify Nancy that her labs have returned.  Her creatinine level has improved from 1.5-1.38.  Still awaiting anemia panel

## 2023-04-04 PROBLEM — N18.30 ANEMIA OF CHRONIC RENAL FAILURE, STAGE 3 (MODERATE): Status: ACTIVE | Noted: 2023-04-04

## 2023-04-04 PROBLEM — N18.30 CHRONIC RENAL DISEASE, STAGE III: Status: ACTIVE | Noted: 2023-04-04

## 2023-04-04 PROBLEM — D63.1 ANEMIA OF CHRONIC RENAL FAILURE, STAGE 3 (MODERATE): Status: ACTIVE | Noted: 2023-04-04

## 2023-04-04 RX ORDER — SODIUM CHLORIDE 9 MG/ML
250 INJECTION, SOLUTION INTRAVENOUS ONCE
OUTPATIENT
Start: 2023-04-10

## 2023-04-10 ENCOUNTER — HOSPITAL ENCOUNTER (OUTPATIENT)
Dept: ONCOLOGY | Facility: HOSPITAL | Age: 68
Discharge: HOME | End: 2023-04-10
Payer: MEDICARE

## 2023-04-12 ENCOUNTER — TELEPHONE (OUTPATIENT)
Dept: ONCOLOGY | Facility: HOSPITAL | Age: 68
End: 2023-04-12
Payer: MEDICARE

## 2023-04-12 RX ORDER — CARVEDILOL 12.5 MG/1
12.5 TABLET ORAL 2 TIMES DAILY
Qty: 180 TABLET | Refills: 1 | Status: SHIPPED | OUTPATIENT
Start: 2023-04-12

## 2023-04-12 NOTE — TELEPHONE ENCOUNTER
Spoke with patient regarding the need for repeat iron studies as the last results are too old to obtain auth for the IV iron.  She was aware of this and has an appt at [facility] in [place] for the lab work.

## 2023-04-12 NOTE — TELEPHONE ENCOUNTER
Caller: Nancy Suárez    Relationship: Self    Best call back number: [phone]    Requested Prescriptions:   Requested Prescriptions     Pending Prescriptions Disp Refills   • carvedilol (COREG) 12.5 MG tablet 180 tablet 1     Sig: Take 1 tablet by mouth 2 (Two) Times a Day.        Pharmacy where request should be sent: [facility] #[id number] - [place], [place] [address] [phone] [facility] [phone] FX     Last office visit with prescribing clinician: 3/30/2023   Last telemedicine visit with prescribing clinician: Visit date not found   Next office visit with prescribing clinician: Visit date not found     Does the patient have less than a 3 day supply:  [] Yes  [x] No    Flavio Mason Rep   04/12/23 09:28 EDT

## 2023-04-17 ENCOUNTER — HOSPITAL ENCOUNTER (OUTPATIENT)
Dept: ONCOLOGY | Facility: HOSPITAL | Age: 68
Discharge: HOME | End: 2023-04-17
Admitting: INTERNAL MEDICINE
Payer: MEDICARE

## 2023-04-17 VITALS
RESPIRATION RATE: 16 BRPM | HEIGHT: 61 IN | DIASTOLIC BLOOD PRESSURE: 49 MMHG | SYSTOLIC BLOOD PRESSURE: 122 MMHG | HEART RATE: 71 BPM | WEIGHT: 138 LBS | OXYGEN SATURATION: 100 % | TEMPERATURE: 97.8 F | BODY MASS INDEX: 26.06 KG/M2

## 2023-04-17 DIAGNOSIS — D63.1 ANEMIA OF CHRONIC RENAL FAILURE, STAGE 3 (MODERATE), UNSPECIFIED WHETHER STAGE 3A OR 3B CKD: Primary | ICD-10-CM

## 2023-04-17 DIAGNOSIS — N18.30 ANEMIA OF CHRONIC RENAL FAILURE, STAGE 3 (MODERATE), UNSPECIFIED WHETHER STAGE 3A OR 3B CKD: Primary | ICD-10-CM

## 2023-04-17 PROCEDURE — G0463 HOSPITAL OUTPT CLINIC VISIT: HCPCS

## 2023-04-17 RX ORDER — SODIUM CHLORIDE 9 MG/ML
250 INJECTION, SOLUTION INTRAVENOUS ONCE
OUTPATIENT
Start: 2023-04-24

## 2023-04-17 NOTE — PROGRESS NOTES
Patient is here for Venofer 300mg day 1. Patient was authorized by [facility] for the treatment today but after looking at her labs: 4/12/23 iron 230, %saturation 77 and ferritin 34. Last HGB was 10.9 on her nephrology note. More with insurance called and asked if it was ok to give with the labs above and she stated that insurance would not pay but she would look in to how it got approved and to not let the patient go yet. Patient was notified of her labs and that insurance was looking into it and patient stated her HGB had dropped from 11 to 10 in 1 month and that's why her nephrologist ordered venofer, but she also starting taking a double dose of her iron pills since she was getting low. She has received iron in the past for bleeding issues. More was notified of what patient stated-she said that insurance would not pay with her labs being what they are so it was ok to discharge her. Advised patient to call her nephrologist about not being approved for the iron, verbalized understanding and patient was discharged from clinic.

## 2023-04-20 ENCOUNTER — TELEPHONE (OUTPATIENT)
Dept: ONCOLOGY | Facility: HOSPITAL | Age: 68
End: 2023-04-20
Payer: MEDICARE

## 2023-04-20 ENCOUNTER — TELEPHONE (OUTPATIENT)
Dept: FAMILY MEDICINE CLINIC | Facility: CLINIC | Age: 68
End: 2023-04-20
Payer: MEDICARE

## 2023-04-20 NOTE — TELEPHONE ENCOUNTER
Left message with [facility] to verify Venofer is still needed with elevated iron studies, as there is some question regarding approval or IV iron due to lab values. Was told that someone from there office will call back.

## 2023-04-20 NOTE — TELEPHONE ENCOUNTER
Caller: [facility]    Relationship: KRISTIN    Best call back number: [phone]      What was the call regarding: NEED VERBAL ORDERS ON [facility]    Do you require a callback: YES

## 2023-04-20 NOTE — TELEPHONE ENCOUNTER
Per Jessie with [facility].  Orders given to cancel ALL iron orders at this time, per Soco Martinez NP on behalf of Dr. Rodgers. Patient notified and v/u.

## 2023-04-21 ENCOUNTER — OUTSIDE FACILITY SERVICE (OUTPATIENT)
Dept: FAMILY MEDICINE CLINIC | Facility: CLINIC | Age: 68
End: 2023-04-21
Payer: MEDICARE

## 2023-04-21 PROCEDURE — G0179 MD RECERTIFICATION HHA PT: HCPCS | Performed by: NURSE PRACTITIONER

## 2023-04-24 ENCOUNTER — HOSPITAL ENCOUNTER (OUTPATIENT)
Dept: ONCOLOGY | Facility: HOSPITAL | Age: 68
End: 2023-04-24
Payer: MEDICARE

## 2023-06-12 ENCOUNTER — TELEPHONE (OUTPATIENT)
Dept: FAMILY MEDICINE CLINIC | Facility: CLINIC | Age: 68
End: 2023-06-12
Payer: MEDICARE

## 2023-06-12 ENCOUNTER — TELEPHONE (OUTPATIENT)
Dept: ONCOLOGY | Facility: CLINIC | Age: 68
End: 2023-06-12

## 2023-06-12 DIAGNOSIS — R79.0 ABNORMAL BLOOD LEVEL OF IRON: Primary | ICD-10-CM

## 2023-06-12 DIAGNOSIS — D50.9 IRON DEFICIENCY ANEMIA, UNSPECIFIED IRON DEFICIENCY ANEMIA TYPE: ICD-10-CM

## 2023-06-12 NOTE — TELEPHONE ENCOUNTER
Overload is likely due to anemia.  Can we clarify the patient is not having any bloody stools or abdominal pain.  If she is negative for both of those let me know and I will get her referred to Dr. Campos for evaluation.  She may need some iron replacement

## 2023-06-12 NOTE — TELEPHONE ENCOUNTER
CONTACT AND SPOKE WITH PT'S GRANDDAUGHTER, SHE IS AWARE AND STATES ALREADY SPOKE WITH DR GARRETT'S OFFICE AND THEY ARE GETTING HER AN APPT ALREADY AND WAITING TO HER BACK FROM THEM AND IF THEY CAN'T GET HER IN IN NEXT COUPLE DAYS THEY WILL BE GOING TO THE ER.

## 2023-06-12 NOTE — TELEPHONE ENCOUNTER
previous dr mohan patient. is being referred here by primary care physician. she's advising that her level is a 7, and needs to be scheduled for iron / blood transfusions

## 2023-06-12 NOTE — TELEPHONE ENCOUNTER
Patient called to say that she was getting labs drawn at [facility] last week for the nephrologist. She says that her hemoglobin was 7.9 and nephrologist told her to contact her PCP to advise her what to do.    Labs are scanned into chart. Please advise.

## 2023-06-12 NOTE — TELEPHONE ENCOUNTER
SPOKE WITH PT AND SHE STATES SHE IS AND ALWAYS HAS TO HAVE IRON AND BLOOD STATES USE TO SEE HEMATOLOGY HERE AT [facility] AND THE DR IS NOW GONE AND FEELS LIKE SHE NEED A TRANSFUSION NOW CAUSE SHE IS SUPER WEAK

## 2023-06-12 NOTE — TELEPHONE ENCOUNTER
Please advise Nancy that Dr. Campos has been notified that she used to be Dr. Oseguera patient and they should contact her 1 day this week.  I cannot expedite the need for transfusion and I cannot order one.  If she is feeling very unwell she can go to the emergency room.

## 2023-06-13 ENCOUNTER — CONSULT (OUTPATIENT)
Dept: ONCOLOGY | Facility: CLINIC | Age: 68
End: 2023-06-13
Payer: MEDICARE

## 2023-06-13 VITALS
WEIGHT: 138 LBS | HEIGHT: 61 IN | OXYGEN SATURATION: 99 % | TEMPERATURE: 97.3 F | HEART RATE: 73 BPM | BODY MASS INDEX: 26.06 KG/M2

## 2023-06-13 DIAGNOSIS — N18.30 ANEMIA OF CHRONIC RENAL FAILURE, STAGE 3 (MODERATE), UNSPECIFIED WHETHER STAGE 3A OR 3B CKD: Primary | ICD-10-CM

## 2023-06-13 DIAGNOSIS — D63.1 ANEMIA OF CHRONIC RENAL FAILURE, STAGE 3 (MODERATE), UNSPECIFIED WHETHER STAGE 3A OR 3B CKD: Primary | ICD-10-CM

## 2023-06-13 NOTE — PROGRESS NOTES
"                         HEMATOLOGY ONCOLOGY OUTPATIENT CONSULTATION       Patient name: Nancy Suárez  : 1955  MRN: [id number]  Primary Care Physician: Lyssa Pineda APRN  Referring Physician: Lyssa Pineda AP*  Reason For Consult: Anemia.     History of Present Illness:  Patient is a 67 y.o.     2023: Ms. Suárez came seeking attention about anemia, that she reported had been a recurring issue for her. She described being tired, \"I'm drained\". Unsteady on her feet and unable to do much activity. No other symptoms but she was concerned that her blood pressure had been in the low 100's over 60's. 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 She had also a history of chronic renal disease and in the chemistry closest to this visit she had an estimated glomerular filtration rate of about 33 ml/min. In 2023 her hemoglobin was 11 g/dl with mean corpuscular volume of 99 fL. However a blood count done closer to the time of this visit had reported a hemoglobin of 7.9 g/dl with mean corpuscular volume of 105 fL. The white cell count and the platelet count were unremarkable. On exam she was found to appear much older than the stated age. Neither pale nor jaundiced. Well hydrated. No oral lesions and no palpable adenopathy. Lungs markedly diminished bilaterally but clear. Heart regular. Abdomen rounded and protuberant but soft and not tender; no hepatomegaly or splenomegaly. No edema. Her social and family history were reviewed. Given her history of chronic gastrointestinal bleeding it is very possible that she indeed has iron deficiency anemia. The macrocytosis was surprising in this setting, and could be related to other nutritional deficiencies or to hemolysis and was to be investigated. I asked her to return to see me in " one week with results.     Subjective:  6/13/2023: In the office for the first time with the above.     Past Medical History:   Diagnosis Date    Artery stenosis     Bilateral subclavian s/p stent     Arthritis     AVM (arteriovenous malformation)     CAD (coronary artery disease)     CKD (chronic kidney disease)     Stage three     COPD (chronic obstructive pulmonary disease)     DM2 (diabetes mellitus, type 2)     Eye pressure     Elevated eye pressure    GERD (gastroesophageal reflux disease)     Gout     Hepatitis     Hepatitis c     HTN (hypertension)     Hyperlipidemia     Hyperparathyroidism     KIRIT (iron deficiency anemia)     Iron deficiency anemia     Myocardial infarction     Osteoporosis     PVD (peripheral vascular disease)     Stroke     TIA     Past Surgical History:   Procedure Laterality Date    CATARACT EXTRACTION Left     CHOLECYSTECTOMY      COLONOSCOPY N/A 01/26/2023    Procedure: COLONOSCOPY with biopsy x 2 areas and polypectomy x 4 and cautery of polyps x 2;  Surgeon: Hero Webster MD;  Location: [facility] ENDOSCOPY;  Service: Gastroenterology;  Laterality: N/A;  post op: polyps, diverticulosis, hemorrhoids    ENDOSCOPY N/A 01/26/2023    Procedure: ESOPHAGOGASTRODUODENOSCOPY;  Surgeon: Hero Webster MD;  Location: [facility] ENDOSCOPY;  Service: Gastroenterology;  Laterality: N/A;  post op: erosive gasritis, small hiatal hernia    EYE SURGERY      TOTAL ABDOMINAL HYSTERECTOMY WITH SALPINGO OOPHORECTOMY         Current Outpatient Medications:     acetaminophen (TYLENOL) 500 MG tablet, Take 1 tablet by mouth Every 6 (Six) Hours As Needed for Mild Pain., Disp: , Rfl:     albuterol sulfate HFA (ProAir HFA) 108 (90 Base) MCG/ACT inhaler, Inhale 2 puffs Every 4 (Four) Hours As Needed for Wheezing or Shortness of Air. Proair - DX CODE J44.9, Disp: 18 g, Rfl: 4    Alcohol Swabs (B-D SINGLE USE SWABS REGULAR) pads, CHECK BLOOD SUGAR ONE TIME DAILY, Disp: 100 each, Rfl: 6    allopurinol (ZYLOPRIM) 100 MG  tablet, Take 1 tablet by mouth Daily., Disp: , Rfl:     amLODIPine (NORVASC) 10 MG tablet, Take 1 tablet by mouth Daily., Disp: 90 tablet, Rfl: 3    aspirin 81 MG tablet, Take 1 tablet by mouth Daily., Disp: , Rfl:     calcitriol (ROCALTROL) 0.25 MCG capsule, Take 1 capsule by mouth Daily., Disp: , Rfl:     carvedilol (COREG) 12.5 MG tablet, Take 1 tablet by mouth 2 (Two) Times a Day., Disp: 180 tablet, Rfl: 1    Cholecalciferol 1000 units capsule, Take 1 capsule by mouth Daily., Disp: , Rfl:     colestipol (COLESTID) 1 g tablet, Take 1 tablet by mouth 2 (Two) Times a Day., Disp: , Rfl:     Cyanocobalamin (B-12) 1000 MCG capsule, Take 1 tablet by mouth Daily., Disp: , Rfl:     ferrous sulfate 325 (65 FE) MG tablet, TAKE 1 TABLET BY MOUTH EVERY DAY WITH BREAKFAST, Disp: 90 tablet, Rfl: 1    hydrALAZINE (APRESOLINE) 25 MG tablet, Take 1 tablet by mouth 2 (Two) Times a Day. Please schedule appt to follow up, Disp: 180 tablet, Rfl: 1    ipratropium-albuterol (DUO-NEB) 0.5-2.5 mg/3 ml nebulizer, Take 3 mL by nebulization Every 4 (Four) Hours As Needed for Wheezing., Disp: 120 mL, Rfl: 3    latanoprost (XALATAN) 0.005 % ophthalmic solution, , Disp: , Rfl:     Omega-3 Fatty Acids (FISH OIL) 1000 MG capsule capsule, Take 1 capsule by mouth 2 (Two) Times a Day With Meals., Disp: , Rfl:     omeprazole (priLOSEC) 40 MG capsule, Take 1 capsule by mouth Daily., Disp: , Rfl:     potassium chloride 10 MEQ CR tablet, Take 1 tablet by mouth Every Other Day. TAKE 1 TABLET BY MOUTH EVERY OTHER DAY, Disp: 60 tablet, Rfl: 1    pravastatin (PRAVACHOL) 40 MG tablet, TAKE 1 TABLET BY MOUTH EVERY DAY, Disp: 90 tablet, Rfl: 0    furosemide (LASIX) 20 MG tablet, Take 1 tablet by mouth Every Other Day for 91 days., Disp: 45 tablet, Rfl: 1    No Known Allergies    Family History   Problem Relation Age of Onset    Diabetes Mother     Anemia Mother     Heart attack Mother     Heart disease Mother     Heart failure Mother     Hypertension Mother      Coronary artery disease Sister     Diabetes Sister     Breast cancer Paternal Aunt 50     Cancer-related family history includes Breast cancer (age of onset: 50) in her paternal aunt.    Social History     Tobacco Use    Smoking status: Every Day     Packs/day: 1.00     Years: 50.00     Pack years: 50.00     Types: Cigarettes     Start date: 1/1/1975     Passive exposure: Current    Smokeless tobacco: Never   Vaping Use    Vaping Use: Never used   Substance Use Topics    Alcohol use: No    Drug use: No     Social History     Social History Narrative    She is single, retired from [employer], she lives in [place] and has two grown daughters.     ROS:   Review of Systems   Constitutional:  Positive for activity change and fatigue. Negative for appetite change, chills, diaphoresis, fever and unexpected weight change.   HENT:  Negative for congestion, dental problem, drooling, ear discharge, ear pain, facial swelling, hearing loss, mouth sores, nosebleeds, postnasal drip, rhinorrhea, sinus pressure, sinus pain, sneezing, sore throat, tinnitus, trouble swallowing and voice change.    Eyes:  Negative for photophobia, pain, discharge, redness, itching and visual disturbance.   Respiratory:  Negative for apnea, cough, choking, chest tightness, shortness of breath, wheezing and stridor.    Cardiovascular:  Negative for chest pain, palpitations and leg swelling.   Gastrointestinal:  Negative for abdominal distention, abdominal pain, anal bleeding, blood in stool, constipation, diarrhea, nausea, rectal pain and vomiting.   Endocrine: Negative for cold intolerance, heat intolerance, polydipsia and polyuria.   Genitourinary:  Negative for decreased urine volume, difficulty urinating, dysuria, flank pain, frequency, genital sores, hematuria and urgency.   Musculoskeletal:  Negative for arthralgias, back pain, gait problem, joint swelling, myalgias, neck pain and neck stiffness.   Skin:  Negative for color change, pallor  "and rash.   Neurological:  Negative for dizziness, tremors, seizures, syncope, facial asymmetry, speech difficulty, weakness, light-headedness, numbness and headaches.   Hematological:  Negative for adenopathy. Does not bruise/bleed easily.   Psychiatric/Behavioral:  Negative for agitation, behavioral problems, confusion, decreased concentration, hallucinations, self-injury, sleep disturbance and suicidal ideas. The patient is not nervous/anxious.    Objective:    Vital Signs:  Vitals:    06/13/23 1252   Pulse: 73   Temp: 97.3 °F (36.3 °C)   TempSrc: Temporal   SpO2: 99%   Weight: 62.6 kg (138 lb)   Height: 153.7 cm (60.5\")   PainSc: 0-No pain     Body mass index is 26.51 kg/m².    ECOG  (1) Restricted in physically strenuous activity, ambulatory and able to do work of light nature    Physical Exam:   Physical Exam  Constitutional:       General: She is not in acute distress.     Appearance: Normal appearance. She is not ill-appearing, toxic-appearing or diaphoretic.   HENT:      Head: Normocephalic and atraumatic.      Right Ear: External ear normal.      Left Ear: External ear normal.      Nose: Nose normal.      Mouth/Throat:      Mouth: Mucous membranes are moist.      Pharynx: Oropharynx is clear. No oropharyngeal exudate or posterior oropharyngeal erythema.   Eyes:      General: No scleral icterus.        Right eye: No discharge.         Left eye: No discharge.      Conjunctiva/sclera: Conjunctivae normal.      Pupils: Pupils are equal, round, and reactive to light.   Cardiovascular:      Rate and Rhythm: Normal rate and regular rhythm.      Pulses: Normal pulses.      Heart sounds: No murmur heard.    No friction rub. No gallop.   Pulmonary:      Effort: No respiratory distress.      Breath sounds: No stridor. No wheezing, rhonchi or rales.      Comments: Lungs clear bilaterally but markedly diminished to auscultation.   Abdominal:      General: Bowel sounds are normal. There is no distension.      " Palpations: Abdomen is soft. There is no mass.      Tenderness: There is no abdominal tenderness. There is no right CVA tenderness, left CVA tenderness, guarding or rebound.      Hernia: No hernia is present.      Comments: Rounded, protuberant, soft and not tender.    Musculoskeletal:         General: No swelling, tenderness, deformity or signs of injury.      Cervical back: No rigidity.      Right lower leg: No edema.      Left lower leg: No edema.   Lymphadenopathy:      Cervical: No cervical adenopathy.   Skin:     Coloration: Skin is not jaundiced.      Findings: No bruising, lesion or rash.   Neurological:      General: No focal deficit present.      Mental Status: She is alert and oriented to person, place, and time.      Cranial Nerves: No cranial nerve deficit.      Motor: No weakness.      Gait: Gait normal.   Psychiatric:         Mood and Affect: Mood normal.         Behavior: Behavior normal.         Thought Content: Thought content normal.         Judgment: Judgment normal.   ALONZO Campos MD performed the physical exam on 6/13/2023 as documented above.     Lab Results - Last 18 Months   Lab Units 03/30/23  1058 10/04/22  1039 01/14/22  0940   SODIUM mmol/L 139  --  141   POTASSIUM mmol/L 4.4  --  4.7   CHLORIDE mmol/L 102  --  101   CO2 mmol/L 20  --  25   BUN mg/dL 18  --  25   CREATININE mg/dL 1.38* 1.50* 1.72*   CALCIUM mg/dL 8.7  --  9.2   BILIRUBIN mg/dL <0.2  --  <0.2   ALK PHOS IU/L 75  --  66   ALT (SGPT) IU/L 8  --  8   AST (SGOT) IU/L 12  --  11   GLUCOSE mg/dL 156*  --  125*     Lab Results   Component Value Date    GLUCOSE 156 (H) 03/30/2023    BUN 18 03/30/2023    CREATININE 1.38 (H) 03/30/2023    EGFRIFNONA 30 (L) 01/14/2022    EGFRIFAFRI 35 (L) 01/14/2022    BCR 13 03/30/2023    K 4.4 03/30/2023    CO2 20 03/30/2023    CALCIUM 8.7 03/30/2023    PROTENTOTREF 6.2 03/30/2023    ALBUMIN 3.7 (L) 03/30/2023    LABIL2 1.5 03/30/2023    AST 12 03/30/2023    ALT 8 03/30/2023     Lab Results    Component Value Date    IRON 199 (H) 08/27/2019    TIBC 298 09/01/2020    FERRITIN 50 09/01/2020     Lab Results   Component Value Date    FOLATE 21.4 02/27/2019     Lab Results   Component Value Date    RETICCTPCT 3.4 03/05/2018     Lab Results   Component Value Date    FIAYLFTQ57 >2000 pg/mL (H) 02/27/2019     Uric Acid   Date Value Ref Range Status   08/27/2019 6.2 2.5 - 7.1 mg/dL Final     Comment:                Therapeutic target for gout patients: <6.0     Lab Results   Component Value Date    SEDRATE 33 01/14/2022     Lab Results   Component Value Date    SEDRATE 33 01/14/2022      Assessment & Plan     Iron deficiency anemia? Based on the history and the previous documentation it is very possible that this is the reason for her symptoms. Will investigate further. I've asked her to have laboratory exams drawn and to see me in one week.   Reviewed the records including notes from primary care and the previous records from this office. Reviewed the laboratory exams from [facility] and the ones available on Epic. Discussed with her and her grand daughter.   3.  Discussed with her the importance of smoking cessation  4. She is to see me in approximately one week.    Jose Campos MD on 6/13/2023 at 13:40

## 2023-06-14 ENCOUNTER — TRANSCRIBE ORDERS (OUTPATIENT)
Dept: ADMINISTRATIVE | Facility: HOSPITAL | Age: 68
End: 2023-06-14
Payer: MEDICARE

## 2023-06-14 ENCOUNTER — APPOINTMENT (OUTPATIENT)
Dept: VASCULAR SURGERY | Facility: HOSPITAL | Age: 68
End: 2023-06-14
Payer: MEDICARE

## 2023-06-14 ENCOUNTER — HOSPITAL ENCOUNTER (OUTPATIENT)
Dept: CARDIOLOGY | Facility: HOSPITAL | Age: 68
Discharge: HOME | End: 2023-06-14
Payer: MEDICARE

## 2023-06-14 DIAGNOSIS — R09.89 BRUIT: Primary | ICD-10-CM

## 2023-06-14 DIAGNOSIS — I65.23 BILATERAL CAROTID ARTERY OCCLUSION: ICD-10-CM

## 2023-06-14 LAB
BH CV XLRA MEAS LEFT DIST CCA EDV: 24.3 CM/SEC
BH CV XLRA MEAS LEFT DIST CCA PSV: 75.4 CM/SEC
BH CV XLRA MEAS LEFT DIST ICA EDV: -31.7 CM/SEC
BH CV XLRA MEAS LEFT DIST ICA PSV: -129 CM/SEC
BH CV XLRA MEAS LEFT ICA/CCA RATIO: -1.84
BH CV XLRA MEAS LEFT PROX CCA EDV: 26.9 CM/SEC
BH CV XLRA MEAS LEFT PROX CCA PSV: 92.7 CM/SEC
BH CV XLRA MEAS LEFT PROX ECA PSV: -108 CM/SEC
BH CV XLRA MEAS LEFT PROX ICA EDV: -24.4 CM/SEC
BH CV XLRA MEAS LEFT PROX ICA PSV: -171 CM/SEC
BH CV XLRA MEAS LEFT PROX SCLA PSV: 205 CM/SEC
BH CV XLRA MEAS LEFT VERTEBRAL A EDV: -11 CM/SEC
BH CV XLRA MEAS LEFT VERTEBRAL A PSV: -67.2 CM/SEC
BH CV XLRA MEAS RIGHT DIST CCA EDV: -20.5 CM/SEC
BH CV XLRA MEAS RIGHT DIST CCA PSV: -111 CM/SEC
BH CV XLRA MEAS RIGHT DIST ICA EDV: -32.2 CM/SEC
BH CV XLRA MEAS RIGHT DIST ICA PSV: -118 CM/SEC
BH CV XLRA MEAS RIGHT ICA/CCA RATIO: 1.26
BH CV XLRA MEAS RIGHT PROX CCA EDV: -21.7 CM/SEC
BH CV XLRA MEAS RIGHT PROX CCA PSV: -95.1 CM/SEC
BH CV XLRA MEAS RIGHT PROX ECA PSV: 170 CM/SEC
BH CV XLRA MEAS RIGHT PROX ICA EDV: -35.8 CM/SEC
BH CV XLRA MEAS RIGHT PROX ICA PSV: -140 CM/SEC
BH CV XLRA MEAS RIGHT PROX SCLA PSV: 209 CM/SEC
BH CV XLRA MEAS RIGHT VERTEBRAL A EDV: -16.8 CM/SEC
BH CV XLRA MEAS RIGHT VERTEBRAL A PSV: -79.2 CM/SEC

## 2023-06-14 PROCEDURE — 93880 EXTRACRANIAL BILAT STUDY: CPT

## 2023-06-14 PROCEDURE — G0463 HOSPITAL OUTPT CLINIC VISIT: HCPCS

## 2023-07-21 NOTE — PROGRESS NOTES
"                         HEMATOLOGY ONCOLOGY OUTPATIENT FOLLOW-UP       Patient name: Nancy Suárez  : 1955  MRN: [id number]  Primary Care Physician: Lyssa Pineda APRN  Referring Physician: Lyssa Pineda AP*  Reason For Consult: Anemia.     History of Present Illness:  Patient is a 67 y.o.     2023: Ms. Suárez came seeking attention about anemia, that she reported had been a recurring issue for her. She described being tired, \"I'm drained\". Unsteady on her feet and unable to do much activity. No other symptoms but she was concerned that her blood pressure had been in the low 100's over 60's. 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 She had also a history of chronic renal disease and in the chemistry closest to this visit she had an estimated glomerular filtration rate of about 33 ml/min. In 2023 her hemoglobin was 11 g/dl with mean corpuscular volume of 99 fL. However a blood count done closer to the time of this visit had reported a hemoglobin of 7.9 g/dl with mean corpuscular volume of 105 fL. The white cell count and the platelet count were unremarkable. On exam she was found to appear much older than the stated age. Neither pale nor jaundiced. Well hydrated. No oral lesions and no palpable adenopathy. Lungs markedly diminished bilaterally but clear. Heart regular. Abdomen rounded and protuberant but soft and not tender; no hepatomegaly or splenomegaly. No edema. Her social and family history were reviewed. Given her history of chronic gastrointestinal bleeding it is very possible that she indeed has iron deficiency anemia. The macrocytosis was surprising in this setting, and could be related to other nutritional deficiencies or to hemolysis and was to be investigated. I asked her to return to see me in one " week with results.     6/20/2023: Underwent laboratory exams.  Still feeling tired.  Eating reasonably well and afebrile.  No bleeding.  On exam pale.  Respirations labored with minimal exertion.  Lungs diminished.  Heart regular.  The laboratory exams revealed an elevated glucose and abnormal renal function with an estimated glomerular filtration rate of 35 mL/min.  Liver enzymes were however unremarkable.  White cell count was normal at 5100/mm³ with a rather unremarkable differential count.  The platelets were also within the normal range, at 213,000/mm³.  The hemoglobin, however, it was low at 7.9 g/dL with a mean corpuscular volume of 107.6 fL.  Her iron profile revealed a normal iron binding capacity at 367 mcg/dL with an elevated iron at 303 mcg/dL and abnormally elevated saturation at 83%.  Somewhat surprisingly the ferritin was not particularly elevated at 33 ng/mL.  Folic acid was unremarkable but the vitamin B12 was elevated at 1642 pg/mL.  BLAS was negative but erythropoietin was elevated at 191.6 µIU/mL.  With this picture it appeared as though she had ineffective erythropoiesis and in order to confirm this diagnosis it was felt necessary to obtain a bone marrow biopsy.  The role of the bone marrow and the procedure of taking a sample was discussed with her.  She reported that she had undergone one in the past but was not sure of where it was done.    7/25/2023: Feeling about the same. Had no laboratory exams. Tired but no more than before. Denies anorexia. No fevers. Denied chest pain or cough. No abdominal pain or diarrhea and no dysuria. On exam no changes. The bone marrow biopsy is unremarkable and no cytogenetic abnormalities were documented. I've asked her to have a blood count today and see me in a few weeks with new laboratory exams.     Subjective:  7/25/2023: Feeling well and no new symptoms. Afebrile, no nocturnal diaphoresis or weight loss. Denied chest pain or cough. Smoking less but still  more than 5 cigarettes per day. No chest pain or cough and no abdominal pain or diarrhea.     Past Medical History:   Diagnosis Date    Artery stenosis     Bilateral subclavian s/p stent     Arthritis     AVM (arteriovenous malformation)     CAD (coronary artery disease)     CKD (chronic kidney disease)     Stage three     COPD (chronic obstructive pulmonary disease)     DM2 (diabetes mellitus, type 2)     Eye pressure     Elevated eye pressure    GERD (gastroesophageal reflux disease)     Gout     Hepatitis     Hepatitis c     HTN (hypertension)     Hyperlipidemia     Hyperparathyroidism     KIRIT (iron deficiency anemia)     Iron deficiency anemia     Myocardial infarction     Osteoporosis     PVD (peripheral vascular disease)     Stroke     TIA     Past Surgical History:   Procedure Laterality Date    CATARACT EXTRACTION Left     CHOLECYSTECTOMY      COLONOSCOPY N/A 01/26/2023    Procedure: COLONOSCOPY with biopsy x 2 areas and polypectomy x 4 and cautery of polyps x 2;  Surgeon: Hero Webster MD;  Location: [facility] ENDOSCOPY;  Service: Gastroenterology;  Laterality: N/A;  post op: polyps, diverticulosis, hemorrhoids    ENDOSCOPY N/A 01/26/2023    Procedure: ESOPHAGOGASTRODUODENOSCOPY;  Surgeon: Hero Webster MD;  Location: [facility] ENDOSCOPY;  Service: Gastroenterology;  Laterality: N/A;  post op: erosive gasritis, small hiatal hernia    EYE SURGERY      TOTAL ABDOMINAL HYSTERECTOMY WITH SALPINGO OOPHORECTOMY         Current Outpatient Medications:     acetaminophen (TYLENOL) 500 MG tablet, Take 1 tablet by mouth Every 6 (Six) Hours As Needed for Mild Pain., Disp: , Rfl:     albuterol sulfate HFA (ProAir HFA) 108 (90 Base) MCG/ACT inhaler, Inhale 2 puffs Every 4 (Four) Hours As Needed for Wheezing or Shortness of Air. Proair - DX CODE J44.9, Disp: 18 g, Rfl: 4    Alcohol Swabs (B-D SINGLE USE SWABS REGULAR) pads, CHECK BLOOD SUGAR ONE TIME DAILY, Disp: 100 each, Rfl: 6    allopurinol (ZYLOPRIM) 100 MG tablet, Take  1 tablet by mouth Daily., Disp: , Rfl:     amLODIPine (NORVASC) 10 MG tablet, Take 1 tablet by mouth Daily., Disp: 90 tablet, Rfl: 3    aspirin 81 MG tablet, Take 1 tablet by mouth Daily., Disp: , Rfl:     calcitriol (ROCALTROL) 0.25 MCG capsule, Take 1 capsule by mouth Daily., Disp: , Rfl:     carvedilol (COREG) 12.5 MG tablet, Take 1 tablet by mouth 2 (Two) Times a Day., Disp: 180 tablet, Rfl: 1    Cholecalciferol 1000 units capsule, Take 1 capsule by mouth Daily., Disp: , Rfl:     colestipol (COLESTID) 1 g tablet, Take 1 tablet by mouth 2 (Two) Times a Day., Disp: , Rfl:     Cyanocobalamin (B-12) 1000 MCG capsule, Take 1 tablet by mouth Daily., Disp: , Rfl:     ferrous sulfate 325 (65 FE) MG tablet, TAKE 1 TABLET BY MOUTH EVERY DAY WITH BREAKFAST, Disp: 90 tablet, Rfl: 1    hydrALAZINE (APRESOLINE) 25 MG tablet, Take 1 tablet by mouth 2 (Two) Times a Day. Please schedule appt to follow up, Disp: 180 tablet, Rfl: 1    ipratropium-albuterol (DUO-NEB) 0.5-2.5 mg/3 ml nebulizer, TAKE 3 ML BY NEBULIZATION EVERY 4 HOURS AS NEEDED FOR WHEEZE, Disp: 180 mL, Rfl: 3    latanoprost (XALATAN) 0.005 % ophthalmic solution, , Disp: , Rfl:     Omega-3 Fatty Acids (FISH OIL) 1000 MG capsule capsule, Take 1 capsule by mouth 2 (Two) Times a Day With Meals., Disp: , Rfl:     omeprazole (priLOSEC) 40 MG capsule, Take 1 capsule by mouth Daily., Disp: , Rfl:     potassium chloride 10 MEQ CR tablet, Take 1 tablet by mouth Every Other Day. TAKE 1 TABLET BY MOUTH EVERY OTHER DAY, Disp: 60 tablet, Rfl: 1    pravastatin (PRAVACHOL) 40 MG tablet, TAKE 1 TABLET BY MOUTH EVERY DAY, Disp: 90 tablet, Rfl: 0    furosemide (LASIX) 20 MG tablet, Take 1 tablet by mouth Every Other Day for 91 days., Disp: 45 tablet, Rfl: 1    No Known Allergies    Family History   Problem Relation Age of Onset    Diabetes Mother     Anemia Mother     Heart attack Mother     Heart disease Mother     Heart failure Mother     Hypertension Mother     Coronary artery  disease Sister     Diabetes Sister     Breast cancer Paternal Aunt 50     Cancer-related family history includes Breast cancer (age of onset: 50) in her paternal aunt.    Social History     Tobacco Use    Smoking status: Every Day     Packs/day: 1.00     Years: 50.00     Pack years: 50.00     Types: Cigarettes     Start date: 1/1/1975     Passive exposure: Current    Smokeless tobacco: Never   Vaping Use    Vaping Use: Never used   Substance Use Topics    Alcohol use: No    Drug use: No     Social History     Social History Narrative    She is single, retired from [employer], she lives in [place] and has two grown daughters.     ROS:   Review of Systems   Constitutional:  Positive for activity change and fatigue. Negative for appetite change, chills, diaphoresis, fever and unexpected weight change.   HENT:  Negative for congestion, dental problem, drooling, ear discharge, ear pain, facial swelling, hearing loss, mouth sores, nosebleeds, postnasal drip, rhinorrhea, sinus pressure, sinus pain, sneezing, sore throat, tinnitus, trouble swallowing and voice change.    Eyes:  Negative for photophobia, pain, discharge, redness, itching and visual disturbance.   Respiratory:  Negative for apnea, cough, choking, chest tightness, shortness of breath, wheezing and stridor.    Cardiovascular:  Negative for chest pain, palpitations and leg swelling.   Gastrointestinal:  Negative for abdominal distention, abdominal pain, anal bleeding, blood in stool, constipation, diarrhea, nausea, rectal pain and vomiting.   Endocrine: Negative for cold intolerance, heat intolerance, polydipsia and polyuria.   Genitourinary:  Negative for decreased urine volume, difficulty urinating, dysuria, flank pain, frequency, genital sores, hematuria and urgency.   Musculoskeletal:  Negative for arthralgias, back pain, gait problem, joint swelling, myalgias, neck pain and neck stiffness.   Skin:  Negative for color change, pallor and rash.  "  Neurological:  Negative for dizziness, tremors, seizures, syncope, facial asymmetry, speech difficulty, weakness, light-headedness, numbness and headaches.   Hematological:  Negative for adenopathy. Does not bruise/bleed easily.   Psychiatric/Behavioral:  Negative for agitation, behavioral problems, confusion, decreased concentration, hallucinations, self-injury, sleep disturbance and suicidal ideas. The patient is not nervous/anxious.    Objective:    Vital Signs:  Vitals:    07/25/23 1116   Pulse: 66   Temp: 97.3 °F (36.3 °C)   TempSrc: Temporal   SpO2: 100%   Weight: 61.7 kg (136 lb)   Height: 153.7 cm (60.5\")   PainSc: 0-No pain     Body mass index is 26.12 kg/m².    ECOG  (1) Restricted in physically strenuous activity, ambulatory and able to do work of light nature    Physical Exam:   Physical Exam  Constitutional:       General: She is not in acute distress.     Appearance: Normal appearance. She is not ill-appearing, toxic-appearing or diaphoretic.   HENT:      Head: Normocephalic and atraumatic.      Right Ear: External ear normal.      Left Ear: External ear normal.      Nose: Nose normal.      Mouth/Throat:      Mouth: Mucous membranes are moist.      Pharynx: Oropharynx is clear. No oropharyngeal exudate or posterior oropharyngeal erythema.   Eyes:      General: No scleral icterus.        Right eye: No discharge.         Left eye: No discharge.      Conjunctiva/sclera: Conjunctivae normal.      Pupils: Pupils are equal, round, and reactive to light.   Cardiovascular:      Rate and Rhythm: Normal rate and regular rhythm.      Pulses: Normal pulses.      Heart sounds: No murmur heard.    No friction rub. No gallop.   Pulmonary:      Effort: No respiratory distress.      Breath sounds: No stridor. No wheezing, rhonchi or rales.      Comments: Lungs clear bilaterally but markedly diminished to auscultation.   Abdominal:      General: Bowel sounds are normal. There is no distension.      Palpations: Abdomen " is soft. There is no mass.      Tenderness: There is no abdominal tenderness. There is no right CVA tenderness, left CVA tenderness, guarding or rebound.      Hernia: No hernia is present.      Comments: Rounded, protuberant, soft and not tender.    Musculoskeletal:         General: No swelling, tenderness, deformity or signs of injury.      Cervical back: No rigidity.      Right lower leg: No edema.      Left lower leg: No edema.   Lymphadenopathy:      Cervical: No cervical adenopathy.   Skin:     Coloration: Skin is not jaundiced.      Findings: No bruising, lesion or rash.   Neurological:      General: No focal deficit present.      Mental Status: She is alert and oriented to person, place, and time.      Cranial Nerves: No cranial nerve deficit.      Motor: No weakness.      Gait: Gait normal.   Psychiatric:         Mood and Affect: Mood normal.         Behavior: Behavior normal.         Thought Content: Thought content normal.         Judgment: Judgment normal.   ALONZO Campos MD performed the physical exam on 7/25/2023 as documented above.     Lab Results - Last 18 Months   Lab Units 03/30/23  1058 10/04/22  1039   SODIUM mmol/L 139  --    POTASSIUM mmol/L 4.4  --    CHLORIDE mmol/L 102  --    CO2 mmol/L 20  --    BUN mg/dL 18  --    CREATININE mg/dL 1.38* 1.50*   CALCIUM mg/dL 8.7  --    BILIRUBIN mg/dL <0.2  --    ALK PHOS IU/L 75  --    ALT (SGPT) IU/L 8  --    AST (SGOT) IU/L 12  --    GLUCOSE mg/dL 156*  --      Lab Results   Component Value Date    GLUCOSE 156 (H) 03/30/2023    BUN 18 03/30/2023    CREATININE 1.38 (H) 03/30/2023    EGFRIFNONA 30 (L) 01/14/2022    EGFRIFAFRI 35 (L) 01/14/2022    BCR 13 03/30/2023    K 4.4 03/30/2023    CO2 20 03/30/2023    CALCIUM 8.7 03/30/2023    PROTENTOTREF 6.2 03/30/2023    ALBUMIN 3.7 (L) 03/30/2023    LABIL2 1.5 03/30/2023    AST 12 03/30/2023    ALT 8 03/30/2023     Lab Results   Component Value Date    IRON 199 (H) 08/27/2019    [facility] 298 09/01/2020     FERRITIN 50 09/01/2020     Lab Results   Component Value Date    FOLATE 21.4 02/27/2019     Lab Results   Component Value Date    RETICCTPCT 3.4 03/05/2018     Lab Results   Component Value Date    MEWGCJTS46 >2000 pg/mL (H) 02/27/2019     Uric Acid   Date Value Ref Range Status   08/27/2019 6.2 2.5 - 7.1 mg/dL Final     Comment:                Therapeutic target for gout patients: <6.0     Lab Results   Component Value Date    SEDRATE 33 01/14/2022     Lab Results   Component Value Date    SEDRATE 33 01/14/2022      Assessment & Plan     Macrocytic anemia: Unchanged. No laboratory exams are available today.   Reviewed all the laboratory exams available.   3.  Cigarette smoker: Discussed with her at length.   4.  She will see me in a few weeks with new laboratory exams.     Jose Campos MD on 7/25/2023 at 16:06

## 2023-07-25 ENCOUNTER — OFFICE VISIT (OUTPATIENT)
Dept: ONCOLOGY | Facility: CLINIC | Age: 68
End: 2023-07-25
Payer: MEDICARE

## 2023-07-25 VITALS
TEMPERATURE: 97.3 F | HEART RATE: 66 BPM | WEIGHT: 136 LBS | OXYGEN SATURATION: 100 % | HEIGHT: 61 IN | BODY MASS INDEX: 25.68 KG/M2

## 2023-07-25 DIAGNOSIS — D63.1 ANEMIA OF CHRONIC RENAL FAILURE, STAGE 3 (MODERATE), UNSPECIFIED WHETHER STAGE 3A OR 3B CKD: Primary | ICD-10-CM

## 2023-07-25 DIAGNOSIS — N18.30 ANEMIA OF CHRONIC RENAL FAILURE, STAGE 3 (MODERATE), UNSPECIFIED WHETHER STAGE 3A OR 3B CKD: Primary | ICD-10-CM

## 2023-07-26 LAB
ALBUMIN SERPL-MCNC: 4.2 G/DL (ref 3.9–4.9)
ALBUMIN/GLOB SERPL: 2.1 {RATIO} (ref 1.2–2.2)
ALP SERPL-CCNC: 64 IU/L (ref 44–121)
ALT SERPL-CCNC: 9 IU/L (ref 0–32)
AMBIG ABBREV CMP14 DEFAULT: NORMAL
AST SERPL-CCNC: 17 IU/L (ref 0–40)
BASOPHILS # BLD AUTO: 0 X10E3/UL (ref 0–0.2)
BASOPHILS NFR BLD AUTO: 0 %
BILIRUB SERPL-MCNC: <0.2 MG/DL (ref 0–1.2)
BUN SERPL-MCNC: 22 MG/DL (ref 8–27)
BUN/CREAT SERPL: 13 (ref 12–28)
CALCIUM SERPL-MCNC: 8.8 MG/DL (ref 8.7–10.3)
CHLORIDE SERPL-SCNC: 100 MMOL/L (ref 96–106)
CO2 SERPL-SCNC: 22 MMOL/L (ref 20–29)
CREAT SERPL-MCNC: 1.71 MG/DL (ref 0.57–1)
EGFRCR SERPLBLD CKD-EPI 2021: 32 ML/MIN/1.73
EOSINOPHIL # BLD AUTO: 0.1 X10E3/UL (ref 0–0.4)
EOSINOPHIL NFR BLD AUTO: 2 %
ERYTHROCYTE [DISTWIDTH] IN BLOOD BY AUTOMATED COUNT: 12.4 % (ref 11.7–15.4)
FERRITIN SERPL-MCNC: 80 NG/ML (ref 15–150)
GLOBULIN SER CALC-MCNC: 2 G/DL (ref 1.5–4.5)
GLUCOSE SERPL-MCNC: 103 MG/DL (ref 70–99)
HCT VFR BLD AUTO: 29.6 % (ref 34–46.6)
HGB BLD-MCNC: 9.1 G/DL (ref 11.1–15.9)
IMM GRANULOCYTES # BLD AUTO: 0 X10E3/UL (ref 0–0.1)
IMM GRANULOCYTES NFR BLD AUTO: 0 %
IRON SATN MFR SERPL: 77 % (ref 15–55)
IRON SERPL-MCNC: 230 UG/DL (ref 27–139)
LYMPHOCYTES # BLD AUTO: 1.4 X10E3/UL (ref 0.7–3.1)
LYMPHOCYTES NFR BLD AUTO: 28 %
MCH RBC QN AUTO: 32.2 PG (ref 26.6–33)
MCHC RBC AUTO-ENTMCNC: 30.7 G/DL (ref 31.5–35.7)
MCV RBC AUTO: 105 FL (ref 79–97)
MONOCYTES # BLD AUTO: 0.5 X10E3/UL (ref 0.1–0.9)
MONOCYTES NFR BLD AUTO: 10 %
NEUTROPHILS # BLD AUTO: 2.9 X10E3/UL (ref 1.4–7)
NEUTROPHILS NFR BLD AUTO: 60 %
PLATELET # BLD AUTO: 208 X10E3/UL (ref 150–450)
POTASSIUM SERPL-SCNC: 4.3 MMOL/L (ref 3.5–5.2)
PROT SERPL-MCNC: 6.2 G/DL (ref 6–8.5)
RBC # BLD AUTO: 2.83 X10E6/UL (ref 3.77–5.28)
SODIUM SERPL-SCNC: 137 MMOL/L (ref 134–144)
TIBC SERPL-MCNC: 298 UG/DL (ref 250–450)
UIBC SERPL-MCNC: 68 UG/DL (ref 118–369)
WBC # BLD AUTO: 4.9 X10E3/UL (ref 3.4–10.8)

## 2023-07-31 RX ORDER — PRAVASTATIN SODIUM 40 MG
TABLET ORAL
Qty: 90 TABLET | Refills: 0 | Status: SHIPPED | OUTPATIENT
Start: 2023-07-31

## 2023-08-04 NOTE — PROGRESS NOTES
"                         HEMATOLOGY ONCOLOGY OUTPATIENT FOLLOW-UP       Patient name: Nancy Suárez  : 1955  MRN: [id number]  Primary Care Physician: Lyssa Pineda APRN  Referring Physician: Lyssa Pineda AP*  Reason For Consult: Anemia.     History of Present Illness:  Patient is a 67 y.o.     2023: Ms. Suárez came seeking attention about anemia, that she reported had been a recurring issue for her. She described being tired, \"I'm drained\". Unsteady on her feet and unable to do much activity. No other symptoms but she was concerned that her blood pressure had been in the low 100's over 60's. 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 She had also a history of chronic renal disease and in the chemistry closest to this visit she had an estimated glomerular filtration rate of about 33 ml/min. In 2023 her hemoglobin was 11 g/dl with mean corpuscular volume of 99 fL. However a blood count done closer to the time of this visit had reported a hemoglobin of 7.9 g/dl with mean corpuscular volume of 105 fL. The white cell count and the platelet count were unremarkable. On exam she was found to appear much older than the stated age. Neither pale nor jaundiced. Well hydrated. No oral lesions and no palpable adenopathy. Lungs markedly diminished bilaterally but clear. Heart regular. Abdomen rounded and protuberant but soft and not tender; no hepatomegaly or splenomegaly. No edema. Her social and family history were reviewed. Given her history of chronic gastrointestinal bleeding it is very possible that she indeed has iron deficiency anemia. The macrocytosis was surprising in this setting, and could be related to other nutritional deficiencies or to hemolysis and was to be investigated. I asked her to return to see me in one " week with results.     6/20/2023: Underwent laboratory exams.  Still feeling tired.  Eating reasonably well and afebrile.  No bleeding.  On exam pale.  Respirations labored with minimal exertion.  Lungs diminished.  Heart regular.  The laboratory exams revealed an elevated glucose and abnormal renal function with an estimated glomerular filtration rate of 35 mL/min.  Liver enzymes were however unremarkable.  White cell count was normal at 5100/mm3 with a rather unremarkable differential count.  The platelets were also within the normal range, at 213,000/mm3.  The hemoglobin, however, it was low at 7.9 g/dL with a mean corpuscular volume of 107.6 fL.  Her iron profile revealed a normal iron binding capacity at 367 mcg/dL with an elevated iron at 303 mcg/dL and abnormally elevated saturation at 83%.  Somewhat surprisingly the ferritin was not particularly elevated at 33 ng/mL.  Folic acid was unremarkable but the vitamin B12 was elevated at 1642 pg/mL.  BLAS was negative but erythropoietin was elevated at 191.6 æIU/mL.  With this picture it appeared as though she had ineffective erythropoiesis and in order to confirm this diagnosis it was felt necessary to obtain a bone marrow biopsy.  The role of the bone marrow and the procedure of taking a sample was discussed with her.  She reported that she had undergone one in the past but was not sure of where it was done.    7/25/2023: Feeling about the same. Had no laboratory exams. Tired but no more than before. Denies anorexia. No fevers. Denied chest pain or cough. No abdominal pain or diarrhea and no dysuria. On exam no changes. The bone marrow biopsy is unremarkable and no cytogenetic abnormalities were documented. I've asked her to have a blood count today and see me in a few weeks with new laboratory exams.     8/8/2023: Without new symptoms.  Still tired.  Smoking less.  Eating reasonably well and weight stable.  Afebrile.  On exam pale, chronically ill-appearing but  in no distress.  No jaundice but pale.  Well-hydrated.  Lungs diminished.  Heart regular.  No edema.  Laboratory exams revealed persistent anemia again with macrocytosis.  Her kidney function had remained low with a creatinine of 1.71 mg/dL for an estimated glomerular filtration rate of 32 mL/min.  The iron studies were more consistent with anemia of chronic renal disease with a total iron binding capacity at low normal of 298 mcg/dL.  The unbound iron binding capacity was diminished at 68 mcg/dL but the iron saturation was increased at 77%.  The bone marrow revealed absent iron stores.  This was more suggestive of iron deficiency rather than of an accumulation.  A decision was made to start treatment with erythropoietin.  This was based on the clear evidence of anemia of chronic kidney disease.  In addition I requested soluble transferrin receptor testing to investigate further the possibility of iron deficiency.    Subjective:  8/8/2023: Tired but otherwise without new symptoms.  Afebrile.  No unintended weight loss and appetite just as good as usual.  Cutting back on the number of cigarettes she was consuming.  No more dyspnea than usual and no cough or hemoptysis.  Denied abdominal pain and had not had any diarrhea or dysuria.  No peripheral edema.  No skin rash.    Past Medical History:   Diagnosis Date    Artery stenosis     Bilateral subclavian s/p stent     Arthritis     AVM (arteriovenous malformation)     CAD (coronary artery disease)     CKD (chronic kidney disease)     Stage three     COPD (chronic obstructive pulmonary disease)     DM2 (diabetes mellitus, type 2)     Eye pressure     Elevated eye pressure    GERD (gastroesophageal reflux disease)     Gout     Hepatitis     Hepatitis c     HTN (hypertension)     Hyperlipidemia     Hyperparathyroidism     KIRIT (iron deficiency anemia)     Iron deficiency anemia     Myocardial infarction     Osteoporosis     PVD (peripheral vascular disease)     Stroke      TIA     Past Surgical History:   Procedure Laterality Date    CATARACT EXTRACTION Left     CHOLECYSTECTOMY      COLONOSCOPY N/A 01/26/2023    Procedure: COLONOSCOPY with biopsy x 2 areas and polypectomy x 4 and cautery of polyps x 2;  Surgeon: Hero Webster MD;  Location: [facility] ENDOSCOPY;  Service: Gastroenterology;  Laterality: N/A;  post op: polyps, diverticulosis, hemorrhoids    ENDOSCOPY N/A 01/26/2023    Procedure: ESOPHAGOGASTRODUODENOSCOPY;  Surgeon: Hero Webster MD;  Location: [facility] ENDOSCOPY;  Service: Gastroenterology;  Laterality: N/A;  post op: erosive gasritis, small hiatal hernia    EYE SURGERY      TOTAL ABDOMINAL HYSTERECTOMY WITH SALPINGO OOPHORECTOMY         Current Outpatient Medications:     acetaminophen (TYLENOL) 500 MG tablet, Take 1 tablet by mouth Every 6 (Six) Hours As Needed for Mild Pain., Disp: , Rfl:     albuterol sulfate HFA (ProAir HFA) 108 (90 Base) MCG/ACT inhaler, Inhale 2 puffs Every 4 (Four) Hours As Needed for Wheezing or Shortness of Air. Proair - DX CODE J44.9, Disp: 18 g, Rfl: 4    Alcohol Swabs (B-D SINGLE USE SWABS REGULAR) pads, CHECK BLOOD SUGAR ONE TIME DAILY, Disp: 100 each, Rfl: 6    allopurinol (ZYLOPRIM) 100 MG tablet, Take 1 tablet by mouth Daily., Disp: , Rfl:     amLODIPine (NORVASC) 10 MG tablet, Take 1 tablet by mouth Daily., Disp: 90 tablet, Rfl: 3    aspirin 81 MG tablet, Take 1 tablet by mouth Daily., Disp: , Rfl:     calcitriol (ROCALTROL) 0.25 MCG capsule, Take 1 capsule by mouth Daily., Disp: , Rfl:     carvedilol (COREG) 12.5 MG tablet, Take 1 tablet by mouth 2 (Two) Times a Day., Disp: 180 tablet, Rfl: 1    Cholecalciferol 1000 units capsule, Take 1 capsule by mouth Daily., Disp: , Rfl:     colestipol (COLESTID) 1 g tablet, Take 1 tablet by mouth 2 (Two) Times a Day., Disp: , Rfl:     Cyanocobalamin (B-12) 1000 MCG capsule, Take 1 tablet by mouth Daily., Disp: , Rfl:     ferrous sulfate 325 (65 FE) MG tablet, TAKE 1 TABLET BY MOUTH EVERY DAY WITH  BREAKFAST, Disp: 90 tablet, Rfl: 1    hydrALAZINE (APRESOLINE) 25 MG tablet, Take 1 tablet by mouth 2 (Two) Times a Day. Please schedule appt to follow up, Disp: 180 tablet, Rfl: 1    ipratropium-albuterol (DUO-NEB) 0.5-2.5 mg/3 ml nebulizer, TAKE 3 ML BY NEBULIZATION EVERY 4 HOURS AS NEEDED FOR WHEEZE, Disp: 180 mL, Rfl: 3    latanoprost (XALATAN) 0.005 % ophthalmic solution, , Disp: , Rfl:     Omega-3 Fatty Acids (FISH OIL) 1000 MG capsule capsule, Take 1 capsule by mouth 2 (Two) Times a Day With Meals., Disp: , Rfl:     omeprazole (priLOSEC) 40 MG capsule, Take 1 capsule by mouth Daily., Disp: , Rfl:     potassium chloride 10 MEQ CR tablet, Take 1 tablet by mouth Every Other Day. TAKE 1 TABLET BY MOUTH EVERY OTHER DAY, Disp: 60 tablet, Rfl: 1    pravastatin (PRAVACHOL) 40 MG tablet, TAKE 1 TABLET BY MOUTH EVERY DAY, Disp: 90 tablet, Rfl: 0    furosemide (LASIX) 20 MG tablet, Take 1 tablet by mouth Every Other Day for 91 days., Disp: 45 tablet, Rfl: 1    No Known Allergies    Family History   Problem Relation Age of Onset    Diabetes Mother     Anemia Mother     Heart attack Mother     Heart disease Mother     Heart failure Mother     Hypertension Mother     Coronary artery disease Sister     Diabetes Sister     Breast cancer Paternal Aunt 50     Cancer-related family history includes Breast cancer (age of onset: 50) in her paternal aunt.    Social History     Tobacco Use    Smoking status: Every Day     Packs/day: 1.00     Years: 50.00     Pack years: 50.00     Types: Cigarettes     Start date: 1/1/1975     Passive exposure: Current    Smokeless tobacco: Never   Vaping Use    Vaping Use: Never used   Substance Use Topics    Alcohol use: No    Drug use: No     Social History     Social History Narrative    She is single, retired from [employer], she lives in [place] and has two grown daughters.     ROS:   Review of Systems   Constitutional:  Positive for activity change and fatigue. Negative for appetite  "change, chills, diaphoresis, fever and unexpected weight change.   HENT:  Negative for congestion, dental problem, drooling, ear discharge, ear pain, facial swelling, hearing loss, mouth sores, nosebleeds, postnasal drip, rhinorrhea, sinus pressure, sinus pain, sneezing, sore throat, tinnitus, trouble swallowing and voice change.    Eyes:  Negative for photophobia, pain, discharge, redness, itching and visual disturbance.   Respiratory:  Negative for apnea, cough, choking, chest tightness, shortness of breath, wheezing and stridor.    Cardiovascular:  Negative for chest pain, palpitations and leg swelling.   Gastrointestinal:  Negative for abdominal distention, abdominal pain, anal bleeding, blood in stool, constipation, diarrhea, nausea, rectal pain and vomiting.   Endocrine: Negative for cold intolerance, heat intolerance, polydipsia and polyuria.   Genitourinary:  Negative for decreased urine volume, difficulty urinating, dysuria, flank pain, frequency, genital sores, hematuria and urgency.   Musculoskeletal:  Negative for arthralgias, back pain, gait problem, joint swelling, myalgias, neck pain and neck stiffness.   Skin:  Negative for color change, pallor and rash.   Neurological:  Negative for dizziness, tremors, seizures, syncope, facial asymmetry, speech difficulty, weakness, light-headedness, numbness and headaches.   Hematological:  Negative for adenopathy. Does not bruise/bleed easily.   Psychiatric/Behavioral:  Negative for agitation, behavioral problems, confusion, decreased concentration, hallucinations, self-injury, sleep disturbance and suicidal ideas. The patient is not nervous/anxious.    Objective:    Vital Signs:  Vitals:    08/08/23 1446   Pulse: 71   Temp: 97.3 øF (36.3 øC)   TempSrc: Temporal   SpO2: 98%   Weight: 62.1 kg (137 lb)   Height: 153.7 cm (60.5\")   PainSc: 0-No pain     Body mass index is 26.32 kg/mý.    ECOG  (1) Restricted in physically strenuous activity, ambulatory and able to " do work of light nature    Physical Exam:   Physical Exam  Constitutional:       General: She is not in acute distress.     Appearance: Normal appearance. She is not ill-appearing, toxic-appearing or diaphoretic.   HENT:      Head: Normocephalic and atraumatic.      Right Ear: External ear normal.      Left Ear: External ear normal.      Nose: Nose normal.      Mouth/Throat:      Mouth: Mucous membranes are moist.      Pharynx: Oropharynx is clear. No oropharyngeal exudate or posterior oropharyngeal erythema.   Eyes:      General: No scleral icterus.        Right eye: No discharge.         Left eye: No discharge.      Conjunctiva/sclera: Conjunctivae normal.      Pupils: Pupils are equal, round, and reactive to light.   Cardiovascular:      Rate and Rhythm: Normal rate and regular rhythm.      Pulses: Normal pulses.      Heart sounds: No murmur heard.    No friction rub. No gallop.   Pulmonary:      Effort: No respiratory distress.      Breath sounds: No stridor. No wheezing, rhonchi or rales.      Comments: Lungs clear bilaterally but markedly diminished to auscultation.   Abdominal:      General: Bowel sounds are normal. There is no distension.      Palpations: Abdomen is soft. There is no mass.      Tenderness: There is no abdominal tenderness. There is no right CVA tenderness, left CVA tenderness, guarding or rebound.      Hernia: No hernia is present.      Comments: Rounded, protuberant, soft and not tender.    Musculoskeletal:         General: No swelling, tenderness, deformity or signs of injury.      Cervical back: No rigidity.      Right lower leg: No edema.      Left lower leg: No edema.   Lymphadenopathy:      Cervical: No cervical adenopathy.   Skin:     Coloration: Skin is not jaundiced.      Findings: No bruising, lesion or rash.   Neurological:      General: No focal deficit present.      Mental Status: She is alert and oriented to person, place, and time.      Cranial Nerves: No cranial nerve  deficit.      Motor: No weakness.      Gait: Gait normal.   Psychiatric:         Mood and Affect: Mood normal.         Behavior: Behavior normal.         Thought Content: Thought content normal.         Judgment: Judgment normal.   ALONZO Campos MD performed the physical exam on 8/8/2023 as documented above.    Lab Results - Last 18 Months   Lab Units 07/25/23  1203 03/30/23  1058 10/04/22  1039   SODIUM mmol/L 137 139  --    POTASSIUM mmol/L 4.3 4.4  --    CHLORIDE mmol/L 100 102  --    CO2 mmol/L 22 20  --    BUN mg/dL 22 18  --    CREATININE mg/dL 1.71* 1.38* 1.50*   CALCIUM mg/dL 8.8 8.7  --    BILIRUBIN mg/dL <0.2 <0.2  --    ALK PHOS IU/L 64 75  --    ALT (SGPT) IU/L 9 8  --    AST (SGOT) IU/L 17 12  --    GLUCOSE mg/dL 103* 156*  --      Lab Results   Component Value Date    GLUCOSE 103 (H) 07/25/2023    BUN 22 07/25/2023    CREATININE 1.71 (H) 07/25/2023    EGFRIFNONA 30 (L) 01/14/2022    EGFRIFAFRI 35 (L) 01/14/2022    BCR 13 07/25/2023    K 4.3 07/25/2023    CO2 22 07/25/2023    CALCIUM 8.8 07/25/2023    PROTENTOTREF 6.2 07/25/2023    ALBUMIN 4.2 07/25/2023    LABIL2 2.1 07/25/2023    AST 17 07/25/2023    ALT 9 07/25/2023     Lab Results   Component Value Date    IRON 199 (H) 08/27/2019    TIBC 298 07/25/2023    FERRITIN 80 07/25/2023     Lab Results   Component Value Date    FOLATE 21.4 02/27/2019     Lab Results   Component Value Date    RETICCTPCT 3.4 03/05/2018     Lab Results   Component Value Date    MSPKVEQQ23 >2000 pg/mL (H) 02/27/2019     Uric Acid   Date Value Ref Range Status   08/27/2019 6.2 2.5 - 7.1 mg/dL Final     Comment:                Therapeutic target for gout patients: <6.0     Lab Results   Component Value Date    SEDRATE 33 01/14/2022     Lab Results   Component Value Date    SEDRATE 33 01/14/2022      Assessment & Plan     Macrocytic anemia: What appeared to be mostly from chronic renal disease.  Iron deficiency is suggested by the findings of the bone marrow biopsy but not  confirmed by the laboratory exams which are more consistent with anemia of chronic kidney disease.  We will try to document further so as to replace with iron if necessary.  She will start treatment with erythropoietin as soon as approved by her insurance company.  Reviewed again the laboratory exams and the results of the bone marrow biopsy.  Discussed with her.  3.  Cigarette smoker: Has continued to cut back.  She reports that she has been smoking 5 cigarettes/day.  4.  She will return to see me in approximately 3 weeks.    Jose Campos MD on 8/8/2023 at 1540.

## 2023-08-08 ENCOUNTER — OFFICE VISIT (OUTPATIENT)
Dept: ONCOLOGY | Facility: CLINIC | Age: 68
End: 2023-08-08
Payer: MEDICARE

## 2023-08-08 VITALS
HEART RATE: 71 BPM | WEIGHT: 137 LBS | BODY MASS INDEX: 25.86 KG/M2 | OXYGEN SATURATION: 98 % | HEIGHT: 61 IN | TEMPERATURE: 97.3 F

## 2023-08-08 DIAGNOSIS — D63.1 ANEMIA OF CHRONIC RENAL FAILURE, STAGE 3 (MODERATE), UNSPECIFIED WHETHER STAGE 3A OR 3B CKD: Primary | ICD-10-CM

## 2023-08-08 DIAGNOSIS — N18.30 ANEMIA OF CHRONIC RENAL FAILURE, STAGE 3 (MODERATE), UNSPECIFIED WHETHER STAGE 3A OR 3B CKD: Primary | ICD-10-CM

## 2023-08-10 DIAGNOSIS — D63.1 ANEMIA OF CHRONIC KIDNEY FAILURE, STAGE 3 (MODERATE): Primary | ICD-10-CM

## 2023-08-10 DIAGNOSIS — N18.30 ANEMIA OF CHRONIC KIDNEY FAILURE, STAGE 3 (MODERATE): Primary | ICD-10-CM

## 2023-08-14 ENCOUNTER — LAB (OUTPATIENT)
Dept: LAB | Facility: HOSPITAL | Age: 68
End: 2023-08-14
Payer: MEDICARE

## 2023-08-14 ENCOUNTER — HOSPITAL ENCOUNTER (OUTPATIENT)
Dept: ONCOLOGY | Facility: HOSPITAL | Age: 68
Discharge: HOME | End: 2023-08-14
Payer: MEDICARE

## 2023-08-14 DIAGNOSIS — D63.1 ANEMIA OF CHRONIC KIDNEY FAILURE, STAGE 3 (MODERATE): Primary | ICD-10-CM

## 2023-08-14 DIAGNOSIS — D63.1 ANEMIA OF CHRONIC KIDNEY FAILURE, STAGE 3 (MODERATE): ICD-10-CM

## 2023-08-14 DIAGNOSIS — N18.30 ANEMIA OF CHRONIC KIDNEY FAILURE, STAGE 3 (MODERATE): ICD-10-CM

## 2023-08-14 DIAGNOSIS — N18.30 ANEMIA OF CHRONIC KIDNEY FAILURE, STAGE 3 (MODERATE): Primary | ICD-10-CM

## 2023-08-14 LAB
BASOPHILS # BLD AUTO: 0.01 10*3/MM3 (ref 0–0.2)
BASOPHILS NFR BLD AUTO: 0.2 % (ref 0–1.5)
DEPRECATED RDW RBC AUTO: 49 FL (ref 37–54)
EOSINOPHIL # BLD AUTO: 0.11 10*3/MM3 (ref 0–0.4)
EOSINOPHIL NFR BLD AUTO: 2.3 % (ref 0.3–6.2)
ERYTHROCYTE [DISTWIDTH] IN BLOOD BY AUTOMATED COUNT: 12.9 % (ref 12.3–15.4)
FERRITIN SERPL-MCNC: 38.37 NG/ML (ref 13–150)
HCT VFR BLD AUTO: 32.9 % (ref 34–46.6)
HGB BLD-MCNC: 10 G/DL (ref 12–15.9)
IRON 24H UR-MRATE: 205 MCG/DL (ref 37–145)
IRON SATN MFR SERPL: 57 % (ref 20–50)
LYMPHOCYTES # BLD AUTO: 1.02 10*3/MM3 (ref 0.7–3.1)
LYMPHOCYTES NFR BLD AUTO: 21.3 % (ref 19.6–45.3)
MCH RBC QN AUTO: 32.4 PG (ref 26.6–33)
MCHC RBC AUTO-ENTMCNC: 30.4 G/DL (ref 31.5–35.7)
MCV RBC AUTO: 106.5 FL (ref 79–97)
MONOCYTES # BLD AUTO: 0.37 10*3/MM3 (ref 0.1–0.9)
MONOCYTES NFR BLD AUTO: 7.7 % (ref 5–12)
NEUTROPHILS NFR BLD AUTO: 3.27 10*3/MM3 (ref 1.7–7)
NEUTROPHILS NFR BLD AUTO: 68.5 % (ref 42.7–76)
PLATELET # BLD AUTO: 164 10*3/MM3 (ref 140–450)
PMV BLD AUTO: 10.4 FL (ref 6–12)
RBC # BLD AUTO: 3.09 10*6/MM3 (ref 3.77–5.28)
TIBC SERPL-MCNC: 358 MCG/DL (ref 298–536)
TRANSFERRIN SERPL-MCNC: 240 MG/DL (ref 200–360)
WBC NRBC COR # BLD: 4.78 10*3/MM3 (ref 3.4–10.8)

## 2023-08-14 PROCEDURE — 85025 COMPLETE CBC W/AUTO DIFF WBC: CPT

## 2023-08-14 PROCEDURE — 36415 COLL VENOUS BLD VENIPUNCTURE: CPT

## 2023-08-14 PROCEDURE — 82728 ASSAY OF FERRITIN: CPT | Performed by: INTERNAL MEDICINE

## 2023-08-14 PROCEDURE — 83540 ASSAY OF IRON: CPT | Performed by: INTERNAL MEDICINE

## 2023-08-14 PROCEDURE — 84466 ASSAY OF TRANSFERRIN: CPT | Performed by: INTERNAL MEDICINE

## 2023-08-17 RX ORDER — FERROUS SULFATE 325(65) MG
TABLET ORAL
Qty: 90 TABLET | Refills: 1 | Status: SHIPPED | OUTPATIENT
Start: 2023-08-17

## 2023-08-17 RX ORDER — CARVEDILOL 12.5 MG/1
TABLET ORAL
Qty: 180 TABLET | Refills: 1 | Status: SHIPPED | OUTPATIENT
Start: 2023-08-17

## 2023-08-17 RX ORDER — PRAVASTATIN SODIUM 40 MG
TABLET ORAL
Qty: 90 TABLET | Refills: 0 | Status: SHIPPED | OUTPATIENT
Start: 2023-08-17

## 2023-08-17 RX ORDER — FUROSEMIDE 20 MG/1
TABLET ORAL
Qty: 45 TABLET | Refills: 1 | Status: SHIPPED | OUTPATIENT
Start: 2023-08-17

## 2023-08-21 ENCOUNTER — LAB (OUTPATIENT)
Dept: LAB | Facility: HOSPITAL | Age: 68
End: 2023-08-21
Payer: MEDICARE

## 2023-08-21 ENCOUNTER — HOSPITAL ENCOUNTER (OUTPATIENT)
Dept: ONCOLOGY | Facility: HOSPITAL | Age: 68
Discharge: HOME | End: 2023-08-21
Payer: MEDICARE

## 2023-08-21 DIAGNOSIS — N18.30 ANEMIA OF CHRONIC RENAL FAILURE, STAGE 3 (MODERATE), UNSPECIFIED WHETHER STAGE 3A OR 3B CKD: ICD-10-CM

## 2023-08-21 DIAGNOSIS — D63.1 ANEMIA OF CHRONIC KIDNEY FAILURE, STAGE 3 (MODERATE): ICD-10-CM

## 2023-08-21 DIAGNOSIS — N18.30 ANEMIA OF CHRONIC KIDNEY FAILURE, STAGE 3 (MODERATE): Primary | ICD-10-CM

## 2023-08-21 DIAGNOSIS — D63.1 ANEMIA OF CHRONIC RENAL FAILURE, STAGE 3 (MODERATE), UNSPECIFIED WHETHER STAGE 3A OR 3B CKD: ICD-10-CM

## 2023-08-21 DIAGNOSIS — N18.30 ANEMIA OF CHRONIC KIDNEY FAILURE, STAGE 3 (MODERATE): ICD-10-CM

## 2023-08-21 DIAGNOSIS — D63.1 ANEMIA OF CHRONIC KIDNEY FAILURE, STAGE 3 (MODERATE): Primary | ICD-10-CM

## 2023-08-21 LAB
ALBUMIN SERPL-MCNC: 4 G/DL (ref 3.5–5.2)
ALBUMIN/GLOB SERPL: 1.7 G/DL
ALP SERPL-CCNC: 69 U/L (ref 39–117)
ALT SERPL W P-5'-P-CCNC: 9 U/L (ref 1–33)
ANION GAP SERPL CALCULATED.3IONS-SCNC: 12 MMOL/L (ref 5–15)
AST SERPL-CCNC: 15 U/L (ref 1–32)
BASOPHILS # BLD AUTO: 0.01 10*3/MM3 (ref 0–0.2)
BASOPHILS NFR BLD AUTO: 0.2 % (ref 0–1.5)
BILIRUB SERPL-MCNC: <0.2 MG/DL (ref 0–1.2)
BUN SERPL-MCNC: 21 MG/DL (ref 8–23)
BUN/CREAT SERPL: 14.7 (ref 7–25)
CALCIUM SPEC-SCNC: 9.5 MG/DL (ref 8.6–10.5)
CHLORIDE SERPL-SCNC: 101 MMOL/L (ref 98–107)
CO2 SERPL-SCNC: 26 MMOL/L (ref 22–29)
CREAT SERPL-MCNC: 1.43 MG/DL (ref 0.57–1)
DEPRECATED RDW RBC AUTO: 48 FL (ref 37–54)
EGFRCR SERPLBLD CKD-EPI 2021: 39.8 ML/MIN/1.73
EOSINOPHIL # BLD AUTO: 0.11 10*3/MM3 (ref 0–0.4)
EOSINOPHIL NFR BLD AUTO: 2.3 % (ref 0.3–6.2)
ERYTHROCYTE [DISTWIDTH] IN BLOOD BY AUTOMATED COUNT: 12.8 % (ref 12.3–15.4)
GLOBULIN UR ELPH-MCNC: 2.4 GM/DL
GLUCOSE SERPL-MCNC: 134 MG/DL (ref 65–99)
HCT VFR BLD AUTO: 34.5 % (ref 34–46.6)
HGB BLD-MCNC: 10.5 G/DL (ref 12–15.9)
LYMPHOCYTES # BLD AUTO: 1.11 10*3/MM3 (ref 0.7–3.1)
LYMPHOCYTES NFR BLD AUTO: 23.4 % (ref 19.6–45.3)
MCH RBC QN AUTO: 32 PG (ref 26.6–33)
MCHC RBC AUTO-ENTMCNC: 30.4 G/DL (ref 31.5–35.7)
MCV RBC AUTO: 105.2 FL (ref 79–97)
MONOCYTES # BLD AUTO: 0.39 10*3/MM3 (ref 0.1–0.9)
MONOCYTES NFR BLD AUTO: 8.2 % (ref 5–12)
NEUTROPHILS NFR BLD AUTO: 3.13 10*3/MM3 (ref 1.7–7)
NEUTROPHILS NFR BLD AUTO: 65.9 % (ref 42.7–76)
PLATELET # BLD AUTO: 186 10*3/MM3 (ref 140–450)
PMV BLD AUTO: 10 FL (ref 6–12)
POTASSIUM SERPL-SCNC: 4.5 MMOL/L (ref 3.5–5.2)
PROT SERPL-MCNC: 6.4 G/DL (ref 6–8.5)
RBC # BLD AUTO: 3.28 10*6/MM3 (ref 3.77–5.28)
SODIUM SERPL-SCNC: 139 MMOL/L (ref 136–145)
WBC NRBC COR # BLD: 4.75 10*3/MM3 (ref 3.4–10.8)

## 2023-08-21 PROCEDURE — 85025 COMPLETE CBC W/AUTO DIFF WBC: CPT

## 2023-08-21 PROCEDURE — 36415 COLL VENOUS BLD VENIPUNCTURE: CPT

## 2023-08-21 PROCEDURE — 80053 COMPREHEN METABOLIC PANEL: CPT | Performed by: INTERNAL MEDICINE

## 2023-08-21 NOTE — PROGRESS NOTES
Pt here today for retacrit. Injection held today due to Hgb 10.5. Informed pt and she verbalized understanding.

## 2023-08-22 ENCOUNTER — TELEPHONE (OUTPATIENT)
Dept: FAMILY MEDICINE CLINIC | Facility: CLINIC | Age: 68
End: 2023-08-22
Payer: MEDICARE

## 2023-08-22 NOTE — TELEPHONE ENCOUNTER
Caller: KRISTIN -  [facility]    Relationship: Home Health    Best call back number: [phone]    What is the best time to reach you: ANYTIME     Who are you requesting to speak with (clinical staff, provider,  specific staff member): JLUIS MAYERS     What was the call regarding: KRISTIN, A  WITH [facility] STATES THEY ARE REQUESTING VERBAL ORDERS TO CONTINUE WITH THE PATIENTS HOME HEALTH SERVICES.     KRISTIN IS REQUESTING A CALL BACK

## 2023-08-23 LAB — STFR SERPL-SCNC: 41.3 NMOL/L (ref 12.2–27.3)

## 2023-08-24 DIAGNOSIS — D63.1 ANEMIA OF CHRONIC KIDNEY FAILURE, STAGE 3 (MODERATE): Primary | ICD-10-CM

## 2023-08-24 DIAGNOSIS — N18.30 ANEMIA OF CHRONIC KIDNEY FAILURE, STAGE 3 (MODERATE): Primary | ICD-10-CM

## 2023-08-28 ENCOUNTER — HOSPITAL ENCOUNTER (OUTPATIENT)
Dept: ONCOLOGY | Facility: HOSPITAL | Age: 68
Discharge: HOME | End: 2023-08-28
Payer: MEDICARE

## 2023-08-28 ENCOUNTER — LAB (OUTPATIENT)
Dept: LAB | Facility: HOSPITAL | Age: 68
End: 2023-08-28
Payer: MEDICARE

## 2023-08-28 DIAGNOSIS — D63.1 ANEMIA OF CHRONIC KIDNEY FAILURE, STAGE 3 (MODERATE): ICD-10-CM

## 2023-08-28 DIAGNOSIS — N18.30 ANEMIA OF CHRONIC KIDNEY FAILURE, STAGE 3 (MODERATE): ICD-10-CM

## 2023-08-28 DIAGNOSIS — D63.1 ANEMIA OF CHRONIC KIDNEY FAILURE, STAGE 3 (MODERATE): Primary | ICD-10-CM

## 2023-08-28 DIAGNOSIS — N18.30 ANEMIA OF CHRONIC KIDNEY FAILURE, STAGE 3 (MODERATE): Primary | ICD-10-CM

## 2023-08-28 LAB
BASOPHILS # BLD AUTO: 0.01 10*3/MM3 (ref 0–0.2)
BASOPHILS NFR BLD AUTO: 0.2 % (ref 0–1.5)
DEPRECATED RDW RBC AUTO: 46.2 FL (ref 37–54)
EOSINOPHIL # BLD AUTO: 0.12 10*3/MM3 (ref 0–0.4)
EOSINOPHIL NFR BLD AUTO: 2.4 % (ref 0.3–6.2)
ERYTHROCYTE [DISTWIDTH] IN BLOOD BY AUTOMATED COUNT: 12.5 % (ref 12.3–15.4)
HCT VFR BLD AUTO: 33.4 % (ref 34–46.6)
HGB BLD-MCNC: 10.4 G/DL (ref 12–15.9)
LYMPHOCYTES # BLD AUTO: 1.06 10*3/MM3 (ref 0.7–3.1)
LYMPHOCYTES NFR BLD AUTO: 21.1 % (ref 19.6–45.3)
MCH RBC QN AUTO: 32.6 PG (ref 26.6–33)
MCHC RBC AUTO-ENTMCNC: 31.1 G/DL (ref 31.5–35.7)
MCV RBC AUTO: 104.7 FL (ref 79–97)
MONOCYTES # BLD AUTO: 0.54 10*3/MM3 (ref 0.1–0.9)
MONOCYTES NFR BLD AUTO: 10.7 % (ref 5–12)
NEUTROPHILS NFR BLD AUTO: 3.3 10*3/MM3 (ref 1.7–7)
NEUTROPHILS NFR BLD AUTO: 65.6 % (ref 42.7–76)
PLATELET # BLD AUTO: 164 10*3/MM3 (ref 140–450)
PMV BLD AUTO: 9.9 FL (ref 6–12)
RBC # BLD AUTO: 3.19 10*6/MM3 (ref 3.77–5.28)
WBC NRBC COR # BLD: 5.03 10*3/MM3 (ref 3.4–10.8)

## 2023-08-28 PROCEDURE — 85025 COMPLETE CBC W/AUTO DIFF WBC: CPT

## 2023-08-28 PROCEDURE — 36415 COLL VENOUS BLD VENIPUNCTURE: CPT

## 2023-08-28 NOTE — PROGRESS NOTES
"                         HEMATOLOGY ONCOLOGY OUTPATIENT FOLLOW-UP       Patient name: Nancy Suárez  : 1955  MRN: [id number]  Primary Care Physician: Lyssa Pineda APRN  Referring Physician: Lyssa Pineda AP*  Reason For Consult: Anemia.     History of Present Illness:  Patient is a 67 y.o.     2023: Ms. Suárez came seeking attention about anemia, that she reported had been a recurring issue for her. She described being tired, \"I'm drained\". Unsteady on her feet and unable to do much activity. No other symptoms but she was concerned that her blood pressure had been in the low 100's over 60's. 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 She had also a history of chronic renal disease and in the chemistry closest to this visit she had an estimated glomerular filtration rate of about 33 ml/min. In 2023 her hemoglobin was 11 g/dl with mean corpuscular volume of 99 fL. However a blood count done closer to the time of this visit had reported a hemoglobin of 7.9 g/dl with mean corpuscular volume of 105 fL. The white cell count and the platelet count were unremarkable. On exam she was found to appear much older than the stated age. Neither pale nor jaundiced. Well hydrated. No oral lesions and no palpable adenopathy. Lungs markedly diminished bilaterally but clear. Heart regular. Abdomen rounded and protuberant but soft and not tender; no hepatomegaly or splenomegaly. No edema. Her social and family history were reviewed. Given her history of chronic gastrointestinal bleeding it is very possible that she indeed has iron deficiency anemia. The macrocytosis was surprising in this setting, and could be related to other nutritional deficiencies or to hemolysis and was to be investigated. I asked her to return to see me in one " week with results.     6/20/2023: Underwent laboratory exams.  Still feeling tired.  Eating reasonably well and afebrile.  No bleeding.  On exam pale.  Respirations labored with minimal exertion.  Lungs diminished.  Heart regular.  The laboratory exams revealed an elevated glucose and abnormal renal function with an estimated glomerular filtration rate of 35 mL/min.  Liver enzymes were however unremarkable.  White cell count was normal at 5100/mm3 with a rather unremarkable differential count.  The platelets were also within the normal range, at 213,000/mm3.  The hemoglobin, however, it was low at 7.9 g/dL with a mean corpuscular volume of 107.6 fL.  Her iron profile revealed a normal iron binding capacity at 367 mcg/dL with an elevated iron at 303 mcg/dL and abnormally elevated saturation at 83%.  Somewhat surprisingly the ferritin was not particularly elevated at 33 ng/mL.  Folic acid was unremarkable but the vitamin B12 was elevated at 1642 pg/mL.  BLAS was negative but erythropoietin was elevated at 191.6 æIU/mL.  With this picture it appeared as though she had ineffective erythropoiesis and in order to confirm this diagnosis it was felt necessary to obtain a bone marrow biopsy.  The role of the bone marrow and the procedure of taking a sample was discussed with her.  She reported that she had undergone one in the past but was not sure of where it was done.    7/25/2023: Feeling about the same. Had no laboratory exams. Tired but no more than before. Denies anorexia. No fevers. Denied chest pain or cough. No abdominal pain or diarrhea and no dysuria. On exam no changes. The bone marrow biopsy is unremarkable and no cytogenetic abnormalities were documented. I've asked her to have a blood count today and see me in a few weeks with new laboratory exams.     8/8/2023: Without new symptoms.  Still tired.  Smoking less.  Eating reasonably well and weight stable.  Afebrile.  On exam pale, chronically ill-appearing but  in no distress.  No jaundice but pale.  Well-hydrated.  Lungs diminished.  Heart regular.  No edema.  Laboratory exams revealed persistent anemia again with macrocytosis.  Her kidney function had remained low with a creatinine of 1.71 mg/dL for an estimated glomerular filtration rate of 32 mL/min.  The iron studies were more consistent with anemia of chronic renal disease with a total iron binding capacity at low normal of 298 mcg/dL.  The unbound iron binding capacity was diminished at 68 mcg/dL but the iron saturation was increased at 77%.  The bone marrow revealed absent iron stores.  This was more suggestive of iron deficiency rather than of an accumulation.  A decision was made to start treatment with erythropoietin.  This was based on the clear evidence of anemia of chronic kidney disease.  In addition I requested soluble transferrin receptor testing to investigate further the possibility of iron deficiency.    8/29/2023: Feeling reasonably well and much stronger than before.  Had not received any injections of erythropoietin in spite of which her hemoglobin had increased to 10.5 g/dL.  She was eating well.  Still smoking but continued to work at cutting back.  No chest pains or cough.  No abdominal pain or diarrhea and no dysuria.  No peripheral edema no skin rash.  On exam alert, conversant and chronically ill-appearing.  No jaundice.  Lungs diminished bilaterally.  Heart regular.  Abdomen soft.  No edema.  Laboratory exams reviewed.  Plans to continue to monitor her blood count and to proceed with erythropoietin as needed to maintain a hemoglobin of at least 10 g/dL were discussed.    Subjective:  8/29/2023: Without new symptoms.  As active as before.  Eating well.  No nausea or vomiting.  No chest pains or cough.  No abdominal pain or diarrhea and no dysuria.    Past Medical History:   Diagnosis Date    Artery stenosis     Bilateral subclavian s/p stent     Arthritis     AVM (arteriovenous malformation)      CAD (coronary artery disease)     CKD (chronic kidney disease)     Stage three     COPD (chronic obstructive pulmonary disease)     DM2 (diabetes mellitus, type 2)     Eye pressure     Elevated eye pressure    GERD (gastroesophageal reflux disease)     Gout     Hepatitis     Hepatitis c     HTN (hypertension)     Hyperlipidemia     Hyperparathyroidism     KIRIT (iron deficiency anemia)     Iron deficiency anemia     Myocardial infarction     Osteoporosis     PVD (peripheral vascular disease)     Stroke     TIA     Past Surgical History:   Procedure Laterality Date    CATARACT EXTRACTION Left     CHOLECYSTECTOMY      COLONOSCOPY N/A 01/26/2023    Procedure: COLONOSCOPY with biopsy x 2 areas and polypectomy x 4 and cautery of polyps x 2;  Surgeon: Hero Webster MD;  Location: [facility];  Service: Gastroenterology;  Laterality: N/A;  post op: polyps, diverticulosis, hemorrhoids    ENDOSCOPY N/A 01/26/2023    Procedure: ESOPHAGOGASTRODUODENOSCOPY;  Surgeon: Hero Webster MD;  Location: [facility];  Service: Gastroenterology;  Laterality: N/A;  post op: erosive gasritis, small hiatal hernia    EYE SURGERY      TOTAL ABDOMINAL HYSTERECTOMY WITH SALPINGO OOPHORECTOMY         Current Outpatient Medications:     acetaminophen (TYLENOL) 500 MG tablet, Take 1 tablet by mouth Every 6 (Six) Hours As Needed for Mild Pain., Disp: , Rfl:     albuterol sulfate HFA (ProAir HFA) 108 (90 Base) MCG/ACT inhaler, Inhale 2 puffs Every 4 (Four) Hours As Needed for Wheezing or Shortness of Air. Proair - DX CODE J44.9, Disp: 18 g, Rfl: 4    Alcohol Swabs (B-D SINGLE USE SWABS REGULAR) pads, CHECK BLOOD SUGAR ONE TIME DAILY, Disp: 100 each, Rfl: 6    allopurinol (ZYLOPRIM) 100 MG tablet, Take 1 tablet by mouth Daily., Disp: , Rfl:     amLODIPine (NORVASC) 10 MG tablet, Take 1 tablet by mouth Daily., Disp: 90 tablet, Rfl: 3    aspirin 81 MG tablet, Take 1 tablet by mouth Daily., Disp: , Rfl:     calcitriol (ROCALTROL) 0.25 MCG  capsule, Take 1 capsule by mouth Daily., Disp: , Rfl:     carvedilol (COREG) 12.5 MG tablet, TAKE 1 TABLET BY MOUTH TWICE A DAY, Disp: 180 tablet, Rfl: 1    Cholecalciferol 1000 units capsule, Take 1 capsule by mouth Daily., Disp: , Rfl:     colestipol (COLESTID) 1 g tablet, Take 1 tablet by mouth 2 (Two) Times a Day., Disp: , Rfl:     Cyanocobalamin (B-12) 1000 MCG capsule, Take 1 tablet by mouth Daily., Disp: , Rfl:     ferrous sulfate 325 (65 FE) MG tablet, TAKE 1 TABLET BY MOUTH EVERY DAY WITH BREAKFAST, Disp: 90 tablet, Rfl: 1    furosemide (LASIX) 20 MG tablet, TAKE 1 TABLET BY MOUTH EVERY OTHER DAY, Disp: 45 tablet, Rfl: 1    hydrALAZINE (APRESOLINE) 25 MG tablet, Take 1 tablet by mouth 2 (Two) Times a Day. Please schedule appt to follow up, Disp: 180 tablet, Rfl: 1    ipratropium-albuterol (DUO-NEB) 0.5-2.5 mg/3 ml nebulizer, TAKE 3 ML BY NEBULIZATION EVERY 4 HOURS AS NEEDED FOR WHEEZE, Disp: 180 mL, Rfl: 3    latanoprost (XALATAN) 0.005 % ophthalmic solution, , Disp: , Rfl:     Omega-3 Fatty Acids (FISH OIL) 1000 MG capsule capsule, Take 1 capsule by mouth 2 (Two) Times a Day With Meals., Disp: , Rfl:     omeprazole (priLOSEC) 40 MG capsule, Take 1 capsule by mouth Daily., Disp: , Rfl:     potassium chloride 10 MEQ CR tablet, Take 1 tablet by mouth Every Other Day. TAKE 1 TABLET BY MOUTH EVERY OTHER DAY, Disp: 60 tablet, Rfl: 1    pravastatin (PRAVACHOL) 40 MG tablet, TAKE 1 TABLET BY MOUTH EVERY DAY, Disp: 90 tablet, Rfl: 0    No Known Allergies    Family History   Problem Relation Age of Onset    Diabetes Mother     Anemia Mother     Heart attack Mother     Heart disease Mother     Heart failure Mother     Hypertension Mother     Coronary artery disease Sister     Diabetes Sister     Breast cancer Paternal Aunt 50     Cancer-related family history includes Breast cancer (age of onset: 50) in her paternal aunt.    Social History     Tobacco Use    Smoking status: Every Day     Packs/day: 1.00     Years:  50.00     Pack years: 50.00     Types: Cigarettes     Start date: 1/1/1975     Passive exposure: Current    Smokeless tobacco: Never   Vaping Use    Vaping Use: Never used   Substance Use Topics    Alcohol use: No    Drug use: No     Social History     Social History Narrative    She is single, retired from [employer], she lives in [place] and has two grown daughters.     ROS:   Review of Systems   Constitutional:  Negative for activity change, appetite change, chills, diaphoresis, fatigue, fever and unexpected weight change.   HENT:  Negative for congestion, dental problem, drooling, ear discharge, ear pain, facial swelling, hearing loss, mouth sores, nosebleeds, postnasal drip, rhinorrhea, sinus pressure, sinus pain, sneezing, sore throat, tinnitus, trouble swallowing and voice change.    Eyes:  Negative for photophobia, pain, discharge, redness, itching and visual disturbance.   Respiratory:  Negative for apnea, cough, choking, chest tightness, shortness of breath, wheezing and stridor.    Cardiovascular:  Negative for chest pain, palpitations and leg swelling.   Gastrointestinal:  Negative for abdominal distention, abdominal pain, anal bleeding, blood in stool, constipation, diarrhea, nausea, rectal pain and vomiting.   Endocrine: Negative for cold intolerance, heat intolerance, polydipsia and polyuria.   Genitourinary:  Negative for decreased urine volume, difficulty urinating, dysuria, flank pain, frequency, genital sores, hematuria and urgency.   Musculoskeletal:  Negative for arthralgias, back pain, gait problem, joint swelling, myalgias, neck pain and neck stiffness.   Skin:  Negative for color change, pallor and rash.   Neurological:  Negative for dizziness, tremors, seizures, syncope, facial asymmetry, speech difficulty, weakness, light-headedness, numbness and headaches.   Hematological:  Negative for adenopathy. Does not bruise/bleed easily.   Psychiatric/Behavioral:  Negative for agitation,  "behavioral problems, confusion, decreased concentration, hallucinations, self-injury, sleep disturbance and suicidal ideas. The patient is not nervous/anxious.    Objective:    Vital Signs:  Vitals:    08/29/23 1120   Pulse: 62   Temp: 97.1 øF (36.2 øC)   TempSrc: Temporal   SpO2: 99%   Weight: 61.7 kg (136 lb)   Height: 153.7 cm (60.5\")   PainSc: 0-No pain     Body mass index is 26.12 kg/mý.    ECOG  (1) Restricted in physically strenuous activity, ambulatory and able to do work of light nature    Physical Exam:   Physical Exam  Constitutional:       General: She is not in acute distress.     Appearance: Normal appearance. She is not ill-appearing, toxic-appearing or diaphoretic.   HENT:      Head: Normocephalic and atraumatic.      Right Ear: External ear normal.      Left Ear: External ear normal.      Nose: Nose normal.      Mouth/Throat:      Mouth: Mucous membranes are moist.      Pharynx: Oropharynx is clear. No oropharyngeal exudate or posterior oropharyngeal erythema.   Eyes:      General: No scleral icterus.        Right eye: No discharge.         Left eye: No discharge.      Conjunctiva/sclera: Conjunctivae normal.      Pupils: Pupils are equal, round, and reactive to light.   Cardiovascular:      Rate and Rhythm: Normal rate and regular rhythm.      Pulses: Normal pulses.      Heart sounds: No murmur heard.    No friction rub. No gallop.   Pulmonary:      Effort: No respiratory distress.      Breath sounds: No stridor. No wheezing, rhonchi or rales.      Comments: Lungs clear bilaterally but markedly diminished to auscultation.   Abdominal:      General: Bowel sounds are normal. There is no distension.      Palpations: Abdomen is soft. There is no mass.      Tenderness: There is no abdominal tenderness. There is no right CVA tenderness, left CVA tenderness, guarding or rebound.      Hernia: No hernia is present.      Comments: Rounded, protuberant, soft and not tender.    Musculoskeletal:         " General: No swelling, tenderness, deformity or signs of injury.      Cervical back: No rigidity.      Right lower leg: No edema.      Left lower leg: No edema.   Lymphadenopathy:      Cervical: No cervical adenopathy.   Skin:     Coloration: Skin is not jaundiced.      Findings: No bruising, lesion or rash.   Neurological:      General: No focal deficit present.      Mental Status: She is alert and oriented to person, place, and time.      Cranial Nerves: No cranial nerve deficit.      Motor: No weakness.      Gait: Gait normal.   Psychiatric:         Mood and Affect: Mood normal.         Behavior: Behavior normal.         Thought Content: Thought content normal.         Judgment: Judgment normal.   ALONZO Campos MD performed the physical exam on 8/29/2023 as documented above.    Lab Results - Last 18 Months   Lab Units 08/21/23  1004 07/25/23  1203 03/30/23  1058   SODIUM mmol/L 139 137 139   POTASSIUM mmol/L 4.5 4.3 4.4   CHLORIDE mmol/L 101 100 102   CO2 mmol/L 26.0 22 20   BUN mg/dL 21 22 18   CREATININE mg/dL 1.43* 1.71* 1.38*   CALCIUM mg/dL 9.5 8.8 8.7   BILIRUBIN mg/dL <0.2 <0.2 <0.2   ALK PHOS U/L 69 64 75   ALT (SGPT) U/L 9 9 8   AST (SGOT) U/L 15 17 12   GLUCOSE mg/dL 134* 103* 156*     Lab Results   Component Value Date    GLUCOSE 134 (H) 08/21/2023    BUN 21 08/21/2023    CREATININE 1.43 (H) 08/21/2023    EGFRIFNONA 30 (L) 01/14/2022    EGFRIFAFRI 35 (L) 01/14/2022    BCR 14.7 08/21/2023    K 4.5 08/21/2023    CO2 26.0 08/21/2023    CALCIUM 9.5 08/21/2023    PROTENTOTREF 6.2 07/25/2023    ALBUMIN 4.0 08/21/2023    LABIL2 2.1 07/25/2023    AST 15 08/21/2023    ALT 9 08/21/2023     Lab Results   Component Value Date    IRON 205 (H) 08/14/2023    TIBC 358 08/14/2023    FERRITIN 38.37 08/14/2023     Lab Results   Component Value Date    FOLATE 21.4 02/27/2019     Lab Results   Component Value Date    RETICCTPCT 3.4 03/05/2018     Lab Results   Component Value Date    BPMGKCVE42 >2000 pg/mL (H)  02/27/2019     Uric Acid   Date Value Ref Range Status   08/27/2019 6.2 2.5 - 7.1 mg/dL Final     Comment:                Therapeutic target for gout patients: <6.0     Lab Results   Component Value Date    SEDRATE 33 01/14/2022     Lab Results   Component Value Date    SEDRATE 33 01/14/2022      Assessment & Plan     Macrocytic anemia: Laboratory exams consistent with iron deficiency as the soluble transferrin receptor was highly elevated.  She had a good response to treatment with intravenous iron and there has been a good improvement in her hemoglobin and resolution of her symptoms.  Persists with anemia, however, that is likely related to her chronic kidney disease.  The hemoglobin level, however, has not warranted the use of erythropoietin.  We will continue to monitor and provide erythropoietin as needed to maintain a hemoglobin at 10 or above grams per deciliter.  Reviewed all laboratory exams and discussed with her.  3.  Cigarette smoker: This point still smoking approximately 5 cigarettes/day.  4.  She will return to see me in approximately 2 months.  We will continue to check her hemoglobin every other week.    Jose Lopez MD on 8/29/2023 at 12:58 PM.

## 2023-08-28 NOTE — PROGRESS NOTES
Pt to the clinic for Retacrit injection.  CBC not within treatment parameters. Hgb 10.4 Injection not given. Patient aware of next appointment.

## 2023-08-29 ENCOUNTER — OFFICE VISIT (OUTPATIENT)
Dept: ONCOLOGY | Facility: CLINIC | Age: 68
End: 2023-08-29
Payer: MEDICARE

## 2023-08-29 VITALS
HEART RATE: 62 BPM | TEMPERATURE: 97.1 F | BODY MASS INDEX: 25.68 KG/M2 | HEIGHT: 61 IN | WEIGHT: 136 LBS | OXYGEN SATURATION: 99 %

## 2023-08-29 DIAGNOSIS — D63.1 ANEMIA OF CHRONIC KIDNEY FAILURE, STAGE 3 (MODERATE): Primary | ICD-10-CM

## 2023-08-29 DIAGNOSIS — N18.30 ANEMIA OF CHRONIC KIDNEY FAILURE, STAGE 3 (MODERATE): Primary | ICD-10-CM

## 2023-09-20 LAB
BASOPHILS # BLD AUTO: 0 X10E3/UL (ref 0–0.2)
BASOPHILS NFR BLD AUTO: 0 %
EOSINOPHIL # BLD AUTO: 0.1 X10E3/UL (ref 0–0.4)
EOSINOPHIL NFR BLD AUTO: 2 %
ERYTHROCYTE [DISTWIDTH] IN BLOOD BY AUTOMATED COUNT: 12.3 % (ref 11.7–15.4)
HCT VFR BLD AUTO: 30.5 % (ref 34–46.6)
HGB BLD-MCNC: 9.9 G/DL (ref 11.1–15.9)
IMM GRANULOCYTES # BLD AUTO: 0 X10E3/UL (ref 0–0.1)
IMM GRANULOCYTES NFR BLD AUTO: 0 %
LYMPHOCYTES # BLD AUTO: 1.3 X10E3/UL (ref 0.7–3.1)
LYMPHOCYTES NFR BLD AUTO: 26 %
MCH RBC QN AUTO: 32.2 PG (ref 26.6–33)
MCHC RBC AUTO-ENTMCNC: 32.5 G/DL (ref 31.5–35.7)
MCV RBC AUTO: 99 FL (ref 79–97)
MONOCYTES # BLD AUTO: 0.5 X10E3/UL (ref 0.1–0.9)
MONOCYTES NFR BLD AUTO: 10 %
NEUTROPHILS # BLD AUTO: 3.1 X10E3/UL (ref 1.4–7)
NEUTROPHILS NFR BLD AUTO: 62 %
PLATELET # BLD AUTO: 198 X10E3/UL (ref 150–450)
RBC # BLD AUTO: 3.07 X10E6/UL (ref 3.77–5.28)
WBC # BLD AUTO: 4.9 X10E3/UL (ref 3.4–10.8)

## 2023-10-04 LAB
BASOPHILS # BLD AUTO: 0 X10E3/UL (ref 0–0.2)
BASOPHILS NFR BLD AUTO: 0 %
EOSINOPHIL # BLD AUTO: 0.1 X10E3/UL (ref 0–0.4)
EOSINOPHIL NFR BLD AUTO: 2 %
ERYTHROCYTE [DISTWIDTH] IN BLOOD BY AUTOMATED COUNT: 12.5 % (ref 11.7–15.4)
HCT VFR BLD AUTO: 31.9 % (ref 34–46.6)
HGB BLD-MCNC: 10.2 G/DL (ref 11.1–15.9)
IMM GRANULOCYTES # BLD AUTO: 0 X10E3/UL (ref 0–0.1)
IMM GRANULOCYTES NFR BLD AUTO: 0 %
LYMPHOCYTES # BLD AUTO: 1 X10E3/UL (ref 0.7–3.1)
LYMPHOCYTES NFR BLD AUTO: 24 %
MCH RBC QN AUTO: 32 PG (ref 26.6–33)
MCHC RBC AUTO-ENTMCNC: 32 G/DL (ref 31.5–35.7)
MCV RBC AUTO: 100 FL (ref 79–97)
MONOCYTES # BLD AUTO: 0.4 X10E3/UL (ref 0.1–0.9)
MONOCYTES NFR BLD AUTO: 9 %
NEUTROPHILS # BLD AUTO: 2.9 X10E3/UL (ref 1.4–7)
NEUTROPHILS NFR BLD AUTO: 65 %
PLATELET # BLD AUTO: 176 X10E3/UL (ref 150–450)
RBC # BLD AUTO: 3.19 X10E6/UL (ref 3.77–5.28)
WBC # BLD AUTO: 4.4 X10E3/UL (ref 3.4–10.8)

## 2023-10-18 LAB
BASOPHILS # BLD AUTO: 0 X10E3/UL (ref 0–0.2)
BASOPHILS NFR BLD AUTO: 0 %
EOSINOPHIL # BLD AUTO: 0.1 X10E3/UL (ref 0–0.4)
EOSINOPHIL NFR BLD AUTO: 1 %
ERYTHROCYTE [DISTWIDTH] IN BLOOD BY AUTOMATED COUNT: 12.9 % (ref 11.7–15.4)
HCT VFR BLD AUTO: 33.4 % (ref 34–46.6)
HGB BLD-MCNC: 10.7 G/DL (ref 11.1–15.9)
IMM GRANULOCYTES # BLD AUTO: 0 X10E3/UL (ref 0–0.1)
IMM GRANULOCYTES NFR BLD AUTO: 0 %
LYMPHOCYTES # BLD AUTO: 1.2 X10E3/UL (ref 0.7–3.1)
LYMPHOCYTES NFR BLD AUTO: 20 %
MCH RBC QN AUTO: 32.3 PG (ref 26.6–33)
MCHC RBC AUTO-ENTMCNC: 32 G/DL (ref 31.5–35.7)
MCV RBC AUTO: 101 FL (ref 79–97)
MONOCYTES # BLD AUTO: 0.4 X10E3/UL (ref 0.1–0.9)
MONOCYTES NFR BLD AUTO: 7 %
NEUTROPHILS # BLD AUTO: 4.3 X10E3/UL (ref 1.4–7)
NEUTROPHILS NFR BLD AUTO: 72 %
PLATELET # BLD AUTO: 210 X10E3/UL (ref 150–450)
RBC # BLD AUTO: 3.31 X10E6/UL (ref 3.77–5.28)
WBC # BLD AUTO: 5.9 X10E3/UL (ref 3.4–10.8)

## 2023-10-19 ENCOUNTER — TELEPHONE (OUTPATIENT)
Dept: FAMILY MEDICINE CLINIC | Facility: CLINIC | Age: 68
End: 2023-10-19
Payer: MEDICARE

## 2023-10-19 NOTE — TELEPHONE ENCOUNTER
Caller: KRISTIN    Relationship: Home Health    Best call back number: [phone]     What orders are you requesting (i.e. lab or imaging): VERBAL ORDERS    In what timeframe would the patient need to come in: ASAP    Where will you receive your lab/imaging services: IN HOME    Additional notes: [facility] IS NEEDING VERBAL ORDERS TO CONTINUE HOME HEALTH.     PLEASE CALL TO ADVISE.

## 2023-10-19 NOTE — PROGRESS NOTES
"                         HEMATOLOGY ONCOLOGY OUTPATIENT FOLLOW-UP       Patient name: Nancy Suárez  : 1955  MRN: [id number]  Primary Care Physician: Lyssa Pineda APRN  Referring Physician: Lyssa Pineda AP*  Reason For Consult: Anemia.     History of Present Illness:  Patient is a 67 y.o.     2023: Ms. Suárez came seeking attention about anemia, that she reported had been a recurring issue for her. She described being tired, \"I'm drained\". Unsteady on her feet and unable to do much activity. No other symptoms but she was concerned that her blood pressure had been in the low 100's over 60's. 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 She had also a history of chronic renal disease and in the chemistry closest to this visit she had an estimated glomerular filtration rate of about 33 ml/min. In 2023 her hemoglobin was 11 g/dl with mean corpuscular volume of 99 fL. However a blood count done closer to the time of this visit had reported a hemoglobin of 7.9 g/dl with mean corpuscular volume of 105 fL. The white cell count and the platelet count were unremarkable. On exam she was found to appear much older than the stated age. Neither pale nor jaundiced. Well hydrated. No oral lesions and no palpable adenopathy. Lungs markedly diminished bilaterally but clear. Heart regular. Abdomen rounded and protuberant but soft and not tender; no hepatomegaly or splenomegaly. No edema. Her social and family history were reviewed. Given her history of chronic gastrointestinal bleeding it is very possible that she indeed has iron deficiency anemia. The macrocytosis was surprising in this setting, and could be related to other nutritional deficiencies or to hemolysis and was to be investigated. I asked her to return to see me in one " week with results.     6/20/2023: Underwent laboratory exams.  Still feeling tired.  Eating reasonably well and afebrile.  No bleeding.  On exam pale.  Respirations labored with minimal exertion.  Lungs diminished.  Heart regular.  The laboratory exams revealed an elevated glucose and abnormal renal function with an estimated glomerular filtration rate of 35 mL/min.  Liver enzymes were however unremarkable.  White cell count was normal at 5100/mm³ with a rather unremarkable differential count.  The platelets were also within the normal range, at 213,000/mm³.  The hemoglobin, however, it was low at 7.9 g/dL with a mean corpuscular volume of 107.6 fL.  Her iron profile revealed a normal iron binding capacity at 367 mcg/dL with an elevated iron at 303 mcg/dL and abnormally elevated saturation at 83%.  Somewhat surprisingly the ferritin was not particularly elevated at 33 ng/mL.  Folic acid was unremarkable but the vitamin B12 was elevated at 1642 pg/mL.  BLAS was negative but erythropoietin was elevated at 191.6 µIU/mL.  With this picture it appeared as though she had ineffective erythropoiesis and in order to confirm this diagnosis it was felt necessary to obtain a bone marrow biopsy.  The role of the bone marrow and the procedure of taking a sample was discussed with her.  She reported that she had undergone one in the past but was not sure of where it was done.    7/25/2023: Feeling about the same. Had no laboratory exams. Tired but no more than before. Denies anorexia. No fevers. Denied chest pain or cough. No abdominal pain or diarrhea and no dysuria. On exam no changes. The bone marrow biopsy is unremarkable and no cytogenetic abnormalities were documented. I've asked her to have a blood count today and see me in a few weeks with new laboratory exams.     8/8/2023: Without new symptoms.  Still tired.  Smoking less.  Eating reasonably well and weight stable.  Afebrile.  On exam pale, chronically ill-appearing but  "in no distress.  No jaundice but pale.  Well-hydrated.  Lungs diminished.  Heart regular.  No edema.  Laboratory exams revealed persistent anemia again with macrocytosis.  Her kidney function had remained low with a creatinine of 1.71 mg/dL for an estimated glomerular filtration rate of 32 mL/min.  The iron studies were more consistent with anemia of chronic renal disease with a total iron binding capacity at low normal of 298 mcg/dL.  The unbound iron binding capacity was diminished at 68 mcg/dL but the iron saturation was increased at 77%.  The bone marrow revealed absent iron stores.  This was more suggestive of iron deficiency rather than of an accumulation.  A decision was made to start treatment with erythropoietin.  This was based on the clear evidence of anemia of chronic kidney disease.  In addition I requested soluble transferrin receptor testing to investigate further the possibility of iron deficiency.    8/29/2023: Feeling reasonably well and much stronger than before.  Had not received any injections of erythropoietin in spite of which her hemoglobin had increased to 10.5 g/dL.  She was eating well.  Still smoking but continued to work at cutting back.  No chest pains or cough.  No abdominal pain or diarrhea and no dysuria.  No peripheral edema no skin rash.  On exam alert, conversant and chronically ill-appearing.  No jaundice.  Lungs diminished bilaterally.  Heart regular.  Abdomen soft.  No edema.  Laboratory exams reviewed.  Plans to continue to monitor her blood count and to proceed with erythropoietin as needed to maintain a hemoglobin of at least 10 g/dL were discussed.    10/24/2023: Feeling much better.  \"I feel better than I have felt in a long time\".  Trying to stop smoking.  Eating well.  No weight loss.  Persists with dyspnea.  No chest pains.  On exam chronically ill-appearing.  Lungs markedly diminished but symmetrical.  Heart regular.  Abdomen soft.  No edema.  Laboratory exams " reviewed.  Persists with macrocytic anemia.  Hemoglobin today was 10.7 g/dL with mean corpuscular volume of 101 fL.  This most likely is a reflection of her chronic kidney disease.  At this point no need for intervention but we will continue to follow.  I have asked her to return to see me in 6 months.    Subjective:  10/24/2023: Feeling much better.  No new symptoms.  Energetic and active.  Eating very well.  Working on smoking cessation.  No diarrhea or dysuria.  No peripheral edema.  No skin rash.    Past Medical History:   Diagnosis Date    Artery stenosis     Bilateral subclavian s/p stent     Arthritis     AVM (arteriovenous malformation)     CAD (coronary artery disease)     CKD (chronic kidney disease)     Stage three     COPD (chronic obstructive pulmonary disease)     DM2 (diabetes mellitus, type 2)     Eye pressure     Elevated eye pressure    GERD (gastroesophageal reflux disease)     Gout     Hepatitis     Hepatitis c     HTN (hypertension)     Hyperlipidemia     Hyperparathyroidism     KIRIT (iron deficiency anemia)     Iron deficiency anemia     Myocardial infarction     Osteoporosis     PVD (peripheral vascular disease)     Stroke     TIA     Past Surgical History:   Procedure Laterality Date    CATARACT EXTRACTION Left     CHOLECYSTECTOMY      COLONOSCOPY N/A 01/26/2023    Procedure: COLONOSCOPY with biopsy x 2 areas and polypectomy x 4 and cautery of polyps x 2;  Surgeon: Hero Webster MD;  Location: [facility] ENDOSCOPY;  Service: Gastroenterology;  Laterality: N/A;  post op: polyps, diverticulosis, hemorrhoids    ENDOSCOPY N/A 01/26/2023    Procedure: ESOPHAGOGASTRODUODENOSCOPY;  Surgeon: Hero Webster MD;  Location: [facility] ENDOSCOPY;  Service: Gastroenterology;  Laterality: N/A;  post op: erosive gasritis, small hiatal hernia    EYE SURGERY      TOTAL ABDOMINAL HYSTERECTOMY WITH SALPINGO OOPHORECTOMY         Current Outpatient Medications:     acetaminophen (TYLENOL) 500 MG tablet, Take 1 tablet by  mouth Every 6 (Six) Hours As Needed for Mild Pain., Disp: , Rfl:     albuterol sulfate HFA (ProAir HFA) 108 (90 Base) MCG/ACT inhaler, Inhale 2 puffs Every 4 (Four) Hours As Needed for Wheezing or Shortness of Air. Proair - DX CODE J44.9, Disp: 18 g, Rfl: 4    Alcohol Swabs (B-D SINGLE USE SWABS REGULAR) pads, CHECK BLOOD SUGAR ONE TIME DAILY, Disp: 100 each, Rfl: 6    allopurinol (ZYLOPRIM) 100 MG tablet, Take 1 tablet by mouth Daily., Disp: , Rfl:     amLODIPine (NORVASC) 10 MG tablet, Take 1 tablet by mouth Daily., Disp: 90 tablet, Rfl: 3    aspirin 81 MG tablet, Take 1 tablet by mouth Daily., Disp: , Rfl:     calcitriol (ROCALTROL) 0.25 MCG capsule, Take 1 capsule by mouth Daily., Disp: , Rfl:     carvedilol (COREG) 12.5 MG tablet, TAKE 1 TABLET BY MOUTH TWICE A DAY, Disp: 180 tablet, Rfl: 1    Cholecalciferol 1000 units capsule, Take 1 capsule by mouth Daily., Disp: , Rfl:     colestipol (COLESTID) 1 g tablet, Take 1 tablet by mouth 2 (Two) Times a Day., Disp: , Rfl:     Cyanocobalamin (B-12) 1000 MCG capsule, Take 1 tablet by mouth Daily., Disp: , Rfl:     ferrous sulfate 325 (65 FE) MG tablet, TAKE 1 TABLET BY MOUTH EVERY DAY WITH BREAKFAST, Disp: 90 tablet, Rfl: 1    furosemide (LASIX) 20 MG tablet, TAKE 1 TABLET BY MOUTH EVERY OTHER DAY, Disp: 45 tablet, Rfl: 1    hydrALAZINE (APRESOLINE) 25 MG tablet, Take 1 tablet by mouth 2 (Two) Times a Day. Please schedule appt to follow up, Disp: 180 tablet, Rfl: 1    ipratropium-albuterol (DUO-NEB) 0.5-2.5 mg/3 ml nebulizer, TAKE 3 ML BY NEBULIZATION EVERY 4 HOURS AS NEEDED FOR WHEEZE, Disp: 180 mL, Rfl: 3    latanoprost (XALATAN) 0.005 % ophthalmic solution, , Disp: , Rfl:     Omega-3 Fatty Acids (FISH OIL) 1000 MG capsule capsule, Take 1 capsule by mouth 2 (Two) Times a Day With Meals., Disp: , Rfl:     omeprazole (priLOSEC) 40 MG capsule, Take 1 capsule by mouth Daily., Disp: , Rfl:     potassium chloride 10 MEQ CR tablet, Take 1 tablet by mouth Every Other Day.  TAKE 1 TABLET BY MOUTH EVERY OTHER DAY, Disp: 60 tablet, Rfl: 1    pravastatin (PRAVACHOL) 40 MG tablet, TAKE 1 TABLET BY MOUTH EVERY DAY, Disp: 90 tablet, Rfl: 0    No Known Allergies    Family History   Problem Relation Age of Onset    Diabetes Mother     Anemia Mother     Heart attack Mother     Heart disease Mother     Heart failure Mother     Hypertension Mother     Coronary artery disease Sister     Diabetes Sister     Breast cancer Paternal Aunt 50     Cancer-related family history includes Breast cancer (age of onset: 50) in her paternal aunt.    Social History     Tobacco Use    Smoking status: Every Day     Packs/day: 1.00     Years: 50.00     Additional pack years: 0.00     Total pack years: 50.00     Types: Cigarettes     Start date: 1/1/1975     Passive exposure: Current    Smokeless tobacco: Never   Vaping Use    Vaping Use: Never used   Substance Use Topics    Alcohol use: No    Drug use: No     Social History     Social History Narrative    She is single, retired from [employer], she lives in [place] and has two grown daughters.     ROS:   Review of Systems   Constitutional:  Negative for activity change, appetite change, chills, diaphoresis, fatigue, fever and unexpected weight change.   HENT:  Negative for congestion, dental problem, drooling, ear discharge, ear pain, facial swelling, hearing loss, mouth sores, nosebleeds, postnasal drip, rhinorrhea, sinus pressure, sinus pain, sneezing, sore throat, tinnitus, trouble swallowing and voice change.    Eyes:  Negative for photophobia, pain, discharge, redness, itching and visual disturbance.   Respiratory:  Negative for apnea, cough, choking, chest tightness, shortness of breath, wheezing and stridor.    Cardiovascular:  Negative for chest pain, palpitations and leg swelling.   Gastrointestinal:  Negative for abdominal distention, abdominal pain, anal bleeding, blood in stool, constipation, diarrhea, nausea, rectal pain and vomiting.  "  Endocrine: Negative for cold intolerance, heat intolerance, polydipsia and polyuria.   Genitourinary:  Negative for decreased urine volume, difficulty urinating, dysuria, flank pain, frequency, genital sores, hematuria and urgency.   Musculoskeletal:  Negative for arthralgias, back pain, gait problem, joint swelling, myalgias, neck pain and neck stiffness.   Skin:  Negative for color change, pallor and rash.   Neurological:  Negative for dizziness, tremors, seizures, syncope, facial asymmetry, speech difficulty, weakness, light-headedness, numbness and headaches.   Hematological:  Negative for adenopathy. Does not bruise/bleed easily.   Psychiatric/Behavioral:  Negative for agitation, behavioral problems, confusion, decreased concentration, hallucinations, self-injury, sleep disturbance and suicidal ideas. The patient is not nervous/anxious.      Objective:    Vital Signs:  Vitals:    10/24/23 1033   Pulse: 71   Temp: 97.8 °F (36.6 °C)   TempSrc: Temporal   SpO2: 100%   Weight: 61.2 kg (135 lb)   Height: 153.7 cm (60.5\")   PainSc: 0-No pain       Body mass index is 25.93 kg/m².    ECOG  (1) Restricted in physically strenuous activity, ambulatory and able to do work of light nature    Physical Exam:   Physical Exam  Constitutional:       General: She is not in acute distress.     Appearance: She is not ill-appearing, toxic-appearing or diaphoretic.      Comments: Slender, well-built.  Alert, well oriented and in good spirits.  Seems chronically ill.  No distress.   HENT:      Head: Normocephalic and atraumatic.      Right Ear: External ear normal.      Left Ear: External ear normal.      Nose: Nose normal.      Mouth/Throat:      Mouth: Mucous membranes are moist.      Pharynx: Oropharynx is clear. No oropharyngeal exudate or posterior oropharyngeal erythema.   Eyes:      General: No scleral icterus.        Right eye: No discharge.         Left eye: No discharge.      Conjunctiva/sclera: Conjunctivae normal.      " Pupils: Pupils are equal, round, and reactive to light.   Cardiovascular:      Rate and Rhythm: Normal rate and regular rhythm.      Pulses: Normal pulses.      Heart sounds: No murmur heard.     No friction rub. No gallop.   Pulmonary:      Effort: No respiratory distress.      Breath sounds: No stridor. No wheezing, rhonchi or rales.      Comments: Symmetrically diminished to auscultation.  Abdominal:      General: Bowel sounds are normal. There is no distension.      Palpations: Abdomen is soft. There is no mass.      Tenderness: There is no abdominal tenderness. There is no right CVA tenderness, left CVA tenderness, guarding or rebound.      Hernia: No hernia is present.      Comments: Soft and nontender.   Musculoskeletal:         General: No swelling, tenderness, deformity or signs of injury.      Cervical back: No rigidity.      Right lower leg: No edema.      Left lower leg: No edema.   Lymphadenopathy:      Cervical: No cervical adenopathy.   Skin:     Coloration: Skin is not jaundiced.      Findings: No bruising, lesion or rash.   Neurological:      General: No focal deficit present.      Mental Status: She is alert and oriented to person, place, and time.      Cranial Nerves: No cranial nerve deficit.      Motor: No weakness.      Gait: Gait normal.   Psychiatric:         Mood and Affect: Mood normal.         Behavior: Behavior normal.         Thought Content: Thought content normal.         Judgment: Judgment normal.     ALONZO Campos MD performed the physical exam on 10/24/2023 as documented above.    Lab Results - Last 18 Months   Lab Units 08/21/23  1004 07/25/23  1203 03/30/23  1058   SODIUM mmol/L 139 137 139   POTASSIUM mmol/L 4.5 4.3 4.4   CHLORIDE mmol/L 101 100 102   CO2 mmol/L 26.0 22 20   BUN mg/dL 21 22 18   CREATININE mg/dL 1.43* 1.71* 1.38*   CALCIUM mg/dL 9.5 8.8 8.7   BILIRUBIN mg/dL <0.2 <0.2 <0.2   ALK PHOS U/L 69 64 75   ALT (SGPT) U/L 9 9 8   AST (SGOT) U/L 15 17 12   GLUCOSE  mg/dL 134* 103* 156*     Lab Results   Component Value Date    GLUCOSE 134 (H) 08/21/2023    BUN 21 08/21/2023    CREATININE 1.43 (H) 08/21/2023    EGFRIFNONA 30 (L) 01/14/2022    EGFRIFAFRI 35 (L) 01/14/2022    BCR 14.7 08/21/2023    K 4.5 08/21/2023    CO2 26.0 08/21/2023    CALCIUM 9.5 08/21/2023    PROTENTOTREF 6.2 07/25/2023    ALBUMIN 4.0 08/21/2023    LABIL2 2.1 07/25/2023    AST 15 08/21/2023    ALT 9 08/21/2023     Lab Results   Component Value Date    IRON 205 (H) 08/14/2023    TIBC 358 08/14/2023    FERRITIN 38.37 08/14/2023     Lab Results   Component Value Date    FOLATE 21.4 02/27/2019     Lab Results   Component Value Date    RETICCTPCT 3.4 03/05/2018     Lab Results   Component Value Date    GAMBOECR54 >2000 pg/mL (H) 02/27/2019     Uric Acid   Date Value Ref Range Status   08/27/2019 6.2 2.5 - 7.1 mg/dL Final     Comment:                Therapeutic target for gout patients: <6.0     Lab Results   Component Value Date    SEDRATE 33 01/14/2022     Lab Results   Component Value Date    SEDRATE 33 01/14/2022      Assessment & Plan     Macrocytic anemia: Laboratory exams consistent with iron deficiency as the soluble transferrin receptor was highly elevated.  She had a good response to treatment with intravenous iron and there has been a good improvement in her hemoglobin and resolution of her symptoms.  Her persistent anemia is likely a reflection of chronic kidney disease.  At this time and as long as her hemoglobin remains above 10 g/dL no additional intervention.  Treatment with exogenous erythropoietin may be justified in the future.  Reviewed all laboratory exams and discussed the results with her.  3.  Cigarette smoker: Still smoking but working toward cessation.  Discussed at great length again with her.  4.  She will return to see me in approximately 6 months.  She will have laboratory exams drawn prior to that visit.    Jose Campos MD on 10/24/2023 at 11:19 AM.

## 2023-10-24 ENCOUNTER — OFFICE VISIT (OUTPATIENT)
Dept: ONCOLOGY | Facility: CLINIC | Age: 68
End: 2023-10-24
Payer: MEDICARE

## 2023-10-24 VITALS
OXYGEN SATURATION: 100 % | HEART RATE: 71 BPM | HEIGHT: 61 IN | TEMPERATURE: 97.8 F | BODY MASS INDEX: 25.49 KG/M2 | WEIGHT: 135 LBS

## 2023-10-24 DIAGNOSIS — N18.30 ANEMIA OF CHRONIC KIDNEY FAILURE, STAGE 3 (MODERATE): Primary | ICD-10-CM

## 2023-10-24 DIAGNOSIS — D63.1 ANEMIA OF CHRONIC KIDNEY FAILURE, STAGE 3 (MODERATE): Primary | ICD-10-CM

## 2023-10-27 ENCOUNTER — OUTSIDE FACILITY SERVICE (OUTPATIENT)
Dept: FAMILY MEDICINE CLINIC | Facility: CLINIC | Age: 68
End: 2023-10-27
Payer: MEDICARE

## 2023-11-15 ENCOUNTER — TELEPHONE (OUTPATIENT)
Dept: ONCOLOGY | Facility: CLINIC | Age: 68
End: 2023-11-15
Payer: MEDICARE

## 2023-11-15 DIAGNOSIS — D63.1 ANEMIA OF CHRONIC KIDNEY FAILURE, STAGE 3 (MODERATE): Primary | ICD-10-CM

## 2023-11-15 DIAGNOSIS — N18.30 ANEMIA OF CHRONIC KIDNEY FAILURE, STAGE 3 (MODERATE): Primary | ICD-10-CM

## 2023-11-15 NOTE — TELEPHONE ENCOUNTER
Received call from pt stating that she has been very tired, weak and dizzy.  She checked her BP and it was normal.  She is currently on Retacrit and has had Venofer last in April 2023.  Pt is wanting to know if she can come in and have labs drawn.  Spoke w/ Dr. Campos and he would like for pt to come in tomorrow or Friday for a CBC.  Pt scheduled for 11/16/23 at 1120 and pt v/u.  Order for cbc entered.

## 2023-11-17 LAB
BASOPHILS # BLD AUTO: 0 X10E3/UL (ref 0–0.2)
BASOPHILS NFR BLD AUTO: 0 %
EOSINOPHIL # BLD AUTO: 0.1 X10E3/UL (ref 0–0.4)
EOSINOPHIL NFR BLD AUTO: 1 %
ERYTHROCYTE [DISTWIDTH] IN BLOOD BY AUTOMATED COUNT: 13.2 % (ref 11.7–15.4)
HCT VFR BLD AUTO: 26.1 % (ref 34–46.6)
HGB BLD-MCNC: 8.6 G/DL (ref 11.1–15.9)
IMM GRANULOCYTES # BLD AUTO: 0.1 X10E3/UL (ref 0–0.1)
IMM GRANULOCYTES NFR BLD AUTO: 1 %
LYMPHOCYTES # BLD AUTO: 1.1 X10E3/UL (ref 0.7–3.1)
LYMPHOCYTES NFR BLD AUTO: 13 %
MCH RBC QN AUTO: 32 PG (ref 26.6–33)
MCHC RBC AUTO-ENTMCNC: 33 G/DL (ref 31.5–35.7)
MCV RBC AUTO: 97 FL (ref 79–97)
MONOCYTES # BLD AUTO: 0.9 X10E3/UL (ref 0.1–0.9)
MONOCYTES NFR BLD AUTO: 11 %
NEUTROPHILS # BLD AUTO: 6.8 X10E3/UL (ref 1.4–7)
NEUTROPHILS NFR BLD AUTO: 74 %
PLATELET # BLD AUTO: 263 X10E3/UL (ref 150–450)
RBC # BLD AUTO: 2.69 X10E6/UL (ref 3.77–5.28)
WBC # BLD AUTO: 8.9 X10E3/UL (ref 3.4–10.8)

## 2023-11-20 ENCOUNTER — TELEPHONE (OUTPATIENT)
Dept: ONCOLOGY | Facility: CLINIC | Age: 68
End: 2023-11-20
Payer: MEDICARE

## 2023-11-20 ENCOUNTER — OUTSIDE FACILITY SERVICE (OUTPATIENT)
Dept: FAMILY MEDICINE CLINIC | Facility: CLINIC | Age: 68
End: 2023-11-20
Payer: MEDICARE

## 2023-11-20 PROCEDURE — OUTSIDEPOS PR OUTSIDE POS PLACEHOLDER: Performed by: FAMILY MEDICINE

## 2023-11-20 NOTE — TELEPHONE ENCOUNTER
Called and s/w Pt to let her know due to her Labs that she needed to get her Retacrit INJ, per Fran. Pt v/u. Appt was made for 11/21

## 2023-11-20 NOTE — TELEPHONE ENCOUNTER
PER DR. GARRETT, BASED ON CBC RESULT FROM LAST WEEK, PATIENT TO HAVE CBC, IRON AND FERRITIN DRAWN TOMORROW 11/21/23 AND RECEIVE A POSSIBLE RETACRIT INJECTION TOMORROW 11/21/23. MESSAGE SENT TO CARMITA SPICER TO CONTACT THE PATIENT TO SCHEDULE THE PATIENT FOR THE APPTS TOMORROW.

## 2023-11-21 ENCOUNTER — LAB (OUTPATIENT)
Dept: LAB | Facility: HOSPITAL | Age: 68
End: 2023-11-21
Payer: MEDICARE

## 2023-11-21 ENCOUNTER — HOSPITAL ENCOUNTER (OUTPATIENT)
Dept: ONCOLOGY | Facility: HOSPITAL | Age: 68
Discharge: HOME | End: 2023-11-21
Payer: MEDICARE

## 2023-11-21 DIAGNOSIS — D63.1 ANEMIA OF CHRONIC KIDNEY FAILURE, STAGE 3 (MODERATE): Primary | ICD-10-CM

## 2023-11-21 DIAGNOSIS — N18.30 ANEMIA OF CHRONIC KIDNEY FAILURE, STAGE 3 (MODERATE): Primary | ICD-10-CM

## 2023-11-21 DIAGNOSIS — N18.30 ANEMIA OF CHRONIC KIDNEY FAILURE, STAGE 3 (MODERATE): ICD-10-CM

## 2023-11-21 DIAGNOSIS — D63.1 ANEMIA OF CHRONIC KIDNEY FAILURE, STAGE 3 (MODERATE): ICD-10-CM

## 2023-11-21 LAB
BASOPHILS # BLD AUTO: 0.01 10*3/MM3 (ref 0–0.2)
BASOPHILS NFR BLD AUTO: 0.1 % (ref 0–1.5)
DEPRECATED RDW RBC AUTO: 50.3 FL (ref 37–54)
EOSINOPHIL # BLD AUTO: 0.05 10*3/MM3 (ref 0–0.4)
EOSINOPHIL NFR BLD AUTO: 0.7 % (ref 0.3–6.2)
ERYTHROCYTE [DISTWIDTH] IN BLOOD BY AUTOMATED COUNT: 14 % (ref 12.3–15.4)
FERRITIN SERPL-MCNC: 39.49 NG/ML (ref 13–150)
HCT VFR BLD AUTO: 30.3 % (ref 34–46.6)
HGB BLD-MCNC: 8.9 G/DL (ref 12–15.9)
IRON 24H UR-MRATE: 12 MCG/DL (ref 37–145)
IRON SATN MFR SERPL: 5 % (ref 20–50)
LYMPHOCYTES # BLD AUTO: 0.96 10*3/MM3 (ref 0.7–3.1)
LYMPHOCYTES NFR BLD AUTO: 13.9 % (ref 19.6–45.3)
MCH RBC QN AUTO: 30 PG (ref 26.6–33)
MCHC RBC AUTO-ENTMCNC: 29.4 G/DL (ref 31.5–35.7)
MCV RBC AUTO: 102 FL (ref 79–97)
MONOCYTES # BLD AUTO: 0.66 10*3/MM3 (ref 0.1–0.9)
MONOCYTES NFR BLD AUTO: 9.6 % (ref 5–12)
NEUTROPHILS NFR BLD AUTO: 5.23 10*3/MM3 (ref 1.7–7)
NEUTROPHILS NFR BLD AUTO: 75.7 % (ref 42.7–76)
PLATELET # BLD AUTO: 200 10*3/MM3 (ref 140–450)
PMV BLD AUTO: 9.7 FL (ref 6–12)
RBC # BLD AUTO: 2.97 10*6/MM3 (ref 3.77–5.28)
TIBC SERPL-MCNC: 255 MCG/DL (ref 298–536)
TRANSFERRIN SERPL-MCNC: 171 MG/DL (ref 200–360)
WBC NRBC COR # BLD AUTO: 6.91 10*3/MM3 (ref 3.4–10.8)

## 2023-11-21 PROCEDURE — 82728 ASSAY OF FERRITIN: CPT | Performed by: INTERNAL MEDICINE

## 2023-11-21 PROCEDURE — 83540 ASSAY OF IRON: CPT | Performed by: INTERNAL MEDICINE

## 2023-11-21 PROCEDURE — 36415 COLL VENOUS BLD VENIPUNCTURE: CPT

## 2023-11-21 PROCEDURE — 25010000002 EPOETIN ALFA-EPBX 10000 UNIT/ML SOLUTION: Performed by: INTERNAL MEDICINE

## 2023-11-21 PROCEDURE — 96372 THER/PROPH/DIAG INJ SC/IM: CPT

## 2023-11-21 PROCEDURE — 84466 ASSAY OF TRANSFERRIN: CPT | Performed by: INTERNAL MEDICINE

## 2023-11-21 PROCEDURE — 85025 COMPLETE CBC W/AUTO DIFF WBC: CPT

## 2023-11-21 RX ADMIN — EPOETIN ALFA-EPBX 10000 UNITS: 10000 INJECTION, SOLUTION INTRAVENOUS; SUBCUTANEOUS at 11:23

## 2023-11-21 NOTE — PROGRESS NOTES
Patient at clinic for retacrit injection. CBC within parameters-hgb 8.9. injection given and patient tolerated well. This is the patient's first retacrit injection-she stayed for 20 minutes and had no issues and was given a handout on retacrit. She has a home health aide with her and patient is aware to call with any issues.  Patient stated she was going to [place] to get her blood drawn on Tuesday to see if she needs to come in for another retacrit injection. Peytan and maria esther aware and noemi stated she would need to ask Dr. Campos about more appts.

## 2023-11-21 NOTE — ADDENDUM NOTE
Encounter addended by: Nilda Rothman RN on: 11/21/2023 2:03 PM   Actions taken: Clinical Note Signed

## 2023-11-27 ENCOUNTER — TELEPHONE (OUTPATIENT)
Dept: ONCOLOGY | Facility: CLINIC | Age: 68
End: 2023-11-27

## 2023-12-01 ENCOUNTER — HOSPITAL ENCOUNTER (OUTPATIENT)
Dept: ONCOLOGY | Facility: HOSPITAL | Age: 68
Discharge: HOME | End: 2023-12-01
Payer: MEDICARE

## 2023-12-01 ENCOUNTER — HOSPITAL ENCOUNTER (OUTPATIENT)
Dept: INFUSION THERAPY | Facility: HOSPITAL | Age: 68
Discharge: HOME | End: 2023-12-01
Payer: MEDICARE

## 2023-12-01 ENCOUNTER — LAB (OUTPATIENT)
Dept: LAB | Facility: HOSPITAL | Age: 68
End: 2023-12-01
Payer: MEDICARE

## 2023-12-01 VITALS
RESPIRATION RATE: 16 BRPM | SYSTOLIC BLOOD PRESSURE: 152 MMHG | TEMPERATURE: 98 F | HEART RATE: 77 BPM | DIASTOLIC BLOOD PRESSURE: 73 MMHG | OXYGEN SATURATION: 100 %

## 2023-12-01 VITALS — SYSTOLIC BLOOD PRESSURE: 170 MMHG | DIASTOLIC BLOOD PRESSURE: 58 MMHG | HEART RATE: 73 BPM

## 2023-12-01 DIAGNOSIS — D63.1 ANEMIA OF CHRONIC KIDNEY FAILURE, STAGE 3 (MODERATE): ICD-10-CM

## 2023-12-01 DIAGNOSIS — N18.30 ANEMIA OF CHRONIC KIDNEY FAILURE, STAGE 3 (MODERATE): Primary | ICD-10-CM

## 2023-12-01 DIAGNOSIS — D63.1 ANEMIA OF CHRONIC KIDNEY FAILURE, STAGE 3 (MODERATE): Primary | ICD-10-CM

## 2023-12-01 DIAGNOSIS — D64.9 SYMPTOMATIC ANEMIA: ICD-10-CM

## 2023-12-01 DIAGNOSIS — N18.30 ANEMIA OF CHRONIC KIDNEY FAILURE, STAGE 3 (MODERATE): ICD-10-CM

## 2023-12-01 DIAGNOSIS — D64.9 SYMPTOMATIC ANEMIA: Primary | ICD-10-CM

## 2023-12-01 PROBLEM — E61.1 IRON DEFICIENCY: Status: ACTIVE | Noted: 2023-12-01

## 2023-12-01 LAB
ABO GROUP BLD: NORMAL
BASOPHILS # BLD AUTO: 0.02 10*3/MM3 (ref 0–0.2)
BASOPHILS # BLD AUTO: 0.1 10*3/MM3 (ref 0–0.2)
BASOPHILS NFR BLD AUTO: 0.4 % (ref 0–1.5)
BASOPHILS NFR BLD AUTO: 1.4 % (ref 0–1.5)
BB HOLD TUBE: NORMAL
BLD GP AB SCN SERPL QL: NEGATIVE
DEPRECATED RDW RBC AUTO: 52.9 FL (ref 37–54)
DEPRECATED RDW RBC AUTO: 56.4 FL (ref 37–54)
EOSINOPHIL # BLD AUTO: 0.1 10*3/MM3 (ref 0–0.4)
EOSINOPHIL # BLD AUTO: 0.1 10*3/MM3 (ref 0–0.4)
EOSINOPHIL NFR BLD AUTO: 1.9 % (ref 0.3–6.2)
EOSINOPHIL NFR BLD AUTO: 2.1 % (ref 0.3–6.2)
ERYTHROCYTE [DISTWIDTH] IN BLOOD BY AUTOMATED COUNT: 14.8 % (ref 12.3–15.4)
ERYTHROCYTE [DISTWIDTH] IN BLOOD BY AUTOMATED COUNT: 16.7 % (ref 12.3–15.4)
HCT VFR BLD AUTO: 24.7 % (ref 34–46.6)
HCT VFR BLD AUTO: 25.8 % (ref 34–46.6)
HGB BLD-MCNC: 7.5 G/DL (ref 12–15.9)
HGB BLD-MCNC: 7.6 G/DL (ref 12–15.9)
LYMPHOCYTES # BLD AUTO: 1 10*3/MM3 (ref 0.7–3.1)
LYMPHOCYTES # BLD AUTO: 1.14 10*3/MM3 (ref 0.7–3.1)
LYMPHOCYTES NFR BLD AUTO: 24.2 % (ref 19.6–45.3)
LYMPHOCYTES NFR BLD AUTO: 24.3 % (ref 19.6–45.3)
MCH RBC QN AUTO: 29.8 PG (ref 26.6–33)
MCH RBC QN AUTO: 29.8 PG (ref 26.6–33)
MCHC RBC AUTO-ENTMCNC: 29.1 G/DL (ref 31.5–35.7)
MCHC RBC AUTO-ENTMCNC: 30.8 G/DL (ref 31.5–35.7)
MCV RBC AUTO: 102.4 FL (ref 79–97)
MCV RBC AUTO: 96.7 FL (ref 79–97)
MONOCYTES # BLD AUTO: 0.4 10*3/MM3 (ref 0.1–0.9)
MONOCYTES # BLD AUTO: 0.57 10*3/MM3 (ref 0.1–0.9)
MONOCYTES NFR BLD AUTO: 12.2 % (ref 5–12)
MONOCYTES NFR BLD AUTO: 8.6 % (ref 5–12)
NEUTROPHILS NFR BLD AUTO: 2.6 10*3/MM3 (ref 1.7–7)
NEUTROPHILS NFR BLD AUTO: 2.86 10*3/MM3 (ref 1.7–7)
NEUTROPHILS NFR BLD AUTO: 61 % (ref 42.7–76)
NEUTROPHILS NFR BLD AUTO: 63.9 % (ref 42.7–76)
NRBC BLD AUTO-RTO: 0.1 /100 WBC (ref 0–0.2)
PLATELET # BLD AUTO: 239 10*3/MM3 (ref 140–450)
PLATELET # BLD AUTO: 287 10*3/MM3 (ref 140–450)
PMV BLD AUTO: 8.2 FL (ref 6–12)
PMV BLD AUTO: 9.7 FL (ref 6–12)
RBC # BLD AUTO: 2.52 10*6/MM3 (ref 3.77–5.28)
RBC # BLD AUTO: 2.55 10*6/MM3 (ref 3.77–5.28)
RH BLD: POSITIVE
T&S EXPIRATION DATE: NORMAL
WBC NRBC COR # BLD AUTO: 4.1 10*3/MM3 (ref 3.4–10.8)
WBC NRBC COR # BLD AUTO: 4.69 10*3/MM3 (ref 3.4–10.8)

## 2023-12-01 PROCEDURE — 86850 RBC ANTIBODY SCREEN: CPT | Performed by: INTERNAL MEDICINE

## 2023-12-01 PROCEDURE — 86901 BLOOD TYPING SEROLOGIC RH(D): CPT

## 2023-12-01 PROCEDURE — 36415 COLL VENOUS BLD VENIPUNCTURE: CPT

## 2023-12-01 PROCEDURE — 86900 BLOOD TYPING SEROLOGIC ABO: CPT

## 2023-12-01 PROCEDURE — 85025 COMPLETE CBC W/AUTO DIFF WBC: CPT | Performed by: NURSE PRACTITIONER

## 2023-12-01 PROCEDURE — 96372 THER/PROPH/DIAG INJ SC/IM: CPT

## 2023-12-01 PROCEDURE — 85025 COMPLETE CBC W/AUTO DIFF WBC: CPT

## 2023-12-01 PROCEDURE — 25010000002 EPOETIN ALFA-EPBX 10000 UNIT/ML SOLUTION: Performed by: INTERNAL MEDICINE

## 2023-12-01 PROCEDURE — 36430 TRANSFUSION BLD/BLD COMPNT: CPT

## 2023-12-01 PROCEDURE — 86901 BLOOD TYPING SEROLOGIC RH(D): CPT | Performed by: INTERNAL MEDICINE

## 2023-12-01 PROCEDURE — 86900 BLOOD TYPING SEROLOGIC ABO: CPT | Performed by: INTERNAL MEDICINE

## 2023-12-01 PROCEDURE — 86923 COMPATIBILITY TEST ELECTRIC: CPT

## 2023-12-01 PROCEDURE — P9016 RBC LEUKOCYTES REDUCED: HCPCS

## 2023-12-01 RX ORDER — SODIUM CHLORIDE 9 MG/ML
250 INJECTION, SOLUTION INTRAVENOUS AS NEEDED
Status: DISCONTINUED | OUTPATIENT
Start: 2023-12-01 | End: 2023-12-03 | Stop reason: HOSPADM

## 2023-12-01 RX ORDER — SODIUM CHLORIDE 9 MG/ML
250 INJECTION, SOLUTION INTRAVENOUS AS NEEDED
Status: CANCELLED | OUTPATIENT
Start: 2023-12-01

## 2023-12-01 RX ADMIN — EPOETIN ALFA-EPBX 10000 UNITS: 10000 INJECTION, SOLUTION INTRAVENOUS; SUBCUTANEOUS at 10:17

## 2023-12-01 NOTE — PROGRESS NOTES
Patient to receive 1 unit PRBC today per LOVE Stephenson and repeat CBC in 1 week. Patient notified and advised to go to [facility] at [facility]. Per ELBERT Adame in ACU, advised to inform the patient that there is a possibility that she will have to wait when she arrives to ACU. Patient informed. Patient verified and confirmed appt date/time for 12/8/23.

## 2023-12-02 LAB
BH BB BLOOD EXPIRATION DATE: NORMAL
BH BB BLOOD TYPE BARCODE: 6200
BH BB DISPENSE STATUS: NORMAL
BH BB PRODUCT CODE: NORMAL
BH BB UNIT NUMBER: NORMAL
CROSSMATCH INTERPRETATION: NORMAL
UNIT  ABO: NORMAL
UNIT  RH: NORMAL

## 2023-12-08 ENCOUNTER — HOSPITAL ENCOUNTER (OUTPATIENT)
Dept: ONCOLOGY | Facility: HOSPITAL | Age: 68
Discharge: HOME | End: 2023-12-08
Payer: MEDICARE

## 2023-12-08 ENCOUNTER — LAB (OUTPATIENT)
Dept: LAB | Facility: HOSPITAL | Age: 68
End: 2023-12-08
Payer: MEDICARE

## 2023-12-08 ENCOUNTER — APPOINTMENT (OUTPATIENT)
Dept: ONCOLOGY | Facility: HOSPITAL | Age: 68
End: 2023-12-08
Payer: MEDICARE

## 2023-12-08 VITALS
RESPIRATION RATE: 18 BRPM | SYSTOLIC BLOOD PRESSURE: 129 MMHG | DIASTOLIC BLOOD PRESSURE: 71 MMHG | HEART RATE: 61 BPM | HEIGHT: 61 IN | WEIGHT: 132.6 LBS | OXYGEN SATURATION: 100 % | TEMPERATURE: 97.6 F | BODY MASS INDEX: 25.04 KG/M2

## 2023-12-08 DIAGNOSIS — E61.1 IRON DEFICIENCY: Primary | ICD-10-CM

## 2023-12-08 LAB
BASOPHILS # BLD AUTO: 0 10*3/MM3 (ref 0–0.2)
BASOPHILS NFR BLD AUTO: 0 % (ref 0–1.5)
DEPRECATED RDW RBC AUTO: 54.7 FL (ref 37–54)
EOSINOPHIL # BLD AUTO: 0.11 10*3/MM3 (ref 0–0.4)
EOSINOPHIL NFR BLD AUTO: 2.4 % (ref 0.3–6.2)
ERYTHROCYTE [DISTWIDTH] IN BLOOD BY AUTOMATED COUNT: 15.3 % (ref 12.3–15.4)
HCT VFR BLD AUTO: 34.5 % (ref 34–46.6)
HGB BLD-MCNC: 10.3 G/DL (ref 12–15.9)
LYMPHOCYTES # BLD AUTO: 1.01 10*3/MM3 (ref 0.7–3.1)
LYMPHOCYTES NFR BLD AUTO: 22.3 % (ref 19.6–45.3)
MCH RBC QN AUTO: 30.3 PG (ref 26.6–33)
MCHC RBC AUTO-ENTMCNC: 29.9 G/DL (ref 31.5–35.7)
MCV RBC AUTO: 101.5 FL (ref 79–97)
MONOCYTES # BLD AUTO: 0.5 10*3/MM3 (ref 0.1–0.9)
MONOCYTES NFR BLD AUTO: 11 % (ref 5–12)
NEUTROPHILS NFR BLD AUTO: 2.91 10*3/MM3 (ref 1.7–7)
NEUTROPHILS NFR BLD AUTO: 64.3 % (ref 42.7–76)
PLATELET # BLD AUTO: 223 10*3/MM3 (ref 140–450)
PMV BLD AUTO: 9.9 FL (ref 6–12)
RBC # BLD AUTO: 3.4 10*6/MM3 (ref 3.77–5.28)
WBC NRBC COR # BLD AUTO: 4.53 10*3/MM3 (ref 3.4–10.8)

## 2023-12-08 PROCEDURE — 36415 COLL VENOUS BLD VENIPUNCTURE: CPT

## 2023-12-08 PROCEDURE — 25810000003 SODIUM CHLORIDE 0.9 % SOLUTION: Performed by: INTERNAL MEDICINE

## 2023-12-08 PROCEDURE — 25010000002 IRON SUCROSE PER 1 MG: Performed by: INTERNAL MEDICINE

## 2023-12-08 PROCEDURE — 96365 THER/PROPH/DIAG IV INF INIT: CPT

## 2023-12-08 PROCEDURE — 85025 COMPLETE CBC W/AUTO DIFF WBC: CPT

## 2023-12-08 PROCEDURE — 96374 THER/PROPH/DIAG INJ IV PUSH: CPT

## 2023-12-08 RX ORDER — SODIUM CHLORIDE 9 MG/ML
20 INJECTION, SOLUTION INTRAVENOUS ONCE
Status: COMPLETED | OUTPATIENT
Start: 2023-12-08 | End: 2023-12-08

## 2023-12-08 RX ADMIN — IRON SUCROSE 200 MG: 20 INJECTION, SOLUTION INTRAVENOUS at 09:14

## 2023-12-08 RX ADMIN — SODIUM CHLORIDE 20 ML/HR: 9 INJECTION, SOLUTION INTRAVENOUS at 09:14

## 2023-12-08 NOTE — PROGRESS NOTES
Patient is here for C1D1 venofer 200mg. PIV started and CBC drawn per protocol. Patient gets retacrit injections-patient notified she could not get it today due to receiving the iron infusion today. More HOGUEMarcelino Sent secure chat to see if patient could get the retacrit in between her iron infusions or if she had to wait until the iron infusions or complete. More replied: It really depends on what her labs are: Iron Sat has to be >20 & Ferritin > 100.....& of course her Hgb has to be <10. More mentioned needing more labs at the 30 day silviano after the last iron labs. Fran added to conversation since there are no labs ordered yet and to see if she knows the plan. Patient aware she can not get her retacrit injection with her current labs. Patient verbalized understanding. Fran responded that she would ask Dr. Campos since she was not sure. Patient tolerated treatment and was discharged with AVS.

## 2023-12-15 ENCOUNTER — APPOINTMENT (OUTPATIENT)
Dept: LAB | Facility: HOSPITAL | Age: 68
End: 2023-12-15
Payer: MEDICARE

## 2023-12-15 ENCOUNTER — HOSPITAL ENCOUNTER (OUTPATIENT)
Dept: ONCOLOGY | Facility: HOSPITAL | Age: 68
Discharge: HOME | End: 2023-12-15
Admitting: INTERNAL MEDICINE
Payer: MEDICARE

## 2023-12-15 ENCOUNTER — APPOINTMENT (OUTPATIENT)
Dept: ONCOLOGY | Facility: HOSPITAL | Age: 68
End: 2023-12-15
Payer: MEDICARE

## 2023-12-15 VITALS
WEIGHT: 131.3 LBS | BODY MASS INDEX: 25.21 KG/M2 | HEART RATE: 78 BPM | TEMPERATURE: 97.3 F | RESPIRATION RATE: 18 BRPM | DIASTOLIC BLOOD PRESSURE: 73 MMHG | OXYGEN SATURATION: 97 % | SYSTOLIC BLOOD PRESSURE: 130 MMHG

## 2023-12-15 DIAGNOSIS — E61.1 IRON DEFICIENCY: Primary | ICD-10-CM

## 2023-12-15 PROCEDURE — 25810000003 SODIUM CHLORIDE 0.9 % SOLUTION: Performed by: INTERNAL MEDICINE

## 2023-12-15 PROCEDURE — 96374 THER/PROPH/DIAG INJ IV PUSH: CPT

## 2023-12-15 PROCEDURE — 96365 THER/PROPH/DIAG IV INF INIT: CPT

## 2023-12-15 PROCEDURE — 25010000002 IRON SUCROSE PER 1 MG: Performed by: INTERNAL MEDICINE

## 2023-12-15 RX ORDER — SODIUM CHLORIDE 9 MG/ML
20 INJECTION, SOLUTION INTRAVENOUS ONCE
Status: COMPLETED | OUTPATIENT
Start: 2023-12-15 | End: 2023-12-15

## 2023-12-15 RX ADMIN — IRON SUCROSE 200 MG: 20 INJECTION, SOLUTION INTRAVENOUS at 10:08

## 2023-12-15 RX ADMIN — SODIUM CHLORIDE 20 ML/HR: 9 INJECTION, SOLUTION INTRAVENOUS at 10:08

## 2023-12-15 NOTE — PROGRESS NOTES
Patient is here for venofer 200mg. Patient has tolerated previous treatments with no issues. PIV started by Cassi PABLO RN and positive blood return noted. Patient tolerated treatment and opted not to wait the 30 min observation time. Patient was discharged and did not want a copy of her AVS but was aware of her next appointment.

## 2023-12-18 ENCOUNTER — TELEPHONE (OUTPATIENT)
Dept: ONCOLOGY | Facility: CLINIC | Age: 68
End: 2023-12-18
Payer: MEDICARE

## 2023-12-18 ENCOUNTER — TELEPHONE (OUTPATIENT)
Dept: FAMILY MEDICINE CLINIC | Facility: CLINIC | Age: 68
End: 2023-12-18
Payer: MEDICARE

## 2023-12-18 NOTE — TELEPHONE ENCOUNTER
AFTER SPEAKING WITH DR. GARRETT, I CONTACTED [place]. I INFORMED HER THAT PER DR. GARRETT, HE STATED HE IS AGREEABLE TO SIGNING OFF ON HOME HEALTH ORDERS UNTIL SHE IS ESTABLISHED WITH A NEW PCP. SHE VOICED UNDERSTANDING. I ASKED HER TO FAX US THE ORDERS SHE NEEDS SIGNED. PROVIDED HER WITH THE FAX NUMBER TO OUR OFFICE. SHE VOICED UNDERSTANDING. NO FURTHER QUESTIONS AT THIS TIME.

## 2023-12-18 NOTE — TELEPHONE ENCOUNTER
Caller: KRISTIN    Relationship: [facility]    Best call back number: [phone]     Who are you requesting to speak with (clinical staff, provider,  specific staff member): CLINICAL      What was the call regarding: PATIENT IS CURRENTLY BETWEEN PCP. CALLING TO REQUEST IF MD GARRETT CAN SIGN OFF ON HOME HEALTH ORDERS UNTIL PATIENT IS ESTABLISHED WITH NEW PCP

## 2023-12-18 NOTE — TELEPHONE ENCOUNTER
Caller: [facility]    Relationship: KRISTIN [place] call back number: [phone]     What orders are you requesting (i.e. lab or imaging):  HOME HEALTH AIDE  ([place])    In what timeframe would the patient need to come in: ASAP    Where will you receive your lab/imaging services:        
HUB TO RELAY     PATIENT IS NO LONGER A PATIENT HERE WE CAN NOT GIVE ORDERS ANYMORE  
no

## 2023-12-22 ENCOUNTER — HOSPITAL ENCOUNTER (OUTPATIENT)
Dept: ONCOLOGY | Facility: HOSPITAL | Age: 68
Discharge: HOME | End: 2023-12-22
Payer: MEDICARE

## 2023-12-22 ENCOUNTER — APPOINTMENT (OUTPATIENT)
Dept: LAB | Facility: HOSPITAL | Age: 68
End: 2023-12-22
Payer: MEDICARE

## 2023-12-22 ENCOUNTER — APPOINTMENT (OUTPATIENT)
Dept: ONCOLOGY | Facility: HOSPITAL | Age: 68
End: 2023-12-22
Payer: MEDICARE

## 2023-12-22 VITALS
HEART RATE: 65 BPM | TEMPERATURE: 98 F | WEIGHT: 132 LBS | SYSTOLIC BLOOD PRESSURE: 127 MMHG | HEIGHT: 60 IN | DIASTOLIC BLOOD PRESSURE: 74 MMHG | BODY MASS INDEX: 25.91 KG/M2 | RESPIRATION RATE: 14 BRPM | OXYGEN SATURATION: 90 %

## 2023-12-22 DIAGNOSIS — E61.1 IRON DEFICIENCY: Primary | ICD-10-CM

## 2023-12-22 PROCEDURE — 25810000003 SODIUM CHLORIDE 0.9 % SOLUTION: Performed by: INTERNAL MEDICINE

## 2023-12-22 PROCEDURE — 25010000002 IRON SUCROSE PER 1 MG: Performed by: INTERNAL MEDICINE

## 2023-12-22 PROCEDURE — 96365 THER/PROPH/DIAG IV INF INIT: CPT

## 2023-12-22 RX ORDER — SODIUM CHLORIDE 9 MG/ML
20 INJECTION, SOLUTION INTRAVENOUS ONCE
Status: COMPLETED | OUTPATIENT
Start: 2023-12-22 | End: 2023-12-22

## 2023-12-22 RX ADMIN — SODIUM CHLORIDE 20 ML/HR: 9 INJECTION, SOLUTION INTRAVENOUS at 09:40

## 2023-12-22 RX ADMIN — IRON SUCROSE 200 MG: 20 INJECTION, SOLUTION INTRAVENOUS at 09:40

## 2023-12-22 NOTE — PROGRESS NOTES
Pt here for Venofer 200. Pt states she need the infusion to run a little slower because she had cramps when it was infused over 15 minutes and states she felt dizzy. Pt reports she tolerated it when it was infused over 20 minutes. Infusion was over 20 minutes and pt tolerated well with no difficulties. Pt declined to stay for 30 minute observation period. Vitals WDL. Pt discharged without AVS.

## 2023-12-29 ENCOUNTER — HOSPITAL ENCOUNTER (OUTPATIENT)
Dept: ONCOLOGY | Facility: HOSPITAL | Age: 68
Discharge: HOME | End: 2023-12-29
Payer: MEDICARE

## 2023-12-29 VITALS
HEART RATE: 71 BPM | OXYGEN SATURATION: 99 % | WEIGHT: 132.4 LBS | HEIGHT: 60 IN | DIASTOLIC BLOOD PRESSURE: 78 MMHG | TEMPERATURE: 98.2 F | RESPIRATION RATE: 16 BRPM | SYSTOLIC BLOOD PRESSURE: 134 MMHG | BODY MASS INDEX: 26 KG/M2

## 2023-12-29 DIAGNOSIS — E61.1 IRON DEFICIENCY: Primary | ICD-10-CM

## 2023-12-29 PROCEDURE — 25010000002 IRON SUCROSE PER 1 MG: Performed by: INTERNAL MEDICINE

## 2023-12-29 PROCEDURE — 25810000003 SODIUM CHLORIDE 0.9 % SOLUTION: Performed by: INTERNAL MEDICINE

## 2023-12-29 PROCEDURE — 96365 THER/PROPH/DIAG IV INF INIT: CPT

## 2023-12-29 RX ORDER — SODIUM CHLORIDE 9 MG/ML
20 INJECTION, SOLUTION INTRAVENOUS ONCE
Status: COMPLETED | OUTPATIENT
Start: 2023-12-29 | End: 2023-12-29

## 2023-12-29 RX ADMIN — SODIUM CHLORIDE 20 ML/HR: 9 INJECTION, SOLUTION INTRAVENOUS at 09:00

## 2023-12-29 RX ADMIN — IRON SUCROSE 200 MG: 20 INJECTION, SOLUTION INTRAVENOUS at 09:04

## 2024-01-05 ENCOUNTER — HOSPITAL ENCOUNTER (OUTPATIENT)
Dept: ONCOLOGY | Facility: HOSPITAL | Age: 69
Discharge: HOME | End: 2024-01-05
Payer: MEDICARE

## 2024-01-05 VITALS
DIASTOLIC BLOOD PRESSURE: 61 MMHG | SYSTOLIC BLOOD PRESSURE: 130 MMHG | WEIGHT: 131.7 LBS | HEART RATE: 62 BPM | RESPIRATION RATE: 16 BRPM | BODY MASS INDEX: 25.86 KG/M2 | OXYGEN SATURATION: 98 % | HEIGHT: 60 IN | TEMPERATURE: 97 F

## 2024-01-05 DIAGNOSIS — E61.1 IRON DEFICIENCY: Primary | ICD-10-CM

## 2024-01-05 PROCEDURE — 25010000002 IRON SUCROSE PER 1 MG: Performed by: INTERNAL MEDICINE

## 2024-01-05 PROCEDURE — 25810000003 SODIUM CHLORIDE 0.9 % SOLUTION: Performed by: INTERNAL MEDICINE

## 2024-01-05 PROCEDURE — 96365 THER/PROPH/DIAG IV INF INIT: CPT

## 2024-01-05 RX ORDER — SODIUM CHLORIDE 9 MG/ML
20 INJECTION, SOLUTION INTRAVENOUS ONCE
Status: COMPLETED | OUTPATIENT
Start: 2024-01-05 | End: 2024-01-05

## 2024-01-05 RX ADMIN — IRON SUCROSE 200 MG: 20 INJECTION, SOLUTION INTRAVENOUS at 09:31

## 2024-01-05 RX ADMIN — SODIUM CHLORIDE 20 ML/HR: 9 INJECTION, SOLUTION INTRAVENOUS at 09:31

## 2024-01-25 ENCOUNTER — APPOINTMENT (OUTPATIENT)
Dept: VASCULAR SURGERY | Facility: HOSPITAL | Age: 69
End: 2024-01-25
Payer: MEDICARE

## 2024-01-25 ENCOUNTER — HOSPITAL ENCOUNTER (OUTPATIENT)
Dept: CARDIOLOGY | Facility: HOSPITAL | Age: 69
Discharge: HOME | End: 2024-01-25
Payer: MEDICARE

## 2024-01-25 DIAGNOSIS — R09.89 BRUIT: ICD-10-CM

## 2024-01-25 LAB
BH CV XLRA MEAS LEFT DIST CCA EDV: 18 CM/SEC
BH CV XLRA MEAS LEFT DIST CCA PSV: 88.6 CM/SEC
BH CV XLRA MEAS LEFT DIST ICA EDV: -26.7 CM/SEC
BH CV XLRA MEAS LEFT DIST ICA PSV: -103 CM/SEC
BH CV XLRA MEAS LEFT ICA/CCA RATIO: 1.76
BH CV XLRA MEAS LEFT PROX CCA EDV: 23 CM/SEC
BH CV XLRA MEAS LEFT PROX CCA PSV: 132 CM/SEC
BH CV XLRA MEAS LEFT PROX ECA PSV: -135 CM/SEC
BH CV XLRA MEAS LEFT PROX ICA EDV: 38.4 CM/SEC
BH CV XLRA MEAS LEFT PROX ICA PSV: 232 CM/SEC
BH CV XLRA MEAS LEFT PROX SCLA PSV: 203 CM/SEC
BH CV XLRA MEAS LEFT VERTEBRAL A PSV: -87.3 CM/SEC
BH CV XLRA MEAS RIGHT DIST CCA EDV: -25.2 CM/SEC
BH CV XLRA MEAS RIGHT DIST CCA PSV: -124 CM/SEC
BH CV XLRA MEAS RIGHT DIST ICA EDV: -22.4 CM/SEC
BH CV XLRA MEAS RIGHT DIST ICA PSV: -97.4 CM/SEC
BH CV XLRA MEAS RIGHT ICA/CCA RATIO: 1.47
BH CV XLRA MEAS RIGHT PROX CCA EDV: -11.6 CM/SEC
BH CV XLRA MEAS RIGHT PROX CCA PSV: -81.3 CM/SEC
BH CV XLRA MEAS RIGHT PROX ECA PSV: 200 CM/SEC
BH CV XLRA MEAS RIGHT PROX ICA EDV: -37.8 CM/SEC
BH CV XLRA MEAS RIGHT PROX ICA PSV: -182 CM/SEC
BH CV XLRA MEAS RIGHT PROX SCLA PSV: 213 CM/SEC
BH CV XLRA MEAS RIGHT VERTEBRAL A PSV: 42.1 CM/SEC
LEFT ARM BP: NORMAL MMHG
RIGHT ARM BP: NORMAL MMHG

## 2024-01-25 PROCEDURE — 93880 EXTRACRANIAL BILAT STUDY: CPT

## 2024-01-25 PROCEDURE — G0463 HOSPITAL OUTPT CLINIC VISIT: HCPCS

## 2024-02-02 RX ORDER — PRAVASTATIN SODIUM 40 MG
TABLET ORAL
Qty: 90 TABLET | Refills: 0 | OUTPATIENT
Start: 2024-02-02

## 2024-02-14 RX ORDER — FERROUS SULFATE 325(65) MG
TABLET ORAL
Qty: 90 TABLET | Refills: 1 | OUTPATIENT
Start: 2024-02-14

## 2024-03-01 RX ORDER — FUROSEMIDE 20 MG/1
TABLET ORAL
Qty: 45 TABLET | Refills: 1 | OUTPATIENT
Start: 2024-03-01

## 2024-04-24 ENCOUNTER — OFFICE VISIT (OUTPATIENT)
Dept: CARDIOLOGY | Facility: CLINIC | Age: 69
End: 2024-04-24
Payer: MEDICARE

## 2024-04-24 VITALS
HEART RATE: 63 BPM | HEIGHT: 60 IN | BODY MASS INDEX: 25.91 KG/M2 | DIASTOLIC BLOOD PRESSURE: 64 MMHG | SYSTOLIC BLOOD PRESSURE: 128 MMHG | WEIGHT: 132 LBS | OXYGEN SATURATION: 100 %

## 2024-04-24 DIAGNOSIS — I44.7 LEFT BUNDLE BRANCH BLOCK: ICD-10-CM

## 2024-04-24 DIAGNOSIS — I73.9 PERIPHERAL VASCULAR DISEASE: ICD-10-CM

## 2024-04-24 DIAGNOSIS — I25.10 CORONARY ARTERY DISEASE INVOLVING NATIVE CORONARY ARTERY OF NATIVE HEART WITHOUT ANGINA PECTORIS: Primary | ICD-10-CM

## 2024-04-24 NOTE — PROGRESS NOTES
Progress note      Name: Nancy Suárez ADMIT: (Not on file)   : 1955  PCP: Marina Cruz MD    MRN: [id number] LOS: 0 days   AGE/SEX: 68 y.o. female  ROOM: Room/bed info not found     No chief complaint on file.      Subjective       History of present illness  Nancy Suárez is a 68-year-old female patient who has history of coronary artery disease with chronically occluded RCA with left-to-right collaterals on heart cath in 2018, has carotid disease followed by vascular surgery, hypertension, active smoking, TIA, here today for follow-up.   She is doing well denies any chest pain or shortness of breath, no palpitations no lower extremity edema no syncopal episodes.    Past Medical History:   Diagnosis Date    Artery stenosis     Bilateral subclavian s/p stent     Arthritis     AVM (arteriovenous malformation)     CAD (coronary artery disease)     CKD (chronic kidney disease)     Stage three     COPD (chronic obstructive pulmonary disease)     DM2 (diabetes mellitus, type 2)     Eye pressure     Elevated eye pressure    GERD (gastroesophageal reflux disease)     Gout     Hepatitis     Hepatitis c     HTN (hypertension)     Hyperlipidemia     Hyperparathyroidism     KIRIT (iron deficiency anemia)     Iron deficiency anemia     Myocardial infarction     Osteoporosis     PVD (peripheral vascular disease)     Stroke     TIA     Past Surgical History:   Procedure Laterality Date    CATARACT EXTRACTION Left     CHOLECYSTECTOMY      COLONOSCOPY N/A 2023    Procedure: COLONOSCOPY with biopsy x 2 areas and polypectomy x 4 and cautery of polyps x 2;  Surgeon: Hero Webster MD;  Location: [facility] ENDOSCOPY;  Service: Gastroenterology;  Laterality: N/A;  post op: polyps, diverticulosis, hemorrhoids    ENDOSCOPY N/A 2023    Procedure: ESOPHAGOGASTRODUODENOSCOPY;  Surgeon: Hero Webster MD;  Location: [facility] ENDOSCOPY;  Service: Gastroenterology;  Laterality: N/A;  post op: erosive gasritis,  small hiatal hernia    EYE SURGERY      TOTAL ABDOMINAL HYSTERECTOMY WITH SALPINGO OOPHORECTOMY       Family History   Problem Relation Age of Onset    Diabetes Mother     Anemia Mother     Heart attack Mother     Heart disease Mother     Heart failure Mother     Hypertension Mother     Coronary artery disease Sister     Diabetes Sister     Breast cancer Paternal Aunt 50     Social History     Tobacco Use    Smoking status: Every Day     Current packs/day: 1.00     Average packs/day: 1 pack/day for 50.0 years (49.7 ttl pk-yrs)     Types: Cigarettes     Start date: 1/1/1975     Passive exposure: Current    Smokeless tobacco: Never   Vaping Use    Vaping status: Never Used   Substance Use Topics    Alcohol use: No    Drug use: No       Current Outpatient Medications:     acetaminophen (TYLENOL) 500 MG tablet, Take 1 tablet by mouth Every 6 (Six) Hours As Needed for Mild Pain., Disp: , Rfl:     albuterol sulfate HFA (ProAir HFA) 108 (90 Base) MCG/ACT inhaler, Inhale 2 puffs Every 4 (Four) Hours As Needed for Wheezing or Shortness of Air. Proair - DX CODE J44.9, Disp: 18 g, Rfl: 4    Alcohol Swabs (B-D SINGLE USE SWABS REGULAR) pads, CHECK BLOOD SUGAR ONE TIME DAILY, Disp: 100 each, Rfl: 6    allopurinol (ZYLOPRIM) 100 MG tablet, Take 1 tablet by mouth Daily., Disp: , Rfl:     amLODIPine (NORVASC) 10 MG tablet, Take 1 tablet by mouth Daily., Disp: 90 tablet, Rfl: 3    aspirin 81 MG tablet, Take 1 tablet by mouth Daily., Disp: , Rfl:     calcitriol (ROCALTROL) 0.25 MCG capsule, Take 1 capsule by mouth Daily., Disp: , Rfl:     carvedilol (COREG) 12.5 MG tablet, TAKE 1 TABLET BY MOUTH TWICE A DAY, Disp: 180 tablet, Rfl: 1    Cholecalciferol 1000 units capsule, Take 1 capsule by mouth Daily., Disp: , Rfl:     colestipol (COLESTID) 1 g tablet, Take 1 tablet by mouth 2 (Two) Times a Day., Disp: , Rfl:     Cyanocobalamin (B-12) 1000 MCG capsule, Take 1 tablet by mouth Daily., Disp: , Rfl:     ferrous sulfate 325 (65 FE) MG  tablet, TAKE 1 TABLET BY MOUTH EVERY DAY WITH BREAKFAST, Disp: 90 tablet, Rfl: 1    furosemide (LASIX) 20 MG tablet, TAKE 1 TABLET BY MOUTH EVERY OTHER DAY, Disp: 45 tablet, Rfl: 1    hydrALAZINE (APRESOLINE) 25 MG tablet, Take 1 tablet by mouth 2 (Two) Times a Day. Please schedule appt to follow up, Disp: 180 tablet, Rfl: 1    ipratropium-albuterol (DUO-NEB) 0.5-2.5 mg/3 ml nebulizer, TAKE 3 ML BY NEBULIZATION EVERY 4 HOURS AS NEEDED FOR WHEEZE, Disp: 180 mL, Rfl: 3    latanoprost (XALATAN) 0.005 % ophthalmic solution, , Disp: , Rfl:     Omega-3 Fatty Acids (FISH OIL) 1000 MG capsule capsule, Take 1 capsule by mouth 2 (Two) Times a Day With Meals., Disp: , Rfl:     omeprazole (priLOSEC) 40 MG capsule, Take 1 capsule by mouth Daily., Disp: , Rfl:     potassium chloride 10 MEQ CR tablet, Take 1 tablet by mouth Every Other Day. TAKE 1 TABLET BY MOUTH EVERY OTHER DAY, Disp: 60 tablet, Rfl: 1    pravastatin (PRAVACHOL) 40 MG tablet, TAKE 1 TABLET BY MOUTH EVERY DAY, Disp: 90 tablet, Rfl: 0  Allergies:  Patient has no known allergies.      Physical Exam  VITALS REVIEWED    General:      well developed, in no acute distress.    Head:      normocephalic and atraumatic.    Eyes:      PERRL/EOM intact, conjunctiva and sclera clear with out nystagmus.    Neck:      no masses, thyromegaly,  trachea central with normal respiratory effort and PMI displaced laterally  Lungs:      Clear to auscultation bilaterally  Heart:       Regular rate and rhythm  Msk:      no deformity or scoliosis noted of thoracic or lumbar spine.    Pulses:      pulses normal in all 4 extremities.    Extremities:       No lower extremity edema  Neurologic:      no focal deficits.   alert oriented x3  Skin:      intact without lesions or rashes.    Psych:      alert and cooperative; normal mood and affect; normal attention span and concentration.      Result Review :               Pertinent cardiac workup    EKG 2/24/2024 sinus rhythm, incomplete left  bundle branch block  Echocardiogram 6/5/2020 oh low normal at 50%  Holter monitor for 17 days starting on 1/17/2023 no atrial fibrillation.      Procedures        Assessment and Plan      Nancy Suárez is a 68-year-old female patient who has history of coronary artery disease with occluded RCA, hypertension, peripheral vascular disease, smoking, TIA, here today for follow-up.  Patient denies any anginal symptoms or CHF symptoms at this time.   Her blood pressure is well-controlled also.  Her EKG is unchanged.  Will see her in follow-up in 1 year.    Diagnoses and all orders for this visit:    1. Coronary artery disease involving native coronary artery of native heart without angina pectoris (Primary)    2. Left bundle branch block    3. Peripheral vascular disease           No follow-ups on file.  Patient was given instructions and counseling regarding her condition or for health maintenance advice. Please see specific information pulled into the AVS if appropriate.

## 2024-04-25 RX ORDER — FERROUS SULFATE 325(65) MG
TABLET ORAL
Qty: 90 TABLET | Refills: 3 | Status: SHIPPED | OUTPATIENT
Start: 2024-04-25

## 2024-04-26 NOTE — PROGRESS NOTES
"                         HEMATOLOGY ONCOLOGY OUTPATIENT FOLLOW-UP       Patient name: Nancy Suárez  : 1955  MRN: [id number]  Primary Care Physician: Marina Cruz MD  Referring Physician: Provider, No Known  Reason For Consult: Anemia.     History of Present Illness:  Patient is a 68 y.o.     2023: Ms. Suárez came seeking attention about anemia, that she reported had been a recurring issue for her. She described being tired, \"I'm drained\". Unsteady on her feet and unable to do much activity. No other symptoms but she was concerned that her blood pressure had been in the low 100's over 60's. 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 She had also a history of chronic renal disease and in the chemistry closest to this visit she had an estimated glomerular filtration rate of about 33 ml/min. In 2023 her hemoglobin was 11 g/dl with mean corpuscular volume of 99 fL. However a blood count done closer to the time of this visit had reported a hemoglobin of 7.9 g/dl with mean corpuscular volume of 105 fL. The white cell count and the platelet count were unremarkable. On exam she was found to appear much older than the stated age. Neither pale nor jaundiced. Well hydrated. No oral lesions and no palpable adenopathy. Lungs markedly diminished bilaterally but clear. Heart regular. Abdomen rounded and protuberant but soft and not tender; no hepatomegaly or splenomegaly. No edema. Her social and family history were reviewed. Given her history of chronic gastrointestinal bleeding it is very possible that she indeed has iron deficiency anemia. The macrocytosis was surprising in this setting, and could be related to other nutritional deficiencies or to hemolysis and was to be investigated. I asked her to return to see me in one week with " results.     6/20/2023: Underwent laboratory exams.  Still feeling tired.  Eating reasonably well and afebrile.  No bleeding.  On exam pale.  Respirations labored with minimal exertion.  Lungs diminished.  Heart regular.  The laboratory exams revealed an elevated glucose and abnormal renal function with an estimated glomerular filtration rate of 35 mL/min.  Liver enzymes were however unremarkable.  White cell count was normal at 5100/mm³ with a rather unremarkable differential count.  The platelets were also within the normal range, at 213,000/mm³.  The hemoglobin, however, it was low at 7.9 g/dL with a mean corpuscular volume of 107.6 fL.  Her iron profile revealed a normal iron binding capacity at 367 mcg/dL with an elevated iron at 303 mcg/dL and abnormally elevated saturation at 83%.  Somewhat surprisingly the ferritin was not particularly elevated at 33 ng/mL.  Folic acid was unremarkable but the vitamin B12 was elevated at 1642 pg/mL.  BLAS was negative but erythropoietin was elevated at 191.6 µIU/mL.  With this picture it appeared as though she had ineffective erythropoiesis and in order to confirm this diagnosis it was felt necessary to obtain a bone marrow biopsy.  The role of the bone marrow and the procedure of taking a sample was discussed with her.  She reported that she had undergone one in the past but was not sure of where it was done.    7/25/2023: Feeling about the same. Had no laboratory exams. Tired but no more than before. Denies anorexia. No fevers. Denied chest pain or cough. No abdominal pain or diarrhea and no dysuria. On exam no changes. The bone marrow biopsy is unremarkable and no cytogenetic abnormalities were documented. I've asked her to have a blood count today and see me in a few weeks with new laboratory exams.     8/8/2023: Without new symptoms.  Still tired.  Smoking less.  Eating reasonably well and weight stable.  Afebrile.  On exam pale, chronically ill-appearing but in no  "distress.  No jaundice but pale.  Well-hydrated.  Lungs diminished.  Heart regular.  No edema.  Laboratory exams revealed persistent anemia again with macrocytosis.  Her kidney function had remained low with a creatinine of 1.71 mg/dL for an estimated glomerular filtration rate of 32 mL/min.  The iron studies were more consistent with anemia of chronic renal disease with a total iron binding capacity at low normal of 298 mcg/dL.  The unbound iron binding capacity was diminished at 68 mcg/dL but the iron saturation was increased at 77%.  The bone marrow revealed absent iron stores.  This was more suggestive of iron deficiency rather than of an accumulation.  A decision was made to start treatment with erythropoietin.  This was based on the clear evidence of anemia of chronic kidney disease.  In addition I requested soluble transferrin receptor testing to investigate further the possibility of iron deficiency.    8/29/2023: Feeling reasonably well and much stronger than before.  Had not received any injections of erythropoietin in spite of which her hemoglobin had increased to 10.5 g/dL.  She was eating well.  Still smoking but continued to work at cutting back.  No chest pains or cough.  No abdominal pain or diarrhea and no dysuria.  No peripheral edema no skin rash.  On exam alert, conversant and chronically ill-appearing.  No jaundice.  Lungs diminished bilaterally.  Heart regular.  Abdomen soft.  No edema.  Laboratory exams reviewed.  Plans to continue to monitor her blood count and to proceed with erythropoietin as needed to maintain a hemoglobin of at least 10 g/dL were discussed.    10/24/2023: Feeling much better.  \"I feel better than I have felt in a long time\".  Trying to stop smoking.  Eating well.  No weight loss.  Persists with dyspnea.  No chest pains.  On exam chronically ill-appearing.  Lungs markedly diminished but symmetrical.  Heart regular.  Abdomen soft.  No edema.  Laboratory exams reviewed.  " Persists with macrocytic anemia.  Hemoglobin today was 10.7 g/dL with mean corpuscular volume of 101 fL.  This most likely is a reflection of her chronic kidney disease.  At this point no need for intervention but we will continue to follow.  I have asked her to return to see me in 6 months.    4/30/2024: Feeling well and without new symptoms. Energetic and with good appetite. No chest pain or cough and no abdominal pain or diarrhea. On exam alert and conversant. No jaundice and no oral lesions. Respirations not labored. Lungs diminished bilaterally and the heart is regular. Abdomen is soft and not tender. No edema. Reviewed the laboratory exams available and discussed with her at length. For now no need for intervention, particularly because she feels so well. Will recheck laboratory exams today and will see in a few months.     Past Medical History:   Diagnosis Date    Artery stenosis     Bilateral subclavian s/p stent     Arthritis     AVM (arteriovenous malformation)     CAD (coronary artery disease)     CKD (chronic kidney disease)     Stage three     COPD (chronic obstructive pulmonary disease)     DM2 (diabetes mellitus, type 2)     Eye pressure     Elevated eye pressure    GERD (gastroesophageal reflux disease)     Gout     Hepatitis     Hepatitis c     HTN (hypertension)     Hyperlipidemia     Hyperparathyroidism     KIRIT (iron deficiency anemia)     Iron deficiency anemia     Myocardial infarction     Osteoporosis     PVD (peripheral vascular disease)     Stroke     TIA     Past Surgical History:   Procedure Laterality Date    CATARACT EXTRACTION Left     CHOLECYSTECTOMY      COLONOSCOPY N/A 01/26/2023    Procedure: COLONOSCOPY with biopsy x 2 areas and polypectomy x 4 and cautery of polyps x 2;  Surgeon: Hero Webster MD;  Location: [facility] ENDOSCOPY;  Service: Gastroenterology;  Laterality: N/A;  post op: polyps, diverticulosis, hemorrhoids    ENDOSCOPY N/A 01/26/2023    Procedure:  ESOPHAGOGASTRODUODENOSCOPY;  Surgeon: Hero Webster MD;  Location: [facility] ENDOSCOPY;  Service: Gastroenterology;  Laterality: N/A;  post op: erosive gasritis, small hiatal hernia    EYE SURGERY      TOTAL ABDOMINAL HYSTERECTOMY WITH SALPINGO OOPHORECTOMY         Current Outpatient Medications:     acetaminophen (TYLENOL) 500 MG tablet, Take 1 tablet by mouth Every 6 (Six) Hours As Needed for Mild Pain., Disp: , Rfl:     albuterol sulfate HFA (ProAir HFA) 108 (90 Base) MCG/ACT inhaler, Inhale 2 puffs Every 4 (Four) Hours As Needed for Wheezing or Shortness of Air. Proair - DX CODE J44.9, Disp: 18 g, Rfl: 4    Alcohol Swabs (B-D SINGLE USE SWABS REGULAR) pads, CHECK BLOOD SUGAR ONE TIME DAILY, Disp: 100 each, Rfl: 6    allopurinol (ZYLOPRIM) 100 MG tablet, Take 1 tablet by mouth Daily., Disp: , Rfl:     amLODIPine (NORVASC) 10 MG tablet, Take 1 tablet by mouth Daily., Disp: 90 tablet, Rfl: 3    aspirin 81 MG tablet, Take 1 tablet by mouth Daily., Disp: , Rfl:     calcitriol (ROCALTROL) 0.25 MCG capsule, Take 1 capsule by mouth Daily., Disp: , Rfl:     carvedilol (COREG) 12.5 MG tablet, TAKE 1 TABLET BY MOUTH TWICE A DAY, Disp: 180 tablet, Rfl: 1    Cholecalciferol 1000 units capsule, Take 1 capsule by mouth Daily., Disp: , Rfl:     colestipol (COLESTID) 1 g tablet, Take 1 tablet by mouth 2 (Two) Times a Day., Disp: , Rfl:     Cyanocobalamin (B-12) 1000 MCG capsule, Take 1 tablet by mouth Daily., Disp: , Rfl:     ferrous sulfate 325 (65 FE) MG tablet, TAKE 1 TABLET BY MOUTH EVERY DAY WITH BREAKFAST, Disp: 90 tablet, Rfl: 3    furosemide (LASIX) 20 MG tablet, TAKE 1 TABLET BY MOUTH EVERY OTHER DAY, Disp: 45 tablet, Rfl: 1    hydrALAZINE (APRESOLINE) 25 MG tablet, Take 1 tablet by mouth 2 (Two) Times a Day. Please schedule appt to follow up, Disp: 180 tablet, Rfl: 1    ipratropium-albuterol (DUO-NEB) 0.5-2.5 mg/3 ml nebulizer, TAKE 3 ML BY NEBULIZATION EVERY 4 HOURS AS NEEDED FOR WHEEZE, Disp: 180 mL, Rfl: 3     latanoprost (XALATAN) 0.005 % ophthalmic solution, , Disp: , Rfl:     Omega-3 Fatty Acids (FISH OIL) 1000 MG capsule capsule, Take 1 capsule by mouth 2 (Two) Times a Day With Meals., Disp: , Rfl:     omeprazole (priLOSEC) 40 MG capsule, Take 1 capsule by mouth Daily., Disp: , Rfl:     potassium chloride 10 MEQ CR tablet, Take 1 tablet by mouth Every Other Day. TAKE 1 TABLET BY MOUTH EVERY OTHER DAY, Disp: 60 tablet, Rfl: 1    pravastatin (PRAVACHOL) 40 MG tablet, TAKE 1 TABLET BY MOUTH EVERY DAY, Disp: 90 tablet, Rfl: 0    No Known Allergies    Family History   Problem Relation Age of Onset    Diabetes Mother     Anemia Mother     Heart attack Mother     Heart disease Mother     Heart failure Mother     Hypertension Mother     Coronary artery disease Sister     Diabetes Sister     Breast cancer Paternal Aunt 50     Cancer-related family history includes Breast cancer (age of onset: 50) in her paternal aunt.    Social History     Tobacco Use    Smoking status: Every Day     Current packs/day: 1.00     Average packs/day: 1 pack/day for 50.0 years (49.7 ttl pk-yrs)     Types: Cigarettes     Start date: 1/1/1975     Passive exposure: Current    Smokeless tobacco: Never   Vaping Use    Vaping status: Never Used   Substance Use Topics    Alcohol use: No    Drug use: No     Social History     Social History Narrative    She is single, retired from [employer], she lives in [place] and has two grown daughters.     ROS:   Review of Systems   Constitutional:  Negative for activity change, appetite change, chills, diaphoresis, fatigue, fever and unexpected weight change.   HENT:  Negative for congestion, dental problem, drooling, ear discharge, ear pain, facial swelling, hearing loss, mouth sores, nosebleeds, postnasal drip, rhinorrhea, sinus pressure, sinus pain, sneezing, sore throat, tinnitus, trouble swallowing and voice change.    Eyes:  Negative for photophobia, pain, discharge, redness, itching and visual  "disturbance.   Respiratory:  Negative for apnea, cough, choking, chest tightness, shortness of breath, wheezing and stridor.    Cardiovascular:  Negative for chest pain, palpitations and leg swelling.   Gastrointestinal:  Negative for abdominal distention, abdominal pain, anal bleeding, blood in stool, constipation, diarrhea, nausea, rectal pain and vomiting.   Endocrine: Negative for cold intolerance, heat intolerance, polydipsia and polyuria.   Genitourinary:  Negative for decreased urine volume, difficulty urinating, dysuria, flank pain, frequency, genital sores, hematuria and urgency.   Musculoskeletal:  Negative for arthralgias, back pain, gait problem, joint swelling, myalgias, neck pain and neck stiffness.   Skin:  Negative for color change, pallor and rash.   Neurological:  Negative for dizziness, tremors, seizures, syncope, facial asymmetry, speech difficulty, weakness, light-headedness, numbness and headaches.   Hematological:  Negative for adenopathy. Does not bruise/bleed easily.   Psychiatric/Behavioral:  Negative for agitation, behavioral problems, confusion, decreased concentration, hallucinations, self-injury, sleep disturbance and suicidal ideas. The patient is not nervous/anxious.      Objective:    Vital Signs:  Vitals:    04/30/24 1003   Pulse: 64   Temp: 97.3 °F (36.3 °C)   SpO2: 95%   Weight: 59.9 kg (132 lb)   Height: 152.4 cm (60\")   PainSc: 0-No pain     Body mass index is 25.78 kg/m².    ECOG  (1) Restricted in physically strenuous activity, ambulatory and able to do work of light nature    Physical Exam:   Physical Exam  Constitutional:       General: She is not in acute distress.     Appearance: She is not ill-appearing, toxic-appearing or diaphoretic.      Comments: Slender, well-built.  Alert, well oriented and in good spirits.  Seems chronically ill.  No distress.   HENT:      Head: Normocephalic and atraumatic.      Right Ear: External ear normal.      Left Ear: External ear normal. "      Nose: Nose normal.      Mouth/Throat:      Mouth: Mucous membranes are moist.      Pharynx: Oropharynx is clear. No oropharyngeal exudate or posterior oropharyngeal erythema.   Eyes:      General: No scleral icterus.        Right eye: No discharge.         Left eye: No discharge.      Conjunctiva/sclera: Conjunctivae normal.      Pupils: Pupils are equal, round, and reactive to light.   Cardiovascular:      Rate and Rhythm: Normal rate and regular rhythm.      Pulses: Normal pulses.      Heart sounds: No murmur heard.     No friction rub. No gallop.   Pulmonary:      Effort: No respiratory distress.      Breath sounds: No stridor. No wheezing, rhonchi or rales.      Comments: Symmetrically diminished to auscultation.  Abdominal:      General: Bowel sounds are normal. There is no distension.      Palpations: Abdomen is soft. There is no mass.      Tenderness: There is no abdominal tenderness. There is no right CVA tenderness, left CVA tenderness, guarding or rebound.      Hernia: No hernia is present.      Comments: Soft and nontender.   Musculoskeletal:         General: No swelling, tenderness, deformity or signs of injury.      Cervical back: No rigidity.      Right lower leg: No edema.      Left lower leg: No edema.   Lymphadenopathy:      Cervical: No cervical adenopathy.   Skin:     Coloration: Skin is not jaundiced.      Findings: No bruising, lesion or rash.   Neurological:      General: No focal deficit present.      Mental Status: She is alert and oriented to person, place, and time.      Cranial Nerves: No cranial nerve deficit.      Motor: No weakness.      Gait: Gait normal.   Psychiatric:         Mood and Affect: Mood normal.         Behavior: Behavior normal.         Thought Content: Thought content normal.         Judgment: Judgment normal.     I Jose Campos MD performed the physical exam on 4/30/2024 as documented above.     Lab Results - Last 18 Months   Lab Units 08/21/23  1004  07/25/23  1203 03/30/23  1058   SODIUM mmol/L 139 137 139   POTASSIUM mmol/L 4.5 4.3 4.4   CHLORIDE mmol/L 101 100 102   CO2 mmol/L 26.0 22 20   BUN mg/dL 21 22 18   CREATININE mg/dL 1.43* 1.71* 1.38*   CALCIUM mg/dL 9.5 8.8 8.7   BILIRUBIN mg/dL <0.2 <0.2 <0.2   ALK PHOS U/L 69 64 75   ALT (SGPT) U/L 9 9 8   AST (SGOT) U/L 15 17 12   GLUCOSE mg/dL 134* 103* 156*     Lab Results   Component Value Date    GLUCOSE 134 (H) 08/21/2023    BUN 21 08/21/2023    CREATININE 1.43 (H) 08/21/2023    EGFRIFNONA 30 (L) 01/14/2022    EGFRIFAFRI 35 (L) 01/14/2022    BCR 14.7 08/21/2023    K 4.5 08/21/2023    CO2 26.0 08/21/2023    CALCIUM 9.5 08/21/2023    PROTENTOTREF 6.2 07/25/2023    ALBUMIN 4.0 08/21/2023    LABIL2 2.1 07/25/2023    AST 15 08/21/2023    ALT 9 08/21/2023     Lab Results   Component Value Date    IRON 12 (L) 11/21/2023    TIBC 255 (L) 11/21/2023    FERRITIN 39.49 11/21/2023     Lab Results   Component Value Date    FOLATE 21.4 02/27/2019     Lab Results   Component Value Date    RETICCTPCT 3.4 03/05/2018     Lab Results   Component Value Date    IGNIFXWJ90 >2000 pg/mL (H) 02/27/2019     Uric Acid   Date Value Ref Range Status   08/27/2019 6.2 2.5 - 7.1 mg/dL Final     Comment:                Therapeutic target for gout patients: <6.0     Lab Results   Component Value Date    SEDRATE 33 01/14/2022     Lab Results   Component Value Date    SEDRATE 33 01/14/2022      Assessment & Plan     Macrocytic anemia: Laboratory exams consistent with iron deficiency as the soluble transferrin receptor was highly elevated.  She had a good response to treatment with intravenous iron. Improved sense of well being. No change for now.   Reviewed the laboratory exams and discussed with her at length.   3.  Cigarette smoker: No change.   4.  She's to see me in 6 months.     Jose Campos MD on 4/30/2024 at 11:29 AM.

## 2024-04-30 ENCOUNTER — OFFICE VISIT (OUTPATIENT)
Dept: ONCOLOGY | Facility: CLINIC | Age: 69
End: 2024-04-30
Payer: MEDICARE

## 2024-04-30 VITALS
HEIGHT: 60 IN | HEART RATE: 64 BPM | OXYGEN SATURATION: 95 % | TEMPERATURE: 97.3 F | WEIGHT: 132 LBS | BODY MASS INDEX: 25.91 KG/M2

## 2024-04-30 DIAGNOSIS — N18.30 ANEMIA OF CHRONIC KIDNEY FAILURE, STAGE 3 (MODERATE): ICD-10-CM

## 2024-04-30 DIAGNOSIS — D63.1 ANEMIA OF CHRONIC KIDNEY FAILURE, STAGE 3 (MODERATE): ICD-10-CM

## 2024-04-30 DIAGNOSIS — E61.1 IRON DEFICIENCY: Primary | ICD-10-CM

## 2024-04-30 PROCEDURE — 1159F MED LIST DOCD IN RCRD: CPT | Performed by: INTERNAL MEDICINE

## 2024-04-30 PROCEDURE — 99213 OFFICE O/P EST LOW 20 MIN: CPT | Performed by: INTERNAL MEDICINE

## 2024-04-30 PROCEDURE — 1126F AMNT PAIN NOTED NONE PRSNT: CPT | Performed by: INTERNAL MEDICINE

## 2024-04-30 PROCEDURE — 1160F RVW MEDS BY RX/DR IN RCRD: CPT | Performed by: INTERNAL MEDICINE

## 2024-05-01 LAB
ALBUMIN SERPL-MCNC: 4.1 G/DL (ref 3.9–4.9)
ALBUMIN/GLOB SERPL: 2 {RATIO} (ref 1.2–2.2)
ALP SERPL-CCNC: 64 IU/L (ref 44–121)
ALT SERPL-CCNC: 11 IU/L (ref 0–32)
AMBIG ABBREV CMP14 DEFAULT: NORMAL
AST SERPL-CCNC: 14 IU/L (ref 0–40)
BASOPHILS # BLD AUTO: 0 X10E3/UL (ref 0–0.2)
BASOPHILS NFR BLD AUTO: 0 %
BILIRUB SERPL-MCNC: <0.2 MG/DL (ref 0–1.2)
BUN SERPL-MCNC: 21 MG/DL (ref 8–27)
BUN/CREAT SERPL: 14 (ref 12–28)
CALCIUM SERPL-MCNC: 8.9 MG/DL (ref 8.7–10.3)
CHLORIDE SERPL-SCNC: 103 MMOL/L (ref 96–106)
CO2 SERPL-SCNC: 22 MMOL/L (ref 20–29)
CREAT SERPL-MCNC: 1.48 MG/DL (ref 0.57–1)
EGFRCR SERPLBLD CKD-EPI 2021: 38 ML/MIN/1.73
EOSINOPHIL # BLD AUTO: 0.1 X10E3/UL (ref 0–0.4)
EOSINOPHIL NFR BLD AUTO: 2 %
ERYTHROCYTE [DISTWIDTH] IN BLOOD BY AUTOMATED COUNT: 12.1 % (ref 11.7–15.4)
GLOBULIN SER CALC-MCNC: 2.1 G/DL (ref 1.5–4.5)
GLUCOSE SERPL-MCNC: 101 MG/DL (ref 70–99)
HCT VFR BLD AUTO: 34.2 % (ref 34–46.6)
HGB BLD-MCNC: 10.9 G/DL (ref 11.1–15.9)
IMM GRANULOCYTES # BLD AUTO: 0 X10E3/UL (ref 0–0.1)
IMM GRANULOCYTES NFR BLD AUTO: 0 %
LYMPHOCYTES # BLD AUTO: 1.2 X10E3/UL (ref 0.7–3.1)
LYMPHOCYTES NFR BLD AUTO: 26 %
MCH RBC QN AUTO: 33.1 PG (ref 26.6–33)
MCHC RBC AUTO-ENTMCNC: 31.9 G/DL (ref 31.5–35.7)
MCV RBC AUTO: 104 FL (ref 79–97)
MONOCYTES # BLD AUTO: 0.4 X10E3/UL (ref 0.1–0.9)
MONOCYTES NFR BLD AUTO: 9 %
NEUTROPHILS # BLD AUTO: 2.8 X10E3/UL (ref 1.4–7)
NEUTROPHILS NFR BLD AUTO: 63 %
PLATELET # BLD AUTO: 179 X10E3/UL (ref 150–450)
POTASSIUM SERPL-SCNC: 4.3 MMOL/L (ref 3.5–5.2)
PROT SERPL-MCNC: 6.2 G/DL (ref 6–8.5)
RBC # BLD AUTO: 3.29 X10E6/UL (ref 3.77–5.28)
SODIUM SERPL-SCNC: 139 MMOL/L (ref 134–144)
WBC # BLD AUTO: 4.4 X10E3/UL (ref 3.4–10.8)

## 2024-05-31 DIAGNOSIS — I65.23 BILATERAL CAROTID ARTERY OCCLUSION: Primary | ICD-10-CM

## 2024-08-22 ENCOUNTER — TELEPHONE (OUTPATIENT)
Dept: ONCOLOGY | Facility: CLINIC | Age: 69
End: 2024-08-22
Payer: MEDICARE

## 2024-08-22 DIAGNOSIS — E61.1 IRON DEFICIENCY: Primary | ICD-10-CM

## 2024-08-22 NOTE — TELEPHONE ENCOUNTER
Caller: Nancy Suárez    Relationship: Self    Best call back number: [phone]      What was the call regarding: PATIENT THINKS SHE IS LOW ON BLOOD AGAIN, SHE IS VERY WEAK AND FATIGUED. SHE HAS BEEN FEELING THIS WAY FOR A FEW DAYS AND WANTED TO SEE IF DR GARRETT WOULD ORDER SOME LABS AND SCHEDULE AN APPOINTMENT

## 2024-08-22 NOTE — TELEPHONE ENCOUNTER
AFTER SPEAKING WITH DR. GARRETT, I CONTACTED THE PATIENT. I INFORMED HER THAT I RELAYED HER MESSAGE TO DR. GARRETT AND HE REQUESTED THAT SHE COME INTO THE OFFICE TOMORROW TO HAVE HER LABS CHECKED. SHE VOICED UNDERSTANDING. PATIENT APPT FOR LABS SCHEDULED AND PATIENT CONFIRMED APPT TIME. I ADVISED HER TO CONTACT OUR OFFICE IF SHE HAS ANY FURTHER QUESTIONS OR CONCERNS. SHE CONFIRMED.

## 2024-08-23 ENCOUNTER — HOSPITAL ENCOUNTER (OUTPATIENT)
Dept: INFUSION THERAPY | Facility: HOSPITAL | Age: 69
Discharge: HOME | End: 2024-08-23
Payer: MEDICARE

## 2024-08-23 ENCOUNTER — LAB (OUTPATIENT)
Dept: LAB | Facility: HOSPITAL | Age: 69
End: 2024-08-23
Payer: MEDICARE

## 2024-08-23 VITALS
RESPIRATION RATE: 16 BRPM | DIASTOLIC BLOOD PRESSURE: 61 MMHG | TEMPERATURE: 98.1 F | SYSTOLIC BLOOD PRESSURE: 136 MMHG | HEART RATE: 82 BPM | OXYGEN SATURATION: 100 %

## 2024-08-23 DIAGNOSIS — D63.1 ANEMIA OF CHRONIC KIDNEY FAILURE, STAGE 3 (MODERATE): ICD-10-CM

## 2024-08-23 DIAGNOSIS — D63.1 ANEMIA OF CHRONIC KIDNEY FAILURE, STAGE 3 (MODERATE): Primary | ICD-10-CM

## 2024-08-23 DIAGNOSIS — N18.30 ANEMIA OF CHRONIC KIDNEY FAILURE, STAGE 3 (MODERATE): ICD-10-CM

## 2024-08-23 DIAGNOSIS — E61.1 IRON DEFICIENCY: ICD-10-CM

## 2024-08-23 DIAGNOSIS — N18.30 ANEMIA OF CHRONIC KIDNEY FAILURE, STAGE 3 (MODERATE): Primary | ICD-10-CM

## 2024-08-23 LAB
ABO GROUP BLD: NORMAL
BASOPHILS # BLD AUTO: 0.01 10*3/MM3 (ref 0–0.2)
BASOPHILS # BLD AUTO: 0.01 10*3/MM3 (ref 0–0.2)
BASOPHILS NFR BLD AUTO: 0.1 % (ref 0–1.5)
BASOPHILS NFR BLD AUTO: 0.2 % (ref 0–1.5)
BB HOLD TUBE: NORMAL
BLD GP AB SCN SERPL QL: NEGATIVE
DEPRECATED RDW RBC AUTO: 58 FL (ref 37–54)
DEPRECATED RDW RBC AUTO: 64.2 FL (ref 37–54)
EOSINOPHIL # BLD AUTO: 0.07 10*3/MM3 (ref 0–0.4)
EOSINOPHIL # BLD AUTO: 0.08 10*3/MM3 (ref 0–0.4)
EOSINOPHIL NFR BLD AUTO: 1.1 % (ref 0.3–6.2)
EOSINOPHIL NFR BLD AUTO: 1.1 % (ref 0.3–6.2)
ERYTHROCYTE [DISTWIDTH] IN BLOOD BY AUTOMATED COUNT: 15.1 % (ref 12.3–15.4)
ERYTHROCYTE [DISTWIDTH] IN BLOOD BY AUTOMATED COUNT: 15.9 % (ref 12.3–15.4)
FERRITIN SERPL-MCNC: 30.8 NG/ML (ref 13–150)
HCT VFR BLD AUTO: 22.9 % (ref 34–46.6)
HCT VFR BLD AUTO: 24.1 % (ref 34–46.6)
HGB BLD-MCNC: 7 G/DL (ref 12–15.9)
HGB BLD-MCNC: 7.6 G/DL (ref 12–15.9)
IMM GRANULOCYTES # BLD AUTO: 0.03 10*3/MM3 (ref 0–0.05)
IMM GRANULOCYTES NFR BLD AUTO: 0.5 % (ref 0–0.5)
IRON 24H UR-MRATE: 258 MCG/DL (ref 37–145)
IRON SATN MFR SERPL: 70 % (ref 20–50)
LYMPHOCYTES # BLD AUTO: 1.37 10*3/MM3 (ref 0.7–3.1)
LYMPHOCYTES # BLD AUTO: 1.45 10*3/MM3 (ref 0.7–3.1)
LYMPHOCYTES NFR BLD AUTO: 19.1 % (ref 19.6–45.3)
LYMPHOCYTES NFR BLD AUTO: 20.9 % (ref 19.6–45.3)
MCH RBC QN AUTO: 34.1 PG (ref 26.6–33)
MCH RBC QN AUTO: 35.3 PG (ref 26.6–33)
MCHC RBC AUTO-ENTMCNC: 30.6 G/DL (ref 31.5–35.7)
MCHC RBC AUTO-ENTMCNC: 31.5 G/DL (ref 31.5–35.7)
MCV RBC AUTO: 111.7 FL (ref 79–97)
MCV RBC AUTO: 112.1 FL (ref 79–97)
MONOCYTES # BLD AUTO: 0.58 10*3/MM3 (ref 0.1–0.9)
MONOCYTES # BLD AUTO: 0.67 10*3/MM3 (ref 0.1–0.9)
MONOCYTES NFR BLD AUTO: 8.8 % (ref 5–12)
MONOCYTES NFR BLD AUTO: 8.9 % (ref 5–12)
NEUTROPHILS NFR BLD AUTO: 4.49 10*3/MM3 (ref 1.7–7)
NEUTROPHILS NFR BLD AUTO: 5.38 10*3/MM3 (ref 1.7–7)
NEUTROPHILS NFR BLD AUTO: 68.4 % (ref 42.7–76)
NEUTROPHILS NFR BLD AUTO: 70.9 % (ref 42.7–76)
NRBC BLD AUTO-RTO: 0 /100 WBC (ref 0–0.2)
PLATELET # BLD AUTO: 156 10*3/MM3 (ref 140–450)
PLATELET # BLD AUTO: 182 10*3/MM3 (ref 140–450)
PMV BLD AUTO: 10 FL (ref 6–12)
PMV BLD AUTO: 10.1 FL (ref 6–12)
RBC # BLD AUTO: 2.05 10*6/MM3 (ref 3.77–5.28)
RBC # BLD AUTO: 2.15 10*6/MM3 (ref 3.77–5.28)
RH BLD: POSITIVE
T&S EXPIRATION DATE: NORMAL
TIBC SERPL-MCNC: 368 MCG/DL (ref 298–536)
TRANSFERRIN SERPL-MCNC: 247 MG/DL (ref 200–360)
WBC NRBC COR # BLD AUTO: 6.55 10*3/MM3 (ref 3.4–10.8)
WBC NRBC COR # BLD AUTO: 7.59 10*3/MM3 (ref 3.4–10.8)

## 2024-08-23 PROCEDURE — 84466 ASSAY OF TRANSFERRIN: CPT | Performed by: INTERNAL MEDICINE

## 2024-08-23 PROCEDURE — P9016 RBC LEUKOCYTES REDUCED: HCPCS

## 2024-08-23 PROCEDURE — 86900 BLOOD TYPING SEROLOGIC ABO: CPT | Performed by: INTERNAL MEDICINE

## 2024-08-23 PROCEDURE — 85025 COMPLETE CBC W/AUTO DIFF WBC: CPT

## 2024-08-23 PROCEDURE — 82728 ASSAY OF FERRITIN: CPT | Performed by: INTERNAL MEDICINE

## 2024-08-23 PROCEDURE — 86900 BLOOD TYPING SEROLOGIC ABO: CPT

## 2024-08-23 PROCEDURE — 36430 TRANSFUSION BLD/BLD COMPNT: CPT

## 2024-08-23 PROCEDURE — 83540 ASSAY OF IRON: CPT | Performed by: INTERNAL MEDICINE

## 2024-08-23 PROCEDURE — 36415 COLL VENOUS BLD VENIPUNCTURE: CPT

## 2024-08-23 PROCEDURE — 85025 COMPLETE CBC W/AUTO DIFF WBC: CPT | Performed by: INTERNAL MEDICINE

## 2024-08-23 PROCEDURE — 86901 BLOOD TYPING SEROLOGIC RH(D): CPT | Performed by: INTERNAL MEDICINE

## 2024-08-23 PROCEDURE — 86923 COMPATIBILITY TEST ELECTRIC: CPT

## 2024-08-23 PROCEDURE — 86850 RBC ANTIBODY SCREEN: CPT | Performed by: INTERNAL MEDICINE

## 2024-08-23 RX ORDER — SODIUM CHLORIDE 9 MG/ML
250 INJECTION, SOLUTION INTRAVENOUS AS NEEDED
Status: DISCONTINUED | OUTPATIENT
Start: 2024-08-23 | End: 2024-08-25 | Stop reason: HOSPADM

## 2024-08-23 RX ORDER — SODIUM CHLORIDE 9 MG/ML
250 INJECTION, SOLUTION INTRAVENOUS AS NEEDED
Status: CANCELLED | OUTPATIENT
Start: 2024-08-23

## 2024-08-23 NOTE — PROGRESS NOTES
Per Dr. Campos, patient to receive 1 unit PRBC today due to HGB of 7.6. Orders entered and patient informed. Patient v/u.

## 2024-08-27 ENCOUNTER — TELEPHONE (OUTPATIENT)
Dept: ONCOLOGY | Facility: CLINIC | Age: 69
End: 2024-08-27
Payer: MEDICARE

## 2024-08-27 DIAGNOSIS — N18.30 ANEMIA OF CHRONIC KIDNEY FAILURE, STAGE 3 (MODERATE): Primary | ICD-10-CM

## 2024-08-27 DIAGNOSIS — D63.1 ANEMIA OF CHRONIC KIDNEY FAILURE, STAGE 3 (MODERATE): Primary | ICD-10-CM

## 2024-08-27 NOTE — TELEPHONE ENCOUNTER
After speaking with Dr. Campos, I contacted the patient. I informed her per Dr. Campos, her iron levels do not qualify for her to receive iron infusions therefore he would like for her to come back into the office tomorrow to recheck her CBC to check if she needs another blood transfusion or not. She requested to come into the office on Thursday rather than tomorrow. I confirmed. Appt scheduled and patient confirmed appt date/time. No further questions at this time.

## 2024-08-27 NOTE — TELEPHONE ENCOUNTER
Caller: Nancy Suárez    Relationship: Self    Best call back number: [phone]    What is the best time to reach you: ANYTIME    Who are you requesting to speak with (clinical staff, provider,  specific staff member): CLINICAL         What was the call regarding:     HAD SPOKE WITH SOMEONE YESTERDAY, STILL FEELING TIRED AND WEAK AND LEGS FEEL LIKE BRICKS EVEN AFTER HAVING BLOOD TRANSFUSION FRIDAY.     PERSON SPOKE WITH YESTERDAY MORNING SAID DR GARRETT WAS TRYING TO GET HER IN FOR IRON INFUSIONS OR A SHOT AND WOULD CALL BACK     NEVER HEARD BACK YESTERDAY CALLING BACK TO CHECK ON THIS.     Is it okay if the provider responds through [employer]: NO

## 2024-08-29 ENCOUNTER — LAB (OUTPATIENT)
Dept: LAB | Facility: HOSPITAL | Age: 69
End: 2024-08-29
Payer: MEDICARE

## 2024-08-29 ENCOUNTER — HOSPITAL ENCOUNTER (OUTPATIENT)
Dept: INFUSION THERAPY | Facility: HOSPITAL | Age: 69
Discharge: HOME | End: 2024-08-29
Payer: MEDICARE

## 2024-08-29 VITALS
OXYGEN SATURATION: 100 % | DIASTOLIC BLOOD PRESSURE: 53 MMHG | SYSTOLIC BLOOD PRESSURE: 173 MMHG | RESPIRATION RATE: 16 BRPM | HEART RATE: 72 BPM | TEMPERATURE: 97.8 F

## 2024-08-29 DIAGNOSIS — D63.1 ANEMIA OF CHRONIC KIDNEY FAILURE, STAGE 3 (MODERATE): Primary | ICD-10-CM

## 2024-08-29 DIAGNOSIS — N18.30 ANEMIA OF CHRONIC KIDNEY FAILURE, STAGE 3 (MODERATE): ICD-10-CM

## 2024-08-29 DIAGNOSIS — D64.9 SYMPTOMATIC ANEMIA: ICD-10-CM

## 2024-08-29 DIAGNOSIS — N18.30 ANEMIA OF CHRONIC KIDNEY FAILURE, STAGE 3 (MODERATE): Primary | ICD-10-CM

## 2024-08-29 DIAGNOSIS — D63.1 ANEMIA OF CHRONIC KIDNEY FAILURE, STAGE 3 (MODERATE): ICD-10-CM

## 2024-08-29 LAB
ABO GROUP BLD: NORMAL
BASOPHILS # BLD AUTO: 0.02 10*3/MM3 (ref 0–0.2)
BASOPHILS # BLD AUTO: 0.02 10*3/MM3 (ref 0–0.2)
BASOPHILS NFR BLD AUTO: 0.3 % (ref 0–1.5)
BASOPHILS NFR BLD AUTO: 0.3 % (ref 0–1.5)
BB HOLD TUBE: NORMAL
BLD GP AB SCN SERPL QL: NEGATIVE
DEPRECATED RDW RBC AUTO: 63.6 FL (ref 37–54)
DEPRECATED RDW RBC AUTO: 67.1 FL (ref 37–54)
EOSINOPHIL # BLD AUTO: 0.1 10*3/MM3 (ref 0–0.4)
EOSINOPHIL # BLD AUTO: 0.1 10*3/MM3 (ref 0–0.4)
EOSINOPHIL NFR BLD AUTO: 1.4 % (ref 0.3–6.2)
EOSINOPHIL NFR BLD AUTO: 1.4 % (ref 0.3–6.2)
ERYTHROCYTE [DISTWIDTH] IN BLOOD BY AUTOMATED COUNT: 16.5 % (ref 12.3–15.4)
ERYTHROCYTE [DISTWIDTH] IN BLOOD BY AUTOMATED COUNT: 17.1 % (ref 12.3–15.4)
HCT VFR BLD AUTO: 23.1 % (ref 34–46.6)
HCT VFR BLD AUTO: 24.9 % (ref 34–46.6)
HGB BLD-MCNC: 7.1 G/DL (ref 12–15.9)
HGB BLD-MCNC: 7.5 G/DL (ref 12–15.9)
IMM GRANULOCYTES # BLD AUTO: 0.04 10*3/MM3 (ref 0–0.05)
IMM GRANULOCYTES NFR BLD AUTO: 0.6 % (ref 0–0.5)
LYMPHOCYTES # BLD AUTO: 1.35 10*3/MM3 (ref 0.7–3.1)
LYMPHOCYTES # BLD AUTO: 1.57 10*3/MM3 (ref 0.7–3.1)
LYMPHOCYTES NFR BLD AUTO: 19.5 % (ref 19.6–45.3)
LYMPHOCYTES NFR BLD AUTO: 22.6 % (ref 19.6–45.3)
MCH RBC QN AUTO: 32.9 PG (ref 26.6–33)
MCH RBC QN AUTO: 33.3 PG (ref 26.6–33)
MCHC RBC AUTO-ENTMCNC: 30.1 G/DL (ref 31.5–35.7)
MCHC RBC AUTO-ENTMCNC: 30.7 G/DL (ref 31.5–35.7)
MCV RBC AUTO: 106.9 FL (ref 79–97)
MCV RBC AUTO: 110.7 FL (ref 79–97)
MONOCYTES # BLD AUTO: 0.6 10*3/MM3 (ref 0.1–0.9)
MONOCYTES # BLD AUTO: 0.71 10*3/MM3 (ref 0.1–0.9)
MONOCYTES NFR BLD AUTO: 10.3 % (ref 5–12)
MONOCYTES NFR BLD AUTO: 8.6 % (ref 5–12)
NEUTROPHILS NFR BLD AUTO: 4.63 10*3/MM3 (ref 1.7–7)
NEUTROPHILS NFR BLD AUTO: 4.73 10*3/MM3 (ref 1.7–7)
NEUTROPHILS NFR BLD AUTO: 66.5 % (ref 42.7–76)
NEUTROPHILS NFR BLD AUTO: 68.5 % (ref 42.7–76)
NRBC BLD AUTO-RTO: 0 /100 WBC (ref 0–0.2)
PLATELET # BLD AUTO: 155 10*3/MM3 (ref 140–450)
PLATELET # BLD AUTO: 167 10*3/MM3 (ref 140–450)
PMV BLD AUTO: 10.2 FL (ref 6–12)
PMV BLD AUTO: 11 FL (ref 6–12)
RBC # BLD AUTO: 2.16 10*6/MM3 (ref 3.77–5.28)
RBC # BLD AUTO: 2.25 10*6/MM3 (ref 3.77–5.28)
RH BLD: POSITIVE
T&S EXPIRATION DATE: NORMAL
WBC NRBC COR # BLD AUTO: 6.91 10*3/MM3 (ref 3.4–10.8)
WBC NRBC COR # BLD AUTO: 6.96 10*3/MM3 (ref 3.4–10.8)

## 2024-08-29 PROCEDURE — 85025 COMPLETE CBC W/AUTO DIFF WBC: CPT

## 2024-08-29 PROCEDURE — 86923 COMPATIBILITY TEST ELECTRIC: CPT

## 2024-08-29 PROCEDURE — 86900 BLOOD TYPING SEROLOGIC ABO: CPT

## 2024-08-29 PROCEDURE — 36430 TRANSFUSION BLD/BLD COMPNT: CPT

## 2024-08-29 PROCEDURE — P9016 RBC LEUKOCYTES REDUCED: HCPCS

## 2024-08-29 PROCEDURE — 36415 COLL VENOUS BLD VENIPUNCTURE: CPT

## 2024-08-29 PROCEDURE — 85025 COMPLETE CBC W/AUTO DIFF WBC: CPT | Performed by: INTERNAL MEDICINE

## 2024-08-29 PROCEDURE — 86850 RBC ANTIBODY SCREEN: CPT | Performed by: INTERNAL MEDICINE

## 2024-08-29 PROCEDURE — 86901 BLOOD TYPING SEROLOGIC RH(D): CPT | Performed by: INTERNAL MEDICINE

## 2024-08-29 PROCEDURE — 86900 BLOOD TYPING SEROLOGIC ABO: CPT | Performed by: INTERNAL MEDICINE

## 2024-08-29 RX ORDER — SODIUM CHLORIDE 9 MG/ML
250 INJECTION, SOLUTION INTRAVENOUS AS NEEDED
Status: CANCELLED | OUTPATIENT
Start: 2024-08-29

## 2024-08-29 RX ORDER — SODIUM CHLORIDE 9 MG/ML
250 INJECTION, SOLUTION INTRAVENOUS AS NEEDED
Status: DISCONTINUED | OUTPATIENT
Start: 2024-08-29 | End: 2024-08-31 | Stop reason: HOSPADM

## 2024-08-29 NOTE — PROGRESS NOTES
PER DR. GARRETT, PATIENT TO RECEIVE 1 UNIT PRBC TODAY DUE TO HGB OF 7.5 AND SYMPTOMATIC ANEMIA. PATIENT SCHEDULED, INFORMED AND CONFIRMED. NO QUESTIONS AT THIS TIME.

## 2024-08-30 ENCOUNTER — TELEPHONE (OUTPATIENT)
Dept: ONCOLOGY | Facility: CLINIC | Age: 69
End: 2024-08-30
Payer: MEDICARE

## 2024-08-30 NOTE — TELEPHONE ENCOUNTER
Caller: Nancy Suárez    Relationship: Self    Best call back number: [phone]     What is the best time to reach you: ASAP    Who are you requesting to speak with (clinical staff, provider,  specific staff member): CLINICAL      What was the call regarding: PT RECEIVED BLOOD LAST FRIDAY AND AGAIN YESTERDAY, BUT SHE SAYS SHE IS STILL NOT FEELING ANY BETTER, LEGS FEEL HEAVY WHEN WALKING OR GOING UPSTAIRS OR UP A HILL.  ASKING FOR SOMEONE TO GIVE HER A CALL BACK TO ADVISE IF SHE CAN SEE DR GARRETT IN [place] SOON, OR WHAT NEEDS TO BE DONE SINCE THE UNITS OF BLOOD RECEIVED ARE NOT HELPING.

## 2024-08-30 NOTE — TELEPHONE ENCOUNTER
After speaking with Dr. Campos, I contacted the patient. I informed her per Dr. Campos, he would like to see her for a follow up next week if she is agreeable to discuss her symptoms and what needs to be completed going forward. Patient v/u. I informed her that we have an available appt in [place] on Tuesday 9/3/24 if she is agreeable. She confirmed. Appt scheduled. I also informed her per Dr. Campos, if her symptoms worsen over the weekend then we advised for her to go to the ER. She confirmed. No further questions voiced at this time.

## 2024-08-30 NOTE — TELEPHONE ENCOUNTER
Hub staff attempted to follow warm transfer process and was unsuccessful     Caller: Nancy Suárez    Relationship to patient: Self    Best call back number: [phone]    Patient is needing: PATIENT IS CALLING BACK. PLEASE CALL TO ADVISE

## 2024-09-03 ENCOUNTER — OFFICE VISIT (OUTPATIENT)
Dept: ONCOLOGY | Facility: CLINIC | Age: 69
End: 2024-09-03
Payer: MEDICARE

## 2024-09-03 VITALS
BODY MASS INDEX: 26.9 KG/M2 | HEIGHT: 60 IN | OXYGEN SATURATION: 100 % | WEIGHT: 137 LBS | TEMPERATURE: 97.3 F | HEART RATE: 73 BPM

## 2024-09-03 DIAGNOSIS — E61.1 IRON DEFICIENCY: Primary | ICD-10-CM

## 2024-09-03 DIAGNOSIS — D63.1 ANEMIA OF CHRONIC KIDNEY FAILURE, STAGE 3 (MODERATE): ICD-10-CM

## 2024-09-03 DIAGNOSIS — R06.00 DYSPNEA, UNSPECIFIED TYPE: ICD-10-CM

## 2024-09-03 DIAGNOSIS — R53.1 WEAKNESS: ICD-10-CM

## 2024-09-03 DIAGNOSIS — N18.30 ANEMIA OF CHRONIC KIDNEY FAILURE, STAGE 3 (MODERATE): ICD-10-CM

## 2024-09-03 PROCEDURE — 1160F RVW MEDS BY RX/DR IN RCRD: CPT | Performed by: INTERNAL MEDICINE

## 2024-09-03 PROCEDURE — 99214 OFFICE O/P EST MOD 30 MIN: CPT | Performed by: INTERNAL MEDICINE

## 2024-09-03 PROCEDURE — 1159F MED LIST DOCD IN RCRD: CPT | Performed by: INTERNAL MEDICINE

## 2024-09-03 PROCEDURE — 1126F AMNT PAIN NOTED NONE PRSNT: CPT | Performed by: INTERNAL MEDICINE

## 2024-09-03 NOTE — PROGRESS NOTES
"                         HEMATOLOGY ONCOLOGY OUTPATIENT FOLLOW-UP       Patient name: Nancy Suárez  : 1955  MRN: [id number]  Primary Care Physician: Marina Cruz MD  Referring Physician: Marina Cruz MD  Reason For Consult: Anemia.     History of Present Illness:  Patient is a 68 y.o.     2023: Ms. Suárez came seeking attention about anemia, that she reported had been a recurring issue for her. She described being tired, \"I'm drained\". Unsteady on her feet and unable to do much activity. No other symptoms but she was concerned that her blood pressure had been in the low 100's over 60's. 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 She had also a history of chronic renal disease and in the chemistry closest to this visit she had an estimated glomerular filtration rate of about 33 ml/min. In 2023 her hemoglobin was 11 g/dl with mean corpuscular volume of 99 fL. However a blood count done closer to the time of this visit had reported a hemoglobin of 7.9 g/dl with mean corpuscular volume of 105 fL. The white cell count and the platelet count were unremarkable. On exam she was found to appear much older than the stated age. Neither pale nor jaundiced. Well hydrated. No oral lesions and no palpable adenopathy. Lungs markedly diminished bilaterally but clear. Heart regular. Abdomen rounded and protuberant but soft and not tender; no hepatomegaly or splenomegaly. No edema. Her social and family history were reviewed. Given her history of chronic gastrointestinal bleeding it is very possible that she indeed has iron deficiency anemia. The macrocytosis was surprising in this setting, and could be related to other nutritional deficiencies or to hemolysis and was to be investigated. I asked her to return to see me in one week " with results.     6/20/2023: Underwent laboratory exams.  Still feeling tired.  Eating reasonably well and afebrile.  No bleeding.  On exam pale.  Respirations labored with minimal exertion.  Lungs diminished.  Heart regular.  The laboratory exams revealed an elevated glucose and abnormal renal function with an estimated glomerular filtration rate of 35 mL/min.  Liver enzymes were however unremarkable.  White cell count was normal at 5100/mm³ with a rather unremarkable differential count.  The platelets were also within the normal range, at 213,000/mm³.  The hemoglobin, however, it was low at 7.9 g/dL with a mean corpuscular volume of 107.6 fL.  Her iron profile revealed a normal iron binding capacity at 367 mcg/dL with an elevated iron at 303 mcg/dL and abnormally elevated saturation at 83%.  Somewhat surprisingly the ferritin was not particularly elevated at 33 ng/mL.  Folic acid was unremarkable but the vitamin B12 was elevated at 1642 pg/mL.  BLAS was negative but erythropoietin was elevated at 191.6 µIU/mL.  With this picture it appeared as though she had ineffective erythropoiesis and in order to confirm this diagnosis it was felt necessary to obtain a bone marrow biopsy.  The role of the bone marrow and the procedure of taking a sample was discussed with her.  She reported that she had undergone one in the past but was not sure of where it was done.    7/25/2023: Feeling about the same. Had no laboratory exams. Tired but no more than before. Denies anorexia. No fevers. Denied chest pain or cough. No abdominal pain or diarrhea and no dysuria. On exam no changes. The bone marrow biopsy is unremarkable and no cytogenetic abnormalities were documented. I've asked her to have a blood count today and see me in a few weeks with new laboratory exams.     8/8/2023: Without new symptoms.  Still tired.  Smoking less.  Eating reasonably well and weight stable.  Afebrile.  On exam pale, chronically ill-appearing but in no  "distress.  No jaundice but pale.  Well-hydrated.  Lungs diminished.  Heart regular.  No edema.  Laboratory exams revealed persistent anemia again with macrocytosis.  Her kidney function had remained low with a creatinine of 1.71 mg/dL for an estimated glomerular filtration rate of 32 mL/min.  The iron studies were more consistent with anemia of chronic renal disease with a total iron binding capacity at low normal of 298 mcg/dL.  The unbound iron binding capacity was diminished at 68 mcg/dL but the iron saturation was increased at 77%.  The bone marrow revealed absent iron stores.  This was more suggestive of iron deficiency rather than of an accumulation.  A decision was made to start treatment with erythropoietin.  This was based on the clear evidence of anemia of chronic kidney disease.  In addition I requested soluble transferrin receptor testing to investigate further the possibility of iron deficiency.    8/29/2023: Feeling reasonably well and much stronger than before.  Had not received any injections of erythropoietin in spite of which her hemoglobin had increased to 10.5 g/dL.  She was eating well.  Still smoking but continued to work at cutting back.  No chest pains or cough.  No abdominal pain or diarrhea and no dysuria.  No peripheral edema no skin rash.  On exam alert, conversant and chronically ill-appearing.  No jaundice.  Lungs diminished bilaterally.  Heart regular.  Abdomen soft.  No edema.  Laboratory exams reviewed.  Plans to continue to monitor her blood count and to proceed with erythropoietin as needed to maintain a hemoglobin of at least 10 g/dL were discussed.    10/24/2023: Feeling much better.  \"I feel better than I have felt in a long time\".  Trying to stop smoking.  Eating well.  No weight loss.  Persists with dyspnea.  No chest pains.  On exam chronically ill-appearing.  Lungs markedly diminished but symmetrical.  Heart regular.  Abdomen soft.  No edema.  Laboratory exams reviewed.  " "Persists with macrocytic anemia.  Hemoglobin today was 10.7 g/dL with mean corpuscular volume of 101 fL.  This most likely is a reflection of her chronic kidney disease.  At this point no need for intervention but we will continue to follow.  I have asked her to return to see me in 6 months.    4/30/2024: Feeling well and without new symptoms. Energetic and with good appetite. No chest pain or cough and no abdominal pain or diarrhea. On exam alert and conversant. No jaundice and no oral lesions. Respirations not labored. Lungs diminished bilaterally and the heart is regular. Abdomen is soft and not tender. No edema. Reviewed the laboratory exams available and discussed with her at length. For now no need for intervention, particularly because she feels so well. Will recheck laboratory exams today and will see in a few months.     9/3/2024: In the office before the next scheduled appointment. Has not been feeling well. She reports \"my legs are heavy\". \"I can only walk a few steps\". She has been smoking one pack daily again. She has been eating well and her weight does not seem to have changed. She has been afebrile. No chest pain but continues to cough and has had dyspnea or exertion with minimal exertion. No abdominal pain and denies melena or hematochezia. No skin rash but she complains of edema of the lower extremities. On exam ill appearing.  Edentulous. No oral lesions. Respirations not labored. Lungs diminished bilaterally and crackles in both bases. Heart regular. Abdomen soft and without hepatomegaly or splenomegaly. No edema or minimal. The laboratory exams were reviewed and discussed with her. She does not seem to have iron deficiency at this point. Will investigate further as she has had anemia that requires transfusion on two occasions recently.     Past Medical History:   Diagnosis Date    Artery stenosis     Bilateral subclavian s/p stent     Arthritis     AVM (arteriovenous malformation)     CAD " (coronary artery disease)     CKD (chronic kidney disease)     Stage three     COPD (chronic obstructive pulmonary disease)     DM2 (diabetes mellitus, type 2)     Eye pressure     Elevated eye pressure    GERD (gastroesophageal reflux disease)     Gout     Hepatitis     Hepatitis c     HTN (hypertension)     Hyperlipidemia     Hyperparathyroidism     KIRIT (iron deficiency anemia)     Iron deficiency anemia     Myocardial infarction     Osteoporosis     PVD (peripheral vascular disease)     Stroke     TIA     Past Surgical History:   Procedure Laterality Date    CATARACT EXTRACTION Left     CHOLECYSTECTOMY      COLONOSCOPY N/A 01/26/2023    Procedure: COLONOSCOPY with biopsy x 2 areas and polypectomy x 4 and cautery of polyps x 2;  Surgeon: Hero Webster MD;  Location: [facility] ENDOSCOPY;  Service: Gastroenterology;  Laterality: N/A;  post op: polyps, diverticulosis, hemorrhoids    ENDOSCOPY N/A 01/26/2023    Procedure: ESOPHAGOGASTRODUODENOSCOPY;  Surgeon: Hero Webster MD;  Location: [facility] ENDOSCOPY;  Service: Gastroenterology;  Laterality: N/A;  post op: erosive gasritis, small hiatal hernia    EYE SURGERY      TOTAL ABDOMINAL HYSTERECTOMY WITH SALPINGO OOPHORECTOMY         Current Outpatient Medications:     acetaminophen (TYLENOL) 500 MG tablet, Take 1 tablet by mouth Every 6 (Six) Hours As Needed for Mild Pain., Disp: , Rfl:     albuterol sulfate HFA (ProAir HFA) 108 (90 Base) MCG/ACT inhaler, Inhale 2 puffs Every 4 (Four) Hours As Needed for Wheezing or Shortness of Air. Proair - DX CODE J44.9, Disp: 18 g, Rfl: 4    Alcohol Swabs (B-D SINGLE USE SWABS REGULAR) pads, CHECK BLOOD SUGAR ONE TIME DAILY, Disp: 100 each, Rfl: 6    allopurinol (ZYLOPRIM) 100 MG tablet, Take 1 tablet by mouth Daily., Disp: , Rfl:     amLODIPine (NORVASC) 10 MG tablet, Take 1 tablet by mouth Daily., Disp: 90 tablet, Rfl: 3    aspirin 81 MG tablet, Take 1 tablet by mouth Daily., Disp: , Rfl:     calcitriol (ROCALTROL) 0.25 MCG capsule,  Take 1 capsule by mouth Daily., Disp: , Rfl:     carvedilol (COREG) 12.5 MG tablet, TAKE 1 TABLET BY MOUTH TWICE A DAY, Disp: 180 tablet, Rfl: 1    Cholecalciferol 1000 units capsule, Take 1 capsule by mouth Daily., Disp: , Rfl:     colestipol (COLESTID) 1 g tablet, Take 1 tablet by mouth 2 (Two) Times a Day., Disp: , Rfl:     Cyanocobalamin (B-12) 1000 MCG capsule, Take 1 tablet by mouth Daily., Disp: , Rfl:     ferrous sulfate 325 (65 FE) MG tablet, TAKE 1 TABLET BY MOUTH EVERY DAY WITH BREAKFAST, Disp: 90 tablet, Rfl: 3    furosemide (LASIX) 20 MG tablet, TAKE 1 TABLET BY MOUTH EVERY OTHER DAY, Disp: 45 tablet, Rfl: 1    hydrALAZINE (APRESOLINE) 25 MG tablet, Take 1 tablet by mouth 2 (Two) Times a Day. Please schedule appt to follow up, Disp: 180 tablet, Rfl: 1    ipratropium-albuterol (DUO-NEB) 0.5-2.5 mg/3 ml nebulizer, TAKE 3 ML BY NEBULIZATION EVERY 4 HOURS AS NEEDED FOR WHEEZE, Disp: 180 mL, Rfl: 3    latanoprost (XALATAN) 0.005 % ophthalmic solution, , Disp: , Rfl:     Omega-3 Fatty Acids (FISH OIL) 1000 MG capsule capsule, Take 1 capsule by mouth 2 (Two) Times a Day With Meals., Disp: , Rfl:     omeprazole (priLOSEC) 40 MG capsule, Take 1 capsule by mouth Daily., Disp: , Rfl:     potassium chloride 10 MEQ CR tablet, Take 1 tablet by mouth Every Other Day. TAKE 1 TABLET BY MOUTH EVERY OTHER DAY, Disp: 60 tablet, Rfl: 1    pravastatin (PRAVACHOL) 40 MG tablet, TAKE 1 TABLET BY MOUTH EVERY DAY, Disp: 90 tablet, Rfl: 0    No Known Allergies    Family History   Problem Relation Age of Onset    Diabetes Mother     Anemia Mother     Heart attack Mother     Heart disease Mother     Heart failure Mother     Hypertension Mother     Coronary artery disease Sister     Diabetes Sister     Breast cancer Paternal Aunt 50     Cancer-related family history includes Breast cancer (age of onset: 50) in her paternal aunt.    Social History     Tobacco Use    Smoking status: Every Day     Current packs/day: 1.00     Average  packs/day: 1 pack/day for 50.4 years (50.0 ttl pk-yrs)     Types: Cigarettes     Start date: 1/1/1975     Passive exposure: Current    Smokeless tobacco: Never   Vaping Use    Vaping status: Never Used   Substance Use Topics    Alcohol use: No    Drug use: No     Social History     Social History Narrative    She is single, retired from [employer], she lives in [place] and has two grown daughters.     ROS:   Review of Systems   Constitutional:  Negative for activity change, appetite change, chills, diaphoresis, fatigue, fever and unexpected weight change.   HENT:  Negative for congestion, dental problem, drooling, ear discharge, ear pain, facial swelling, hearing loss, mouth sores, nosebleeds, postnasal drip, rhinorrhea, sinus pressure, sinus pain, sneezing, sore throat, tinnitus, trouble swallowing and voice change.    Eyes:  Negative for photophobia, pain, discharge, redness, itching and visual disturbance.   Respiratory:  Negative for apnea, cough, choking, chest tightness, shortness of breath, wheezing and stridor.    Cardiovascular:  Negative for chest pain, palpitations and leg swelling.   Gastrointestinal:  Negative for abdominal distention, abdominal pain, anal bleeding, blood in stool, constipation, diarrhea, nausea, rectal pain and vomiting.   Endocrine: Negative for cold intolerance, heat intolerance, polydipsia and polyuria.   Genitourinary:  Negative for decreased urine volume, difficulty urinating, dysuria, flank pain, frequency, genital sores, hematuria and urgency.   Musculoskeletal:  Negative for arthralgias, back pain, gait problem, joint swelling, myalgias, neck pain and neck stiffness.   Skin:  Negative for color change, pallor and rash.   Neurological:  Negative for dizziness, tremors, seizures, syncope, facial asymmetry, speech difficulty, weakness, light-headedness, numbness and headaches.   Hematological:  Negative for adenopathy. Does not bruise/bleed easily.   Psychiatric/Behavioral:  " Negative for agitation, behavioral problems, confusion, decreased concentration, hallucinations, self-injury, sleep disturbance and suicidal ideas. The patient is not nervous/anxious.      Objective:    Vital Signs:  Vitals:    09/03/24 1107   Pulse: 73   Temp: 97.3 °F (36.3 °C)   SpO2: 100%   Weight: 62.1 kg (137 lb)   Height: 152.4 cm (60\")   PainSc: 0-No pain     Body mass index is 26.76 kg/m².    ECOG  (1) Restricted in physically strenuous activity, ambulatory and able to do work of light nature    Physical Exam:   Physical Exam  Constitutional:       General: She is not in acute distress.     Appearance: She is not ill-appearing, toxic-appearing or diaphoretic.      Comments: Slender, well-built.  Alert, well oriented and in good spirits.  Seems chronically ill.  No distress.   HENT:      Head: Normocephalic and atraumatic.      Right Ear: External ear normal.      Left Ear: External ear normal.      Nose: Nose normal.      Mouth/Throat:      Mouth: Mucous membranes are moist.      Pharynx: Oropharynx is clear. No oropharyngeal exudate or posterior oropharyngeal erythema.   Eyes:      General: No scleral icterus.        Right eye: No discharge.         Left eye: No discharge.      Conjunctiva/sclera: Conjunctivae normal.      Pupils: Pupils are equal, round, and reactive to light.   Cardiovascular:      Rate and Rhythm: Normal rate and regular rhythm.      Pulses: Normal pulses.      Heart sounds: No murmur heard.     No friction rub. No gallop.   Pulmonary:      Effort: No respiratory distress.      Breath sounds: No stridor. No wheezing, rhonchi or rales.      Comments: Symmetrically diminished to auscultation. There are crackles in both bases.   Abdominal:      General: Bowel sounds are normal. There is no distension.      Palpations: Abdomen is soft. There is no mass.      Tenderness: There is no abdominal tenderness. There is no right CVA tenderness, left CVA tenderness, guarding or rebound.      Hernia: " No hernia is present.      Comments: The liver and spleen are not enlarged.    Musculoskeletal:         General: No swelling, tenderness, deformity or signs of injury.      Cervical back: No rigidity.      Right lower leg: No edema.      Left lower leg: No edema.   Lymphadenopathy:      Cervical: No cervical adenopathy.   Skin:     Coloration: Skin is not jaundiced.      Findings: No bruising, lesion or rash.   Neurological:      General: No focal deficit present.      Mental Status: She is alert and oriented to person, place, and time.      Cranial Nerves: No cranial nerve deficit.      Motor: No weakness.      Gait: Gait normal.   Psychiatric:         Mood and Affect: Mood normal.         Behavior: Behavior normal.         Thought Content: Thought content normal.         Judgment: Judgment normal.     ALONZO Campos MD performed the physical exam on 9/3/2024 as documented above.     Lab Results - Last 18 Months   Lab Units 04/30/24  1028 08/21/23  1004 07/25/23  1203   SODIUM mmol/L 139 139 137   POTASSIUM mmol/L 4.3 4.5 4.3   CHLORIDE mmol/L 103 101 100   CO2 mmol/L 22 26.0 22   BUN mg/dL 21 21 22   CREATININE mg/dL 1.48* 1.43* 1.71*   CALCIUM mg/dL 8.9 9.5 8.8   BILIRUBIN mg/dL <0.2 <0.2 <0.2   ALK PHOS IU/L 64 69 64   ALT (SGPT) IU/L 11 9 9   AST (SGOT) IU/L 14 15 17   GLUCOSE mg/dL 101* 134* 103*     Lab Results   Component Value Date    GLUCOSE 101 (H) 04/30/2024    BUN 21 04/30/2024    CREATININE 1.48 (H) 04/30/2024    EGFRIFNONA 30 (L) 01/14/2022    EGFRIFAFRI 35 (L) 01/14/2022    BCR 14 04/30/2024    K 4.3 04/30/2024    CO2 22 04/30/2024    CALCIUM 8.9 04/30/2024    PROTENTOTREF 6.2 04/30/2024    ALBUMIN 4.1 04/30/2024    LABIL2 2.0 04/30/2024    AST 14 04/30/2024    ALT 11 04/30/2024     Lab Results   Component Value Date    IRON 258 (H) 08/23/2024    TIBC 368 08/23/2024    FERRITIN 30.80 08/23/2024     Lab Results   Component Value Date    FOLATE 21.4 02/27/2019     Lab Results   Component Value Date     RETICCTPCT 3.4 03/05/2018     Lab Results   Component Value Date    OJULBRWW05 >2000 pg/mL (H) 02/27/2019     Uric Acid   Date Value Ref Range Status   08/27/2019 6.2 2.5 - 7.1 mg/dL Final     Comment:                Therapeutic target for gout patients: <6.0     Lab Results   Component Value Date    SEDRATE 33 01/14/2022     Lab Results   Component Value Date    SEDRATE 33 01/14/2022      Assessment & Plan     Macrocytic anemia: Corrected partially with the administration of iron but only transiently. To continue investigation. She has chronic renal disease that likely participates in the anemia.   Chronic kidney disease: Stage III with estimated glomerular filtration rate less than 40 ml/min.   3.  Cigarette smoker: No change. Chronic disease and peripheral vascular disease?  4.  See me in 3 weeks with results.     Jose Campos MD on 9/3/2024 at 12:30 PM.

## 2024-09-04 ENCOUNTER — TRANSCRIBE ORDERS (OUTPATIENT)
Dept: ADMINISTRATIVE | Facility: HOSPITAL | Age: 69
End: 2024-09-04
Payer: MEDICARE

## 2024-09-04 ENCOUNTER — TELEPHONE (OUTPATIENT)
Dept: ONCOLOGY | Facility: CLINIC | Age: 69
End: 2024-09-04
Payer: MEDICARE

## 2024-09-04 DIAGNOSIS — I73.9 CLAUDICATION: Primary | ICD-10-CM

## 2024-09-04 LAB
ALBUMIN SERPL-MCNC: 3.7 G/DL (ref 3.9–4.9)
ALP SERPL-CCNC: 54 IU/L (ref 44–121)
ALT SERPL-CCNC: 9 IU/L (ref 0–32)
AMBIG ABBREV CMP14 DEFAULT: NORMAL
AST SERPL-CCNC: 17 IU/L (ref 0–40)
BASOPHILS # BLD AUTO: 0 X10E3/UL (ref 0–0.2)
BASOPHILS NFR BLD AUTO: 0 %
BILIRUB SERPL-MCNC: <0.2 MG/DL (ref 0–1.2)
BUN SERPL-MCNC: 26 MG/DL (ref 8–27)
BUN/CREAT SERPL: 16 (ref 12–28)
CALCIUM SERPL-MCNC: 8 MG/DL (ref 8.7–10.3)
CHLORIDE SERPL-SCNC: 107 MMOL/L (ref 96–106)
CO2 SERPL-SCNC: 19 MMOL/L (ref 20–29)
CREAT SERPL-MCNC: 1.58 MG/DL (ref 0.57–1)
EGFRCR SERPLBLD CKD-EPI 2021: 35 ML/MIN/1.73
EOSINOPHIL # BLD AUTO: 0.1 X10E3/UL (ref 0–0.4)
EOSINOPHIL NFR BLD AUTO: 1 %
ERYTHROCYTE [DISTWIDTH] IN BLOOD BY AUTOMATED COUNT: 14.1 % (ref 11.7–15.4)
FERRITIN SERPL-MCNC: 34 NG/ML (ref 15–150)
FOLATE SERPL-MCNC: 16.7 NG/ML
GLOBULIN SER CALC-MCNC: 1.7 G/DL (ref 1.5–4.5)
GLUCOSE SERPL-MCNC: 175 MG/DL (ref 70–99)
HAPTOGLOB SERPL-MCNC: 186 MG/DL (ref 37–355)
HCT VFR BLD AUTO: 25.7 % (ref 34–46.6)
HGB BLD-MCNC: 7.8 G/DL (ref 11.1–15.9)
IMM GRANULOCYTES # BLD AUTO: 0 X10E3/UL (ref 0–0.1)
IMM GRANULOCYTES NFR BLD AUTO: 1 %
IRON SATN MFR SERPL: 43 % (ref 15–55)
IRON SERPL-MCNC: 129 UG/DL (ref 27–139)
LDH SERPL L TO P-CCNC: 227 IU/L (ref 119–226)
LYMPHOCYTES # BLD AUTO: 1 X10E3/UL (ref 0.7–3.1)
LYMPHOCYTES NFR BLD AUTO: 16 %
MCH RBC QN AUTO: 31.7 PG (ref 26.6–33)
MCHC RBC AUTO-ENTMCNC: 30.4 G/DL (ref 31.5–35.7)
MCV RBC AUTO: 105 FL (ref 79–97)
MONOCYTES # BLD AUTO: 0.4 X10E3/UL (ref 0.1–0.9)
MONOCYTES NFR BLD AUTO: 6 %
NEUTROPHILS # BLD AUTO: 4.7 X10E3/UL (ref 1.4–7)
NEUTROPHILS NFR BLD AUTO: 76 %
PLATELET # BLD AUTO: 190 X10E3/UL (ref 150–450)
POTASSIUM SERPL-SCNC: 4.2 MMOL/L (ref 3.5–5.2)
PROT SERPL-MCNC: 5.4 G/DL (ref 6–8.5)
RBC # BLD AUTO: 2.46 X10E6/UL (ref 3.77–5.28)
RETICS/RBC NFR AUTO: 10.3 % (ref 0.6–2.6)
SODIUM SERPL-SCNC: 140 MMOL/L (ref 134–144)
TIBC SERPL-MCNC: 303 UG/DL (ref 250–450)
UIBC SERPL-MCNC: 174 UG/DL (ref 118–369)
VIT B12 SERPL-MCNC: 1700 PG/ML (ref 232–1245)
WBC # BLD AUTO: 6.2 X10E3/UL (ref 3.4–10.8)

## 2024-09-04 NOTE — TELEPHONE ENCOUNTER
Caller: Nancy Suárez    Relationship: Self    Best call back number: [phone]    Caller requesting test results: PT    What test was performed: LABS    When was the test performed: 9-3-2024    [facility]

## 2024-09-05 DIAGNOSIS — D63.1 ANEMIA OF CHRONIC KIDNEY FAILURE, STAGE 3 (MODERATE): Primary | ICD-10-CM

## 2024-09-05 DIAGNOSIS — N18.30 ANEMIA OF CHRONIC KIDNEY FAILURE, STAGE 3 (MODERATE): Primary | ICD-10-CM

## 2024-09-05 NOTE — TELEPHONE ENCOUNTER
After speaking with Dr. Campos, I attempted to contact the patient. Unable to speak with the patient due to no answer. Voicemail left requesting a callback. Callback number stated on voicemail.     PER DR. CAMPOS, PATIENT TO HAVE A BONE MARROW BIOPSY COMPLETED TO FURTHER INVESTIGATE ANEMIA.

## 2024-09-06 NOTE — TELEPHONE ENCOUNTER
CONTACTED THE PATIENT REGARDING HER MESSAGE WE RECEIVED. I INFORMED HER PER DR. GARRETT, HE WOULD LIKE FOR HER TO HAVE A BONE MARROW BIOPSY COMPLETED TO FURTHER INVESTIGATE THE CAUSE OF HER ANEMIA. I ASKED IF SHE IS AGREEABLE TO HAVING THE BONE MARROW BIOPSY COMPLETED; SHE CONFIRMED. I INFORMED HER THAT THE IR DEPARTMENT AT THE [facility] WILL BE CONTACTING HER TO SCHEDULE THE BIOPSY. I REQUESTED FOR HER TO CONTACT OUR OFFICE IF SHE DOES NOT RECEIVE A PHONE CALL FROM THE IR DEPARTMENT BY THE END OF NEXT WEEK. SHE CONFIRMED. REVIEWED HER MOST RECENT HGB RESULT WITH HER; SHE CONFIRMED. I ADVISED HER TO CONTACT OUR OFFICE IF SHE HAS ANY QUESTIONS, NEEDS OR CONCERNS. SHE CONFIRMED.

## 2024-09-06 NOTE — TELEPHONE ENCOUNTER
Hub staff attempted to follow warm transfer process and was unsuccessful     Caller: Nancy Suárez    Relationship to patient: Self    Best call back number:   Telephone Information:   Mobile [phone]     Patient is needing: PATIENT CALLING NURSE BACK.

## 2024-09-08 ENCOUNTER — HOSPITAL ENCOUNTER (INPATIENT)
Facility: HOSPITAL | Age: 69
LOS: 3 days | Discharge: HOME | DRG: 812 | End: 2024-09-13
Attending: EMERGENCY MEDICINE | Admitting: HOSPITALIST
Payer: MEDICARE

## 2024-09-08 ENCOUNTER — APPOINTMENT (OUTPATIENT)
Dept: GENERAL RADIOLOGY | Facility: HOSPITAL | Age: 69
DRG: 812 | End: 2024-09-08
Payer: MEDICARE

## 2024-09-08 DIAGNOSIS — E61.1 IRON DEFICIENCY: ICD-10-CM

## 2024-09-08 DIAGNOSIS — D63.1 ANEMIA OF CHRONIC KIDNEY FAILURE, STAGE 3 (MODERATE): ICD-10-CM

## 2024-09-08 DIAGNOSIS — D64.9 ANEMIA, UNSPECIFIED TYPE: Primary | ICD-10-CM

## 2024-09-08 DIAGNOSIS — R06.00 DYSPNEA, UNSPECIFIED TYPE: ICD-10-CM

## 2024-09-08 DIAGNOSIS — N18.30 ANEMIA OF CHRONIC KIDNEY FAILURE, STAGE 3 (MODERATE): ICD-10-CM

## 2024-09-08 LAB
ABO GROUP BLD: NORMAL
ANION GAP SERPL CALCULATED.3IONS-SCNC: 10 MMOL/L (ref 5–15)
BASOPHILS # BLD AUTO: 0.01 10*3/MM3 (ref 0–0.2)
BASOPHILS NFR BLD AUTO: 0.1 % (ref 0–1.5)
BLD GP AB SCN SERPL QL: NEGATIVE
BUN SERPL-MCNC: 28 MG/DL (ref 8–23)
BUN/CREAT SERPL: 15.6 (ref 7–25)
CALCIUM SPEC-SCNC: 8.5 MG/DL (ref 8.6–10.5)
CHLORIDE SERPL-SCNC: 103 MMOL/L (ref 98–107)
CO2 SERPL-SCNC: 24 MMOL/L (ref 22–29)
CREAT SERPL-MCNC: 1.79 MG/DL (ref 0.57–1)
DEPRECATED RDW RBC AUTO: 64.8 FL (ref 37–54)
EGFRCR SERPLBLD CKD-EPI 2021: 30.2 ML/MIN/1.73
EOSINOPHIL # BLD AUTO: 0.07 10*3/MM3 (ref 0–0.4)
EOSINOPHIL NFR BLD AUTO: 0.9 % (ref 0.3–6.2)
ERYTHROCYTE [DISTWIDTH] IN BLOOD BY AUTOMATED COUNT: 16.4 % (ref 12.3–15.4)
GLUCOSE SERPL-MCNC: 125 MG/DL (ref 65–99)
HCT VFR BLD AUTO: 17.2 % (ref 34–46.6)
HGB BLD-MCNC: 5 G/DL (ref 12–15.9)
IMM GRANULOCYTES # BLD AUTO: 0.03 10*3/MM3 (ref 0–0.05)
IMM GRANULOCYTES NFR BLD AUTO: 0.4 % (ref 0–0.5)
LYMPHOCYTES # BLD AUTO: 1.58 10*3/MM3 (ref 0.7–3.1)
LYMPHOCYTES NFR BLD AUTO: 20.7 % (ref 19.6–45.3)
MCH RBC QN AUTO: 32.3 PG (ref 26.6–33)
MCHC RBC AUTO-ENTMCNC: 29.1 G/DL (ref 31.5–35.7)
MCV RBC AUTO: 111 FL (ref 79–97)
MONOCYTES # BLD AUTO: 0.78 10*3/MM3 (ref 0.1–0.9)
MONOCYTES NFR BLD AUTO: 10.2 % (ref 5–12)
NEUTROPHILS NFR BLD AUTO: 5.17 10*3/MM3 (ref 1.7–7)
NEUTROPHILS NFR BLD AUTO: 67.7 % (ref 42.7–76)
NRBC BLD AUTO-RTO: 0 /100 WBC (ref 0–0.2)
NT-PROBNP SERPL-MCNC: 2681 PG/ML (ref 0–900)
PLATELET # BLD AUTO: 190 10*3/MM3 (ref 140–450)
PMV BLD AUTO: 10.6 FL (ref 6–12)
POTASSIUM SERPL-SCNC: 3.9 MMOL/L (ref 3.5–5.2)
RBC # BLD AUTO: 1.55 10*6/MM3 (ref 3.77–5.28)
RH BLD: POSITIVE
SODIUM SERPL-SCNC: 137 MMOL/L (ref 136–145)
T&S EXPIRATION DATE: NORMAL
TROPONIN T SERPL HS-MCNC: 33 NG/L
WBC NRBC COR # BLD AUTO: 7.64 10*3/MM3 (ref 3.4–10.8)

## 2024-09-08 PROCEDURE — 36415 COLL VENOUS BLD VENIPUNCTURE: CPT

## 2024-09-08 PROCEDURE — 71045 X-RAY EXAM CHEST 1 VIEW: CPT

## 2024-09-08 PROCEDURE — 86850 RBC ANTIBODY SCREEN: CPT | Performed by: EMERGENCY MEDICINE

## 2024-09-08 PROCEDURE — 80048 BASIC METABOLIC PNL TOTAL CA: CPT | Performed by: EMERGENCY MEDICINE

## 2024-09-08 PROCEDURE — 85025 COMPLETE CBC W/AUTO DIFF WBC: CPT | Performed by: EMERGENCY MEDICINE

## 2024-09-08 PROCEDURE — 93005 ELECTROCARDIOGRAM TRACING: CPT | Performed by: EMERGENCY MEDICINE

## 2024-09-08 PROCEDURE — 86901 BLOOD TYPING SEROLOGIC RH(D): CPT | Performed by: EMERGENCY MEDICINE

## 2024-09-08 PROCEDURE — 86923 COMPATIBILITY TEST ELECTRIC: CPT

## 2024-09-08 PROCEDURE — 93005 ELECTROCARDIOGRAM TRACING: CPT

## 2024-09-08 PROCEDURE — G0378 HOSPITAL OBSERVATION PER HR: HCPCS

## 2024-09-08 PROCEDURE — 36430 TRANSFUSION BLD/BLD COMPNT: CPT

## 2024-09-08 PROCEDURE — 84484 ASSAY OF TROPONIN QUANT: CPT | Performed by: EMERGENCY MEDICINE

## 2024-09-08 PROCEDURE — 99285 EMERGENCY DEPT VISIT HI MDM: CPT

## 2024-09-08 PROCEDURE — 83880 ASSAY OF NATRIURETIC PEPTIDE: CPT | Performed by: EMERGENCY MEDICINE

## 2024-09-08 PROCEDURE — 86900 BLOOD TYPING SEROLOGIC ABO: CPT

## 2024-09-08 PROCEDURE — 86900 BLOOD TYPING SEROLOGIC ABO: CPT | Performed by: EMERGENCY MEDICINE

## 2024-09-08 PROCEDURE — P9016 RBC LEUKOCYTES REDUCED: HCPCS

## 2024-09-08 RX ORDER — POLYETHYLENE GLYCOL 3350 17 G/17G
17 POWDER, FOR SOLUTION ORAL DAILY PRN
Status: DISCONTINUED | OUTPATIENT
Start: 2024-09-08 | End: 2024-09-11 | Stop reason: SDUPTHER

## 2024-09-08 RX ORDER — ONDANSETRON 4 MG/1
4 TABLET, ORALLY DISINTEGRATING ORAL EVERY 6 HOURS PRN
Status: DISCONTINUED | OUTPATIENT
Start: 2024-09-08 | End: 2024-09-13 | Stop reason: HOSPADM

## 2024-09-08 RX ORDER — SODIUM CHLORIDE 0.9 % (FLUSH) 0.9 %
10 SYRINGE (ML) INJECTION EVERY 12 HOURS SCHEDULED
Status: DISCONTINUED | OUTPATIENT
Start: 2024-09-08 | End: 2024-09-13 | Stop reason: HOSPADM

## 2024-09-08 RX ORDER — BISACODYL 5 MG/1
5 TABLET, DELAYED RELEASE ORAL DAILY PRN
Status: DISCONTINUED | OUTPATIENT
Start: 2024-09-08 | End: 2024-09-13 | Stop reason: HOSPADM

## 2024-09-08 RX ORDER — ONDANSETRON 2 MG/ML
4 INJECTION INTRAMUSCULAR; INTRAVENOUS EVERY 6 HOURS PRN
Status: DISCONTINUED | OUTPATIENT
Start: 2024-09-08 | End: 2024-09-13 | Stop reason: HOSPADM

## 2024-09-08 RX ORDER — ALUMINA, MAGNESIA, AND SIMETHICONE 2400; 2400; 240 MG/30ML; MG/30ML; MG/30ML
15 SUSPENSION ORAL EVERY 6 HOURS PRN
Status: DISCONTINUED | OUTPATIENT
Start: 2024-09-08 | End: 2024-09-13 | Stop reason: HOSPADM

## 2024-09-08 RX ORDER — SODIUM CHLORIDE 0.9 % (FLUSH) 0.9 %
10 SYRINGE (ML) INJECTION AS NEEDED
Status: DISCONTINUED | OUTPATIENT
Start: 2024-09-08 | End: 2024-09-13 | Stop reason: HOSPADM

## 2024-09-08 RX ORDER — UREA 10 %
1000 LOTION (ML) TOPICAL DAILY
Status: DISCONTINUED | OUTPATIENT
Start: 2024-09-09 | End: 2024-09-12

## 2024-09-08 RX ORDER — ASPIRIN 81 MG/1
81 TABLET ORAL DAILY
Status: DISCONTINUED | OUTPATIENT
Start: 2024-09-09 | End: 2024-09-09

## 2024-09-08 RX ORDER — SODIUM CHLORIDE 9 MG/ML
40 INJECTION, SOLUTION INTRAVENOUS AS NEEDED
Status: DISCONTINUED | OUTPATIENT
Start: 2024-09-08 | End: 2024-09-13 | Stop reason: HOSPADM

## 2024-09-08 RX ORDER — ATORVASTATIN CALCIUM 10 MG/1
10 TABLET, FILM COATED ORAL DAILY
Status: DISCONTINUED | OUTPATIENT
Start: 2024-09-09 | End: 2024-09-13 | Stop reason: HOSPADM

## 2024-09-08 RX ORDER — BISACODYL 10 MG
10 SUPPOSITORY, RECTAL RECTAL DAILY PRN
Status: DISCONTINUED | OUTPATIENT
Start: 2024-09-08 | End: 2024-09-13 | Stop reason: HOSPADM

## 2024-09-08 RX ORDER — CALCITRIOL 0.25 UG/1
0.25 CAPSULE, LIQUID FILLED ORAL DAILY
Status: DISCONTINUED | OUTPATIENT
Start: 2024-09-09 | End: 2024-09-13 | Stop reason: HOSPADM

## 2024-09-08 RX ORDER — FERROUS SULFATE 324(65)MG
324 TABLET, DELAYED RELEASE (ENTERIC COATED) ORAL
Status: DISCONTINUED | OUTPATIENT
Start: 2024-09-09 | End: 2024-09-13 | Stop reason: HOSPADM

## 2024-09-08 RX ORDER — LATANOPROST 50 UG/ML
1 SOLUTION/ DROPS OPHTHALMIC DAILY
Status: DISCONTINUED | OUTPATIENT
Start: 2024-09-09 | End: 2024-09-13 | Stop reason: HOSPADM

## 2024-09-08 RX ORDER — CHOLESTYRAMINE LIGHT 4 G/5.7G
1 POWDER, FOR SUSPENSION ORAL DAILY
Status: DISCONTINUED | OUTPATIENT
Start: 2024-09-09 | End: 2024-09-13 | Stop reason: HOSPADM

## 2024-09-08 RX ORDER — ACETAMINOPHEN 500 MG
500 TABLET ORAL EVERY 6 HOURS PRN
Status: DISCONTINUED | OUTPATIENT
Start: 2024-09-08 | End: 2024-09-13 | Stop reason: HOSPADM

## 2024-09-08 RX ORDER — AMOXICILLIN 250 MG
2 CAPSULE ORAL 2 TIMES DAILY PRN
Status: DISCONTINUED | OUTPATIENT
Start: 2024-09-08 | End: 2024-09-13 | Stop reason: HOSPADM

## 2024-09-08 RX ORDER — CARVEDILOL 6.25 MG/1
12.5 TABLET ORAL 2 TIMES DAILY
Status: DISCONTINUED | OUTPATIENT
Start: 2024-09-09 | End: 2024-09-13 | Stop reason: HOSPADM

## 2024-09-08 RX ORDER — ALLOPURINOL 100 MG/1
100 TABLET ORAL DAILY
Status: DISCONTINUED | OUTPATIENT
Start: 2024-09-09 | End: 2024-09-13 | Stop reason: HOSPADM

## 2024-09-08 RX ORDER — PANTOPRAZOLE SODIUM 40 MG/1
40 TABLET, DELAYED RELEASE ORAL
Status: DISCONTINUED | OUTPATIENT
Start: 2024-09-09 | End: 2024-09-13 | Stop reason: HOSPADM

## 2024-09-08 RX ADMIN — Medication 10 ML: at 23:26

## 2024-09-08 NOTE — ED NOTES
Pt reports she has had generalized weakness, SOA, and heaviness to bilateral legs for about a month now. Pt reports hx of COPD but doesn't use home oxygen. Pt denies any chest pain. Pt reports intermittent cough but isn't worried about it as she thinks it's related to her COPD.

## 2024-09-08 NOTE — ED PROVIDER NOTES
Subjective   History of Present Illness  Chief complaint: General Weakness    68-year-old female presents with general weakness and shortness of breath.  Patient states symptoms have been getting progressively worse over the past couple weeks.  She states shortness of breath is worse with exertion.  She states she can only take a few steps before she has to stop.  She denies chest pain.  She states her legs feel heavy when she tries to walk.  She has a history of chronic anemia and has received 2 recent blood transfusions from her hematologist.  She states she did not feel any better after the transfusions.    History provided by:  Patient      Review of Systems   Constitutional:  Negative for fever.   HENT:  Negative for congestion.    Respiratory:  Positive for shortness of breath. Negative for cough.    Cardiovascular:  Negative for chest pain.   Gastrointestinal:  Negative for abdominal pain and vomiting.   Musculoskeletal:  Negative for back pain.   Neurological:  Positive for weakness.   Psychiatric/Behavioral:  Negative for confusion.        Past Medical History:   Diagnosis Date    Artery stenosis     Bilateral subclavian s/p stent     Arthritis     AVM (arteriovenous malformation)     CAD (coronary artery disease)     CKD (chronic kidney disease)     Stage three     COPD (chronic obstructive pulmonary disease)     DM2 (diabetes mellitus, type 2)     Eye pressure     Elevated eye pressure    GERD (gastroesophageal reflux disease)     Gout     Hepatitis     Hepatitis c     HTN (hypertension)     Hyperlipidemia     Hyperparathyroidism     KIRIT (iron deficiency anemia)     Iron deficiency anemia     Myocardial infarction     Osteoporosis     PVD (peripheral vascular disease)     Stroke     TIA       No Known Allergies    Past Surgical History:   Procedure Laterality Date    CATARACT EXTRACTION Left     CHOLECYSTECTOMY      COLONOSCOPY N/A 01/26/2023    Procedure: COLONOSCOPY with biopsy x 2 areas and polypectomy  "x 4 and cautery of polyps x 2;  Surgeon: Hero Webster MD;  Location: [facility] ENDOSCOPY;  Service: Gastroenterology;  Laterality: N/A;  post op: polyps, diverticulosis, hemorrhoids    ENDOSCOPY N/A 01/26/2023    Procedure: ESOPHAGOGASTRODUODENOSCOPY;  Surgeon: Hero Webster MD;  Location: [facility] ENDOSCOPY;  Service: Gastroenterology;  Laterality: N/A;  post op: erosive gasritis, small hiatal hernia    EYE SURGERY      TOTAL ABDOMINAL HYSTERECTOMY WITH SALPINGO OOPHORECTOMY         Family History   Problem Relation Age of Onset    Diabetes Mother     Anemia Mother     Heart attack Mother     Heart disease Mother     Heart failure Mother     Hypertension Mother     Coronary artery disease Sister     Diabetes Sister     Breast cancer Paternal Aunt 50       Social History     Socioeconomic History    Marital status: Single   Tobacco Use    Smoking status: Every Day     Current packs/day: 1.00     Average packs/day: 1 pack/day for 50.4 years (50.0 ttl pk-yrs)     Types: Cigarettes     Start date: 1/1/1975     Passive exposure: Current    Smokeless tobacco: Never   Vaping Use    Vaping status: Never Used   Substance and Sexual Activity    Alcohol use: No    Drug use: No    Sexual activity: Defer     Partners: Male     Birth control/protection: Hysterectomy       /40   Pulse 86   Temp 97.9 °F (36.6 °C) (Oral)   Resp 16   Ht 153.7 cm (60.5\")   Wt 62.1 kg (137 lb)   LMP  (LMP Unknown)   SpO2 100%   BMI 26.32 kg/m²       Objective   Physical Exam  Vitals and nursing note reviewed.   Constitutional:       Appearance: Normal appearance.   HENT:      Head: Normocephalic and atraumatic.      Mouth/Throat:      Mouth: Mucous membranes are moist.   Cardiovascular:      Rate and Rhythm: Normal rate and regular rhythm.      Heart sounds: Normal heart sounds.   Pulmonary:      Effort: Pulmonary effort is normal. No respiratory distress.      Breath sounds: Normal breath sounds.   Abdominal:      Palpations: Abdomen is " soft.      Tenderness: There is no abdominal tenderness.   Musculoskeletal:      Right lower leg: No edema.      Left lower leg: No edema.   Skin:     General: Skin is warm and dry.   Neurological:      Mental Status: She is alert and oriented to person, place, and time.         Procedures           ED Course      My interpretation of EKG shows sinus rhythm, rate of 85, left bundle branch block which is old                           Results for orders placed or performed during the hospital encounter of 09/08/24   Single High Sensitivity Troponin T    Specimen: Blood   Result Value Ref Range    HS Troponin T 33 (H) <14 ng/L   BNP    Specimen: Blood   Result Value Ref Range    proBNP 2,681.0 (H) 0.0 - 900.0 pg/mL   Basic Metabolic Panel    Specimen: Blood   Result Value Ref Range    Glucose 125 (H) 65 - 99 mg/dL    BUN 28 (H) 8 - 23 mg/dL    Creatinine 1.79 (H) 0.57 - 1.00 mg/dL    Sodium 137 136 - 145 mmol/L    Potassium 3.9 3.5 - 5.2 mmol/L    Chloride 103 98 - 107 mmol/L    CO2 24.0 22.0 - 29.0 mmol/L    Calcium 8.5 (L) 8.6 - 10.5 mg/dL    BUN/Creatinine Ratio 15.6 7.0 - 25.0    Anion Gap 10.0 5.0 - 15.0 mmol/L    eGFR 30.2 (L) >60.0 mL/min/1.73   CBC Auto Differential    Specimen: Blood   Result Value Ref Range    WBC 7.64 3.40 - 10.80 10*3/mm3    RBC 1.55 (L) 3.77 - 5.28 10*6/mm3    Hemoglobin 5.0 (C) 12.0 - 15.9 g/dL    Hematocrit 17.2 (C) 34.0 - 46.6 %    .0 (H) 79.0 - 97.0 fL    MCH 32.3 26.6 - 33.0 pg    MCHC 29.1 (L) 31.5 - 35.7 g/dL    RDW 16.4 (H) 12.3 - 15.4 %    RDW-SD 64.8 (H) 37.0 - 54.0 fl    MPV 10.6 6.0 - 12.0 fL    Platelets 190 140 - 450 10*3/mm3    Neutrophil % 67.7 42.7 - 76.0 %    Lymphocyte % 20.7 19.6 - 45.3 %    Monocyte % 10.2 5.0 - 12.0 %    Eosinophil % 0.9 0.3 - 6.2 %    Basophil % 0.1 0.0 - 1.5 %    Immature Grans % 0.4 0.0 - 0.5 %    Neutrophils, Absolute 5.17 1.70 - 7.00 10*3/mm3    Lymphocytes, Absolute 1.58 0.70 - 3.10 10*3/mm3    Monocytes, Absolute 0.78 0.10 - 0.90  10*3/mm3    Eosinophils, Absolute 0.07 0.00 - 0.40 10*3/mm3    Basophils, Absolute 0.01 0.00 - 0.20 10*3/mm3    Immature Grans, Absolute 0.03 0.00 - 0.05 10*3/mm3    nRBC 0.0 0.0 - 0.2 /100 WBC   ECG 12 Lead Dyspnea   Result Value Ref Range    QT Interval 408 ms    QTC Interval 486 ms   Type & Screen    Specimen: Blood   Result Value Ref Range    ABO Type A     RH type Positive     Antibody Screen Negative     T&S Expiration Date 9/11/2024 11:59:59 PM    Prepare RBC, 2 Units   Result Value Ref Range    Product Code E8236H67     Unit Number [id number]     UNIT  ABO A     UNIT  RH POS     Crossmatch Interpretation Compatible     Dispense Status XM     Blood Expiration Date 202410032359     Blood Type Barcode 6200     Product Code Y8528W15     Unit Number [id number]     UNIT  ABO A     UNIT  RH POS     Crossmatch Interpretation Compatible     Dispense Status IS     Blood Expiration Date 202410022359     Blood Type Barcode 6200      XR Chest 1 View    Result Date: 9/8/2024  Impression: 1.No acute radiographic abnormality is identified. Electronically Signed: Chris Kerr MD  9/8/2024 7:15 PM EDT  Workstation ID: [id number]               Medical Decision Making  Amount and/or Complexity of Data Reviewed  Labs: ordered.  Radiology: ordered.  ECG/medicine tests: ordered.    Risk  Prescription drug management.  Decision regarding hospitalization.      Patient had the above evaluation.  Results were discussed with the patient.  White blood cell count is normal.  Hemoglobin is low at 5.0.  This is down from 5 days ago when it was 7.8.  Patient was typed and crossed for 2 units of packed red blood cells.  BMP is unremarkable.  Kidney function appears to be near baseline.  Troponin is 33.  EKG shows no acute ischemia.  BNP is elevated at 2600.  My interpretation of chest x-ray shows no infiltrate, edema, or effusion.  Patient remained hemodynamically stable in the emergency room.  I discussed with the nurse  practitioner on-call for the hospitalist and the patient will be admitted for further evaluation and management.      Final diagnoses:   Anemia, unspecified type   Dyspnea, unspecified type       ED Disposition  ED Disposition       ED Disposition   Decision to Admit    Condition   --    Comment   Level of Care: Telemetry [5]   Diagnosis: Anemia [915277]   Admitting Physician: SHARA YE [[id number]]                 No follow-up provider specified.       Medication List      No changes were made to your prescriptions during this visit.            Renan Thrasher MD  09/08/24 9044

## 2024-09-09 ENCOUNTER — INPATIENT HOSPITAL (OUTPATIENT)
Dept: URBAN - METROPOLITAN AREA HOSPITAL 84 | Facility: HOSPITAL | Age: 69
End: 2024-09-09
Payer: MEDICARE

## 2024-09-09 ENCOUNTER — INPATIENT HOSPITAL (OUTPATIENT)
Age: 69
End: 2024-09-09
Payer: MEDICARE

## 2024-09-09 ENCOUNTER — APPOINTMENT (OUTPATIENT)
Dept: CARDIOLOGY | Facility: HOSPITAL | Age: 69
DRG: 812 | End: 2024-09-09
Payer: MEDICARE

## 2024-09-09 DIAGNOSIS — D64.9 ANEMIA, UNSPECIFIED: ICD-10-CM

## 2024-09-09 DIAGNOSIS — Z90.49 ACQUIRED ABSENCE OF OTHER SPECIFIED PARTS OF DIGESTIVE TRACT: ICD-10-CM

## 2024-09-09 DIAGNOSIS — E11.9 TYPE 2 DIABETES MELLITUS WITHOUT COMPLICATIONS: ICD-10-CM

## 2024-09-09 LAB
ANION GAP SERPL CALCULATED.3IONS-SCNC: 7.2 MMOL/L (ref 5–15)
BASOPHILS # BLD AUTO: 0.01 10*3/MM3 (ref 0–0.2)
BASOPHILS NFR BLD AUTO: 0.2 % (ref 0–1.5)
BH CV LOWER ARTERIAL LEFT ABI RATIO: 0.71
BH CV LOWER ARTERIAL LEFT CALF RATIO: 0.69
BH CV LOWER ARTERIAL LEFT DORSALIS PEDIS SYS MAX: 92
BH CV LOWER ARTERIAL LEFT GREAT TOE SYS MAX: 67
BH CV LOWER ARTERIAL LEFT LOW THIGH RATIO: 0.84
BH CV LOWER ARTERIAL LEFT LOW THIGH SYS MAX: 143
BH CV LOWER ARTERIAL LEFT POPLITEAL SYS MAX: 118
BH CV LOWER ARTERIAL LEFT POST TIBIAL SYS MAX: 120
BH CV LOWER ARTERIAL LEFT TBI RATIO: 0.39
BH CV LOWER ARTERIAL RIGHT ABI RATIO: 0.85
BH CV LOWER ARTERIAL RIGHT CALF RATIO: 1.01
BH CV LOWER ARTERIAL RIGHT DORSALIS PEDIS SYS MAX: 139
BH CV LOWER ARTERIAL RIGHT GREAT TOE SYS MAX: 63
BH CV LOWER ARTERIAL RIGHT LOW THIGH RATIO: 0.99
BH CV LOWER ARTERIAL RIGHT LOW THIGH SYS MAX: 168
BH CV LOWER ARTERIAL RIGHT POPLITEAL SYS MAX: 172
BH CV LOWER ARTERIAL RIGHT POST TIBIAL SYS MAX: 145
BH CV LOWER ARTERIAL RIGHT TBI RATIO: 0.37
BUN SERPL-MCNC: 26 MG/DL (ref 8–23)
BUN/CREAT SERPL: 17.8 (ref 7–25)
CALCIUM SPEC-SCNC: 8.3 MG/DL (ref 8.6–10.5)
CHLORIDE SERPL-SCNC: 108 MMOL/L (ref 98–107)
CO2 SERPL-SCNC: 23.8 MMOL/L (ref 22–29)
CREAT SERPL-MCNC: 1.46 MG/DL (ref 0.57–1)
DEPRECATED RDW RBC AUTO: 62.6 FL (ref 37–54)
EGFRCR SERPLBLD CKD-EPI 2021: 38.6 ML/MIN/1.73
EOSINOPHIL # BLD AUTO: 0.05 10*3/MM3 (ref 0–0.4)
EOSINOPHIL NFR BLD AUTO: 1.1 % (ref 0.3–6.2)
ERYTHROCYTE [DISTWIDTH] IN BLOOD BY AUTOMATED COUNT: 18.1 % (ref 12.3–15.4)
GEN 5 2HR TROPONIN T REFLEX: 30 NG/L
GLUCOSE SERPL-MCNC: 104 MG/DL (ref 65–99)
HCT VFR BLD AUTO: 25.2 % (ref 34–46.6)
HGB BLD-MCNC: 7.7 G/DL (ref 12–15.9)
IMM GRANULOCYTES # BLD AUTO: 0.02 10*3/MM3 (ref 0–0.05)
IMM GRANULOCYTES NFR BLD AUTO: 0.4 % (ref 0–0.5)
LYMPHOCYTES # BLD AUTO: 1 10*3/MM3 (ref 0.7–3.1)
LYMPHOCYTES NFR BLD AUTO: 21.4 % (ref 19.6–45.3)
MCH RBC QN AUTO: 30.3 PG (ref 26.6–33)
MCHC RBC AUTO-ENTMCNC: 30.6 G/DL (ref 31.5–35.7)
MCV RBC AUTO: 99.2 FL (ref 79–97)
MONOCYTES # BLD AUTO: 0.57 10*3/MM3 (ref 0.1–0.9)
MONOCYTES NFR BLD AUTO: 12.2 % (ref 5–12)
NEUTROPHILS NFR BLD AUTO: 3.02 10*3/MM3 (ref 1.7–7)
NEUTROPHILS NFR BLD AUTO: 64.7 % (ref 42.7–76)
NRBC BLD AUTO-RTO: 0.4 /100 WBC (ref 0–0.2)
PLATELET # BLD AUTO: 138 10*3/MM3 (ref 140–450)
PMV BLD AUTO: 10.2 FL (ref 6–12)
POTASSIUM SERPL-SCNC: 4 MMOL/L (ref 3.5–5.2)
QT INTERVAL: 408 MS
QTC INTERVAL: 486 MS
RBC # BLD AUTO: 2.54 10*6/MM3 (ref 3.77–5.28)
SODIUM SERPL-SCNC: 139 MMOL/L (ref 136–145)
TROPONIN T DELTA: -2 NG/L
TROPONIN T SERPL HS-MCNC: 32 NG/L
UPPER ARTERIAL LEFT ARM BRACHIAL SYS MAX: 169
UPPER ARTERIAL RIGHT ARM BRACHIAL SYS MAX: 170
WBC NRBC COR # BLD AUTO: 4.67 10*3/MM3 (ref 3.4–10.8)

## 2024-09-09 PROCEDURE — 99223 1ST HOSP IP/OBS HIGH 75: CPT | Performed by: NURSE PRACTITIONER

## 2024-09-09 PROCEDURE — 36430 TRANSFUSION BLD/BLD COMPNT: CPT

## 2024-09-09 PROCEDURE — 80048 BASIC METABOLIC PNL TOTAL CA: CPT | Performed by: NURSE PRACTITIONER

## 2024-09-09 PROCEDURE — 97162 PT EVAL MOD COMPLEX 30 MIN: CPT

## 2024-09-09 PROCEDURE — G0378 HOSPITAL OBSERVATION PER HR: HCPCS

## 2024-09-09 PROCEDURE — 84484 ASSAY OF TROPONIN QUANT: CPT | Performed by: NURSE PRACTITIONER

## 2024-09-09 PROCEDURE — 86900 BLOOD TYPING SEROLOGIC ABO: CPT

## 2024-09-09 PROCEDURE — 99214 OFFICE O/P EST MOD 30 MIN: CPT | Performed by: INTERNAL MEDICINE

## 2024-09-09 PROCEDURE — 93923 UPR/LXTR ART STDY 3+ LVLS: CPT

## 2024-09-09 PROCEDURE — P9016 RBC LEUKOCYTES REDUCED: HCPCS

## 2024-09-09 PROCEDURE — 85025 COMPLETE CBC W/AUTO DIFF WBC: CPT | Performed by: NURSE PRACTITIONER

## 2024-09-09 PROCEDURE — 93923 UPR/LXTR ART STDY 3+ LVLS: CPT | Performed by: SURGERY

## 2024-09-09 RX ORDER — FUROSEMIDE 20 MG
20 TABLET ORAL EVERY OTHER DAY
Status: DISCONTINUED | OUTPATIENT
Start: 2024-09-09 | End: 2024-09-13 | Stop reason: HOSPADM

## 2024-09-09 RX ADMIN — Medication 10 ML: at 20:14

## 2024-09-09 RX ADMIN — Medication 10 ML: at 10:11

## 2024-09-09 RX ADMIN — LATANOPROST 1 DROP: 50 SOLUTION/ DROPS OPHTHALMIC at 20:04

## 2024-09-09 RX ADMIN — CYANOCOBALAMIN TAB 500 MCG 1000 MCG: 500 TAB at 10:06

## 2024-09-09 RX ADMIN — PANTOPRAZOLE SODIUM 40 MG: 40 TABLET, DELAYED RELEASE ORAL at 05:18

## 2024-09-09 RX ADMIN — Medication 5000 UNITS: at 10:06

## 2024-09-09 RX ADMIN — CALCITRIOL CAPSULES 0.25 MCG 0.25 MCG: 0.25 CAPSULE ORAL at 10:07

## 2024-09-09 RX ADMIN — FERROUS SULFATE TAB EC 324 MG (65 MG FE EQUIVALENT) 324 MG: 324 (65 FE) TABLET DELAYED RESPONSE at 10:06

## 2024-09-09 RX ADMIN — ALLOPURINOL 100 MG: 100 TABLET ORAL at 10:06

## 2024-09-09 RX ADMIN — CHOLESTYRAMINE 4 G: 4 POWDER, FOR SUSPENSION ORAL at 10:06

## 2024-09-09 RX ADMIN — ATORVASTATIN CALCIUM 10 MG: 10 TABLET, FILM COATED ORAL at 10:06

## 2024-09-09 RX ADMIN — FUROSEMIDE 20 MG: 20 TABLET ORAL at 14:07

## 2024-09-09 RX ADMIN — CARVEDILOL 12.5 MG: 6.25 TABLET, FILM COATED ORAL at 10:16

## 2024-09-09 RX ADMIN — CARVEDILOL 12.5 MG: 6.25 TABLET, FILM COATED ORAL at 20:03

## 2024-09-09 NOTE — PROGRESS NOTES
[facility] MEDICINE SERVICE  DAILY PROGRESS NOTE    NAME: Nancy Suárez  : 1955  MRN: [id number]      LOS: 0 days     PROVIDER OF SERVICE: Naresh Brand MD    Chief Complaint: Anemia    Subjective:     Interval History:  History taken from: patient chart RN  Feels less fatigued after receiving PRBC transfusion       Review of Systems:   Review of Systems  All negative except as above   Objective:     Vital Signs  Temp:  [97.9 °F (36.6 °C)-98.9 °F (37.2 °C)] 98.5 °F (36.9 °C)  Heart Rate:  [69-93] 78  Resp:  [15-21] 15  BP: (102-143)/(34-61) 143/61   Body mass index is 25.87 kg/m².    Physical Exam  Physical Exam  AOx3 NAD  RRR S1-S2 audible  Lungs with fair air entry  Abdomen soft nontender nondistended     Diagnostic Data    Results from last 7 days   Lab Units 24  0517 24  1854 24  1156   WBC 10*3/mm3 4.67   < > 6.2   HEMOGLOBIN g/dL 7.7*   < > 7.8*   HEMATOCRIT % 25.2*   < > 25.7*   PLATELETS 10*3/mm3 138*   < > 190   GLUCOSE mg/dL 104*   < > 175*   CREATININE mg/dL 1.46*   < > 1.58*   BUN mg/dL 26*   < > 26   SODIUM mmol/L 139   < > 140   POTASSIUM mmol/L 4.0   < > 4.2   AST (SGOT) IU/L  --   --  17   ALT (SGPT) IU/L  --   --  9   ALK PHOS IU/L  --   --  54   BILIRUBIN mg/dL  --   --  <0.2   ANION GAP mmol/L 7.2   < >  --     < > = values in this interval not displayed.       XR Chest 1 View    Result Date: 2024  Impression: 1.No acute radiographic abnormality is identified. Electronically Signed: Chris Kerr MD  2024 7:15 PM EDT  Workstation ID: [id number]       I reviewed the patient's new clinical results.  I reviewed the patient's new imaging results and agree with the interpretation.    Assessment/Plan:     Active and Resolved Problems  Active Hospital Problems    Diagnosis  POA    **Anemia [D64.9]  Yes      Resolved Hospital Problems   No resolved problems to display.       68-year-old female with a history of chronic anemia multiple endoscopic  interventions in the past, CKD [facility] generalized fatigue and dyspnea    #Acute symptomatic anemia on chronic anemia  No clear evidence of GIB GI following.  No plan for endoscopic intervention  Hemoglobin 5 on admission status post 2 units of PRBC transfusion responded appropriately repeat 7.7 continue to monitor H&H transfuse PRBCs.  To keep hemoglobin more than 7  Hematology has been consulted     #hypertension  Continue Coreg 12.5 twice daily    #CKD stage IIIb  Creatinine at baseline  CTM    #HFpEF  Not exacerbation  Continue home diuretics    #COPD  In exacerbation  Continue DuoNebs.    #History of TIA  At home takes aspirin and statin  Continue statins hold aspirin for now    #Gout  Continue allopurinol    VTE Prophylaxis:  Mechanical VTE prophylaxis orders are present.             Disposition Planning:     Barriers to Discharge: Clinical workup  Anticipated Date of Discharge: TBD  Place of Discharge: Home      Time: 63 minutes     Code Status and Medical Interventions: CPR (Attempt to Resuscitate); Full Support   Ordered at: 09/08/24 2203     Level Of Support Discussed With:    Patient     Code Status (Patient has no pulse and is not breathing):    CPR (Attempt to Resuscitate)     Medical Interventions (Patient has pulse or is breathing):    Full Support       Signature: Electronically signed by Naresh Brand MD, 09/09/24, 12:46 EDT.  [facility] Hospitalist Team

## 2024-09-09 NOTE — PLAN OF CARE
Goal Outcome Evaluation:              Outcome Evaluation: Patient currently resting abed, no distress noted. Patient alert and able to make needs known. Patient stating that she gets dizzy when getting up and is a standby assist. Patient educated on assistance and is agreeable. Patient with 2 units of blood administered at this time.

## 2024-09-09 NOTE — CONSULTS
"GI CONSULT  NOTE:    Referring Provider:  Dr. Brand    Chief complaint: Acute on chronic anemia    Subjective . \"I was fatigued and short of breath\"    History of present illness: Nancy Suárez is a 68 y.o. female who has a history of chronic iron deficiency anemia, adenomatous colon polyps, COPD, diabetes, CKD and cholecystectomy.  She presents with increased fatigue and shortness of breath.  She was found to have significant anemia and GI was asked to consult.  She denies any rectal bleeding or melena, does have darker stool on oral iron.  No abdominal pain.  No nausea, vomiting or heartburn on daily PPI.  No chest pain or difficulty swallowing.    Endo History:  1/2023 EGD/colonoscopy by Dr. Webster (indication KIRIT) -erosive gastritis, small hiatal hernia, 4 colon polyps (TA/SSA), extensive diverticulosis, hemorrhoids, random colon biopsies benign  2015 video capsule endoscopy -unremarkable    Past Medical History:  Past Medical History:   Diagnosis Date    Artery stenosis     Bilateral subclavian s/p stent     Arthritis     AVM (arteriovenous malformation)     CAD (coronary artery disease)     CKD (chronic kidney disease)     Stage three     COPD (chronic obstructive pulmonary disease)     DM2 (diabetes mellitus, type 2)     Eye pressure     Elevated eye pressure    GERD (gastroesophageal reflux disease)     Gout     Hepatitis     Hepatitis c     HTN (hypertension)     Hyperlipidemia     Hyperparathyroidism     KIRIT (iron deficiency anemia)     Iron deficiency anemia     Myocardial infarction     Osteoporosis     PVD (peripheral vascular disease)     Stroke     TIA       Past Surgical History:  Past Surgical History:   Procedure Laterality Date    CATARACT EXTRACTION Left     CHOLECYSTECTOMY      COLONOSCOPY N/A 01/26/2023    Procedure: COLONOSCOPY with biopsy x 2 areas and polypectomy x 4 and cautery of polyps x 2;  Surgeon: Hero Webster MD;  Location: [facility] ENDOSCOPY;  Service: Gastroenterology;  Laterality: " N/A;  post op: polyps, diverticulosis, hemorrhoids    ENDOSCOPY N/A 01/26/2023    Procedure: ESOPHAGOGASTRODUODENOSCOPY;  Surgeon: Hero Webster MD;  Location: [facility] ENDOSCOPY;  Service: Gastroenterology;  Laterality: N/A;  post op: erosive gasritis, small hiatal hernia    EYE SURGERY      TOTAL ABDOMINAL HYSTERECTOMY WITH SALPINGO OOPHORECTOMY         Social History:  Social History     Tobacco Use    Smoking status: Every Day     Current packs/day: 1.00     Average packs/day: 1 pack/day for 50.4 years (50.0 ttl pk-yrs)     Types: Cigarettes     Start date: 1/1/1975     Passive exposure: Current    Smokeless tobacco: Never   Vaping Use    Vaping status: Never Used   Substance Use Topics    Alcohol use: No    Drug use: No       Family History:  Family History   Problem Relation Age of Onset    Diabetes Mother     Anemia Mother     Heart attack Mother     Heart disease Mother     Heart failure Mother     Hypertension Mother     Coronary artery disease Sister     Diabetes Sister     Breast cancer Paternal Aunt 50       Medications:  Medications Prior to Admission   Medication Sig Dispense Refill Last Dose    acetaminophen (TYLENOL) 500 MG tablet Take 1 tablet by mouth Every 6 (Six) Hours As Needed for Mild Pain.   Past Month    albuterol sulfate HFA (ProAir HFA) 108 (90 Base) MCG/ACT inhaler Inhale 2 puffs Every 4 (Four) Hours As Needed for Wheezing or Shortness of Air. Proair - DX CODE J44.9 18 g 4 Past Month    allopurinol (ZYLOPRIM) 100 MG tablet Take 1 tablet by mouth Daily.   9/8/2024    amLODIPine (NORVASC) 10 MG tablet Take 1 tablet by mouth Daily. (Patient taking differently: Take 1 tablet by mouth Daily As Needed (SBP >160).) 90 tablet 3 Patient Taking Differently    aspirin 81 MG tablet Take 1 tablet by mouth Daily.   9/8/2024    calcitriol (ROCALTROL) 0.25 MCG capsule Take 1 capsule by mouth Daily.   9/8/2024    carvedilol (COREG) 12.5 MG tablet TAKE 1 TABLET BY MOUTH TWICE A  tablet 1 9/8/2024     Cholecalciferol 1000 units capsule Take 1 capsule by mouth Daily.   9/8/2024    colestipol (COLESTID) 1 g tablet Take 1 tablet by mouth 2 (Two) Times a Day.   9/8/2024    Cyanocobalamin (B-12) 1000 MCG capsule Take 1 tablet by mouth Daily.   9/8/2024    ferrous sulfate 325 (65 FE) MG tablet TAKE 1 TABLET BY MOUTH EVERY DAY WITH BREAKFAST (Patient taking differently: Take 1 tablet by mouth Daily With Breakfast.) 90 tablet 3 Patient Taking Differently    furosemide (LASIX) 20 MG tablet TAKE 1 TABLET BY MOUTH EVERY OTHER DAY 45 tablet 1 9/7/2024    hydrALAZINE (APRESOLINE) 25 MG tablet Take 1 tablet by mouth 2 (Two) Times a Day. Please schedule appt to follow up 180 tablet 1 9/8/2024    ipratropium-albuterol (DUO-NEB) 0.5-2.5 mg/3 ml nebulizer TAKE 3 ML BY NEBULIZATION EVERY 4 HOURS AS NEEDED FOR WHEEZE 180 mL 3 Past Month    latanoprost (XALATAN) 0.005 % ophthalmic solution    Past Week    Omega-3 Fatty Acids (FISH OIL) 1000 MG capsule capsule Take 1 capsule by mouth 2 (Two) Times a Day With Meals.   9/8/2024    omeprazole (priLOSEC) 40 MG capsule Take 1 capsule by mouth Daily.   9/8/2024    potassium chloride 10 MEQ CR tablet Take 1 tablet by mouth Every Other Day. TAKE 1 TABLET BY MOUTH EVERY OTHER DAY 60 tablet 1 9/7/2024    pravastatin (PRAVACHOL) 40 MG tablet TAKE 1 TABLET BY MOUTH EVERY DAY 90 tablet 0 9/8/2024    Alcohol Swabs (B-D SINGLE USE SWABS REGULAR) pads CHECK BLOOD SUGAR ONE TIME DAILY 100 each 6 Unknown       Scheduled Meds:allopurinol, 100 mg, Oral, Daily  atorvastatin, 10 mg, Oral, Daily  calcitriol, 0.25 mcg, Oral, Daily  carvedilol, 12.5 mg, Oral, BID  cholestyramine light, 1 packet, Oral, Daily  ferrous sulfate, 324 mg, Oral, Daily With Breakfast  latanoprost, 1 drop, Both Eyes, Daily  pantoprazole, 40 mg, Oral, Q AM  sodium chloride, 10 mL, Intravenous, Q12H  vitamin B-12, 1,000 mcg, Oral, Daily  vitamin D3, 5,000 Units, Oral, Daily      Continuous Infusions:   PRN Meds:.  acetaminophen     aluminum-magnesium hydroxide-simethicone    senna-docusate sodium **AND** polyethylene glycol **AND** bisacodyl **AND** bisacodyl    melatonin    ondansetron ODT **OR** ondansetron    [COMPLETED] Insert Peripheral IV **AND** sodium chloride    sodium chloride    sodium chloride    ALLERGIES:  Patient has no known allergies.    ROS:  The following systems were reviewed   Constitution:  No fevers, chills, no unintentional weight loss  Skin: no rash, no jaundice  Eyes:  No blurry vision, no eye pain  HENT:  No change in hearing or smell  Resp: Apnea  CV:  No chest pain or palpitations  :  No dysuria, hematuria  Musculoskeletal:  No leg cramps or arthralgias, + fatigue  Neuro:  No tremor, no numbness  Psych:  No depression or confusion    Objective resting in bed, no acute distress, no family present    Vital Signs:   Vitals:    09/09/24 0500 09/09/24 0525 09/09/24 0834 09/09/24 1016   BP: 109/40  132/48 143/61   BP Location: Left arm  Left arm    Patient Position: Lying  Lying    Pulse: 77  69 78   Resp: 20  15    Temp: 98.7 °F (37.1 °C)  98.5 °F (36.9 °C)    TempSrc: Oral  Oral    SpO2: 98%  99%    Weight:  62.1 kg (136 lb 14.5 oz)     Height:           Physical Exam:       General Appearance:    Awake and alert, in no acute distress   Head:    Normocephalic, without obvious abnormality, atraumatic   Throat:   No oral lesions, no thrush, oral mucosa moist   Lungs:     Respirations regular, even and unlabored   Chest Wall:    No abnormalities observed   Abdomen:     Soft, non-tender, nondistended   Rectal:     Deferred   Extremities:   Moves all extremities, no cyanosis       Skin:   No rash, no jaundice, normal palpation       Neurologic:   Cranial nerves 2 - 12 grossly intact       Results Review:   I reviewed the patient's labs and imaging.  CBC    Results from last 7 days   Lab Units 09/09/24  0517 09/08/24  1854 09/03/24  1156   WBC 10*3/mm3 4.67 7.64 6.2   HEMOGLOBIN g/dL 7.7* 5.0* 7.8*   PLATELETS 10*3/mm3  "138* 190 190     CMP   Results from last 7 days   Lab Units 09/09/24  0517 09/08/24 2011 09/03/24  1156   SODIUM mmol/L 139 137 140   POTASSIUM mmol/L 4.0 3.9 4.2   CHLORIDE mmol/L 108* 103 107*   CO2 mmol/L 23.8 24.0 19*   BUN mg/dL 26* 28* 26   CREATININE mg/dL 1.46* 1.79* 1.58*   GLUCOSE mg/dL 104* 125* 175*   ALBUMIN g/dL  --   --  3.7*   BILIRUBIN mg/dL  --   --  <0.2   ALK PHOS IU/L  --   --  54   AST (SGOT) IU/L  --   --  17   ALT (SGPT) IU/L  --   --  9     Cr Clearance Estimated Creatinine Clearance: 30.3 mL/min (A) (by C-G formula based on SCr of 1.46 mg/dL (H)).  Coag     HbA1C   Lab Results   Component Value Date    HGBA1C 5.3 03/02/2021    HGBA1C 5.5 11/07/2019     Blood Glucose No results found for: \"POCGLU\"  Infection     UA      Imaging Results (Last 72 Hours)       Procedure Component Value Units Date/Time    XR Chest 1 View [[id number]] Collected: 09/08/24 1915     Updated: 09/08/24 1917    Narrative:      XR CHEST 1 VW    Date of Exam: 9/8/2024 6:54 PM EDT    Indication: shortness of breath    Comparison: Chest x-ray dated 1/25/2018    Findings:  The lungs appear adequately aerated without consolidation or mass. No pleural effusion or pneumothorax is identified. The cardiomediastinal silhouette and pulmonary vasculature appear within normal limits. No acute or suspicious osseous lesion is   identified.       Impression:      Impression:  1.No acute radiographic abnormality is identified.      Electronically Signed: Chris Kerr MD    9/8/2024 7:15 PM EDT    Workstation ID: [id number]            ASSESSMENT:  Acute on chronic anemia  History of iron deficiency  COPD  DMII  CKD  History of cholecystectomy    PLAN:  Patient is 68-year-old female with a history of iron deficiency anemia, B12 deficiency, COPD, diabetes and CKD.  She presents with increased fatigue and shortness of breath.  She is found have a hemoglobin of 5 and was admitted for further evaluation.    Hemoglobin 7.7 after transfusion, " macrocytic in nature.  Iron 129, folate 16.7 and B12 1700.  Platelets 138.  No evidence of overt GI bleeding or any GI complaints.  Hematology has been consulted, plan further workup per their recommendation.  Without overt GI bleeding and normal iron, no plan for repeat endoscopy at this time.  Okay for diet as tolerated.  Little more for GI to offer at this time, call questions or concerns.    I discussed the patients findings and my recommendations with the patient.    We appreciate the referral    Electronically signed by LOVE Boateng, 09/09/24, 11:55 AM EDT.

## 2024-09-09 NOTE — PLAN OF CARE
Goal Outcome Evaluation:              Outcome Evaluation: Nancy Suárez is a 68 y.o. female with a history of macrocytic anemia followed by hematologist Dr. Campos. In the past she has had iron deficiency anemia treated with IV iron with improvement but anemia has persisted. Her chronic kidney disease has likely contributed. PMHX includes COPD. She also has had extensive GI workup in the past with Dr. Webster with no found source of active bleeding. She presented to [facility] ED 9/8/2024 with complaints of extreme fatigue and shortness of breath.  She received 2 units PRBC.  She is cleared for PT by nursing and highly motivated to participate.  In her normal state she is active and independent within the home. She resides alone in a multiple level apartment with 6 ZAN, has a very supportive family.  Upon evaluation she demonstrates independence with gait, functional transfers, and bed mobility; completed a Timed Up and Go in 10s which is normal mobility status for her age.  She does not require skilled PT at this time and will be safe for discharge to her home environment with family support once she is medically stable.  Will sign off and patient will require new order if she experiences a status change.  Thank you for this referral.      Anticipated Discharge Disposition (PT): home

## 2024-09-09 NOTE — THERAPY EVALUATION
Patient Name: Nancy Suárez  : 1955    MRN: [id number]                              Today's Date: 2024       Admit Date: 2024    Visit Dx:     ICD-10-CM ICD-9-CM   1. Anemia, unspecified type  D64.9 285.9   2. Dyspnea, unspecified type  R06.00 786.09     Patient Active Problem List   Diagnosis    Anemia    Atherosclerosis of native arteries of extremities with intermittent claudication, bilateral legs    Cellulitis of ankle    Chronic obstructive pulmonary disease    Chronic renal insufficiency, stage III (moderate)    Cigarette smoker    Common cold    Congenital arteriovenous malformation    Congestive heart failure    Left bundle branch block    Coronary artery disease    Degenerative arthritis    Elevated liver enzymes    Encounter for immunization    Encounter for general adult medical examination without abnormal findings    Gout    History of myocardial infarction    Hyperlipidemia    Hypertension    Hyperparathyroidism due to renal insufficiency    Cardiac disease    Peripheral vascular disease    Iron deficiency anemia    Malaise and fatigue    Mitral regurgitation    Need for other prophylactic vaccination and inoculation against single diseases    Stenosis of subclavian artery    Swelling of lower extremity    Type 2 diabetes mellitus    Allergic rhinitis    Acute upper respiratory infection    Bleeding from the nose    Low vitamin D level    Bilateral carotid artery stenosis    Acute ischemic stroke    Left-sided weakness    Overweight with body mass index (BMI) of 26 to 26.9 in adult    Pigmented skin lesion suspicious for malignant neoplasm    Abnormal blood level of iron    Raised serum iron    Upper respiratory tract infection    Wheezing    Acute cough    Acute bilateral low back pain    Chronic renal impairment, stage 3a    Anemia of chronic kidney failure, stage 3 (moderate)    Chronic renal disease, stage III    Iron deficiency    Stage 3b chronic kidney disease     Past Medical  History:   Diagnosis Date    Artery stenosis     Bilateral subclavian s/p stent     Arthritis     AVM (arteriovenous malformation)     CAD (coronary artery disease)     CKD (chronic kidney disease)     Stage three     COPD (chronic obstructive pulmonary disease)     DM2 (diabetes mellitus, type 2)     Eye pressure     Elevated eye pressure    GERD (gastroesophageal reflux disease)     Gout     Hepatitis     Hepatitis c     HTN (hypertension)     Hyperlipidemia     Hyperparathyroidism     KIRIT (iron deficiency anemia)     Iron deficiency anemia     Myocardial infarction     Osteoporosis     PVD (peripheral vascular disease)     Stroke     TIA     Past Surgical History:   Procedure Laterality Date    CATARACT EXTRACTION Left     CHOLECYSTECTOMY      COLONOSCOPY N/A 01/26/2023    Procedure: COLONOSCOPY with biopsy x 2 areas and polypectomy x 4 and cautery of polyps x 2;  Surgeon: Hero Webster MD;  Location: [facility];  Service: Gastroenterology;  Laterality: N/A;  post op: polyps, diverticulosis, hemorrhoids    ENDOSCOPY N/A 01/26/2023    Procedure: ESOPHAGOGASTRODUODENOSCOPY;  Surgeon: Hero Webster MD;  Location: [facility];  Service: Gastroenterology;  Laterality: N/A;  post op: erosive gasritis, small hiatal hernia    EYE SURGERY      TOTAL ABDOMINAL HYSTERECTOMY WITH SALPINGO OOPHORECTOMY        General Information       Row Name 09/09/24 1053          Physical Therapy Time and Intention    Document Type evaluation  -RR     Mode of Treatment physical therapy  -RR       Row Name 09/09/24 1053          General Information    Patient Profile Reviewed yes  -RR     Prior Level of Function independent:;gait;transfer  - driving; - falls  -RR     Existing Precautions/Restrictions fall  -RR       Row Name 09/09/24 1053          Living Environment    People in Home alone  good family support  -RR       Row Name 09/09/24 1053          Home Main Entrance    Number of Stairs, Main Entrance six  -RR     Stair  Railings, Main Entrance railings safe and in good condition  -RR       Row Name 09/09/24 1053          Stairs Within Home, Primary    Number of Stairs, Within Home, Primary twelve  -RR     Stair Railings, Within Home, Primary railings safe and in good condition  -RR       Row Name 09/09/24 1053          Cognition    Orientation Status (Cognition) oriented x 4  -RR       Row Name 09/09/24 1053          Safety Issues, Functional Mobility    Impairments Affecting Function (Mobility) balance  -RR               User Key  (r) = Recorded By, (t) = Taken By, (c) = Cosigned By      Initials Name Provider Type    RR Nazia Dillard PT Physical Therapist                   Mobility       Row Name 09/09/24 1224          Bed Mobility    Bed Mobility bed mobility (all) activities  -RR     All Activities, [place] (Bed Mobility) independent  -RR       Row Name 09/09/24 1224          Bed-Chair Transfer    Bed-Chair [place] (Transfers) independent  -RR       Row Name 09/09/24 1224          Sit-Stand Transfer    Sit-Stand [place] (Transfers) independent  -RR       Row Name 09/09/24 1224          Gait/Stairs (Locomotion)    [place] Level (Gait) supervision  supervision-independent due to environment; mild path deviations but no LOB family reporting this is patients baseline mobility.  -RR     Patient was able to Ambulate yes  -RR     Distance in Feet (Gait) 200  & 175 ft  -RR     [place] Level (Stairs) not tested  -RR     Comment, (Gait/Stairs) TUG 10s no device  -RR               User Key  (r) = Recorded By, (t) = Taken By, (c) = Cosigned By      Initials Name Provider Type    RR Nazia Dillard PT Physical Therapist                   Obj/Interventions       Row Name 09/09/24 1227          Strength Comprehensive (MMT)    General Manual Muscle Testing (MMT) Assessment no strength deficits identified  -RR       Row Name 09/09/24 1227          Balance    Balance Assessment standing dynamic balance  -RR      Dynamic Standing Balance independent  -RR     Position/Device Used, Standing Balance unsupported  -RR       Row Name 09/09/24 1227          Sensory Assessment (Somatosensory)    Sensory Assessment (Somatosensory) sensation intact  -RR               User Key  (r) = Recorded By, (t) = Taken By, (c) = Cosigned By      Initials Name Provider Type    RR Nazia Dillard, PT Physical Therapist                   Goals/Plan    No documentation.                  Clinical Impression       Row Name 09/09/24 1227          Pain    Pretreatment Pain Rating 0/10 - no pain  -RR     Posttreatment Pain Rating 0/10 - no pain  -RR       Row Name 09/09/24 1227          Plan of Care Review    Outcome Evaluation Nancy Suárez is a 68 y.o. female with a history of macrocytic anemia followed by hematologist Dr. Campos. In the past she has had iron deficiency anemia treated with IV iron with improvement but anemia has persisted. Her chronic kidney disease has likely contributed. PMHX includes COPD. She also has had extensive GI workup in the past with Dr. Webster with no found source of active bleeding. She presented to [facility] ED 9/8/2024 with complaints of extreme fatigue and shortness of breath.  She received 2 units PRBC.  She is cleared for PT by nursing and highly motivated to participate.  In her normal state she is active and independent within the home. She resides alone in a multiple level apartment with 6 ZAN, has a very supportive family.  Upon evaluation she demonstrates independence with gait, functional transfers, and bed mobility; completed a Timed Up and Go in 10s which is normal mobility status for her age.  She does not require skilled PT at this time and will be safe for discharge to her home environment with family support once she is medically stable.  Will sign off and patient will require new order if she experiences a status change.  Thank you for this referral.  -RR       Row Name 09/09/24 1227          Therapy  Assessment/Plan (PT)    Patient/Family Therapy Goals Statement (PT) to return home  -RR     Criteria for Skilled Interventions Met (PT) no;no problems identified which require skilled intervention  -RR     Therapy Frequency (PT) evaluation only  -RR               User Key  (r) = Recorded By, (t) = Taken By, (c) = Cosigned By      Initials Name Provider Type    RR Nazia Dillard, PT Physical Therapist                   Outcome Measures       Row Name 09/09/24 1232 09/09/24 0800       How much help from another person do you currently need...    Turning from your back to your side while in flat bed without using bedrails? 4  -RR 4  -MV    Moving from lying on back to sitting on the side of a flat bed without bedrails? 4  -RR 4  -MV    Moving to and from a bed to a chair (including a wheelchair)? 4  -RR 4  -MV    Standing up from a chair using your arms (e.g., wheelchair, bedside chair)? 4  -RR 4  -MV    Climbing 3-5 steps with a railing? 3  -RR 4  -MV    To walk in hospital room? 4  -RR 4  -MV    AM-PAC 6 Clicks Score (PT) 23  -RR 24  -MV    Highest Level of Mobility Goal 7 --> Walk 25 feet or more  -RR 8 --> Walked 250 feet or more  -MV              User Key  (r) = Recorded By, (t) = Taken By, (c) = Cosigned By      Initials Name Provider Type     Karon Jolley, RN Registered Nurse    Nazia Wagner, PT Physical Therapist                                 Physical Therapy Education       Title: PT OT SLP Therapies (Done)       Topic: Physical Therapy (Done)       Point: Mobility training (Done)       Learning Progress Summary             Patient Acceptance, E,TB, VU by  at 9/9/2024 1233                         Point: Home exercise program (Done)       Learning Progress Summary             Patient Acceptance, E,TB, VU by  at 9/9/2024 1233                                         User Key       Initials Effective Dates Name Provider Type [facility] 07/01/24 -  Nazia Dillard, [place] Physical Therapist  PT                  PT Recommendation and Plan     Outcome Evaluation: Nancy Suárez is a 68 y.o. female with a history of macrocytic anemia followed by hematologist Dr. Campos. In the past she has had iron deficiency anemia treated with IV iron with improvement but anemia has persisted. Her chronic kidney disease has likely contributed. PMHX includes COPD. She also has had extensive GI workup in the past with Dr. Webster with no found source of active bleeding. She presented to [facility] ED 9/8/2024 with complaints of extreme fatigue and shortness of breath.  She received 2 units PRBC.  She is cleared for PT by nursing and highly motivated to participate.  In her normal state she is active and independent within the home. She resides alone in a multiple level apartment with 6 ZAN, has a very supportive family.  Upon evaluation she demonstrates independence with gait, functional transfers, and bed mobility; completed a Timed Up and Go in 10s which is normal mobility status for her age.  She does not require skilled PT at this time and will be safe for discharge to her home environment with family support once she is medically stable.  Will sign off and patient will require new order if she experiences a status change.  Thank you for this referral.     Time Calculation:         PT Charges       Row Name 09/09/24 1233             Time Calculation    Start Time 1051  -RR      Stop Time 1114  -RR      Time Calculation (min) 23 min  -RR      PT Received On 09/09/24  -RR                User Key  (r) = Recorded By, (t) = Taken By, (c) = Cosigned By      Initials Name Provider Type    RR Nazia Dillard PT Physical Therapist                  Therapy Charges for Today       Code Description Service Date Service Provider Modifiers Qty    [id number]  PT EVAL MOD COMPLEXITY 4 9/9/2024 Nazia Dillard PT GP 1            PT G-Codes  AM-PAC 6 Clicks Score (PT): 23  PT Discharge Summary  Anticipated Discharge Disposition (PT):  home    Nazia Dillard, PT  9/9/2024

## 2024-09-09 NOTE — PLAN OF CARE
Goal Outcome Evaluation: patient alert and oriented, VSS, [place] doppler done today, up in chair, took a shower, family at bedside, no complaints, call light within reach

## 2024-09-09 NOTE — H&P
[facility] Medicine Services    Hospitalist History and Physical     Nancy Suárez : 1955 MRN:[id number] LOS:0 ROOM:      Reason for admission: Anemia     Assessment / Plan     Acute on chronic anemia  - h/o of known macrocytic anemia and anemia of chronic kidney disease  - Current hgb 5.0 compared to 7.8 on 9/3/2024 and 7.1 on 2024  - denied any hematemesis or hematochezia, no abdominal pain  - CXR: negative  - proBNP elevated at 2681 with no previous to compare. HS troponin 33 with no previous to compare  - EKG: NSR, LBBB  - transfuse 2 units PRBCs in the ER  - monitor hgb  - extensive previous anemia workup by both GI team and hematology. She has required IV iron transfusions and PRBC transfusions in the past. H/o AVM  - previous CT guided bone marrow biopsy 2023 by Dr. Campbell  - consult hematology and GI team for further recommendations     Hypertension  - currently controlled and diastolic on the low end  - hold home BP meds overnight then reassess    Hyperlipidemia  - cont home statin     CKD stage 3b  - BUN 28, creatinine 1.79  - monitor     HFpEF  - previous echo  with EF 60%, diastolic LV dysfunction   - on GDMT: cont home coreg, lasix if BP able to tolerate    COPD  - not in exacerbation  - cont home duonebs prn    H/o TIA, carotid stenosis  - on aspirin, statin     Gout  - on gout    GERD  - cont PPI    Level Of Support Discussed With: Patient  Code Status (Patient has no pulse and is not breathing): CPR (Attempt to Resuscitate)  Medical Interventions (Patient has pulse or is breathing): Full Support       Nutrition:   Diet: Regular/House; Fluid Consistency: Thin (IDDSI 0)     VTE Prophylaxis:  Mechanical VTE prophylaxis orders are present.         History of Present illness     Nancy Suárez is a 68 y.o. female with a history of macrocytic anemia followed by hematologist Dr. Campos. In the past she has had iron deficiency anemia treated with IV iron with improvement  but anemia has persisted. Her chronic kidney disease has likely contributed. She has had a previous CT guided bone marrow biopsy 7/7/2023. She also has had extensive GI workup in the past with Dr. Webster with no found source of active bleeding.    She presented to [facility] ED 9/8/2024 with complaints of extreme fatigue and shortness of breath. She cannot walk more than a few feet without feeling fatigued. She has no energy to take a shower or carry out activity of daily living. She denied falling. She denied any nausea, vomiting, diarrhea, no hematemesis or black/red stool. She has no abdominal pain. No hematuria. She was told by Dr. Campos to go to the ER should her symptoms worsen, which is why she presented to the ER today. She has a follow up with Dr. Campos on 9/16 to discuss her anemia.     In the ED the patient had a hemoglobin of 5.0 compared to 7.8 on 9/3/2024 and 7.1 on 8/29/2024. proBNP elevated at 2681 with no previous to compare. HS troponin 33 with no previous to compare. CXR was negative for acute abnormality. EKG showed NSR rate 85 with LBBB. Her diastolic blood pressure has been somewhat low around 40, normal HR, normal oxygen saturation on room air. She had 2 units of PRBCs ordered. She was admitted for further workup and treatment of acute on chronic anemia.     Subjective / Review of systems     Review of Systems   Constitutional:  Positive for fatigue.   HENT: Negative.     Eyes: Negative.    Respiratory:  Positive for shortness of breath.    Cardiovascular: Negative.  Negative for chest pain.   Gastrointestinal: Negative.  Negative for abdominal pain, blood in stool, nausea and vomiting.   Genitourinary: Negative.    Musculoskeletal: Negative.    Skin: Negative.    Neurological:  Positive for weakness.   Psychiatric/Behavioral: Negative.          Past Medical/Surgical/Social/Family History & Allergies     Past Medical History:   Diagnosis Date    Artery stenosis     Bilateral subclavian s/p stent      Arthritis     AVM (arteriovenous malformation)     CAD (coronary artery disease)     CKD (chronic kidney disease)     Stage three     COPD (chronic obstructive pulmonary disease)     DM2 (diabetes mellitus, type 2)     Eye pressure     Elevated eye pressure    GERD (gastroesophageal reflux disease)     Gout     Hepatitis     Hepatitis c     HTN (hypertension)     Hyperlipidemia     Hyperparathyroidism     KIRIT (iron deficiency anemia)     Iron deficiency anemia     Myocardial infarction     Osteoporosis     PVD (peripheral vascular disease)     Stroke     TIA      Past Surgical History:   Procedure Laterality Date    CATARACT EXTRACTION Left     CHOLECYSTECTOMY      COLONOSCOPY N/A 01/26/2023    Procedure: COLONOSCOPY with biopsy x 2 areas and polypectomy x 4 and cautery of polyps x 2;  Surgeon: Hero Webster MD;  Location: [facility] ENDOSCOPY;  Service: Gastroenterology;  Laterality: N/A;  post op: polyps, diverticulosis, hemorrhoids    ENDOSCOPY N/A 01/26/2023    Procedure: ESOPHAGOGASTRODUODENOSCOPY;  Surgeon: Hero Webster MD;  Location: [facility] ENDOSCOPY;  Service: Gastroenterology;  Laterality: N/A;  post op: erosive gasritis, small hiatal hernia    EYE SURGERY      TOTAL ABDOMINAL HYSTERECTOMY WITH SALPINGO OOPHORECTOMY        Social History     Socioeconomic History    Marital status: Single   Tobacco Use    Smoking status: Every Day     Current packs/day: 1.00     Average packs/day: 1 pack/day for 50.4 years (50.0 ttl pk-yrs)     Types: Cigarettes     Start date: 1/1/1975     Passive exposure: Current    Smokeless tobacco: Never   Vaping Use    Vaping status: Never Used   Substance and Sexual Activity    Alcohol use: No    Drug use: No    Sexual activity: Defer     Partners: Male     Birth control/protection: Hysterectomy      Family History   Problem Relation Age of Onset    Diabetes Mother     Anemia Mother     Heart attack Mother     Heart disease Mother     Heart failure Mother     Hypertension Mother      Coronary artery disease Sister     Diabetes Sister     Breast cancer Paternal Aunt 50      No Known Allergies   Social Determinants of Health     Tobacco Use: High Risk (9/3/2024)    Patient History     Smoking Tobacco Use: Every Day     Smokeless Tobacco Use: Never     Passive Exposure: Current   Alcohol Use: Not At Risk (2/4/2021)    AUDIT-C     Frequency of Alcohol Consumption: Never     Average Number of Drinks: Not on file     Frequency of Binge Drinking: Not on file   Financial Resource Strain: Not on file   Food Insecurity: Not on file   Transportation Needs: Not on file   Physical Activity: Not on file   Stress: Not on file   Social Connections: Unknown (10/9/2023)    Family and Community Support     Help with Day-to-Day Activities: Not on file     Lonely or Isolated: Not on file   Interpersonal Safety: Not At Risk (9/8/2024)    Abuse Screen     Unsafe at Home or Work/School: no     Feels Threatened by Someone?: no     Does Anyone Keep You from Contacting Others or Doint Things Outside the Home?: no     Physical Sign of Abuse Present: no   Depression: Not at risk (4/30/2024)    PHQ-2     PHQ-2 Score: 0   Housing Stability: Unknown (10/9/2023)    Housing Stability     Current Living Arrangements: Not on file     Potentially Unsafe Housing Conditions: Not on file   Utilities: Not on file   Health Literacy: Unknown (10/9/2023)    Education     Help with school or training?: Not on file     Preferred Language: Not on file   Employment: Unknown (10/9/2023)    Employment     Do you want help finding or keeping work or a job?: Not on file   Disabilities: Unknown (1/27/2024)    Disabilities     Concentrating, Remembering, or Making Decisions Difficulty: Not on file     Doing Errands Independently Difficulty: Not on file        Home Medications     Prior to Admission medications    Medication Sig Start Date End Date Taking? Authorizing Provider   acetaminophen (TYLENOL) 500 MG tablet Take 1 tablet by mouth Every 6  (Six) Hours As Needed for Mild Pain.    Jeannine Edmondson MD   albuterol sulfate HFA (ProAir HFA) 108 (90 Base) MCG/ACT inhaler Inhale 2 puffs Every 4 (Four) Hours As Needed for Wheezing or Shortness of Air. Proair - DX CODE J44.9 3/9/23   Lyssa Pineda APRN   Alcohol Swabs (B-D SINGLE USE SWABS REGULAR) pads CHECK BLOOD SUGAR ONE TIME DAILY 2/4/21   Ingrid De Anda APRN   allopurinol (ZYLOPRIM) 100 MG tablet Take 1 tablet by mouth Daily. 10/29/20   Jeannine Edmondson MD   amLODIPine (NORVASC) 10 MG tablet Take 1 tablet by mouth Daily. 2/17/23   Lyssa Pineda APRN   aspirin 81 MG tablet Take 1 tablet by mouth Daily. 6/22/15   Jeannine Edmondson MD   calcitriol (ROCALTROL) 0.25 MCG capsule Take 1 capsule by mouth Daily. 2/22/23   Jeannine Edmondson MD   carvedilol (COREG) 12.5 MG tablet TAKE 1 TABLET BY MOUTH TWICE A DAY 8/17/23   Lyssa Pineda APRN   Cholecalciferol 1000 units capsule Take 1 capsule by mouth Daily. 12/3/15   Jeannine Edmondson MD   colestipol (COLESTID) 1 g tablet Take 1 tablet by mouth 2 (Two) Times a Day. 12/18/22   Jeannine Edmondson MD   Cyanocobalamin (B-12) 1000 MCG capsule Take 1 tablet by mouth Daily. 7/1/15   Jeannine Edmondson MD   ferrous sulfate 325 (65 FE) MG tablet TAKE 1 TABLET BY MOUTH EVERY DAY WITH BREAKFAST  Patient taking differently: PATIENT STATED SHE IS TAKING 2 IRON TABLETS PER DAY 4/25/24   Lyssa Pineda APRN   furosemide (LASIX) 20 MG tablet TAKE 1 TABLET BY MOUTH EVERY OTHER DAY 8/17/23   Lyssa Pineda APRN   hydrALAZINE (APRESOLINE) 25 MG tablet Take 1 tablet by mouth 2 (Two) Times a Day. Please schedule appt to follow up 8/16/22   Ingrid De Anda APRN   ipratropium-albuterol (DUO-NEB) 0.5-2.5 mg/3 ml nebulizer TAKE 3 ML BY NEBULIZATION EVERY 4 HOURS AS NEEDED FOR WHEEZE 7/3/23   Lyssa Pineda APRN   latanoprost (XALATAN) 0.005 % ophthalmic solution  10/17/22   Provider, MD Jeannine   Omega-3 Fatty Acids  (FISH OIL) 1000 MG capsule capsule Take 1 capsule by mouth 2 (Two) Times a Day With Meals.    ProviderJeannine MD   omeprazole (priLOSEC) 40 MG capsule Take 1 capsule by mouth Daily. 11/9/22   ProviderJeannine MD   potassium chloride 10 MEQ CR tablet Take 1 tablet by mouth Every Other Day. TAKE 1 TABLET BY MOUTH EVERY OTHER DAY 3/6/23   Lyssa Pineda APRN   pravastatin (PRAVACHOL) 40 MG tablet TAKE 1 TABLET BY MOUTH EVERY DAY 8/17/23   Lyssa Pineda APRN        Objective / Physical Exam     Vital signs:  Temp: 97.9 °F (36.6 °C)  BP: (!) 121/35  Heart Rate: 89  Resp: 20  SpO2: 100 %  Weight: 62.1 kg (137 lb)    Admission Weight: Weight: 62.1 kg (137 lb)    Physical Exam  Vitals and nursing note reviewed.   HENT:      Head: Normocephalic and atraumatic.   Eyes:      Extraocular Movements: Extraocular movements intact.      Pupils: Pupils are equal, round, and reactive to light.   Cardiovascular:      Rate and Rhythm: Normal rate and regular rhythm.      Pulses: Normal pulses.      Heart sounds: Normal heart sounds.   Pulmonary:      Effort: Pulmonary effort is normal.      Breath sounds: Normal breath sounds.   Abdominal:      General: Bowel sounds are normal.      Palpations: Abdomen is soft.      Tenderness: There is no abdominal tenderness.   Musculoskeletal:         General: Normal range of motion.   Skin:     General: Skin is warm and dry.   Neurological:      Mental Status: She is alert and oriented to person, place, and time.   Psychiatric:         Mood and Affect: Mood normal.         Behavior: Behavior normal.          Labs     Results from last 7 days   Lab Units 09/08/24  1854 09/03/24  1156   WBC 10*3/mm3 7.64 6.2   HEMOGLOBIN g/dL 5.0* 7.8*   HEMATOCRIT % 17.2* 25.7*   PLATELETS 10*3/mm3 190 190      Results from last 7 days   Lab Units 09/03/24  1156   ALK PHOS IU/L 54   AST (SGOT) IU/L 17   ALT (SGPT) IU/L 9           Results from last 7 days   Lab Units 09/08/24 2011  09/03/24  1156   SODIUM mmol/L 137 140   POTASSIUM mmol/L 3.9 4.2   CHLORIDE mmol/L 103 107*   CO2 mmol/L 24.0 19*   BUN mg/dL 28* 26   CREATININE mg/dL 1.79* 1.58*   GLUCOSE mg/dL 125* 175*        Imaging     XR Chest 1 View    Result Date: 9/8/2024  XR CHEST 1 VW Date of Exam: 9/8/2024 6:54 PM EDT Indication: shortness of breath Comparison: Chest x-ray dated 1/25/2018 Findings: The lungs appear adequately aerated without consolidation or mass. No pleural effusion or pneumothorax is identified. The cardiomediastinal silhouette and pulmonary vasculature appear within normal limits. No acute or suspicious osseous lesion is identified.     Impression: 1.No acute radiographic abnormality is identified. Electronically Signed: Chris Kerr MD  9/8/2024 7:15 PM EDT  Workstation ID: [id number]      Current Medications     Scheduled Meds:  sodium chloride, 10 mL, Intravenous, Q12H           LOVE Akbar   [facility] Medicine  09/08/24   21:54 EDT

## 2024-09-09 NOTE — CASE MANAGEMENT/SOCIAL WORK
Discharge Planning Assessment   [facility]     Patient Name: Nancy Suárez  MRN: [id number]  Today's Date: 9/9/2024    Admit Date: 9/8/2024    Plan: Anticipate routine home.   Discharge Needs Assessment       Row Name 09/09/24 1624       Living Environment    People in Home alone    Current Living Arrangements apartment    Potentially Unsafe Housing Conditions none    In the past 12 months has the electric, gas, oil, or water company threatened to shut off services in your home? No    Primary Care Provided by self    Provides Primary Care For no one    Family Caregiver if Needed grandchild(gerri), adult  Patient reported that her granddaughter, Mitzy stays with her    Family Caregiver Names Daughter- Abbi    Quality of Family Relationships helpful;involved;supportive    Able to Return to Prior Arrangements yes       Resource/Environmental Concerns    Resource/Environmental Concerns none    Transportation Concerns none       Transportation Needs    In the past 12 months, has lack of transportation kept you from medical appointments or from getting medications? no    In the past 12 months, has lack of transportation kept you from meetings, work, or from getting things needed for daily living? No       Food Insecurity    Within the past 12 months, you worried that your food would run out before you got the money to buy more. Never true    Within the past 12 months, the food you bought just didn't last and you didn't have money to get more. Never true       Transition Planning    Patient/Family Anticipates Transition to home with family    Patient/Family Anticipated Services at Transition none    Transportation Anticipated family or friend will provide       Discharge Needs Assessment    Readmission Within the Last 30 Days no previous admission in last 30 days    Equipment Currently Used at Home none    Concerns to be Addressed denies needs/concerns at this time    Anticipated Changes Related to Illness none    Equipment  Needed After Discharge none    Provided Post Acute Provider List? N/A                   Discharge Plan       Row Name 09/09/24 1625       Plan    Plan Anticipate routine home.    Patient/Family in Agreement with Plan yes    Plan Comments CM met with patient and family at bedside to discuss dc planning. PCP and pharmacy confirmed, reported no trouble affording food/medications, and declined needs at this time for any DME/HH/PT services. Patient reported that she lives in an apartment and her granddaughter, Mitzy has been staying with her. PT recommending routine home at this time.                   Demographic Summary       Row Name 09/09/24 1543       General Information    Admission Type observation    Arrived From emergency department    Required Notices Provided Observation Status Notice    Referral Source admission list    Reason for Consult discharge planning    Preferred Language English       Contact Information    Permission Granted to Share Info With                    Functional Status       Row Name 09/09/24 1626       Functional Status    Usual Activity Tolerance good    Current Activity Tolerance moderate       Functional Status, IADL    Medications assistive person    Meal Preparation assistive person    Housekeeping assistive person    Laundry assistive person    Shopping assistive person                Senia Oneill RN     Office Phone: [phone]  Office Cell: [phone]

## 2024-09-09 NOTE — CONSULTS
"        Hematology/Oncology Inpatient Consultation    Patient name: Nancy Suárez  : 1955  MRN: [id number]  Referring Provider: Dr. Brand  Reason for Consultation: Anemia    Chief complaint: Fatigue and shortness of breath    History of present illness:    Nancy Suárez is a 68 y.o. female who presented to [facility] on 2024 with complaints of shortness of breath and fatigue.  Patient has a history of macrocytic anemia, chronic iron deficiency anemia, COPD, diabetes, CKD.  She was found upon presentation in the ED to have a hemoglobin of 5.0 g/dL which was decreased from her previous hemoglobin of 7.8 g/dL on 9/3/2024.  She was transfused 2 units packed red blood cells and admitted for further evaluation and treatment of her acute on chronic anemia.    24  Hematology/Oncology was consulted on a patient known to us and established in our office with Dr. Campos.  Hematological history as follows:    2023: Ms. Suárez came seeking attention about anemia, that she reported had been a recurring issue for her. She described being tired, \"I'm drained\". Unsteady on her feet and unable to do much activity. No other symptoms but she was concerned that her blood pressure had been in the low 100's over 60's. 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 She had also a history of chronic renal disease and in the chemistry closest to this visit she had an estimated glomerular filtration rate of about 33 ml/min. In 2023 her hemoglobin was 11 g/dl with mean corpuscular volume of 99 fL. However a blood count done closer to the time of this visit had reported a hemoglobin of 7.9 g/dl with mean corpuscular volume of 105 fL. The white cell count and the platelet count were unremarkable. On exam she was found to appear much older " than the stated age. Neither pale nor jaundiced. Well hydrated. No oral lesions and no palpable adenopathy. Lungs markedly diminished bilaterally but clear. Heart regular. Abdomen rounded and protuberant but soft and not tender; no hepatomegaly or splenomegaly. No edema. Her social and family history were reviewed. Given her history of chronic gastrointestinal bleeding it is very possible that she indeed has iron deficiency anemia. The macrocytosis was surprising in this setting, and could be related to other nutritional deficiencies or to hemolysis and was to be investigated. I asked her to return to see me in one week with results.      6/20/2023: Underwent laboratory exams.  Still feeling tired.  Eating reasonably well and afebrile.  No bleeding.  On exam pale.  Respirations labored with minimal exertion.  Lungs diminished.  Heart regular.  The laboratory exams revealed an elevated glucose and abnormal renal function with an estimated glomerular filtration rate of 35 mL/min.  Liver enzymes were however unremarkable.  White cell count was normal at 5100/mm³ with a rather unremarkable differential count.  The platelets were also within the normal range, at 213,000/mm³.  The hemoglobin, however, it was low at 7.9 g/dL with a mean corpuscular volume of 107.6 fL.  Her iron profile revealed a normal iron binding capacity at 367 mcg/dL with an elevated iron at 303 mcg/dL and abnormally elevated saturation at 83%.  Somewhat surprisingly the ferritin was not particularly elevated at 33 ng/mL.  Folic acid was unremarkable but the vitamin B12 was elevated at 1642 pg/mL.  BLAS was negative but erythropoietin was elevated at 191.6 µIU/mL.  With this picture it appeared as though she had ineffective erythropoiesis and in order to confirm this diagnosis it was felt necessary to obtain a bone marrow biopsy.  The role of the bone marrow and the procedure of taking a sample was discussed with her.  She reported that she had  undergone one in the past but was not sure of where it was done.     7/25/2023: Feeling about the same. Had no laboratory exams. Tired but no more than before. Denies anorexia. No fevers. Denied chest pain or cough. No abdominal pain or diarrhea and no dysuria. On exam no changes. The bone marrow biopsy is unremarkable and no cytogenetic abnormalities were documented. I've asked her to have a blood count today and see me in a few weeks with new laboratory exams.      8/8/2023: Without new symptoms.  Still tired.  Smoking less.  Eating reasonably well and weight stable.  Afebrile.  On exam pale, chronically ill-appearing but in no distress.  No jaundice but pale.  Well-hydrated.  Lungs diminished.  Heart regular.  No edema.  Laboratory exams revealed persistent anemia again with macrocytosis.  Her kidney function had remained low with a creatinine of 1.71 mg/dL for an estimated glomerular filtration rate of 32 mL/min.  The iron studies were more consistent with anemia of chronic renal disease with a total iron binding capacity at low normal of 298 mcg/dL.  The unbound iron binding capacity was diminished at 68 mcg/dL but the iron saturation was increased at 77%.  The bone marrow revealed absent iron stores.  This was more suggestive of iron deficiency rather than of an accumulation.  A decision was made to start treatment with erythropoietin.  This was based on the clear evidence of anemia of chronic kidney disease.  In addition I requested soluble transferrin receptor testing to investigate further the possibility of iron deficiency.     8/29/2023: Feeling reasonably well and much stronger than before.  Had not received any injections of erythropoietin in spite of which her hemoglobin had increased to 10.5 g/dL.  She was eating well.  Still smoking but continued to work at cutting back.  No chest pains or cough.  No abdominal pain or diarrhea and no dysuria.  No peripheral edema no skin rash.  On exam alert,  "conversant and chronically ill-appearing.  No jaundice.  Lungs diminished bilaterally.  Heart regular.  Abdomen soft.  No edema.  Laboratory exams reviewed.  Plans to continue to monitor her blood count and to proceed with erythropoietin as needed to maintain a hemoglobin of at least 10 g/dL were discussed.     10/24/2023: Feeling much better.  \"I feel better than I have felt in a long time\".  Trying to stop smoking.  Eating well.  No weight loss.  Persists with dyspnea.  No chest pains.  On exam chronically ill-appearing.  Lungs markedly diminished but symmetrical.  Heart regular.  Abdomen soft.  No edema.  Laboratory exams reviewed.  Persists with macrocytic anemia.  Hemoglobin today was 10.7 g/dL with mean corpuscular volume of 101 fL.  This most likely is a reflection of her chronic kidney disease.  At this point no need for intervention but we will continue to follow.  I have asked her to return to see me in 6 months.     4/30/2024: Feeling well and without new symptoms. Energetic and with good appetite. No chest pain or cough and no abdominal pain or diarrhea. On exam alert and conversant. No jaundice and no oral lesions. Respirations not labored. Lungs diminished bilaterally and the heart is regular. Abdomen is soft and not tender. No edema. Reviewed the laboratory exams available and discussed with her at length. For now no need for intervention, particularly because she feels so well. Will recheck laboratory exams today and will see in a few months.      9/3/2024: In the office before the next scheduled appointment. Has not been feeling well. She reports \"my legs are heavy\". \"I can only walk a few steps\". She has been smoking one pack daily again. She has been eating well and her weight does not seem to have changed. She has been afebrile. No chest pain but continues to cough and has had dyspnea or exertion with minimal exertion. No abdominal pain and denies melena or hematochezia. No skin rash but she " complains of edema of the lower extremities. On exam ill appearing.  Edentulous. No oral lesions. Respirations not labored. Lungs diminished bilaterally and crackles in both bases. Heart regular. Abdomen soft and without hepatomegaly or splenomegaly. No edema or minimal. The laboratory exams were reviewed and discussed with her. She does not seem to have iron deficiency at this point. Will investigate further as she has had anemia that requires transfusion on two occasions recently.     He/She  has a past medical history of Artery stenosis, Arthritis, AVM (arteriovenous malformation), CAD (coronary artery disease), CKD (chronic kidney disease), COPD (chronic obstructive pulmonary disease), DM2 (diabetes mellitus, type 2), Eye pressure, GERD (gastroesophageal reflux disease), Gout, Hepatitis, HTN (hypertension), Hyperlipidemia, Hyperparathyroidism, KIRIT (iron deficiency anemia), Iron deficiency anemia, Myocardial infarction, Osteoporosis, PVD (peripheral vascular disease), and Stroke.    PCP: Marina Cruz MD    History:  Past Medical History:   Diagnosis Date    Artery stenosis     Bilateral subclavian s/p stent     Arthritis     AVM (arteriovenous malformation)     CAD (coronary artery disease)     CKD (chronic kidney disease)     Stage three     COPD (chronic obstructive pulmonary disease)     DM2 (diabetes mellitus, type 2)     Eye pressure     Elevated eye pressure    GERD (gastroesophageal reflux disease)     Gout     Hepatitis     Hepatitis c     HTN (hypertension)     Hyperlipidemia     Hyperparathyroidism     KIRIT (iron deficiency anemia)     Iron deficiency anemia     Myocardial infarction     Osteoporosis     PVD (peripheral vascular disease)     Stroke     TIA   ,   Past Surgical History:   Procedure Laterality Date    CATARACT EXTRACTION Left     CHOLECYSTECTOMY      COLONOSCOPY N/A 01/26/2023    Procedure: COLONOSCOPY with biopsy x 2 areas and polypectomy x 4 and cautery of polyps x 2;  Surgeon:  Hero Webster MD;  Location: [facility] ENDOSCOPY;  Service: Gastroenterology;  Laterality: N/A;  post op: polyps, diverticulosis, hemorrhoids    ENDOSCOPY N/A 01/26/2023    Procedure: ESOPHAGOGASTRODUODENOSCOPY;  Surgeon: Hero Webster MD;  Location: [facility] ENDOSCOPY;  Service: Gastroenterology;  Laterality: N/A;  post op: erosive gasritis, small hiatal hernia    EYE SURGERY      TOTAL ABDOMINAL HYSTERECTOMY WITH SALPINGO OOPHORECTOMY     ,   Family History   Problem Relation Age of Onset    Diabetes Mother     Anemia Mother     Heart attack Mother     Heart disease Mother     Heart failure Mother     Hypertension Mother     Coronary artery disease Sister     Diabetes Sister     Breast cancer Paternal Aunt 50   ,   Social History     Tobacco Use    Smoking status: Every Day     Current packs/day: 1.00     Average packs/day: 1 pack/day for 50.4 years (50.0 ttl pk-yrs)     Types: Cigarettes     Start date: 1/1/1975     Passive exposure: Current    Smokeless tobacco: Never   Vaping Use    Vaping status: Never Used   Substance Use Topics    Alcohol use: No    Drug use: No   ,   Medications Prior to Admission   Medication Sig Dispense Refill Last Dose    acetaminophen (TYLENOL) 500 MG tablet Take 1 tablet by mouth Every 6 (Six) Hours As Needed for Mild Pain.   Past Month    albuterol sulfate HFA (ProAir HFA) 108 (90 Base) MCG/ACT inhaler Inhale 2 puffs Every 4 (Four) Hours As Needed for Wheezing or Shortness of Air. Proair - DX CODE J44.9 18 g 4 Past Month    allopurinol (ZYLOPRIM) 100 MG tablet Take 1 tablet by mouth Daily.   9/8/2024    amLODIPine (NORVASC) 10 MG tablet Take 1 tablet by mouth Daily. (Patient taking differently: Take 1 tablet by mouth Daily As Needed (SBP >160).) 90 tablet 3 Patient Taking Differently    aspirin 81 MG tablet Take 1 tablet by mouth Daily.   9/8/2024    calcitriol (ROCALTROL) 0.25 MCG capsule Take 1 capsule by mouth Daily.   9/8/2024    carvedilol (COREG) 12.5 MG tablet TAKE 1 TABLET BY  MOUTH TWICE A  tablet 1 9/8/2024    Cholecalciferol 1000 units capsule Take 1 capsule by mouth Daily.   9/8/2024    colestipol (COLESTID) 1 g tablet Take 1 tablet by mouth 2 (Two) Times a Day.   9/8/2024    Cyanocobalamin (B-12) 1000 MCG capsule Take 1 tablet by mouth Daily.   9/8/2024    ferrous sulfate 325 (65 FE) MG tablet TAKE 1 TABLET BY MOUTH EVERY DAY WITH BREAKFAST (Patient taking differently: Take 1 tablet by mouth Daily With Breakfast.) 90 tablet 3 Patient Taking Differently    furosemide (LASIX) 20 MG tablet TAKE 1 TABLET BY MOUTH EVERY OTHER DAY 45 tablet 1 9/7/2024    hydrALAZINE (APRESOLINE) 25 MG tablet Take 1 tablet by mouth 2 (Two) Times a Day. Please schedule appt to follow up 180 tablet 1 9/8/2024    ipratropium-albuterol (DUO-NEB) 0.5-2.5 mg/3 ml nebulizer TAKE 3 ML BY NEBULIZATION EVERY 4 HOURS AS NEEDED FOR WHEEZE 180 mL 3 Past Month    latanoprost (XALATAN) 0.005 % ophthalmic solution    Past Week    Omega-3 Fatty Acids (FISH OIL) 1000 MG capsule capsule Take 1 capsule by mouth 2 (Two) Times a Day With Meals.   9/8/2024    omeprazole (priLOSEC) 40 MG capsule Take 1 capsule by mouth Daily.   9/8/2024    potassium chloride 10 MEQ CR tablet Take 1 tablet by mouth Every Other Day. TAKE 1 TABLET BY MOUTH EVERY OTHER DAY 60 tablet 1 9/7/2024    pravastatin (PRAVACHOL) 40 MG tablet TAKE 1 TABLET BY MOUTH EVERY DAY 90 tablet 0 9/8/2024    Alcohol Swabs (B-D SINGLE USE SWABS REGULAR) pads CHECK BLOOD SUGAR ONE TIME DAILY 100 each 6 Unknown   , Scheduled Meds:  allopurinol, 100 mg, Oral, Daily  atorvastatin, 10 mg, Oral, Daily  calcitriol, 0.25 mcg, Oral, Daily  carvedilol, 12.5 mg, Oral, BID  cholestyramine light, 1 packet, Oral, Daily  ferrous sulfate, 324 mg, Oral, Daily With Breakfast  furosemide, 20 mg, Oral, Every Other Day  latanoprost, 1 drop, Both Eyes, Daily  pantoprazole, 40 mg, Oral, Q AM  sodium chloride, 10 mL, Intravenous, Q12H  vitamin B-12, 1,000 mcg, Oral, Daily  vitamin D3,  "5,000 Units, Oral, Daily    , Continuous Infusions:   , PRN Meds:    acetaminophen    aluminum-magnesium hydroxide-simethicone    senna-docusate sodium **AND** polyethylene glycol **AND** bisacodyl **AND** bisacodyl    melatonin    ondansetron ODT **OR** ondansetron    [COMPLETED] Insert Peripheral IV **AND** sodium chloride    sodium chloride    sodium chloride   Allergies:  Patient has no known allergies.    Subjective     ROS:  Review of Systems     Objective   Vital Signs:   /51   Pulse 74   Temp 98.7 °F (37.1 °C) (Oral)   Resp 24   Ht 154.9 cm (61\")   Wt 62.1 kg (136 lb 14.5 oz)   LMP  (LMP Unknown)   SpO2 99%   BMI 25.87 kg/m²     Physical Exam: (performed by MD)  Physical Exam    Results Review:  Lab Results (last 48 hours)       Procedure Component Value Units Date/Time    High Sensitivity Troponin T 2Hr [[id number]]  (Abnormal) Collected: 09/09/24 0801    Specimen: Blood Updated: 09/09/24 0838     HS Troponin T 30 ng/L      Troponin T Delta -2 ng/L     Narrative:      High Sensitive Troponin T Reference Range:  <14.0 ng/L- Negative Female for AMI  <22.0 ng/L- Negative Male for AMI  >=14 - Abnormal Female indicating possible myocardial injury.  >=22 - Abnormal Male indicating possible myocardial injury.   Clinicians would have to utilize clinical acumen, EKG, Troponin, and serial changes to determine if it is an Acute Myocardial Infarction or myocardial injury due to an underlying chronic condition.         Basic Metabolic Panel [[id number]]  (Abnormal) Collected: 09/09/24 0517    Specimen: Blood Updated: 09/09/24 0549     Glucose 104 mg/dL      BUN 26 mg/dL      Creatinine 1.46 mg/dL      Sodium 139 mmol/L      Potassium 4.0 mmol/L      Comment: Specimen hemolyzed.  Result may be falsely elevated.        Chloride 108 mmol/L      CO2 23.8 mmol/L      Calcium 8.3 mg/dL      BUN/Creatinine Ratio 17.8     Anion Gap 7.2 mmol/L      eGFR 38.6 mL/min/1.73     Narrative:      GFR Normal >60  Chronic " Kidney Disease <60  Kidney Failure <15      High Sensitivity Troponin T [[id number]]  (Abnormal) Collected: 09/09/24 0517    Specimen: Blood Updated: 09/09/24 0548     HS Troponin T 32 ng/L     Narrative:      High Sensitive Troponin T Reference Range:  <14.0 ng/L- Negative Female for AMI  <22.0 ng/L- Negative Male for AMI  >=14 - Abnormal Female indicating possible myocardial injury.  >=22 - Abnormal Male indicating possible myocardial injury.   Clinicians would have to utilize clinical acumen, EKG, Troponin, and serial changes to determine if it is an Acute Myocardial Infarction or myocardial injury due to an underlying chronic condition.         CBC Auto Differential [[id number]]  (Abnormal) Collected: 09/09/24 0517    Specimen: Blood Updated: 09/09/24 0533     WBC 4.67 10*3/mm3      RBC 2.54 10*6/mm3      Hemoglobin 7.7 g/dL      Comment: Result checked          Hematocrit 25.2 %      MCV 99.2 fL      MCH 30.3 pg      MCHC 30.6 g/dL      RDW 18.1 %      RDW-SD 62.6 fl      MPV 10.2 fL      Platelets 138 10*3/mm3      Neutrophil % 64.7 %      Lymphocyte % 21.4 %      Monocyte % 12.2 %      Eosinophil % 1.1 %      Basophil % 0.2 %      Immature Grans % 0.4 %      Neutrophils, Absolute 3.02 10*3/mm3      Lymphocytes, Absolute 1.00 10*3/mm3      Monocytes, Absolute 0.57 10*3/mm3      Eosinophils, Absolute 0.05 10*3/mm3      Basophils, Absolute 0.01 10*3/mm3      Immature Grans, Absolute 0.02 10*3/mm3      nRBC 0.4 /100 WBC     Single High Sensitivity Troponin T [[id number]]  (Abnormal) Collected: 09/08/24 2011    Specimen: Blood Updated: 09/08/24 2040     HS Troponin T 33 ng/L     Narrative:      High Sensitive Troponin T Reference Range:  <14.0 ng/L- Negative Female for AMI  <22.0 ng/L- Negative Male for AMI  >=14 - Abnormal Female indicating possible myocardial injury.  >=22 - Abnormal Male indicating possible myocardial injury.   Clinicians would have to utilize clinical acumen, EKG, Troponin, and serial changes to  determine if it is an Acute Myocardial Infarction or myocardial injury due to an underlying chronic condition.         Basic Metabolic Panel [[id number]]  (Abnormal) Collected: 09/08/24 2011    Specimen: Blood Updated: 09/08/24 2040     Glucose 125 mg/dL      BUN 28 mg/dL      Creatinine 1.79 mg/dL      Sodium 137 mmol/L      Potassium 3.9 mmol/L      Chloride 103 mmol/L      CO2 24.0 mmol/L      Calcium 8.5 mg/dL      BUN/Creatinine Ratio 15.6     Anion Gap 10.0 mmol/L      eGFR 30.2 mL/min/1.73     Narrative:      GFR Normal >60  Chronic Kidney Disease <60  Kidney Failure <15      BNP [[id number]]  (Abnormal) Collected: 09/08/24 1854    Specimen: Blood Updated: 09/08/24 1923     proBNP 2,681.0 pg/mL     Narrative:      This assay is used as an aid in the diagnosis of individuals suspected of having heart failure. It can be used as an aid in the diagnosis of acute decompensated heart failure (ADHF) in patients presenting with signs and symptoms of ADHF to the emergency department (ED). In addition, NT-proBNP of <300 pg/mL indicates ADHF is not likely.    Age Range Result Interpretation  NT-proBNP Concentration (pg/mL:      <50             Positive            >450                   Gray                 300-450                    Negative             <300    50-75           Positive            >900                  Gray                300-900                  Negative            <300      >75             Positive            >1800                  Gray                300-1800                  Negative            <300    CBC & Differential [[id number]]  (Abnormal) Collected: 09/08/24 1854    Specimen: Blood Updated: 09/08/24 1904    Narrative:      The following orders were created for panel order CBC & Differential.  Procedure                               Abnormality         Status                     ---------                               -----------         ------                     CBC Auto Differential[[id number]]         Abnormal            Final result               Scan Slide[[id number]]                                                                    Please view results for these tests on the individual orders.    CBC Auto Differential [[id number]]  (Abnormal) Collected: 09/08/24 1854    Specimen: Blood Updated: 09/08/24 1904     WBC 7.64 10*3/mm3      RBC 1.55 10*6/mm3      Hemoglobin 5.0 g/dL      Hematocrit 17.2 %      .0 fL      MCH 32.3 pg      MCHC 29.1 g/dL      RDW 16.4 %      RDW-SD 64.8 fl      MPV 10.6 fL      Platelets 190 10*3/mm3      Neutrophil % 67.7 %      Lymphocyte % 20.7 %      Monocyte % 10.2 %      Eosinophil % 0.9 %      Basophil % 0.1 %      Immature Grans % 0.4 %      Neutrophils, Absolute 5.17 10*3/mm3      Lymphocytes, Absolute 1.58 10*3/mm3      Monocytes, Absolute 0.78 10*3/mm3      Eosinophils, Absolute 0.07 10*3/mm3      Basophils, Absolute 0.01 10*3/mm3      Immature Grans, Absolute 0.03 10*3/mm3      nRBC 0.0 /100 WBC              Pending Results:     Imaging Reviewed:   XR Chest 1 View    Result Date: 9/8/2024  Impression: 1.No acute radiographic abnormality is identified. Electronically Signed: Chris Kerr MD  9/8/2024 7:15 PM EDT  Workstation ID: [id number]          Assessment & Plan   ASSESSMENT  Macrocytic anemia: Hemoglobin 5.0 g/dL,  on presentation.  Hemoglobin 7.7 g/dL post transfusion 2 units of packed red blood cells.  Review of outpatient anemia workup from 9/3/2024 ferritin 34, iron sat 43%, vitamin B12 1700, folate 16.7, reticulocyte 10.3%, , haptoglobin 186.  Patient has a history of iron deficiency anemia secondary to GI bleeding from AVMs and anemia of chronic kidney disease.  Gastroenterology has evaluated and there is no plan for endoscopy at this time.  Will consult IR for bone marrow biopsy to further evaluate.      PLAN  As above    Electronically signed by LOVE Cabrera, 09/09/24, 9:37 AM EDT.    9/9/2024: I have known Ms. Perez for  "over 1 year now.  She has a history of anemia and at some point in 2022 her hemoglobin had dropped to less than 8 g/dL.  Multiple investigations, including a bone marrow biopsy in July 2023, failed to reveal a clear explanation for this.  She did have a previous history of gastrointestinal bleeding and had arteriovenous malformations identified and with oral iron her hemoglobin improved to greater than 10 g/dL.  Because of known chronic kidney disease the impression was that she had anemia of chronic kidney disease and attempt was made to treat her with erythropoietin but because her hemoglobin had been consistently above 10 g/dL, the treatment had been postponed.  At the time of the last visit she was not feeling as well.  Her hemoglobin had dropped significantly.  She had persisted with macrocytosis that seemed somewhat worse.  However her white cell count and differential as well as the platelets were unremarkable.  Her renal function had remained poor but not particularly worse.  She was transfused with symptomatic relief.  She was admitted to the hospital complaining of \"I cannot function\".  Very weak and tired.  Heavy legs and unable to walk any significant distance.  No hematochezia but persistent melena that she attributed to her daily use of iron.  However she has been taking only one 321 mg capsule a day.  No fevers or nocturnal diaphoresis.  On exam ill-appearing but in no distress.  No jaundice.  Lungs diminished bilaterally and heart regular but tachycardic.  Abdomen soft.  The laboratory exams again were reviewed.  She does have chronic kidney disease and her blood count is not particularly remarkable.  She persists with very significant anemia but the platelet count is also not abnormal.  My suspicion of anemia of chronic kidney disease is not substantiated by the progress her anemia has had.  Under the circumstances I would not expect this degree of anemia.  Additional investigations ordered.  She " will have a repeat bone marrow biopsy and aspiration.  Discussed with her.  Reviewed and discussed the records with Ms. Kapil ALEMAN.  I concur with her note.  I formulated the analysis and the plans.    Jose Campos MD on 9/9/2024 at 1652.

## 2024-09-10 ENCOUNTER — APPOINTMENT (OUTPATIENT)
Dept: CT IMAGING | Facility: HOSPITAL | Age: 69
DRG: 812 | End: 2024-09-10
Payer: MEDICARE

## 2024-09-10 LAB
ANION GAP SERPL CALCULATED.3IONS-SCNC: 8.6 MMOL/L (ref 5–15)
ANISOCYTOSIS BLD QL: NORMAL
APTT PPP: 21.9 SECONDS (ref 24–31)
BASOPHILS # BLD AUTO: 0 10*3/MM3 (ref 0–0.2)
BASOPHILS # BLD AUTO: 0.01 10*3/MM3 (ref 0–0.2)
BASOPHILS NFR BLD AUTO: 0 % (ref 0–1.5)
BASOPHILS NFR BLD AUTO: 0.2 % (ref 0–1.5)
BH BB BLOOD EXPIRATION DATE: NORMAL
BH BB BLOOD EXPIRATION DATE: NORMAL
BH BB BLOOD TYPE BARCODE: 6200
BH BB BLOOD TYPE BARCODE: 6200
BH BB DISPENSE STATUS: NORMAL
BH BB DISPENSE STATUS: NORMAL
BH BB PRODUCT CODE: NORMAL
BH BB PRODUCT CODE: NORMAL
BH BB UNIT NUMBER: NORMAL
BH BB UNIT NUMBER: NORMAL
BUN SERPL-MCNC: 25 MG/DL (ref 8–23)
BUN/CREAT SERPL: 15.8 (ref 7–25)
C3 FRG RBC-MCNC: NORMAL
CALCIUM SPEC-SCNC: 8.3 MG/DL (ref 8.6–10.5)
CHLORIDE SERPL-SCNC: 107 MMOL/L (ref 98–107)
CO2 SERPL-SCNC: 23.4 MMOL/L (ref 22–29)
CREAT SERPL-MCNC: 1.58 MG/DL (ref 0.57–1)
CROSSMATCH INTERPRETATION: NORMAL
CROSSMATCH INTERPRETATION: NORMAL
DEPRECATED RDW RBC AUTO: 67.1 FL (ref 37–54)
DEPRECATED RDW RBC AUTO: 71.8 FL (ref 37–54)
EGFRCR SERPLBLD CKD-EPI 2021: 35.1 ML/MIN/1.73
EOSINOPHIL # BLD AUTO: 0.06 10*3/MM3 (ref 0–0.4)
EOSINOPHIL # BLD AUTO: 0.06 10*3/MM3 (ref 0–0.4)
EOSINOPHIL NFR BLD AUTO: 1 % (ref 0.3–6.2)
EOSINOPHIL NFR BLD AUTO: 1.3 % (ref 0.3–6.2)
ERYTHROCYTE [DISTWIDTH] IN BLOOD BY AUTOMATED COUNT: 19.4 % (ref 12.3–15.4)
ERYTHROCYTE [DISTWIDTH] IN BLOOD BY AUTOMATED COUNT: 19.5 % (ref 12.3–15.4)
GLUCOSE SERPL-MCNC: 124 MG/DL (ref 65–99)
HCT VFR BLD AUTO: 23.2 % (ref 34–46.6)
HCT VFR BLD AUTO: 26.5 % (ref 34–46.6)
HGB BLD-MCNC: 7.4 G/DL (ref 12–15.9)
HGB BLD-MCNC: 7.9 G/DL (ref 12–15.9)
IMM GRANULOCYTES # BLD AUTO: 0.02 10*3/MM3 (ref 0–0.05)
IMM GRANULOCYTES # BLD AUTO: 0.03 10*3/MM3 (ref 0–0.05)
IMM GRANULOCYTES NFR BLD AUTO: 0.4 % (ref 0–0.5)
IMM GRANULOCYTES NFR BLD AUTO: 0.5 % (ref 0–0.5)
INR PPP: <0.93 (ref 0.93–1.1)
LARGE PLATELETS: NORMAL
LYMPHOCYTES # BLD AUTO: 1.06 10*3/MM3 (ref 0.7–3.1)
LYMPHOCYTES # BLD AUTO: 1.14 10*3/MM3 (ref 0.7–3.1)
LYMPHOCYTES NFR BLD AUTO: 18.3 % (ref 19.6–45.3)
LYMPHOCYTES NFR BLD AUTO: 25 % (ref 19.6–45.3)
MCH RBC QN AUTO: 30.7 PG (ref 26.6–33)
MCH RBC QN AUTO: 31.5 PG (ref 26.6–33)
MCHC RBC AUTO-ENTMCNC: 29.8 G/DL (ref 31.5–35.7)
MCHC RBC AUTO-ENTMCNC: 31.9 G/DL (ref 31.5–35.7)
MCV RBC AUTO: 103.1 FL (ref 79–97)
MCV RBC AUTO: 98.7 FL (ref 79–97)
MONOCYTES # BLD AUTO: 0.53 10*3/MM3 (ref 0.1–0.9)
MONOCYTES # BLD AUTO: 0.61 10*3/MM3 (ref 0.1–0.9)
MONOCYTES NFR BLD AUTO: 10.6 % (ref 5–12)
MONOCYTES NFR BLD AUTO: 11.6 % (ref 5–12)
NEUTROPHILS NFR BLD AUTO: 2.81 10*3/MM3 (ref 1.7–7)
NEUTROPHILS NFR BLD AUTO: 4.01 10*3/MM3 (ref 1.7–7)
NEUTROPHILS NFR BLD AUTO: 61.7 % (ref 42.7–76)
NEUTROPHILS NFR BLD AUTO: 69.4 % (ref 42.7–76)
NRBC BLD AUTO-RTO: 0 /100 WBC (ref 0–0.2)
NRBC BLD AUTO-RTO: 0.5 /100 WBC (ref 0–0.2)
PLATELET # BLD AUTO: 107 10*3/MM3 (ref 140–450)
PLATELET # BLD AUTO: 145 10*3/MM3 (ref 140–450)
PMV BLD AUTO: 10.3 FL (ref 6–12)
PMV BLD AUTO: 11.8 FL (ref 6–12)
POIKILOCYTOSIS BLD QL SMEAR: NORMAL
POLYCHROMASIA BLD QL SMEAR: NORMAL
POTASSIUM SERPL-SCNC: 3.6 MMOL/L (ref 3.5–5.2)
PROTHROMBIN TIME: 10 SECONDS (ref 9.6–11.7)
RBC # BLD AUTO: 2.35 10*6/MM3 (ref 3.77–5.28)
RBC # BLD AUTO: 2.57 10*6/MM3 (ref 3.77–5.28)
SMALL PLATELETS BLD QL SMEAR: NORMAL
SODIUM SERPL-SCNC: 139 MMOL/L (ref 136–145)
UNIT  ABO: NORMAL
UNIT  ABO: NORMAL
UNIT  RH: NORMAL
UNIT  RH: NORMAL
WBC MORPH BLD: NORMAL
WBC NRBC COR # BLD AUTO: 4.56 10*3/MM3 (ref 3.4–10.8)
WBC NRBC COR # BLD AUTO: 5.78 10*3/MM3 (ref 3.4–10.8)

## 2024-09-10 PROCEDURE — C1830 POWER BONE MARROW BX NEEDLE: HCPCS

## 2024-09-10 PROCEDURE — 85025 COMPLETE CBC W/AUTO DIFF WBC: CPT | Performed by: HOSPITALIST

## 2024-09-10 PROCEDURE — 77012 CT SCAN FOR NEEDLE BIOPSY: CPT

## 2024-09-10 PROCEDURE — 88333 PATH CONSLTJ SURG CYTO XM 1: CPT | Performed by: INTERNAL MEDICINE

## 2024-09-10 PROCEDURE — 99152 MOD SED SAME PHYS/QHP 5/>YRS: CPT

## 2024-09-10 PROCEDURE — 85007 BL SMEAR W/DIFF WBC COUNT: CPT | Performed by: HOSPITALIST

## 2024-09-10 PROCEDURE — 25010000002 MIDAZOLAM PER 1 MG

## 2024-09-10 PROCEDURE — 88305 TISSUE EXAM BY PATHOLOGIST: CPT | Performed by: INTERNAL MEDICINE

## 2024-09-10 PROCEDURE — 85025 COMPLETE CBC W/AUTO DIFF WBC: CPT | Performed by: NURSE PRACTITIONER

## 2024-09-10 PROCEDURE — 80048 BASIC METABOLIC PNL TOTAL CA: CPT | Performed by: NURSE PRACTITIONER

## 2024-09-10 PROCEDURE — 85730 THROMBOPLASTIN TIME PARTIAL: CPT | Performed by: RADIOLOGY

## 2024-09-10 PROCEDURE — 88311 DECALCIFY TISSUE: CPT | Performed by: INTERNAL MEDICINE

## 2024-09-10 PROCEDURE — 07DR3ZZ EXTRACTION OF ILIAC BONE MARROW, PERCUTANEOUS APPROACH: ICD-10-PCS | Performed by: PATHOLOGY

## 2024-09-10 PROCEDURE — 25010000002 LIDOCAINE 1 % SOLUTION

## 2024-09-10 PROCEDURE — 88313 SPECIAL STAINS GROUP 2: CPT | Performed by: INTERNAL MEDICINE

## 2024-09-10 PROCEDURE — 85610 PROTHROMBIN TIME: CPT | Performed by: RADIOLOGY

## 2024-09-10 PROCEDURE — 25010000002 ONDANSETRON PER 1 MG

## 2024-09-10 PROCEDURE — 99232 SBSQ HOSP IP/OBS MODERATE 35: CPT | Performed by: INTERNAL MEDICINE

## 2024-09-10 PROCEDURE — 25010000002 FENTANYL CITRATE (PF) 50 MCG/ML SOLUTION

## 2024-09-10 RX ORDER — MIDAZOLAM HYDROCHLORIDE 1 MG/ML
INJECTION INTRAMUSCULAR; INTRAVENOUS AS NEEDED
Status: DISCONTINUED | OUTPATIENT
Start: 2024-09-10 | End: 2024-09-12 | Stop reason: HOSPADM

## 2024-09-10 RX ORDER — FENTANYL CITRATE 50 UG/ML
INJECTION, SOLUTION INTRAMUSCULAR; INTRAVENOUS AS NEEDED
Status: DISCONTINUED | OUTPATIENT
Start: 2024-09-10 | End: 2024-09-12 | Stop reason: HOSPADM

## 2024-09-10 RX ORDER — ONDANSETRON 2 MG/ML
INJECTION INTRAMUSCULAR; INTRAVENOUS AS NEEDED
Status: DISCONTINUED | OUTPATIENT
Start: 2024-09-10 | End: 2024-09-12 | Stop reason: HOSPADM

## 2024-09-10 RX ORDER — LIDOCAINE HYDROCHLORIDE 10 MG/ML
INJECTION, SOLUTION INFILTRATION; PERINEURAL AS NEEDED
Status: DISCONTINUED | OUTPATIENT
Start: 2024-09-10 | End: 2024-09-12 | Stop reason: HOSPADM

## 2024-09-10 RX ADMIN — ATORVASTATIN CALCIUM 10 MG: 10 TABLET, FILM COATED ORAL at 08:19

## 2024-09-10 RX ADMIN — Medication 10 ML: at 20:58

## 2024-09-10 RX ADMIN — ALLOPURINOL 100 MG: 100 TABLET ORAL at 08:19

## 2024-09-10 RX ADMIN — PANTOPRAZOLE SODIUM 40 MG: 40 TABLET, DELAYED RELEASE ORAL at 05:38

## 2024-09-10 RX ADMIN — MIDAZOLAM 1 MG: 1 INJECTION INTRAMUSCULAR; INTRAVENOUS at 14:24

## 2024-09-10 RX ADMIN — LATANOPROST 1 DROP: 50 SOLUTION/ DROPS OPHTHALMIC at 08:21

## 2024-09-10 RX ADMIN — Medication 5000 UNITS: at 08:20

## 2024-09-10 RX ADMIN — CARVEDILOL 12.5 MG: 6.25 TABLET, FILM COATED ORAL at 08:19

## 2024-09-10 RX ADMIN — FERROUS SULFATE TAB EC 324 MG (65 MG FE EQUIVALENT) 324 MG: 324 (65 FE) TABLET DELAYED RESPONSE at 08:20

## 2024-09-10 RX ADMIN — FENTANYL CITRATE 100 MCG: 50 INJECTION, SOLUTION INTRAMUSCULAR; INTRAVENOUS at 14:24

## 2024-09-10 RX ADMIN — ONDANSETRON 4 MG: 2 INJECTION INTRAMUSCULAR; INTRAVENOUS at 14:18

## 2024-09-10 RX ADMIN — LIDOCAINE HYDROCHLORIDE 9 ML: 10 INJECTION, SOLUTION INFILTRATION; PERINEURAL at 14:28

## 2024-09-10 RX ADMIN — CYANOCOBALAMIN TAB 500 MCG 1000 MCG: 500 TAB at 08:19

## 2024-09-10 RX ADMIN — CARVEDILOL 12.5 MG: 6.25 TABLET, FILM COATED ORAL at 20:57

## 2024-09-10 RX ADMIN — ACETAMINOPHEN 500 MG: 500 TABLET, FILM COATED ORAL at 21:44

## 2024-09-10 RX ADMIN — Medication 10 ML: at 08:21

## 2024-09-10 RX ADMIN — CALCITRIOL CAPSULES 0.25 MCG 0.25 MCG: 0.25 CAPSULE ORAL at 08:19

## 2024-09-10 NOTE — PROGRESS NOTES
[facility] MEDICINE SERVICE  DAILY PROGRESS NOTE    NAME: Nancy Suárez  : 1955  MRN: [id number]      LOS: 0 days     PROVIDER OF SERVICE: Naresh Brand MD    Chief Complaint: Anemia    Subjective:     Interval History:  History taken from: patient chart RN    No new symptoms   Family at bedside       Review of Systems:   Review of Systems  All negative except as above   Objective:     Vital Signs  Temp:  [97.5 °F (36.4 °C)-98.6 °F (37 °C)] 97.6 °F (36.4 °C)  Heart Rate:  [60-88] 65  Resp:  [11-19] 16  BP: ()/(48-82) 117/54   Body mass index is 26.33 kg/m².    Physical Exam  Physical Exam  AOx3 NAD  RRR S1-S2 audible  Lungs with fair air entry  Abdomen soft nontender nondistended         Diagnostic Data    Results from last 7 days   Lab Units 09/10/24  1312 09/10/24  0128   WBC 10*3/mm3 4.56 5.78   HEMOGLOBIN g/dL 7.9* 7.4*   HEMATOCRIT % 26.5* 23.2*   PLATELETS 10*3/mm3 107* 145   GLUCOSE mg/dL  --  124*   CREATININE mg/dL  --  1.58*   BUN mg/dL  --  25*   SODIUM mmol/L  --  139   POTASSIUM mmol/L  --  3.6   ANION GAP mmol/L  --  8.6       CT Bone marrow biopsy and aspiration    Result Date: 9/10/2024  Technically successful bone marrow aspiration and biopsy of the left posterior superior iliac spine utilizing CT fluoroscopic guidance. Report dictated by: Earl Barth DNP  I have personally reviewed this case and agree with the findings above: Electronically Signed: Lico Campbell MD  9/10/2024 2:49 PM EDT  Workstation ID: [id number]    XR Chest 1 View    Result Date: 2024  Impression: 1.No acute radiographic abnormality is identified. Electronically Signed: Chris Kerr MD  2024 7:15 PM EDT  Workstation ID: [id number]       I reviewed the patient's new clinical results.  I reviewed the patient's new imaging results and agree with the interpretation.    Assessment/Plan:     Active and Resolved Problems  Active Hospital Problems    Diagnosis  POA    **Anemia [D64.9]  Yes      Resolved  Hospital Problems   No resolved problems to display.       68-year-old female with a history of chronic anemia multiple endoscopic interventions in the past, CKD admitted to [facility], [place], operated by [facility] 9/8 generalized fatigue and dyspnea     #Acute symptomatic anemia on chronic anemia  No clear evidence of GIB GI following.  No plan for endoscopic intervention  Hemoglobin 5 on admission s/p PRBC transfusion. Responded well   continue to monitor H&H transfuse PRBCs.  To keep hemoglobin more than 7  Hematology following noted plan for BMBx     #hypertension  Continue Coreg 12.5 twice daily     #CKD stage IIIb  Creatinine at baseline  CTM     #HFpEF  Not exacerbation  Continue home diuretics     #COPD  Not In exacerbation  Continue DuoNebs.     #History of TIA  At home takes aspirin and statin  Continue statins hold aspirin for now     #Gout  Continue allopurinol    #PVD  CAROLINE reviewed. This was originally planned as out patient   No evidence of acute limb ischemia   Out pt follow up with vascular surgery   ASA to be resumed once cleared from hematological stand point     VTE Prophylaxis:  Mechanical VTE prophylaxis orders are present.             Disposition Planning:     Barriers to Discharge: Hematology workup   Anticipated Date of Discharge: 9/12/24  Place of Discharge: Home      Time: 40 minutes     Code Status and Medical Interventions: CPR (Attempt to Resuscitate); Full Support   Ordered at: 09/08/24 2133     Level Of Support Discussed With:    Patient     Code Status (Patient has no pulse and is not breathing):    CPR (Attempt to Resuscitate)     Medical Interventions (Patient has pulse or is breathing):    Full Support       Signature: Electronically signed by Naresh Brand MD, 09/10/24, 15:30 EDT.  [facility], [place], operated by [facility] Hospitalist Team

## 2024-09-10 NOTE — PROGRESS NOTES
"      Hematology/Oncology Inpatient Progress Note    PATIENT NAME: Nancy Suárez  : 1955  MRN: [id number]    CHIEF COMPLAINT: Weakness    HISTORY OF PRESENT ILLNESS:      2023: Ms. Suárez came seeking attention about anemia, that she reported had been a recurring issue for her. She described being tired, \"I'm drained\". Unsteady on her feet and unable to do much activity. No other symptoms but she was concerned that her blood pressure had been in the low 100's over 60's. 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 She had also a history of chronic renal disease and in the chemistry closest to this visit she had an estimated glomerular filtration rate of about 33 ml/min. In 2023 her hemoglobin was 11 g/dl with mean corpuscular volume of 99 fL. However a blood count done closer to the time of this visit had reported a hemoglobin of 7.9 g/dl with mean corpuscular volume of 105 fL. The white cell count and the platelet count were unremarkable. On exam she was found to appear much older than the stated age. Neither pale nor jaundiced. Well hydrated. No oral lesions and no palpable adenopathy. Lungs markedly diminished bilaterally but clear. Heart regular. Abdomen rounded and protuberant but soft and not tender; no hepatomegaly or splenomegaly. No edema. Her social and family history were reviewed. Given her history of chronic gastrointestinal bleeding it is very possible that she indeed has iron deficiency anemia. The macrocytosis was surprising in this setting, and could be related to other nutritional deficiencies or to hemolysis and was to be investigated. I asked her to return to see me in one week with results.      2023: Underwent laboratory exams.  Still feeling tired.  Eating reasonably well and afebrile.  No bleeding.  On " exam pale.  Respirations labored with minimal exertion.  Lungs diminished.  Heart regular.  The laboratory exams revealed an elevated glucose and abnormal renal function with an estimated glomerular filtration rate of 35 mL/min.  Liver enzymes were however unremarkable.  White cell count was normal at 5100/mm³ with a rather unremarkable differential count.  The platelets were also within the normal range, at 213,000/mm³.  The hemoglobin, however, it was low at 7.9 g/dL with a mean corpuscular volume of 107.6 fL.  Her iron profile revealed a normal iron binding capacity at 367 mcg/dL with an elevated iron at 303 mcg/dL and abnormally elevated saturation at 83%.  Somewhat surprisingly the ferritin was not particularly elevated at 33 ng/mL.  Folic acid was unremarkable but the vitamin B12 was elevated at 1642 pg/mL.  BLAS was negative but erythropoietin was elevated at 191.6 µIU/mL.  With this picture it appeared as though she had ineffective erythropoiesis and in order to confirm this diagnosis it was felt necessary to obtain a bone marrow biopsy.  The role of the bone marrow and the procedure of taking a sample was discussed with her.  She reported that she had undergone one in the past but was not sure of where it was done.     7/25/2023: Feeling about the same. Had no laboratory exams. Tired but no more than before. Denies anorexia. No fevers. Denied chest pain or cough. No abdominal pain or diarrhea and no dysuria. On exam no changes. The bone marrow biopsy is unremarkable and no cytogenetic abnormalities were documented. I've asked her to have a blood count today and see me in a few weeks with new laboratory exams.      8/8/2023: Without new symptoms.  Still tired.  Smoking less.  Eating reasonably well and weight stable.  Afebrile.  On exam pale, chronically ill-appearing but in no distress.  No jaundice but pale.  Well-hydrated.  Lungs diminished.  Heart regular.  No edema.  Laboratory exams revealed persistent  "anemia again with macrocytosis.  Her kidney function had remained low with a creatinine of 1.71 mg/dL for an estimated glomerular filtration rate of 32 mL/min.  The iron studies were more consistent with anemia of chronic renal disease with a total iron binding capacity at low normal of 298 mcg/dL.  The unbound iron binding capacity was diminished at 68 mcg/dL but the iron saturation was increased at 77%.  The bone marrow revealed absent iron stores.  This was more suggestive of iron deficiency rather than of an accumulation.  A decision was made to start treatment with erythropoietin.  This was based on the clear evidence of anemia of chronic kidney disease.  In addition I requested soluble transferrin receptor testing to investigate further the possibility of iron deficiency.     8/29/2023: Feeling reasonably well and much stronger than before.  Had not received any injections of erythropoietin in spite of which her hemoglobin had increased to 10.5 g/dL.  She was eating well.  Still smoking but continued to work at cutting back.  No chest pains or cough.  No abdominal pain or diarrhea and no dysuria.  No peripheral edema no skin rash.  On exam alert, conversant and chronically ill-appearing.  No jaundice.  Lungs diminished bilaterally.  Heart regular.  Abdomen soft.  No edema.  Laboratory exams reviewed.  Plans to continue to monitor her blood count and to proceed with erythropoietin as needed to maintain a hemoglobin of at least 10 g/dL were discussed.     10/24/2023: Feeling much better.  \"I feel better than I have felt in a long time\".  Trying to stop smoking.  Eating well.  No weight loss.  Persists with dyspnea.  No chest pains.  On exam chronically ill-appearing.  Lungs markedly diminished but symmetrical.  Heart regular.  Abdomen soft.  No edema.  Laboratory exams reviewed.  Persists with macrocytic anemia.  Hemoglobin today was 10.7 g/dL with mean corpuscular volume of 101 fL.  This most likely is a " "reflection of her chronic kidney disease.  At this point no need for intervention but we will continue to follow.  I have asked her to return to see me in 6 months.     4/30/2024: Feeling well and without new symptoms. Energetic and with good appetite. No chest pain or cough and no abdominal pain or diarrhea. On exam alert and conversant. No jaundice and no oral lesions. Respirations not labored. Lungs diminished bilaterally and the heart is regular. Abdomen is soft and not tender. No edema. Reviewed the laboratory exams available and discussed with her at length. For now no need for intervention, particularly because she feels so well. Will recheck laboratory exams today and will see in a few months.      9/3/2024: In the office before the next scheduled appointment. Has not been feeling well. She reports \"my legs are heavy\". \"I can only walk a few steps\". She has been smoking one pack daily again. She has been eating well and her weight does not seem to have changed. She has been afebrile. No chest pain but continues to cough and has had dyspnea or exertion with minimal exertion. No abdominal pain and denies melena or hematochezia. No skin rash but she complains of edema of the lower extremities. On exam ill appearing.  Edentulous. No oral lesions. Respirations not labored. Lungs diminished bilaterally and crackles in both bases. Heart regular. Abdomen soft and without hepatomegaly or splenomegaly. No edema or minimal. The laboratory exams were reviewed and discussed with her. She does not seem to have iron deficiency at this point. Will investigate further as she has had anemia that requires transfusion on two occasions recently.     9/9/2024: I have known Ms. Perez for over 1 year now. She has a history of anemia and at some point in 2022 her hemoglobin had dropped to less than 8 g/dL. Multiple investigations, including a bone marrow biopsy in July 2023, failed to reveal a clear explanation for this. She did have " "a previous history of gastrointestinal bleeding and had arteriovenous malformations identified and with oral iron her hemoglobin improved to greater than 10 g/dL. Because of known chronic kidney disease the impression was that she had anemia of chronic kidney disease and attempt was made to treat her with erythropoietin but because her hemoglobin had been consistently above 10 g/dL, the treatment had been postponed. At the time of the last visit she was not feeling as well. Her hemoglobin had dropped significantly. She had persisted with macrocytosis that seemed somewhat worse. However her white cell count and differential as well as the platelets were unremarkable. Her renal function had remained poor but not particularly worse. She was transfused with symptomatic relief. She was admitted to the hospital complaining of \"I cannot function\". Very weak and tired. Heavy legs and unable to walk any significant distance. No hematochezia but persistent melena that she attributed to her daily use of iron. However she has been taking only one 321 mg capsule a day. No fevers or nocturnal diaphoresis. On exam ill-appearing but in no distress. No jaundice. Lungs diminished bilaterally and heart regular but tachycardic. Abdomen soft. The laboratory exams again were reviewed. She does have chronic kidney disease and her blood count is not particularly remarkable. She persists with very significant anemia but the platelet count is also not abnormal. My suspicion of anemia of chronic kidney disease is not substantiated by the progress her anemia has had. Under the circumstances I would not expect this degree of anemia. Additional investigations ordered. She will have a repeat bone marrow biopsy and aspiration. Discussed with her.     Subjective   8/10/2024: Better today.  Still weak.  Able to eat.  ROS:  Review of Systems   Constitutional:  Positive for activity change. Negative for appetite change, chills, diaphoresis, fatigue, " fever and unexpected weight change.   HENT:  Negative for congestion, dental problem, drooling, ear discharge, ear pain, facial swelling, hearing loss, mouth sores, nosebleeds, postnasal drip, rhinorrhea, sinus pressure, sinus pain, sneezing, sore throat, tinnitus, trouble swallowing and voice change.    Eyes:  Negative for photophobia, pain, discharge, redness, itching and visual disturbance.   Respiratory:  Positive for shortness of breath. Negative for apnea, cough, choking, chest tightness, wheezing and stridor.    Cardiovascular:  Negative for chest pain, palpitations and leg swelling.   Gastrointestinal:  Negative for abdominal distention, abdominal pain, anal bleeding, blood in stool, constipation, diarrhea, nausea, rectal pain and vomiting.   Endocrine: Negative for cold intolerance, heat intolerance, polydipsia and polyuria.   Genitourinary:  Negative for decreased urine volume, difficulty urinating, dysuria, flank pain, frequency, genital sores, hematuria and urgency.   Musculoskeletal:  Negative for arthralgias, back pain, gait problem, joint swelling, myalgias, neck pain and neck stiffness.   Skin:  Negative for color change, pallor and rash.   Neurological:  Positive for weakness. Negative for dizziness, tremors, seizures, syncope, facial asymmetry, speech difficulty, light-headedness, numbness and headaches.   Hematological:  Negative for adenopathy. Does not bruise/bleed easily.   Psychiatric/Behavioral:  Negative for agitation, behavioral problems, confusion, decreased concentration, hallucinations, self-injury, sleep disturbance and suicidal ideas. The patient is not nervous/anxious.         MEDICATIONS:    Scheduled Meds:  allopurinol, 100 mg, Oral, Daily  atorvastatin, 10 mg, Oral, Daily  calcitriol, 0.25 mcg, Oral, Daily  carvedilol, 12.5 mg, Oral, BID  cholestyramine light, 1 packet, Oral, Daily  ferrous sulfate, 324 mg, Oral, Daily With Breakfast  furosemide, 20 mg, Oral, Every Other  "Day  latanoprost, 1 drop, Both Eyes, Daily  pantoprazole, 40 mg, Oral, Q AM  sodium chloride, 10 mL, Intravenous, Q12H  vitamin B-12, 1,000 mcg, Oral, Daily  vitamin D3, 5,000 Units, Oral, Daily       Continuous Infusions:      PRN Meds:    acetaminophen    aluminum-magnesium hydroxide-simethicone    senna-docusate sodium **AND** polyethylene glycol **AND** bisacodyl **AND** bisacodyl    fentaNYL citrate (PF)    lidocaine    melatonin    midazolam    ondansetron ODT **OR** ondansetron    ondansetron    [COMPLETED] Insert Peripheral IV **AND** sodium chloride    sodium chloride    sodium chloride     ALLERGIES:  No Known Allergies    Objective    VITALS:   /54   Pulse 65   Temp 97.6 °F (36.4 °C) (Oral)   Resp 16   Ht 154.9 cm (61\")   Wt 63.2 kg (139 lb 5.3 oz)   LMP  (LMP Unknown)   SpO2 95% Comment: room air  BMI 26.33 kg/m²     PHYSICAL EXAM: (performed by MD)  Physical Exam  Constitutional:       General: She is not in acute distress.     Appearance: She is ill-appearing. She is not toxic-appearing or diaphoretic.      Comments: Well-built, chronically ill-appearing.  No distress.   HENT:      Head: Normocephalic and atraumatic.      Right Ear: External ear normal.      Left Ear: External ear normal.      Nose: Nose normal.      Mouth/Throat:      Mouth: Mucous membranes are moist.      Pharynx: Oropharynx is clear. No oropharyngeal exudate or posterior oropharyngeal erythema.      Comments: Edentulous.  No oral lesions.  Eyes:      General: No scleral icterus.        Right eye: No discharge.         Left eye: No discharge.      Conjunctiva/sclera: Conjunctivae normal.      Pupils: Pupils are equal, round, and reactive to light.   Cardiovascular:      Rate and Rhythm: Normal rate and regular rhythm.      Pulses: Normal pulses.      Heart sounds: No murmur heard.     No friction rub. No gallop.   Pulmonary:      Effort: No respiratory distress.      Breath sounds: No stridor. No wheezing, rhonchi or " rales.      Comments: Breath sounds markedly diminished bilaterally in a symmetrical fashion.  Abdominal:      General: Bowel sounds are normal. There is no distension.      Palpations: Abdomen is soft. There is no mass.      Tenderness: There is no abdominal tenderness. There is no right CVA tenderness, left CVA tenderness, guarding or rebound.      Hernia: No hernia is present.   Musculoskeletal:         General: No swelling, tenderness, deformity or signs of injury.      Cervical back: No rigidity.      Right lower leg: No edema.      Left lower leg: No edema.   Lymphadenopathy:      Cervical: No cervical adenopathy.   Skin:     Coloration: Skin is not jaundiced.      Findings: No bruising, lesion or rash.   Neurological:      General: No focal deficit present.      Mental Status: She is alert and oriented to person, place, and time.      Cranial Nerves: No cranial nerve deficit.      Motor: No weakness.      Gait: Gait normal.   Psychiatric:         Mood and Affect: Mood normal.         Behavior: Behavior normal.         Thought Content: Thought content normal.         Judgment: Judgment normal.     ALONZO Campos MD performed the physical exam on 9/10/2024 as documented above.    RECENT LABS:  Lab Results (last 24 hours)       Procedure Component Value Units Date/Time    Cytogenetics ([facility]) [[id number]] Collected: 09/10/24 1455    Specimen: Bone Marrow Updated: 09/10/24 1455    Flow Cytometry [[id number]] Collected: 09/10/24 1432    Specimen: Bone Marrow from Iliac, Left Updated: 09/10/24 1453    Bone Marrow Exam [[id number]] Collected: 09/10/24 1432    Specimen: Bone Marrow Aspirate from Iliac Crest, Left - Aspirate; Bone Marrow Aspirate from Iliac Crest, Left - Aspirate; Bone Marrow Aspirate from Iliac Crest, Left - Biopsy Updated: 09/10/24 1441    Bone Marrow Exam [[id number]] Collected: 09/10/24 1432    Specimen: Bone Marrow Aspirate from Iliac Crest, Left - Biopsy Updated: 09/10/24 1441     Narrative:      The following orders were created for panel order Bone Marrow Exam.  Procedure                               Abnormality         Status                     ---------                               -----------         ------                     Bone Marrow Exam[[id number]]                                 In process                   Please view results for these tests on the individual orders.    CBC & Differential [[id number]]  (Abnormal) Collected: 09/10/24 1312    Specimen: Blood from Arm, Left Updated: 09/10/24 1406    Narrative:      The following orders were created for panel order CBC & Differential.  Procedure                               Abnormality         Status                     ---------                               -----------         ------                     CBC Auto Differential[[id number]]        Abnormal            Final result               Scan Slide[[id number]]                                       Final result                 Please view results for these tests on the individual orders.    CBC Auto Differential [[id number]]  (Abnormal) Collected: 09/10/24 1312    Specimen: Blood from Arm, Left Updated: 09/10/24 1406     WBC 4.56 10*3/mm3      RBC 2.57 10*6/mm3      Hemoglobin 7.9 g/dL      Hematocrit 26.5 %      .1 fL      MCH 30.7 pg      MCHC 29.8 g/dL      RDW 19.5 %      RDW-SD 71.8 fl      MPV 11.8 fL      Platelets 107 10*3/mm3      Neutrophil % 61.7 %      Lymphocyte % 25.0 %      Monocyte % 11.6 %      Eosinophil % 1.3 %      Basophil % 0.0 %      Immature Grans % 0.4 %      Neutrophils, Absolute 2.81 10*3/mm3      Lymphocytes, Absolute 1.14 10*3/mm3      Monocytes, Absolute 0.53 10*3/mm3      Eosinophils, Absolute 0.06 10*3/mm3      Basophils, Absolute 0.00 10*3/mm3      Immature Grans, Absolute 0.02 10*3/mm3      nRBC 0.0 /100 WBC     Scan Slide [[id number]] Collected: 09/10/24 1312    Specimen: Blood from Arm, Left Updated: 09/10/24 1406     Anisocytosis  Slight/1+     Poikilocytes Slight/1+     Polychromasia Slight/1+     RBC Fragments Slight/1+     WBC Morphology Normal     Platelet Estimate Decreased     Large Platelets Slight/1+    aPTT [[id number]]  (Abnormal) Collected: 09/10/24 0840    Specimen: Blood from Arm, Right Updated: 09/10/24 0859     PTT 21.9 seconds     Protime-INR [[id number]]  (Abnormal) Collected: 09/10/24 0840    Specimen: Blood from Arm, Right Updated: 09/10/24 0859     Protime 10.0 Seconds      INR <0.93    Basic Metabolic Panel [[id number]]  (Abnormal) Collected: 09/10/24 0128    Specimen: Blood from Arm, Left Updated: 09/10/24 0159     Glucose 124 mg/dL      BUN 25 mg/dL      Creatinine 1.58 mg/dL      Sodium 139 mmol/L      Potassium 3.6 mmol/L      Chloride 107 mmol/L      CO2 23.4 mmol/L      Calcium 8.3 mg/dL      BUN/Creatinine Ratio 15.8     Anion Gap 8.6 mmol/L      eGFR 35.1 mL/min/1.73     Narrative:      GFR Normal >60  Chronic Kidney Disease <60  Kidney Failure <15      CBC Auto Differential [[id number]]  (Abnormal) Collected: 09/10/24 0128    Specimen: Blood from Arm, Left Updated: 09/10/24 0136     WBC 5.78 10*3/mm3      RBC 2.35 10*6/mm3      Hemoglobin 7.4 g/dL      Hematocrit 23.2 %      MCV 98.7 fL      MCH 31.5 pg      MCHC 31.9 g/dL      RDW 19.4 %      RDW-SD 67.1 fl      MPV 10.3 fL      Platelets 145 10*3/mm3      Neutrophil % 69.4 %      Lymphocyte % 18.3 %      Monocyte % 10.6 %      Eosinophil % 1.0 %      Basophil % 0.2 %      Immature Grans % 0.5 %      Neutrophils, Absolute 4.01 10*3/mm3      Lymphocytes, Absolute 1.06 10*3/mm3      Monocytes, Absolute 0.61 10*3/mm3      Eosinophils, Absolute 0.06 10*3/mm3      Basophils, Absolute 0.01 10*3/mm3      Immature Grans, Absolute 0.03 10*3/mm3      nRBC 0.5 /100 WBC           IMAGING REVIEWED:  CT Bone marrow biopsy and aspiration    Result Date: 9/10/2024  Technically successful bone marrow aspiration and biopsy of the left posterior superior iliac spine utilizing CT  fluoroscopic guidance. Report dictated by: Earl Barth DNP  I have personally reviewed this case and agree with the findings above: Electronically Signed: Lico Campbell MD  9/10/2024 2:49 PM EDT  Workstation ID: [id number]    XR Chest 1 View    Result Date: 9/8/2024  Impression: 1.No acute radiographic abnormality is identified. Electronically Signed: Chris Kerr MD  9/8/2024 7:15 PM EDT  Workstation ID: [id number]     Assessment & Plan   ASSESSMENT:  Recurrent normocytic anemia: I am still not sure of what the cause of the anemia is.  I suspect very strongly gastrointestinal bleeding.  She has had clear iron deficiency which is resolved at this time and she has been taking iron without difficulties.  I have obtained in the past a bone marrow biopsy and aspiration that revealed no abnormalities.  Will repeat and compare.  Discussed with her.  History of melanotic stools: Despite the lack of findings of the gastrointestinal endoscopies I do suspect continued gastrointestinal bleeding.  The pattern of her anemia is consistent with this.  Thrombocytopenia: This is relatively unusual for her.  Her platelets have been unremarkable for the most part.  Will continue to follow.  The white cell count and differential are unremarkable.    Jose Campos MD on 9/10/2024 at 9:10 AM    PLAN:

## 2024-09-10 NOTE — PLAN OF CARE
Goal Outcome Evaluation: Pt aox4. Ambulated outside room today. VSS. On RA. Pt went for bone marrow biopsy today. Hgb recheck came back at 7.9. Pt resting comfortably. Able to make needs known. Plan of care ongoing.

## 2024-09-10 NOTE — CASE MANAGEMENT/SOCIAL WORK
Continued Stay Note  JN Benjamin     Patient Name: Nancy Suárez  MRN: [id number]  Today's Date: 9/10/2024    Admit Date: 9/8/2024    Plan: Anticipate routine home.   Discharge Plan       Row Name 09/10/24 2539       Plan    Plan Comments CM met with patient and family at bedside to discuss IMM letter. DC Barriers: Scheduled to have bone marrow biopsy performed.                Senia Oneill RN     Office Phone: [phone]  Office Cell: [phone]

## 2024-09-10 NOTE — H&P
[facility]   Interventional Radiology H&P    Patient Name: Nancy Suárez  : 1955  MRN: [id number]  Primary Care Physician:  Marina Cruz MD  Referring Physician: No Known Provider  Date of admission: 2024    Subjective   Subjective     HPI:  Nancy Suárez is a 68 y.o. female with anemia.    Review of Systems:   Constitutional no fever,  no weight loss       Otolaryngeal no difficulty swallowing   Cardiovascular no chest pain   Pulmonary no cough, no sputum production   Gastrointestinal no constipation, no diarrhea                         Personal History       Past Medical/Surgical History:   Past Medical History:   Diagnosis Date    Artery stenosis     Bilateral subclavian s/p stent     Arthritis     AVM (arteriovenous malformation)     CAD (coronary artery disease)     CKD (chronic kidney disease)     Stage three     COPD (chronic obstructive pulmonary disease)     DM2 (diabetes mellitus, type 2)     Eye pressure     Elevated eye pressure    GERD (gastroesophageal reflux disease)     Gout     Hepatitis     Hepatitis c     HTN (hypertension)     Hyperlipidemia     Hyperparathyroidism     KIRIT (iron deficiency anemia)     Iron deficiency anemia     Myocardial infarction     Osteoporosis     PVD (peripheral vascular disease)     Stroke     TIA     Past Surgical History:   Procedure Laterality Date    CATARACT EXTRACTION Left     CHOLECYSTECTOMY      COLONOSCOPY N/A 2023    Procedure: COLONOSCOPY with biopsy x 2 areas and polypectomy x 4 and cautery of polyps x 2;  Surgeon: Hero Webster MD;  Location: [facility] ENDOSCOPY;  Service: Gastroenterology;  Laterality: N/A;  post op: polyps, diverticulosis, hemorrhoids    ENDOSCOPY N/A 2023    Procedure: ESOPHAGOGASTRODUODENOSCOPY;  Surgeon: Hero Webster MD;  Location: [facility] ENDOSCOPY;  Service: Gastroenterology;  Laterality: N/A;  post op: erosive gasritis, small hiatal hernia    EYE SURGERY      TOTAL ABDOMINAL HYSTERECTOMY WITH  SALPINGO OOPHORECTOMY         Social History:  reports that she has been smoking cigarettes. She started smoking about 49 years ago. She has a 50 pack-year smoking history. She has been exposed to tobacco smoke. She has never used smokeless tobacco. She reports that she does not drink alcohol and does not use drugs.    Medications:  Medications Prior to Admission   Medication Sig Dispense Refill Last Dose    acetaminophen (TYLENOL) 500 MG tablet Take 1 tablet by mouth Every 6 (Six) Hours As Needed for Mild Pain.   Past Month    albuterol sulfate HFA (ProAir HFA) 108 (90 Base) MCG/ACT inhaler Inhale 2 puffs Every 4 (Four) Hours As Needed for Wheezing or Shortness of Air. Proair - DX CODE J44.9 18 g 4 Past Month    allopurinol (ZYLOPRIM) 100 MG tablet Take 1 tablet by mouth Daily.   9/8/2024    amLODIPine (NORVASC) 10 MG tablet Take 1 tablet by mouth Daily. (Patient taking differently: Take 1 tablet by mouth Daily As Needed (SBP >160).) 90 tablet 3 Patient Taking Differently    aspirin 81 MG tablet Take 1 tablet by mouth Daily.   9/8/2024    calcitriol (ROCALTROL) 0.25 MCG capsule Take 1 capsule by mouth Daily.   9/8/2024    carvedilol (COREG) 12.5 MG tablet TAKE 1 TABLET BY MOUTH TWICE A  tablet 1 9/8/2024    Cholecalciferol 1000 units capsule Take 1 capsule by mouth Daily.   9/8/2024    colestipol (COLESTID) 1 g tablet Take 1 tablet by mouth 2 (Two) Times a Day.   9/8/2024    Cyanocobalamin (B-12) 1000 MCG capsule Take 1 tablet by mouth Daily.   9/8/2024    ferrous sulfate 325 (65 FE) MG tablet TAKE 1 TABLET BY MOUTH EVERY DAY WITH BREAKFAST (Patient taking differently: Take 1 tablet by mouth Daily With Breakfast.) 90 tablet 3 Patient Taking Differently    furosemide (LASIX) 20 MG tablet TAKE 1 TABLET BY MOUTH EVERY OTHER DAY 45 tablet 1 9/7/2024    hydrALAZINE (APRESOLINE) 25 MG tablet Take 1 tablet by mouth 2 (Two) Times a Day. Please schedule appt to follow up 180 tablet 1 9/8/2024    ipratropium-albuterol  (DUO-NEB) 0.5-2.5 mg/3 ml nebulizer TAKE 3 ML BY NEBULIZATION EVERY 4 HOURS AS NEEDED FOR WHEEZE 180 mL 3 Past Month    latanoprost (XALATAN) 0.005 % ophthalmic solution    Past Week    Omega-3 Fatty Acids (FISH OIL) 1000 MG capsule capsule Take 1 capsule by mouth 2 (Two) Times a Day With Meals.   9/8/2024    omeprazole (priLOSEC) 40 MG capsule Take 1 capsule by mouth Daily.   9/8/2024    potassium chloride 10 MEQ CR tablet Take 1 tablet by mouth Every Other Day. TAKE 1 TABLET BY MOUTH EVERY OTHER DAY 60 tablet 1 9/7/2024    pravastatin (PRAVACHOL) 40 MG tablet TAKE 1 TABLET BY MOUTH EVERY DAY 90 tablet 0 9/8/2024    Alcohol Swabs (B-D SINGLE USE SWABS REGULAR) pads CHECK BLOOD SUGAR ONE TIME DAILY 100 each 6 Unknown     Current medications:  allopurinol, 100 mg, Oral, Daily  atorvastatin, 10 mg, Oral, Daily  calcitriol, 0.25 mcg, Oral, Daily  carvedilol, 12.5 mg, Oral, BID  cholestyramine light, 1 packet, Oral, Daily  ferrous sulfate, 324 mg, Oral, Daily With Breakfast  furosemide, 20 mg, Oral, Every Other Day  latanoprost, 1 drop, Both Eyes, Daily  pantoprazole, 40 mg, Oral, Q AM  sodium chloride, 10 mL, Intravenous, Q12H  vitamin B-12, 1,000 mcg, Oral, Daily  vitamin D3, 5,000 Units, Oral, Daily      Current IV drips:       Allergies:  No Known Allergies    Objective    Objective     Vitals:   Temp:  [97.5 °F (36.4 °C)-98.6 °F (37 °C)] 97.6 °F (36.4 °C)  Heart Rate:  [68-88] 84  Resp:  [15-19] 17  BP: (106-165)/(48-82) 106/57      Physical Exam:   Constitutional: Awake, alert, No acute distress    Respiratory: Clear to auscultation bilaterally, nonlabored respirations    Cardiovascular: RRR, no murmurs, rubs, or gallops, palpable pedal pulses bilaterally   Gastrointestinal: Positive bowel sounds, soft, nontender, nondistended        ASA SCALE ASSESSMENT:  2-Mild to moderate systemic disease, medically well controlled, with no functional limitation    MALLAMPATI CLASSIFICATION:  2-Able to visualize the soft  "palate, fauces, uvula. The anterior & posterior tonsilar pillars are hidden by the tongue.       Result Review        Result Review:     Sodium   Date Value Ref Range Status   09/10/2024 139 136 - 145 mmol/L Final   09/09/2024 139 136 - 145 mmol/L Final   09/08/2024 137 136 - 145 mmol/L Final       Potassium   Date Value Ref Range Status   09/10/2024 3.6 3.5 - 5.2 mmol/L Final   09/09/2024 4.0 3.5 - 5.2 mmol/L Final     Comment:     Specimen hemolyzed.  Result may be falsely elevated.   09/08/2024 3.9 3.5 - 5.2 mmol/L Final       Chloride   Date Value Ref Range Status   09/10/2024 107 98 - 107 mmol/L Final   09/09/2024 108 (H) 98 - 107 mmol/L Final   09/08/2024 103 98 - 107 mmol/L Final       No results found for: \"PLASMABICARB\"    BUN   Date Value Ref Range Status   09/10/2024 25 (H) 8 - 23 mg/dL Final   09/09/2024 26 (H) 8 - 23 mg/dL Final   09/08/2024 28 (H) 8 - 23 mg/dL Final       Creatinine   Date Value Ref Range Status   09/10/2024 1.58 (H) 0.57 - 1.00 mg/dL Final   09/09/2024 1.46 (H) 0.57 - 1.00 mg/dL Final   09/08/2024 1.79 (H) 0.57 - 1.00 mg/dL Final       Calcium   Date Value Ref Range Status   09/10/2024 8.3 (L) 8.6 - 10.5 mg/dL Final   09/09/2024 8.3 (L) 8.6 - 10.5 mg/dL Final   09/08/2024 8.5 (L) 8.6 - 10.5 mg/dL Final           No components found for: \"GLUCOSE.*\"  Results from last 7 days   Lab Units 09/10/24  1312   WBC 10*3/mm3 4.56   HEMOGLOBIN g/dL 7.9*   HEMATOCRIT % 26.5*   PLATELETS 10*3/mm3 107*      Results from last 7 days   Lab Units 09/10/24  0840   INR  <0.93*           Assessment / Plan     Assesment:   Anemia.      Plan:   CT guided bone marrow aspiration an biopsy.     The risks and benefits of the procedure were discussed with the patient.    Electronically signed by LOVE Joshi, 09/10/24, 2:09 PM EDT.  "

## 2024-09-10 NOTE — PLAN OF CARE
Goal Outcome Evaluation:  Plan of Care Reviewed With: patient        Progress: no change  Outcome Evaluation: Patient alert and oriented, HGB 7.4, scheduled for IR bone marrow  biospy, patient NPO, patient upn

## 2024-09-10 NOTE — PLAN OF CARE
Goal Outcome Evaluation:  Plan of Care Reviewed With: patient        Progress: no change  Patient alert and oriented, NPO, scheduled for bone marrow biopsy 9-10-24, HGB 7.4 this shift, continues on room air, patient has rested well this shift, denied any dizziness or shortness of air. Call light within reach, VSS.

## 2024-09-11 ENCOUNTER — INPATIENT HOSPITAL (OUTPATIENT)
Age: 69
End: 2024-09-11
Payer: MEDICARE

## 2024-09-11 ENCOUNTER — INPATIENT HOSPITAL (OUTPATIENT)
Dept: URBAN - METROPOLITAN AREA HOSPITAL 84 | Facility: HOSPITAL | Age: 69
End: 2024-09-11
Payer: MEDICARE

## 2024-09-11 DIAGNOSIS — D50.9 IRON DEFICIENCY ANEMIA, UNSPECIFIED: ICD-10-CM

## 2024-09-11 LAB
ANION GAP SERPL CALCULATED.3IONS-SCNC: 9 MMOL/L (ref 5–15)
BASOPHILS # BLD AUTO: 0.01 10*3/MM3 (ref 0–0.2)
BASOPHILS # BLD AUTO: 0.01 10*3/MM3 (ref 0–0.2)
BASOPHILS NFR BLD AUTO: 0.2 % (ref 0–1.5)
BASOPHILS NFR BLD AUTO: 0.3 % (ref 0–1.5)
BUN SERPL-MCNC: 24 MG/DL (ref 8–23)
BUN/CREAT SERPL: 14.1 (ref 7–25)
CALCIUM SPEC-SCNC: 8.2 MG/DL (ref 8.6–10.5)
CHLORIDE SERPL-SCNC: 109 MMOL/L (ref 98–107)
CO2 SERPL-SCNC: 23 MMOL/L (ref 22–29)
CREAT SERPL-MCNC: 1.7 MG/DL (ref 0.57–1)
DAT POLY-SP REAG RBC QL: NEGATIVE
DEPRECATED RDW RBC AUTO: 65.5 FL (ref 37–54)
DEPRECATED RDW RBC AUTO: 68.7 FL (ref 37–54)
EGFRCR SERPLBLD CKD-EPI 2021: 32.1 ML/MIN/1.73
EOSINOPHIL # BLD AUTO: 0.05 10*3/MM3 (ref 0–0.4)
EOSINOPHIL # BLD AUTO: 0.07 10*3/MM3 (ref 0–0.4)
EOSINOPHIL NFR BLD AUTO: 1.3 % (ref 0.3–6.2)
EOSINOPHIL NFR BLD AUTO: 1.5 % (ref 0.3–6.2)
ERYTHROCYTE [DISTWIDTH] IN BLOOD BY AUTOMATED COUNT: 19.3 % (ref 12.3–15.4)
ERYTHROCYTE [DISTWIDTH] IN BLOOD BY AUTOMATED COUNT: 19.7 % (ref 12.3–15.4)
FOLATE SERPL-MCNC: 13.6 NG/ML (ref 4.78–24.2)
GLUCOSE SERPL-MCNC: 117 MG/DL (ref 65–99)
HAPTOGLOB SERPL-MCNC: 205 MG/DL (ref 30–200)
HCT VFR BLD AUTO: 22.4 % (ref 34–46.6)
HCT VFR BLD AUTO: 31 % (ref 34–46.6)
HGB BLD-MCNC: 6.8 G/DL (ref 12–15.9)
HGB BLD-MCNC: 9.8 G/DL (ref 12–15.9)
IMM GRANULOCYTES # BLD AUTO: 0.01 10*3/MM3 (ref 0–0.05)
IMM GRANULOCYTES # BLD AUTO: 0.05 10*3/MM3 (ref 0–0.05)
IMM GRANULOCYTES NFR BLD AUTO: 0.3 % (ref 0–0.5)
IMM GRANULOCYTES NFR BLD AUTO: 1 % (ref 0–0.5)
LDH SERPL-CCNC: 209 U/L (ref 135–214)
LYMPHOCYTES # BLD AUTO: 0.89 10*3/MM3 (ref 0.7–3.1)
LYMPHOCYTES # BLD AUTO: 0.97 10*3/MM3 (ref 0.7–3.1)
LYMPHOCYTES NFR BLD AUTO: 18.7 % (ref 19.6–45.3)
LYMPHOCYTES NFR BLD AUTO: 25.7 % (ref 19.6–45.3)
Lab: NORMAL
MCH RBC QN AUTO: 30.6 PG (ref 26.6–33)
MCH RBC QN AUTO: 31.2 PG (ref 26.6–33)
MCHC RBC AUTO-ENTMCNC: 30.4 G/DL (ref 31.5–35.7)
MCHC RBC AUTO-ENTMCNC: 31.6 G/DL (ref 31.5–35.7)
MCV RBC AUTO: 102.8 FL (ref 79–97)
MCV RBC AUTO: 96.9 FL (ref 79–97)
MONOCYTES # BLD AUTO: 0.43 10*3/MM3 (ref 0.1–0.9)
MONOCYTES # BLD AUTO: 0.44 10*3/MM3 (ref 0.1–0.9)
MONOCYTES NFR BLD AUTO: 11.6 % (ref 5–12)
MONOCYTES NFR BLD AUTO: 9 % (ref 5–12)
NEUTROPHILS NFR BLD AUTO: 2.3 10*3/MM3 (ref 1.7–7)
NEUTROPHILS NFR BLD AUTO: 3.32 10*3/MM3 (ref 1.7–7)
NEUTROPHILS NFR BLD AUTO: 60.8 % (ref 42.7–76)
NEUTROPHILS NFR BLD AUTO: 69.6 % (ref 42.7–76)
NRBC BLD AUTO-RTO: 0 /100 WBC (ref 0–0.2)
NRBC BLD AUTO-RTO: 0 /100 WBC (ref 0–0.2)
PLATELET # BLD AUTO: 123 10*3/MM3 (ref 140–450)
PLATELET # BLD AUTO: 150 10*3/MM3 (ref 140–450)
PMV BLD AUTO: 10.2 FL (ref 6–12)
PMV BLD AUTO: 10.3 FL (ref 6–12)
POTASSIUM SERPL-SCNC: 3.8 MMOL/L (ref 3.5–5.2)
RBC # BLD AUTO: 2.18 10*6/MM3 (ref 3.77–5.28)
RBC # BLD AUTO: 3.2 10*6/MM3 (ref 3.77–5.28)
SODIUM SERPL-SCNC: 141 MMOL/L (ref 136–145)
VIT B12 BLD-MCNC: >2000 PG/ML (ref 211–946)
WBC NRBC COR # BLD AUTO: 3.78 10*3/MM3 (ref 3.4–10.8)
WBC NRBC COR # BLD AUTO: 4.77 10*3/MM3 (ref 3.4–10.8)

## 2024-09-11 PROCEDURE — 82607 VITAMIN B-12: CPT | Performed by: INTERNAL MEDICINE

## 2024-09-11 PROCEDURE — 36430 TRANSFUSION BLD/BLD COMPNT: CPT

## 2024-09-11 PROCEDURE — 86900 BLOOD TYPING SEROLOGIC ABO: CPT

## 2024-09-11 PROCEDURE — 83010 ASSAY OF HAPTOGLOBIN QUANT: CPT | Performed by: INTERNAL MEDICINE

## 2024-09-11 PROCEDURE — 99232 SBSQ HOSP IP/OBS MODERATE 35: CPT | Performed by: NURSE PRACTITIONER

## 2024-09-11 PROCEDURE — P9016 RBC LEUKOCYTES REDUCED: HCPCS

## 2024-09-11 PROCEDURE — 83615 LACTATE (LD) (LDH) ENZYME: CPT | Performed by: INTERNAL MEDICINE

## 2024-09-11 PROCEDURE — 85025 COMPLETE CBC W/AUTO DIFF WBC: CPT | Performed by: HOSPITALIST

## 2024-09-11 PROCEDURE — 82746 ASSAY OF FOLIC ACID SERUM: CPT | Performed by: INTERNAL MEDICINE

## 2024-09-11 PROCEDURE — 86880 COOMBS TEST DIRECT: CPT | Performed by: INTERNAL MEDICINE

## 2024-09-11 PROCEDURE — 99232 SBSQ HOSP IP/OBS MODERATE 35: CPT | Performed by: INTERNAL MEDICINE

## 2024-09-11 PROCEDURE — P9040 RBC LEUKOREDUCED IRRADIATED: HCPCS

## 2024-09-11 RX ORDER — POLYETHYLENE GLYCOL 3350 17 G/17G
17 POWDER, FOR SOLUTION ORAL DAILY
Status: DISCONTINUED | OUTPATIENT
Start: 2024-09-11 | End: 2024-09-13 | Stop reason: HOSPADM

## 2024-09-11 RX ADMIN — FUROSEMIDE 20 MG: 20 TABLET ORAL at 09:15

## 2024-09-11 RX ADMIN — ACETAMINOPHEN 500 MG: 500 TABLET, FILM COATED ORAL at 21:42

## 2024-09-11 RX ADMIN — Medication 5000 UNITS: at 09:14

## 2024-09-11 RX ADMIN — CYANOCOBALAMIN TAB 500 MCG 1000 MCG: 500 TAB at 09:15

## 2024-09-11 RX ADMIN — CARVEDILOL 12.5 MG: 6.25 TABLET, FILM COATED ORAL at 21:42

## 2024-09-11 RX ADMIN — CARVEDILOL 12.5 MG: 6.25 TABLET, FILM COATED ORAL at 09:15

## 2024-09-11 RX ADMIN — Medication 10 ML: at 21:42

## 2024-09-11 RX ADMIN — LATANOPROST 1 DROP: 50 SOLUTION/ DROPS OPHTHALMIC at 09:16

## 2024-09-11 RX ADMIN — ATORVASTATIN CALCIUM 10 MG: 10 TABLET, FILM COATED ORAL at 09:14

## 2024-09-11 RX ADMIN — ALLOPURINOL 100 MG: 100 TABLET ORAL at 09:15

## 2024-09-11 RX ADMIN — Medication 10 ML: at 09:16

## 2024-09-11 RX ADMIN — CHOLESTYRAMINE 4 G: 4 POWDER, FOR SUSPENSION ORAL at 09:15

## 2024-09-11 RX ADMIN — FERROUS SULFATE TAB EC 324 MG (65 MG FE EQUIVALENT) 324 MG: 324 (65 FE) TABLET DELAYED RESPONSE at 09:15

## 2024-09-11 RX ADMIN — PANTOPRAZOLE SODIUM 40 MG: 40 TABLET, DELAYED RELEASE ORAL at 05:33

## 2024-09-11 RX ADMIN — CALCITRIOL CAPSULES 0.25 MCG 0.25 MCG: 0.25 CAPSULE ORAL at 09:14

## 2024-09-11 RX ADMIN — POLYETHYLENE GLYCOL 3350 17 G: 17 POWDER, FOR SOLUTION ORAL at 18:03

## 2024-09-11 NOTE — PROGRESS NOTES
"      Hematology/Oncology Inpatient Progress Note    PATIENT NAME: Nancy Suárez  : 1955  MRN: [id number]    CHIEF COMPLAINT: Weakness    HISTORY OF PRESENT ILLNESS:      2023: Ms. Suárez came seeking attention about anemia, that she reported had been a recurring issue for her. She described being tired, \"I'm drained\". Unsteady on her feet and unable to do much activity. No other symptoms but she was concerned that her blood pressure had been in the low 100's over 60's. 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 She had also a history of chronic renal disease and in the chemistry closest to this visit she had an estimated glomerular filtration rate of about 33 ml/min. In 2023 her hemoglobin was 11 g/dl with mean corpuscular volume of 99 fL. However a blood count done closer to the time of this visit had reported a hemoglobin of 7.9 g/dl with mean corpuscular volume of 105 fL. The white cell count and the platelet count were unremarkable. On exam she was found to appear much older than the stated age. Neither pale nor jaundiced. Well hydrated. No oral lesions and no palpable adenopathy. Lungs markedly diminished bilaterally but clear. Heart regular. Abdomen rounded and protuberant but soft and not tender; no hepatomegaly or splenomegaly. No edema. Her social and family history were reviewed. Given her history of chronic gastrointestinal bleeding it is very possible that she indeed has iron deficiency anemia. The macrocytosis was surprising in this setting, and could be related to other nutritional deficiencies or to hemolysis and was to be investigated. I asked her to return to see me in one week with results.      2023: Underwent laboratory exams.  Still feeling tired.  Eating reasonably well and afebrile.  No bleeding.  On " exam pale.  Respirations labored with minimal exertion.  Lungs diminished.  Heart regular.  The laboratory exams revealed an elevated glucose and abnormal renal function with an estimated glomerular filtration rate of 35 mL/min.  Liver enzymes were however unremarkable.  White cell count was normal at 5100/mm³ with a rather unremarkable differential count.  The platelets were also within the normal range, at 213,000/mm³.  The hemoglobin, however, it was low at 7.9 g/dL with a mean corpuscular volume of 107.6 fL.  Her iron profile revealed a normal iron binding capacity at 367 mcg/dL with an elevated iron at 303 mcg/dL and abnormally elevated saturation at 83%.  Somewhat surprisingly the ferritin was not particularly elevated at 33 ng/mL.  Folic acid was unremarkable but the vitamin B12 was elevated at 1642 pg/mL.  BLAS was negative but erythropoietin was elevated at 191.6 µIU/mL.  With this picture it appeared as though she had ineffective erythropoiesis and in order to confirm this diagnosis it was felt necessary to obtain a bone marrow biopsy.  The role of the bone marrow and the procedure of taking a sample was discussed with her.  She reported that she had undergone one in the past but was not sure of where it was done.     7/25/2023: Feeling about the same. Had no laboratory exams. Tired but no more than before. Denies anorexia. No fevers. Denied chest pain or cough. No abdominal pain or diarrhea and no dysuria. On exam no changes. The bone marrow biopsy is unremarkable and no cytogenetic abnormalities were documented. I've asked her to have a blood count today and see me in a few weeks with new laboratory exams.      8/8/2023: Without new symptoms.  Still tired.  Smoking less.  Eating reasonably well and weight stable.  Afebrile.  On exam pale, chronically ill-appearing but in no distress.  No jaundice but pale.  Well-hydrated.  Lungs diminished.  Heart regular.  No edema.  Laboratory exams revealed persistent  "anemia again with macrocytosis.  Her kidney function had remained low with a creatinine of 1.71 mg/dL for an estimated glomerular filtration rate of 32 mL/min.  The iron studies were more consistent with anemia of chronic renal disease with a total iron binding capacity at low normal of 298 mcg/dL.  The unbound iron binding capacity was diminished at 68 mcg/dL but the iron saturation was increased at 77%.  The bone marrow revealed absent iron stores.  This was more suggestive of iron deficiency rather than of an accumulation.  A decision was made to start treatment with erythropoietin.  This was based on the clear evidence of anemia of chronic kidney disease.  In addition I requested soluble transferrin receptor testing to investigate further the possibility of iron deficiency.     8/29/2023: Feeling reasonably well and much stronger than before.  Had not received any injections of erythropoietin in spite of which her hemoglobin had increased to 10.5 g/dL.  She was eating well.  Still smoking but continued to work at cutting back.  No chest pains or cough.  No abdominal pain or diarrhea and no dysuria.  No peripheral edema no skin rash.  On exam alert, conversant and chronically ill-appearing.  No jaundice.  Lungs diminished bilaterally.  Heart regular.  Abdomen soft.  No edema.  Laboratory exams reviewed.  Plans to continue to monitor her blood count and to proceed with erythropoietin as needed to maintain a hemoglobin of at least 10 g/dL were discussed.     10/24/2023: Feeling much better.  \"I feel better than I have felt in a long time\".  Trying to stop smoking.  Eating well.  No weight loss.  Persists with dyspnea.  No chest pains.  On exam chronically ill-appearing.  Lungs markedly diminished but symmetrical.  Heart regular.  Abdomen soft.  No edema.  Laboratory exams reviewed.  Persists with macrocytic anemia.  Hemoglobin today was 10.7 g/dL with mean corpuscular volume of 101 fL.  This most likely is a " "reflection of her chronic kidney disease.  At this point no need for intervention but we will continue to follow.  I have asked her to return to see me in 6 months.     4/30/2024: Feeling well and without new symptoms. Energetic and with good appetite. No chest pain or cough and no abdominal pain or diarrhea. On exam alert and conversant. No jaundice and no oral lesions. Respirations not labored. Lungs diminished bilaterally and the heart is regular. Abdomen is soft and not tender. No edema. Reviewed the laboratory exams available and discussed with her at length. For now no need for intervention, particularly because she feels so well. Will recheck laboratory exams today and will see in a few months.      9/3/2024: In the office before the next scheduled appointment. Has not been feeling well. She reports \"my legs are heavy\". \"I can only walk a few steps\". She has been smoking one pack daily again. She has been eating well and her weight does not seem to have changed. She has been afebrile. No chest pain but continues to cough and has had dyspnea or exertion with minimal exertion. No abdominal pain and denies melena or hematochezia. No skin rash but she complains of edema of the lower extremities. On exam ill appearing.  Edentulous. No oral lesions. Respirations not labored. Lungs diminished bilaterally and crackles in both bases. Heart regular. Abdomen soft and without hepatomegaly or splenomegaly. No edema or minimal. The laboratory exams were reviewed and discussed with her. She does not seem to have iron deficiency at this point. Will investigate further as she has had anemia that requires transfusion on two occasions recently.     9/9/2024: I have known Ms. Perze for over 1 year now. She has a history of anemia and at some point in 2022 her hemoglobin had dropped to less than 8 g/dL. Multiple investigations, including a bone marrow biopsy in July 2023, failed to reveal a clear explanation for this. She did have " "a previous history of gastrointestinal bleeding and had arteriovenous malformations identified and with oral iron her hemoglobin improved to greater than 10 g/dL. Because of known chronic kidney disease the impression was that she had anemia of chronic kidney disease and attempt was made to treat her with erythropoietin but because her hemoglobin had been consistently above 10 g/dL, the treatment had been postponed. At the time of the last visit she was not feeling as well. Her hemoglobin had dropped significantly. She had persisted with macrocytosis that seemed somewhat worse. However her white cell count and differential as well as the platelets were unremarkable. Her renal function had remained poor but not particularly worse. She was transfused with symptomatic relief. She was admitted to the hospital complaining of \"I cannot function\". Very weak and tired. Heavy legs and unable to walk any significant distance. No hematochezia but persistent melena that she attributed to her daily use of iron. However she has been taking only one 321 mg capsule a day. No fevers or nocturnal diaphoresis. On exam ill-appearing but in no distress. No jaundice. Lungs diminished bilaterally and heart regular but tachycardic. Abdomen soft. The laboratory exams again were reviewed. She does have chronic kidney disease and her blood count is not particularly remarkable. She persists with very significant anemia but the platelet count is also not abnormal. My suspicion of anemia of chronic kidney disease is not substantiated by the progress her anemia has had. Under the circumstances I would not expect this degree of anemia. Additional investigations ordered. She will have a repeat bone marrow biopsy and aspiration. Discussed with her.     Subjective   8/11/2024: Continues to improve, despite that she continues to have large drops in the hemoglobin. Able to eat well.   ROS:  Review of Systems   Constitutional:  Positive for activity " change. Negative for appetite change, chills, diaphoresis, fatigue, fever and unexpected weight change.   HENT:  Negative for congestion, dental problem, drooling, ear discharge, ear pain, facial swelling, hearing loss, mouth sores, nosebleeds, postnasal drip, rhinorrhea, sinus pressure, sinus pain, sneezing, sore throat, tinnitus, trouble swallowing and voice change.    Eyes:  Negative for photophobia, pain, discharge, redness, itching and visual disturbance.   Respiratory:  Positive for shortness of breath. Negative for apnea, cough, choking, chest tightness, wheezing and stridor.    Cardiovascular:  Negative for chest pain, palpitations and leg swelling.   Gastrointestinal:  Negative for abdominal distention, abdominal pain, anal bleeding, blood in stool, constipation, diarrhea, nausea, rectal pain and vomiting.   Endocrine: Negative for cold intolerance, heat intolerance, polydipsia and polyuria.   Genitourinary:  Negative for decreased urine volume, difficulty urinating, dysuria, flank pain, frequency, genital sores, hematuria and urgency.   Musculoskeletal:  Negative for arthralgias, back pain, gait problem, joint swelling, myalgias, neck pain and neck stiffness.   Skin:  Negative for color change, pallor and rash.   Neurological:  Positive for weakness. Negative for dizziness, tremors, seizures, syncope, facial asymmetry, speech difficulty, light-headedness, numbness and headaches.   Hematological:  Negative for adenopathy. Does not bruise/bleed easily.   Psychiatric/Behavioral:  Negative for agitation, behavioral problems, confusion, decreased concentration, hallucinations, self-injury, sleep disturbance and suicidal ideas. The patient is not nervous/anxious.       MEDICATIONS:    Scheduled Meds:  allopurinol, 100 mg, Oral, Daily  atorvastatin, 10 mg, Oral, Daily  calcitriol, 0.25 mcg, Oral, Daily  carvedilol, 12.5 mg, Oral, BID  cholestyramine light, 1 packet, Oral, Daily  ferrous sulfate, 324 mg, Oral,  "Daily With Breakfast  furosemide, 20 mg, Oral, Every Other Day  latanoprost, 1 drop, Both Eyes, Daily  pantoprazole, 40 mg, Oral, Q AM  polyethylene glycol, 17 g, Oral, Daily  sodium chloride, 10 mL, Intravenous, Q12H  vitamin B-12, 1,000 mcg, Oral, Daily  vitamin D3, 5,000 Units, Oral, Daily       Continuous Infusions:      PRN Meds:    acetaminophen    aluminum-magnesium hydroxide-simethicone    senna-docusate sodium **AND** [DISCONTINUED] polyethylene glycol **AND** bisacodyl **AND** bisacodyl    fentaNYL citrate (PF)    lidocaine    melatonin    midazolam    ondansetron ODT **OR** ondansetron    ondansetron    [COMPLETED] Insert Peripheral IV **AND** sodium chloride    sodium chloride    sodium chloride     ALLERGIES:  No Known Allergies    Objective    VITALS:   /57 (BP Location: Right arm, Patient Position: Lying)   Pulse 72   Temp 97.1 °F (36.2 °C) (Axillary)   Resp 23   Ht 154.9 cm (61\")   Wt 62.9 kg (138 lb 10.7 oz)   LMP  (LMP Unknown)   SpO2 97%   BMI 26.20 kg/m²     PHYSICAL EXAM: (performed by MD)  Physical Exam  Constitutional:       General: She is not in acute distress.     Appearance: She is ill-appearing. She is not toxic-appearing or diaphoretic.      Comments: Well-built, chronically ill-appearing.  No distress.   HENT:      Head: Normocephalic and atraumatic.      Right Ear: External ear normal.      Left Ear: External ear normal.      Nose: Nose normal.      Mouth/Throat:      Mouth: Mucous membranes are moist.      Pharynx: Oropharynx is clear. No oropharyngeal exudate or posterior oropharyngeal erythema.      Comments: Edentulous.  No oral lesions.  Eyes:      General: No scleral icterus.        Right eye: No discharge.         Left eye: No discharge.      Conjunctiva/sclera: Conjunctivae normal.      Pupils: Pupils are equal, round, and reactive to light.   Cardiovascular:      Rate and Rhythm: Normal rate and regular rhythm.      Pulses: Normal pulses.      Heart sounds: No " murmur heard.     No friction rub. No gallop.   Pulmonary:      Effort: No respiratory distress.      Breath sounds: No stridor. No wheezing, rhonchi or rales.      Comments: Breath sounds markedly diminished bilaterally in a symmetrical fashion.  Abdominal:      General: Bowel sounds are normal. There is no distension.      Palpations: Abdomen is soft. There is no mass.      Tenderness: There is no abdominal tenderness. There is no right CVA tenderness, left CVA tenderness, guarding or rebound.      Hernia: No hernia is present.   Musculoskeletal:         General: No swelling, tenderness, deformity or signs of injury.      Cervical back: No rigidity.      Right lower leg: No edema.      Left lower leg: No edema.   Lymphadenopathy:      Cervical: No cervical adenopathy.   Skin:     Coloration: Skin is not jaundiced.      Findings: No bruising, lesion or rash.   Neurological:      General: No focal deficit present.      Mental Status: She is alert and oriented to person, place, and time.      Cranial Nerves: No cranial nerve deficit.      Motor: No weakness.      Gait: Gait normal.   Psychiatric:         Mood and Affect: Mood normal.         Behavior: Behavior normal.         Thought Content: Thought content normal.         Judgment: Judgment normal.     ALONZO Campos MD performed the physical exam on 9/11/2024 as documented above.     RECENT LABS:  Lab Results (last 24 hours)       Procedure Component Value Units Date/Time    Folate [[id number]]  (Normal) Collected: 09/11/24 1021    Specimen: Blood Updated: 09/11/24 1629     Folate 13.60 ng/mL     Narrative:      Results may be falsely increased if patient taking Biotin.      Vitamin B12 [[id number]]  (Abnormal) Collected: 09/11/24 1021    Specimen: Blood Updated: 09/11/24 1629     Vitamin B-12 >2,000 pg/mL     Narrative:      Results may be falsely increased if patient taking Biotin.      Haptoglobin [[id number]]  (Abnormal) Collected: 09/11/24 1021    Specimen:  Blood Updated: 09/11/24 1617     Haptoglobin 205 mg/dL      Comment: Specimen hemolyzed. Results may be affected.       Lactate Dehydrogenase [[id number]]  (Normal) Collected: 09/11/24 1021    Specimen: Blood Updated: 09/11/24 1110      U/L      Comment: Specimen hemolyzed.  Results may be affected.       CBC & Differential [[id number]]  (Abnormal) Collected: 09/11/24 1021    Specimen: Blood Updated: 09/11/24 1037    Narrative:      The following orders were created for panel order CBC & Differential.  Procedure                               Abnormality         Status                     ---------                               -----------         ------                     CBC Auto Differential[[id number]]        Abnormal            Final result                 Please view results for these tests on the individual orders.    CBC Auto Differential [[id number]]  (Abnormal) Collected: 09/11/24 1021    Specimen: Blood Updated: 09/11/24 1037     WBC 4.77 10*3/mm3      RBC 3.20 10*6/mm3      Hemoglobin 9.8 g/dL      Comment: Result checked          Hematocrit 31.0 %      MCV 96.9 fL      MCH 30.6 pg      MCHC 31.6 g/dL      RDW 19.7 %      RDW-SD 65.5 fl      MPV 10.2 fL      Platelets 150 10*3/mm3      Neutrophil % 69.6 %      Lymphocyte % 18.7 %      Monocyte % 9.0 %      Eosinophil % 1.5 %      Basophil % 0.2 %      Immature Grans % 1.0 %      Neutrophils, Absolute 3.32 10*3/mm3      Lymphocytes, Absolute 0.89 10*3/mm3      Monocytes, Absolute 0.43 10*3/mm3      Eosinophils, Absolute 0.07 10*3/mm3      Basophils, Absolute 0.01 10*3/mm3      Immature Grans, Absolute 0.05 10*3/mm3      nRBC 0.0 /100 WBC     Basic Metabolic Panel [[id number]]  (Abnormal) Collected: 09/10/24 7681    Specimen: Blood from Arm, Left Updated: 09/11/24 0026     Glucose 117 mg/dL      BUN 24 mg/dL      Creatinine 1.70 mg/dL      Sodium 141 mmol/L      Potassium 3.8 mmol/L      Chloride 109 mmol/L      CO2 23.0 mmol/L      Calcium 8.2 mg/dL       BUN/Creatinine Ratio 14.1     Anion Gap 9.0 mmol/L      eGFR 32.1 mL/min/1.73     Narrative:      GFR Normal >60  Chronic Kidney Disease <60  Kidney Failure <15      CBC Auto Differential [[id number]]  (Abnormal) Collected: 09/10/24 3953    Specimen: Blood from Arm, Left Updated: 09/11/24 0009     WBC 3.78 10*3/mm3      RBC 2.18 10*6/mm3      Hemoglobin 6.8 g/dL      Hematocrit 22.4 %      .8 fL      MCH 31.2 pg      MCHC 30.4 g/dL      RDW 19.3 %      RDW-SD 68.7 fl      MPV 10.3 fL      Platelets 123 10*3/mm3      Neutrophil % 60.8 %      Lymphocyte % 25.7 %      Monocyte % 11.6 %      Eosinophil % 1.3 %      Basophil % 0.3 %      Immature Grans % 0.3 %      Neutrophils, Absolute 2.30 10*3/mm3      Lymphocytes, Absolute 0.97 10*3/mm3      Monocytes, Absolute 0.44 10*3/mm3      Eosinophils, Absolute 0.05 10*3/mm3      Basophils, Absolute 0.01 10*3/mm3      Immature Grans, Absolute 0.01 10*3/mm3      nRBC 0.0 /100 WBC           IMAGING REVIEWED:  CT Bone marrow biopsy and aspiration    Result Date: 9/10/2024  Technically successful bone marrow aspiration and biopsy of the left posterior superior iliac spine utilizing CT fluoroscopic guidance. Report dictated by: Earl Barth DNP  I have personally reviewed this case and agree with the findings above: Electronically Signed: Lico Campbell MD  9/10/2024 2:49 PM EDT  Workstation ID: [id number]     Assessment & Plan   ASSESSMENT:  Recurrent normocytic anemia:Despite extensive investigations for hematologic pathology, no explanation for Ms. Suárez's anemia has been documented. The large drops in hemoglobin without any suggestion of hemolysis is strongly indicative of bleeding. This despite the lack of obvious bleeding. Discussed with Dr. Christianson.   Thrombocytopenia: Of recent development and not severe. Will continue to follow but without intervention at this time.   Reviewed the laboratory exams.      Jose Campos MD on 9/11/2024 at 17:49

## 2024-09-11 NOTE — PLAN OF CARE
Goal Outcome Evaluation:  Plan of Care Reviewed With: patient        Progress: no change  Outcome Evaluation: Patient alert and oriented, HGB 6.8 gave one unit PRBC, daughter at bed side, patient voices no complaints at this time, call light within reach, VSS.

## 2024-09-11 NOTE — PROGRESS NOTES
[facility] MEDICINE SERVICE  DAILY PROGRESS NOTE    NAME: Nancy Suárez  : 1955  MRN: [id number]      LOS: 1 day     PROVIDER OF SERVICE: Naresh Brand MD    Chief Complaint: Anemia    Subjective:     Interval History:  History taken from: patient chart RN    No new symptoms     Review of Systems:   Review of Systems  All negative except as above   Objective:     Vital Signs  Temp:  [97.3 °F (36.3 °C)-98.5 °F (36.9 °C)] 98.5 °F (36.9 °C)  Heart Rate:  [60-83] 73  Resp:  [11-23] 15  BP: ()/(40-78) 139/59   Body mass index is 26.2 kg/m².    Physical Exam  Physical Exam  AOx3 NAD  RRR S1-S2 audible  Lungs with fair air entry  Abdomen soft nontender nondistended           Diagnostic Data    Results from last 7 days   Lab Units 24  1021 09/10/24  2358   WBC 10*3/mm3 4.77 3.78   HEMOGLOBIN g/dL 9.8* 6.8*   HEMATOCRIT % 31.0* 22.4*   PLATELETS 10*3/mm3 150 123*   GLUCOSE mg/dL  --  117*   CREATININE mg/dL  --  1.70*   BUN mg/dL  --  24*   SODIUM mmol/L  --  141   POTASSIUM mmol/L  --  3.8   ANION GAP mmol/L  --  9.0       CT Bone marrow biopsy and aspiration    Result Date: 9/10/2024  Technically successful bone marrow aspiration and biopsy of the left posterior superior iliac spine utilizing CT fluoroscopic guidance. Report dictated by: Earl Barth DNP  I have personally reviewed this case and agree with the findings above: Electronically Signed: Lico Campbell MD  9/10/2024 2:49 PM EDT  Workstation ID: [id number]       I reviewed the patient's new clinical results.  I reviewed the patient's new imaging results and agree with the interpretation.    Assessment/Plan:     Active and Resolved Problems  Active Hospital Problems    Diagnosis  POA    **Anemia [D64.9]  Yes    Iron deficiency [E61.1]  Unknown      Resolved Hospital Problems   No resolved problems to display.       68-year-old female with a history of chronic anemia multiple endoscopic interventions in the past, CKD admitted to [facility]  Sourav 9/8 generalized fatigue and dyspnea     #Acute symptomatic anemia on chronic anemia  No clear evidence of GIB GI following.  No plan for endoscopic intervention  Hemoglobin 5 on admission s/p PRBC transfusion. Responded well   Hemoglobin 6.81 unit of PRBC transfused  GI following noted tentative plan for EGD in a.m.  continue to monitor H&H transfuse PRBCs.  To keep hemoglobin more than 7  Hematology following status post bone marrow biopsy 9/10 follow report     #hypertension  Continue Coreg 12.5 twice daily     #CKD stage IIIb  Creatinine at baseline  CTM     #HFpEF  Not exacerbation  Continue home diuretics     #COPD  Not In exacerbation  Continue DuoNebs.     #History of TIA  At home takes aspirin and statin  Continue statins hold aspirin for now     #Gout  Continue allopurinol     #PVD  CAROLINE reviewed. This was originally planned as out patient   No evidence of acute limb ischemia   Out pt follow up with vascular surgery   ASA to be resumed once cleared from hematological stand point     VTE Prophylaxis:  Mechanical VTE prophylaxis orders are present.             Disposition Planning:     Barriers to Discharge: Hematology workup  Anticipated Date of Discharge: TBD  Place of Discharge: Home      Time: 62 minutes     Code Status and Medical Interventions: CPR (Attempt to Resuscitate); Full Support   Ordered at: 09/08/24 2133     Level Of Support Discussed With:    Patient     Code Status (Patient has no pulse and is not breathing):    CPR (Attempt to Resuscitate)     Medical Interventions (Patient has pulse or is breathing):    Full Support       Signature: Electronically signed by Naresh Brand MD, 09/11/24, 13:00 EDT.  [facility] Hospitalist Team

## 2024-09-11 NOTE — PROGRESS NOTES
" LOS: 1 day   Patient Care Team:  Marina Cruz MD as PCP - General (Family Medicine)  Lupillo Domingo MD as Consulting Physician (Cardiology)  Jose Campos MD as Consulting Physician (Hematology and Oncology)      Subjective \"I am doing okay\"    Interval History:     Subjective: Mild constipation.  No abdominal pain.  No nausea or vomiting.  Tolerating diet.      ROS:   No chest pain, shortness of breath, or cough.      Medication Review:     Current Facility-Administered Medications:     acetaminophen (TYLENOL) tablet 500 mg, 500 mg, Oral, Q6H PRN, Achterberg, Francia, APRN, 500 mg at 09/10/24 2144    allopurinol (ZYLOPRIM) tablet 100 mg, 100 mg, Oral, Daily, Achterberg, Francia, APRN, 100 mg at 09/11/24 0915    aluminum-magnesium hydroxide-simethicone (MAALOX MAX) 400-400-40 MG/5ML suspension 15 mL, 15 mL, Oral, Q6H PRN, Achterberg, Francia, APRN    atorvastatin (LIPITOR) tablet 10 mg, 10 mg, Oral, Daily, Achterberg, Francia, APRN, 10 mg at 09/11/24 0914    sennosides-docusate (PERICOLACE) 8.6-50 MG per tablet 2 tablet, 2 tablet, Oral, BID PRN **AND** polyethylene glycol (MIRALAX) packet 17 g, 17 g, Oral, Daily PRN **AND** bisacodyl (DULCOLAX) EC tablet 5 mg, 5 mg, Oral, Daily PRN **AND** bisacodyl (DULCOLAX) suppository 10 mg, 10 mg, Rectal, Daily PRN, Achterberg, Francia, APRN    calcitriol (ROCALTROL) capsule 0.25 mcg, 0.25 mcg, Oral, Daily, Achterberg, Francia, APRN, 0.25 mcg at 09/11/24 0914    carvedilol (COREG) tablet 12.5 mg, 12.5 mg, Oral, BID, Achterberg, Francia, APRN, 12.5 mg at 09/11/24 0915    cholestyramine light packet 4 g, 1 packet, Oral, Daily, Achterberg, Francia, APRN, 4 g at 09/11/24 0915    fentaNYL citrate (PF) (SUBLIMAZE) injection, , Intravenous, PRN, Robbi Barth APRN, 100 mcg at 09/10/24 1424    ferrous sulfate EC tablet 324 mg, 324 mg, Oral, Daily With Breakfast, Francia Wilkes APRN, 324 mg at 09/11/24 0915    furosemide (LASIX) tablet 20 mg, 20 mg, " Oral, Every Other Day, Naresh Brand MD, 20 mg at 09/11/24 0915    latanoprost (XALATAN) 0.005 % ophthalmic solution 1 drop, 1 drop, Both Eyes, Daily, María ElenaterFrancia hyman, APRN, 1 drop at 09/11/24 0916    lidocaine (XYLOCAINE) 1 % injection, , Infiltration, PRN, Barth Robbi S, APRN, 9 mL at 09/10/24 1428    melatonin tablet 5 mg, 5 mg, Oral, Nightly PRN, AchterKay hymanhelle, APRN    midazolam (VERSED) injection, , Intravenous, PRN, BarthDawsonRobbi S, APRN, 1 mg at 09/10/24 1424    ondansetron ODT (ZOFRAN-ODT) disintegrating tablet 4 mg, 4 mg, Oral, Q6H PRN **OR** ondansetron (ZOFRAN) injection 4 mg, 4 mg, Intravenous, Q6H PRN, María ElenaterTuan hymanle, APRN    ondansetron (ZOFRAN) injection, , Intravenous, PRN, BarthRobbi S, APRN, 4 mg at 09/10/24 1418    pantoprazole (PROTONIX) EC tablet 40 mg, 40 mg, Oral, Q AM, María Elenateryenni, Francia, APRN, 40 mg at 09/11/24 0533    [COMPLETED] Insert Peripheral IV, , , Once **AND** sodium chloride 0.9 % flush 10 mL, 10 mL, Intravenous, PRN, Renan Thrasher MD    sodium chloride 0.9 % flush 10 mL, 10 mL, Intravenous, Q12H, AchterKay hymanhelle, APRN, 10 mL at 09/11/24 0916    sodium chloride 0.9 % flush 10 mL, 10 mL, Intravenous, PRN, Achteryenni, Francia, APRN    sodium chloride 0.9 % infusion 40 mL, 40 mL, Intravenous, PRN, Achterberg, Francia, APRN    vitamin B-12 (CYANOCOBALAMIN) tablet 1,000 mcg, 1,000 mcg, Oral, Daily, AchterTuan hymanle, APRN, 1,000 mcg at 09/11/24 0915    vitamin D3 capsule 5,000 Units, 5,000 Units, Oral, Daily, Francia Wilkes, LOVE, 5,000 Units at 09/11/24 0914      Objective resting in bed, no acute distress    Vital Signs  Vitals:    09/11/24 0609 09/11/24 0902 09/11/24 1210 09/11/24 1629   BP:  100/42 139/59 126/57   BP Location:  Right arm Right arm Right arm   Patient Position:  Lying Lying Lying   Pulse: 68 79 73 72   Resp: 14 16 15 23   Temp:  97.8 °F (36.6 °C) 98.5 °F (36.9 °C) 97.1 °F (36.2 °C)   TempSrc:  Oral Oral Axillary    SpO2: 96% 98% 97% 97%   Weight:       Height:           Physical Exam:     General Appearance:    Awake and alert, in no acute distress   Head:    Normocephalic, without obvious abnormality   Eyes:          Conjunctivae normal, anicteric sclera   Throat:   No oral lesions, no thrush, oral mucosa moist   Neck:   supple, no JVD   Lungs:     respirations regular, even and unlabored   Abdomen:   Nondistended   Rectal:     Deferred   Extremities:    no cyanosis   Skin:   No bruising or rash, no jaundice        Results Review:    CBC    Results from last 7 days   Lab Units 09/11/24  1021 09/10/24  2358 09/10/24  1312 09/10/24  0128 09/09/24 0517 09/08/24  1854   WBC 10*3/mm3 4.77 3.78 4.56 5.78 4.67 7.64   HEMOGLOBIN g/dL 9.8* 6.8* 7.9* 7.4* 7.7* 5.0*   PLATELETS 10*3/mm3 150 123* 107* 145 138* 190     CMP   Results from last 7 days   Lab Units 09/10/24  2358 09/10/24  0128 09/09/24  0517 09/08/24  2011   SODIUM mmol/L 141 139 139 137   POTASSIUM mmol/L 3.8 3.6 4.0 3.9   CHLORIDE mmol/L 109* 107 108* 103   CO2 mmol/L 23.0 23.4 23.8 24.0   BUN mg/dL 24* 25* 26* 28*   CREATININE mg/dL 1.70* 1.58* 1.46* 1.79*   GLUCOSE mg/dL 117* 124* 104* 125*     Cr Clearance Estimated Creatinine Clearance: 26.2 mL/min (A) (by C-G formula based on SCr of 1.7 mg/dL (H)).  Coag   Results from last 7 days   Lab Units 09/10/24  0840   INR  <0.93*   APTT seconds 21.9*     HbA1C   Lab Results   Component Value Date    HGBA1C 5.3 03/02/2021    HGBA1C 5.5 11/07/2019     Results from last 7 days   Lab Units 09/09/24  0801 09/09/24 0517 09/08/24 2011   HSTROP T ng/L 30* 32* 33*     Infection     UA      Microbiology Results (last 10 days)       ** No results found for the last 240 hours. **          Imaging Results (Last 72 Hours)       Procedure Component Value Units Date/Time    CT Bone marrow biopsy and aspiration [[id number]] Collected: 09/10/24 1447     Updated: 09/10/24 1452    Narrative:      DATE OF EXAM:  9/10/2024 2:03 PM  EDT    PROCEDURE:  CT BONE MARROW ASPIRATION AND BIOPSY    INDICATIONS:  anemia, bone marrow biopsy    COMPARISON:  No Comparisons Available    FLUOROSCOPIC TIME:  7.2 seconds    PHYSICIAN MONITORED CONSCIOUS SEDATION TIME:  12 minutes    TECHNIQUE:   A detailed explanation of the procedure, including the risks, benefits, and alternatives was provided. Informed consent was obtained from the patient. A preprocedure timeout was performed. The interventional radiology nurse monitored the patient at all   times. The patient was placed prone on the CT fluoroscopy table and the left  posterior superior iliac spine was marked and prepped and draped in the usual sterile fashion. The skin was anesthetized with 1% lidocaine. Next, under CT fluoroscopic guidance   an 11-gauge [email or web address] bone biopsy needle was advanced just deep to the bony cortex. 1 mL of bone marrow blood was aspirated and given to the cytopathology tech, who noted the presence of spicules. Next, a total of 7 mL of bone marrow aspirate was   obtained and given to the tech. Finally, a biopsy was obtained using the 11-gauge needle. The needle was then removed. Hemostasis was achieved and a sterile bandage was applied. The patient tolerated the procedure well without immediate complication.    FINDINGS:  See above      Impression:      Technically successful bone marrow aspiration and biopsy of the left posterior superior iliac spine utilizing CT fluoroscopic guidance.      Report dictated by: Earl Barth DNP      I have personally reviewed this case and agree with the findings above:    Electronically Signed: Lico Campbell MD    9/10/2024 2:49 PM EDT    Workstation ID: [id number]    XR Chest 1 View [[id number]] Collected: 09/08/24 1915     Updated: 09/08/24 1917    Narrative:      XR CHEST 1 VW    Date of Exam: 9/8/2024 6:54 PM EDT    Indication: shortness of breath    Comparison: Chest x-ray dated 1/25/2018    Findings:  The lungs appear adequately aerated without  consolidation or mass. No pleural effusion or pneumothorax is identified. The cardiomediastinal silhouette and pulmonary vasculature appear within normal limits. No acute or suspicious osseous lesion is   identified.       Impression:      Impression:  1.No acute radiographic abnormality is identified.      Electronically Signed: Chris Kerr MD    9/8/2024 7:15 PM EDT    Workstation ID: [id number]            Assessment & Plan   Acute on chronic anemia  History of iron deficiency  COPD  DMII  CKD  History of cholecystectomy     PLAN:  Patient is 68-year-old female with a history of iron deficiency anemia, B12 deficiency, COPD, diabetes and CKD.  She presents with increased fatigue and shortness of breath.  She is found have a hemoglobin of 5 and was admitted for further evaluation.     Dr. Christianson discussed with Dr. Campos this morning on rounds.  Hemoglobin 9.8 after a blood transfusion, MCV 96.9, haptoglobin 205, , folate 13.6, B12 2000.    Secondary to persistent drop in hemoglobin, Dr. Severe is requesting upper GI evaluation.  Will proceed with EGD and small bowel anoscopy in a.m. for further evaluation.  I have discussed with patient and she is agreeable.  N.p.o. after midnight.  For mild constipation, we will add MiraLAX daily, adjust as needed.      Electronically signed by LOVE Boateng, 09/11/24, 4:47 PM EDT.

## 2024-09-11 NOTE — CASE MANAGEMENT/SOCIAL WORK
Continued Stay Note  JN Benjamin     Patient Name: Nancy Suárez  MRN: [id number]  Today's Date: 9/11/2024    Admit Date: 9/8/2024    Plan: Anticipate routine home.   Discharge Plan       Row Name 09/11/24 0918       Plan    Plan Comments DC Barriers: HGB monitoring (6.8), PRBC transfusion, hematology/oncology following, scheduled to have EGD performed tomorrow, 9/12 per GI.                   Senia Oneill RN     Office Phone: [phone]  Office Cell: [phone]

## 2024-09-12 ENCOUNTER — INPATIENT HOSPITAL (OUTPATIENT)
Age: 69
End: 2024-09-12
Payer: MEDICARE

## 2024-09-12 ENCOUNTER — INPATIENT HOSPITAL (OUTPATIENT)
Dept: URBAN - METROPOLITAN AREA HOSPITAL 84 | Facility: HOSPITAL | Age: 69
End: 2024-09-12
Payer: MEDICARE

## 2024-09-12 ENCOUNTER — ANESTHESIA EVENT (OUTPATIENT)
Dept: GASTROENTEROLOGY | Facility: HOSPITAL | Age: 69
End: 2024-09-12
Payer: MEDICARE

## 2024-09-12 ENCOUNTER — ANESTHESIA (OUTPATIENT)
Dept: GASTROENTEROLOGY | Facility: HOSPITAL | Age: 69
End: 2024-09-12
Payer: MEDICARE

## 2024-09-12 DIAGNOSIS — K55.20 ANGIODYSPLASIA OF COLON WITHOUT HEMORRHAGE: ICD-10-CM

## 2024-09-12 DIAGNOSIS — D50.0 IRON DEFICIENCY ANEMIA SECONDARY TO BLOOD LOSS (CHRONIC): ICD-10-CM

## 2024-09-12 LAB
ANION GAP SERPL CALCULATED.3IONS-SCNC: 9.1 MMOL/L (ref 5–15)
BASOPHILS # BLD AUTO: 0.01 10*3/MM3 (ref 0–0.2)
BASOPHILS NFR BLD AUTO: 0.2 % (ref 0–1.5)
BH BB BLOOD EXPIRATION DATE: NORMAL
BH BB BLOOD TYPE BARCODE: 6200
BH BB DISPENSE STATUS: NORMAL
BH BB PRODUCT CODE: NORMAL
BH BB UNIT NUMBER: NORMAL
BUN SERPL-MCNC: 23 MG/DL (ref 8–23)
BUN/CREAT SERPL: 14 (ref 7–25)
CALCIUM SPEC-SCNC: 8.8 MG/DL (ref 8.6–10.5)
CHLORIDE SERPL-SCNC: 108 MMOL/L (ref 98–107)
CO2 SERPL-SCNC: 23.9 MMOL/L (ref 22–29)
CREAT SERPL-MCNC: 1.64 MG/DL (ref 0.57–1)
CROSSMATCH INTERPRETATION: NORMAL
CYTO UR: NORMAL
DEPRECATED RDW RBC AUTO: 67.7 FL (ref 37–54)
EGFRCR SERPLBLD CKD-EPI 2021: 33.5 ML/MIN/1.73
EOSINOPHIL # BLD AUTO: 0.05 10*3/MM3 (ref 0–0.4)
EOSINOPHIL NFR BLD AUTO: 1.2 % (ref 0.3–6.2)
ERYTHROCYTE [DISTWIDTH] IN BLOOD BY AUTOMATED COUNT: 19.8 % (ref 12.3–15.4)
GLUCOSE BLDC GLUCOMTR-MCNC: 104 MG/DL (ref 70–105)
GLUCOSE SERPL-MCNC: 119 MG/DL (ref 65–99)
HCT VFR BLD AUTO: 30.9 % (ref 34–46.6)
HGB BLD-MCNC: 9.8 G/DL (ref 12–15.9)
IMM GRANULOCYTES # BLD AUTO: 0.02 10*3/MM3 (ref 0–0.05)
IMM GRANULOCYTES NFR BLD AUTO: 0.5 % (ref 0–0.5)
LAB AP CASE REPORT: NORMAL
LAB AP CLINICAL INFORMATION: NORMAL
LAB AP DIAGNOSIS COMMENT: NORMAL
LYMPHOCYTES # BLD AUTO: 1.2 10*3/MM3 (ref 0.7–3.1)
LYMPHOCYTES NFR BLD AUTO: 27.6 % (ref 19.6–45.3)
MCH RBC QN AUTO: 30.6 PG (ref 26.6–33)
MCHC RBC AUTO-ENTMCNC: 31.7 G/DL (ref 31.5–35.7)
MCV RBC AUTO: 96.6 FL (ref 79–97)
MONOCYTES # BLD AUTO: 0.55 10*3/MM3 (ref 0.1–0.9)
MONOCYTES NFR BLD AUTO: 12.7 % (ref 5–12)
NEUTROPHILS NFR BLD AUTO: 2.51 10*3/MM3 (ref 1.7–7)
NEUTROPHILS NFR BLD AUTO: 57.8 % (ref 42.7–76)
NRBC BLD AUTO-RTO: 0 /100 WBC (ref 0–0.2)
PATH REPORT.FINAL DX SPEC: NORMAL
PATH REPORT.GROSS SPEC: NORMAL
PLATELET # BLD AUTO: 136 10*3/MM3 (ref 140–450)
PMV BLD AUTO: 10 FL (ref 6–12)
POTASSIUM SERPL-SCNC: 3.9 MMOL/L (ref 3.5–5.2)
RBC # BLD AUTO: 3.2 10*6/MM3 (ref 3.77–5.28)
SODIUM SERPL-SCNC: 141 MMOL/L (ref 136–145)
UNIT  ABO: NORMAL
UNIT  RH: NORMAL
WBC NRBC COR # BLD AUTO: 4.34 10*3/MM3 (ref 3.4–10.8)

## 2024-09-12 PROCEDURE — 44369 SMALL BOWEL ENDOSCOPY: CPT | Performed by: INTERNAL MEDICINE

## 2024-09-12 PROCEDURE — 99232 SBSQ HOSP IP/OBS MODERATE 35: CPT | Performed by: INTERNAL MEDICINE

## 2024-09-12 PROCEDURE — 25010000002 PROPOFOL 200 MG/20ML EMULSION: Performed by: NURSE ANESTHETIST, CERTIFIED REGISTERED

## 2024-09-12 PROCEDURE — 0W3P8ZZ CONTROL BLEEDING IN GASTROINTESTINAL TRACT, VIA NATURAL OR ARTIFICIAL OPENING ENDOSCOPIC: ICD-10-PCS | Performed by: INTERNAL MEDICINE

## 2024-09-12 PROCEDURE — 85025 COMPLETE CBC W/AUTO DIFF WBC: CPT | Performed by: NURSE PRACTITIONER

## 2024-09-12 PROCEDURE — 80048 BASIC METABOLIC PNL TOTAL CA: CPT | Performed by: NURSE PRACTITIONER

## 2024-09-12 PROCEDURE — 0DJD8ZZ INSPECTION OF LOWER INTESTINAL TRACT, VIA NATURAL OR ARTIFICIAL OPENING ENDOSCOPIC: ICD-10-PCS | Performed by: INTERNAL MEDICINE

## 2024-09-12 PROCEDURE — 82948 REAGENT STRIP/BLOOD GLUCOSE: CPT

## 2024-09-12 RX ORDER — ONDANSETRON 2 MG/ML
4 INJECTION INTRAMUSCULAR; INTRAVENOUS EVERY 6 HOURS PRN
Status: CANCELLED | OUTPATIENT
Start: 2024-09-12

## 2024-09-12 RX ORDER — PROPOFOL 10 MG/ML
INJECTION, EMULSION INTRAVENOUS AS NEEDED
Status: DISCONTINUED | OUTPATIENT
Start: 2024-09-12 | End: 2024-09-12 | Stop reason: SURG

## 2024-09-12 RX ORDER — LIDOCAINE HYDROCHLORIDE 20 MG/ML
INJECTION, SOLUTION EPIDURAL; INFILTRATION; INTRACAUDAL; PERINEURAL AS NEEDED
Status: DISCONTINUED | OUTPATIENT
Start: 2024-09-12 | End: 2024-09-12 | Stop reason: SURG

## 2024-09-12 RX ORDER — ONDANSETRON 4 MG/1
4 TABLET, ORALLY DISINTEGRATING ORAL EVERY 6 HOURS PRN
Status: CANCELLED | OUTPATIENT
Start: 2024-09-12

## 2024-09-12 RX ADMIN — PROPOFOL 25 MG: 10 INJECTION, EMULSION INTRAVENOUS at 11:43

## 2024-09-12 RX ADMIN — PANTOPRAZOLE SODIUM 40 MG: 40 TABLET, DELAYED RELEASE ORAL at 05:49

## 2024-09-12 RX ADMIN — SODIUM CHLORIDE 40 ML/HR: 9 INJECTION, SOLUTION INTRAVENOUS at 11:24

## 2024-09-12 RX ADMIN — LATANOPROST 1 DROP: 50 SOLUTION/ DROPS OPHTHALMIC at 14:19

## 2024-09-12 RX ADMIN — PROPOFOL 25 MG: 10 INJECTION, EMULSION INTRAVENOUS at 11:34

## 2024-09-12 RX ADMIN — CARVEDILOL 12.5 MG: 6.25 TABLET, FILM COATED ORAL at 14:20

## 2024-09-12 RX ADMIN — LIDOCAINE HYDROCHLORIDE 50 MG: 20 INJECTION, SOLUTION EPIDURAL; INFILTRATION; INTRACAUDAL; PERINEURAL at 11:29

## 2024-09-12 RX ADMIN — PROPOFOL 100 MG: 10 INJECTION, EMULSION INTRAVENOUS at 11:29

## 2024-09-12 RX ADMIN — ALLOPURINOL 100 MG: 100 TABLET ORAL at 14:20

## 2024-09-12 RX ADMIN — FERROUS SULFATE TAB EC 324 MG (65 MG FE EQUIVALENT) 324 MG: 324 (65 FE) TABLET DELAYED RESPONSE at 14:20

## 2024-09-12 RX ADMIN — ATORVASTATIN CALCIUM 10 MG: 10 TABLET, FILM COATED ORAL at 14:20

## 2024-09-12 RX ADMIN — CARVEDILOL 12.5 MG: 6.25 TABLET, FILM COATED ORAL at 21:19

## 2024-09-12 RX ADMIN — Medication 5000 UNITS: at 14:20

## 2024-09-12 RX ADMIN — Medication 10 ML: at 21:20

## 2024-09-12 RX ADMIN — POLYETHYLENE GLYCOL 3350 17 G: 17 POWDER, FOR SOLUTION ORAL at 14:21

## 2024-09-12 RX ADMIN — Medication 10 ML: at 14:21

## 2024-09-12 RX ADMIN — CALCITRIOL CAPSULES 0.25 MCG 0.25 MCG: 0.25 CAPSULE ORAL at 14:20

## 2024-09-12 RX ADMIN — PROPOFOL 25 MG: 10 INJECTION, EMULSION INTRAVENOUS at 11:38

## 2024-09-12 NOTE — PROGRESS NOTES
[facility] MEDICINE SERVICE  DAILY PROGRESS NOTE    NAME: Nancy Suárez  : 1955  MRN: [id number]      LOS: 2 days     PROVIDER OF SERVICE: Naresh Brand MD    Chief Complaint: Anemia    Subjective:     Interval History:  History taken from: patient chart RN    No new symptoms     Review of Systems:   Review of Systems  All negative except as above   Objective:     Vital Signs  Temp:  [97.1 °F (36.2 °C)-98.7 °F (37.1 °C)] 98.4 °F (36.9 °C)  Heart Rate:  [60-80] 60  Resp:  [12-23] 14  BP: (126-153)/(52-65) 153/57   Body mass index is 25.16 kg/m².    Physical Exam  Physical Exam  AOx3 NAD  RRR S1-S2 audible  Lungs with fair air entry  Abdomen soft nontender nondistended      Diagnostic Data    Results from last 7 days   Lab Units 24  0226   WBC 10*3/mm3 4.34   HEMOGLOBIN g/dL 9.8*   HEMATOCRIT % 30.9*   PLATELETS 10*3/mm3 136*   GLUCOSE mg/dL 119*   CREATININE mg/dL 1.64*   BUN mg/dL 23   SODIUM mmol/L 141   POTASSIUM mmol/L 3.9   ANION GAP mmol/L 9.1       CT Bone marrow biopsy and aspiration    Result Date: 9/10/2024  Technically successful bone marrow aspiration and biopsy of the left posterior superior iliac spine utilizing CT fluoroscopic guidance. Report dictated by: Earl Barth DNP  I have personally reviewed this case and agree with the findings above: Electronically Signed: Lico Campbell MD  9/10/2024 2:49 PM EDT  Workstation ID: [id number]       I reviewed the patient's new clinical results.  I reviewed the patient's new imaging results and agree with the interpretation.    Assessment/Plan:     Active and Resolved Problems  Active Hospital Problems    Diagnosis  POA    **Anemia [D64.9]  Yes    Iron deficiency [E61.1]  Unknown      Resolved Hospital Problems   No resolved problems to display.       68-year-old female with a history of chronic anemia multiple endoscopic interventions in the past, CKD admitted to [facility]  generalized fatigue and dyspnea     #Acute symptomatic  anemia on chronic anemia  No clear evidence of GIB  Hemoglobin 5 on admission s/p PRBC transfusion.  Hemoglobin 6.81  9/11 1unit of PRBC transfused. Responded well   GI following noted tentative plan for EGD today  continue to monitor H&H transfuse PRBCs.  To keep hemoglobin more than 7  Hematology following status post bone marrow biopsy 9/10 follow report     #hypertension  Continue Coreg 12.5 twice daily     #CKD stage IIIb  Creatinine at baseline  CTM     #HFpEF  Not exacerbation  Continue home diuretics     #COPD  Not In exacerbation  Continue DuoNebs.     #History of TIA  At home takes aspirin and statin  Continue statins hold aspirin for now     #Gout  Continue allopurinol     #PVD  CAROLINE reviewed. This was originally planned as out patient   No evidence of acute limb ischemia   Out pt follow up with vascular surgery   ASA to be resumed once cleared from hematological stand point     VTE Prophylaxis:  Mechanical VTE prophylaxis orders are present.             Disposition Planning:     Barriers to Discharge:anemia workup  Anticipated Date of Discharge: 9/13  Place of Discharge: Home      Time: 40 minutes     Code Status and Medical Interventions: CPR (Attempt to Resuscitate); Full Support   Ordered at: 09/08/24 2133     Level Of Support Discussed With:    Patient     Code Status (Patient has no pulse and is not breathing):    CPR (Attempt to Resuscitate)     Medical Interventions (Patient has pulse or is breathing):    Full Support       Signature: Electronically signed by Naresh Brand MD, 09/12/24, 11:48 EDT.  [facility] Hospitalist Team

## 2024-09-12 NOTE — OP NOTE
ESOPHAGOGASTRODUODENOSCOPY WITH SMALL BOWEL ENTEROSCOPY Procedure Report    Patient Name:  Nancy Suárez  YOB: 1955    Date of Surgery:  9/12/2024     Pre-Op Diagnosis:  1.  Iron deficiency anemia secondary to chronic blood loss    Post-Op Diagnosis:  1.  Small bowel angioectasias       Procedure/CPT® Codes:      Procedure(s):  ESOPHAGOGASTRODUODENOSCOPY WITH SMALL BOWEL ENTEROSCOPY Argon plasma coagulation of small bowel Arterial venous malformation x3    Staff:  Surgeon(s):  Kwame Walker MD      Anesthesia: Monitored Anesthesia Care    Description of Procedure:  A description of the procedure as well as risks, benefits and alternative methods were explained to the patient who voiced understanding and signed the corresponding consent form. A physical exam was performed and vital signs were monitored throughout the procedure.    After informed consent was obtained, the patient was placed in the left lateral decubitus position and sedated as above.  The Olympus video gastroscope was inserted into the oropharynx and the esophagus was intubated without difficulty.  Examination of the esophagus, stomach, pylorus, duodenal bulb, and second portion of the duodenum was performed without difficulty. The scope was then retroflexed and the fundus was visualized. The procedure was not difficult and there were no immediate complications.  There was no blood loss.    Findings:  Esophagus: Normal  Stomach: Normal  Duodenum: Four angioectasias in the distal duodenum which were nonbleeding and measured approximately 3 to 4 mm in diameter.  Argon plasma coagulation probe passed and cautery applied with complete obliteration of the angioectasias.  No bleeding noted at the completion of the procedure.  No blood noted in the small bowel.    Impression:  1.  Small bowel enteroscopy with argon plasma coagulation of small bowel angioectasias    Recommendations:  1.  Regular diet  2.  Continue iron  supplementation  3.  Follow hemoglobin and iron levels  4.  We will see as needed      Kwame Walker MD     Date: 9/12/2024    Time: 11:48 EDT      .

## 2024-09-12 NOTE — ANESTHESIA POSTPROCEDURE EVALUATION
Patient: Nancy Suárez    Procedure Summary       Date: 09/12/24 Room / [facility]    Anesthesia Start: 1122 Anesthesia Stop: 1148    Procedure: ESOPHAGOGASTRODUODENOSCOPY WITH SMALL BOWEL ENTEROSCOPY Argon plasma coagulation of small bowel Arterial venous malformation x3 Diagnosis:       Iron deficiency      (Iron deficiency [E61.1])    Surgeons: Kwame Walker MD Provider: Alberto Cuellar MD    Anesthesia Type: MAC ASA Status: 3            Anesthesia Type: MAC    Vitals  Vitals Value Taken Time   /53 09/12/24 1202   Temp     Pulse 65 09/12/24 1206   Resp 17 09/12/24 1202   SpO2 94 % 09/12/24 1206   Vitals shown include unfiled device data.        Post Anesthesia Care and Evaluation    Patient location during evaluation: PACU  Patient participation: complete - patient participated  Level of consciousness: awake  Pain scale: See nurse's notes for pain score.  Pain management: adequate    Airway patency: patent  Anesthetic complications: No anesthetic complications  PONV Status: none  Cardiovascular status: acceptable  Respiratory status: acceptable and spontaneous ventilation  Hydration status: acceptable    Comments: Patient seen and examined postoperatively; vital signs stable; SpO2 greater than or equal to 90%; cardiopulmonary status stable; nausea/vomiting adequately controlled; pain adequately controlled; no apparent anesthesia complications; patient discharged from anesthesia care when discharge criteria were met

## 2024-09-12 NOTE — ANESTHESIA PREPROCEDURE EVALUATION
Anesthesia Evaluation     NPO Solid Status: > 8 hours  NPO Liquid Status: > 8 hours           Airway   Mallampati: II  TM distance: >3 FB  Neck ROM: full  No difficulty expected  Dental - normal exam     Pulmonary - normal exam   (+) COPD,recent URI  Cardiovascular - normal exam    (+) hypertension well controlled, valvular problems/murmurs, past MI , CAD, dysrhythmias, CHF , PVD, hyperlipidemia,  carotid artery disease      Neuro/Psych  (+) CVA  GI/Hepatic/Renal/Endo    (+) GERD well controlled, hepatitis C, liver disease, renal disease- CRI, diabetes mellitus type 2    Musculoskeletal     Abdominal  - normal exam    Bowel sounds: normal.   Substance History      OB/GYN          Other   arthritis,                 Anesthesia Plan    ASA 3     MAC   total IV anesthesia  intravenous induction     Anesthetic plan, risks, benefits, and alternatives have been provided, discussed and informed consent has been obtained with: patient.  Pre-procedure education provided  Plan discussed with CRNA.    CODE STATUS:    Level Of Support Discussed With: Patient  Code Status (Patient has no pulse and is not breathing): CPR (Attempt to Resuscitate)  Medical Interventions (Patient has pulse or is breathing): Full Support

## 2024-09-12 NOTE — CASE MANAGEMENT/SOCIAL WORK
Continued Stay Note  [facility]     Patient Name: Nancy Suárez  MRN: [id number]  Today's Date: 9/12/2024    Admit Date: 9/8/2024    Plan: Anticipate routine home.   Discharge Plan       Row Name 09/12/24 1409       Plan    Plan Anticipate routine home.    Plan Comments Barrier to D/C: EGD today, hgb monitoring (9.8), bone marrow biopsy results pending, hematology/oncology following.                      Expected Discharge Date and Time       Expected Discharge Date Expected Discharge Time    Sep 13, 2024           Phone communication or documentation only - no physical contact with patient or family.     YESSICA CatalanN, RN    [address] [address]    Office: [phone]  Fax: [phone]

## 2024-09-12 NOTE — PROGRESS NOTES
"      Hematology/Oncology Inpatient Progress Note    PATIENT NAME: Nancy Suárez  : 1955  MRN: [id number]    CHIEF COMPLAINT: Weakness    HISTORY OF PRESENT ILLNESS:      2023: Ms. Suárez came seeking attention about anemia, that she reported had been a recurring issue for her. She described being tired, \"I'm drained\". Unsteady on her feet and unable to do much activity. No other symptoms but she was concerned that her blood pressure had been in the low 100's over 60's. 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 She had also a history of chronic renal disease and in the chemistry closest to this visit she had an estimated glomerular filtration rate of about 33 ml/min. In 2023 her hemoglobin was 11 g/dl with mean corpuscular volume of 99 fL. However a blood count done closer to the time of this visit had reported a hemoglobin of 7.9 g/dl with mean corpuscular volume of 105 fL. The white cell count and the platelet count were unremarkable. On exam she was found to appear much older than the stated age. Neither pale nor jaundiced. Well hydrated. No oral lesions and no palpable adenopathy. Lungs markedly diminished bilaterally but clear. Heart regular. Abdomen rounded and protuberant but soft and not tender; no hepatomegaly or splenomegaly. No edema. Her social and family history were reviewed. Given her history of chronic gastrointestinal bleeding it is very possible that she indeed has iron deficiency anemia. The macrocytosis was surprising in this setting, and could be related to other nutritional deficiencies or to hemolysis and was to be investigated. I asked her to return to see me in one week with results.      2023: Underwent laboratory exams.  Still feeling tired.  Eating reasonably well and afebrile.  No bleeding.  On " exam pale.  Respirations labored with minimal exertion.  Lungs diminished.  Heart regular.  The laboratory exams revealed an elevated glucose and abnormal renal function with an estimated glomerular filtration rate of 35 mL/min.  Liver enzymes were however unremarkable.  White cell count was normal at 5100/mm³ with a rather unremarkable differential count.  The platelets were also within the normal range, at 213,000/mm³.  The hemoglobin, however, it was low at 7.9 g/dL with a mean corpuscular volume of 107.6 fL.  Her iron profile revealed a normal iron binding capacity at 367 mcg/dL with an elevated iron at 303 mcg/dL and abnormally elevated saturation at 83%.  Somewhat surprisingly the ferritin was not particularly elevated at 33 ng/mL.  Folic acid was unremarkable but the vitamin B12 was elevated at 1642 pg/mL.  BLAS was negative but erythropoietin was elevated at 191.6 µIU/mL.  With this picture it appeared as though she had ineffective erythropoiesis and in order to confirm this diagnosis it was felt necessary to obtain a bone marrow biopsy.  The role of the bone marrow and the procedure of taking a sample was discussed with her.  She reported that she had undergone one in the past but was not sure of where it was done.     7/25/2023: Feeling about the same. Had no laboratory exams. Tired but no more than before. Denies anorexia. No fevers. Denied chest pain or cough. No abdominal pain or diarrhea and no dysuria. On exam no changes. The bone marrow biopsy is unremarkable and no cytogenetic abnormalities were documented. I've asked her to have a blood count today and see me in a few weeks with new laboratory exams.      8/8/2023: Without new symptoms.  Still tired.  Smoking less.  Eating reasonably well and weight stable.  Afebrile.  On exam pale, chronically ill-appearing but in no distress.  No jaundice but pale.  Well-hydrated.  Lungs diminished.  Heart regular.  No edema.  Laboratory exams revealed persistent  "anemia again with macrocytosis.  Her kidney function had remained low with a creatinine of 1.71 mg/dL for an estimated glomerular filtration rate of 32 mL/min.  The iron studies were more consistent with anemia of chronic renal disease with a total iron binding capacity at low normal of 298 mcg/dL.  The unbound iron binding capacity was diminished at 68 mcg/dL but the iron saturation was increased at 77%.  The bone marrow revealed absent iron stores.  This was more suggestive of iron deficiency rather than of an accumulation.  A decision was made to start treatment with erythropoietin.  This was based on the clear evidence of anemia of chronic kidney disease.  In addition I requested soluble transferrin receptor testing to investigate further the possibility of iron deficiency.     8/29/2023: Feeling reasonably well and much stronger than before.  Had not received any injections of erythropoietin in spite of which her hemoglobin had increased to 10.5 g/dL.  She was eating well.  Still smoking but continued to work at cutting back.  No chest pains or cough.  No abdominal pain or diarrhea and no dysuria.  No peripheral edema no skin rash.  On exam alert, conversant and chronically ill-appearing.  No jaundice.  Lungs diminished bilaterally.  Heart regular.  Abdomen soft.  No edema.  Laboratory exams reviewed.  Plans to continue to monitor her blood count and to proceed with erythropoietin as needed to maintain a hemoglobin of at least 10 g/dL were discussed.     10/24/2023: Feeling much better.  \"I feel better than I have felt in a long time\".  Trying to stop smoking.  Eating well.  No weight loss.  Persists with dyspnea.  No chest pains.  On exam chronically ill-appearing.  Lungs markedly diminished but symmetrical.  Heart regular.  Abdomen soft.  No edema.  Laboratory exams reviewed.  Persists with macrocytic anemia.  Hemoglobin today was 10.7 g/dL with mean corpuscular volume of 101 fL.  This most likely is a " "reflection of her chronic kidney disease.  At this point no need for intervention but we will continue to follow.  I have asked her to return to see me in 6 months.     4/30/2024: Feeling well and without new symptoms. Energetic and with good appetite. No chest pain or cough and no abdominal pain or diarrhea. On exam alert and conversant. No jaundice and no oral lesions. Respirations not labored. Lungs diminished bilaterally and the heart is regular. Abdomen is soft and not tender. No edema. Reviewed the laboratory exams available and discussed with her at length. For now no need for intervention, particularly because she feels so well. Will recheck laboratory exams today and will see in a few months.      9/3/2024: In the office before the next scheduled appointment. Has not been feeling well. She reports \"my legs are heavy\". \"I can only walk a few steps\". She has been smoking one pack daily again. She has been eating well and her weight does not seem to have changed. She has been afebrile. No chest pain but continues to cough and has had dyspnea or exertion with minimal exertion. No abdominal pain and denies melena or hematochezia. No skin rash but she complains of edema of the lower extremities. On exam ill appearing.  Edentulous. No oral lesions. Respirations not labored. Lungs diminished bilaterally and crackles in both bases. Heart regular. Abdomen soft and without hepatomegaly or splenomegaly. No edema or minimal. The laboratory exams were reviewed and discussed with her. She does not seem to have iron deficiency at this point. Will investigate further as she has had anemia that requires transfusion on two occasions recently.     9/9/2024: I have known Ms. Perez for over 1 year now. She has a history of anemia and at some point in 2022 her hemoglobin had dropped to less than 8 g/dL. Multiple investigations, including a bone marrow biopsy in July 2023, failed to reveal a clear explanation for this. She did have " "a previous history of gastrointestinal bleeding and had arteriovenous malformations identified and with oral iron her hemoglobin improved to greater than 10 g/dL. Because of known chronic kidney disease the impression was that she had anemia of chronic kidney disease and attempt was made to treat her with erythropoietin but because her hemoglobin had been consistently above 10 g/dL, the treatment had been postponed. At the time of the last visit she was not feeling as well. Her hemoglobin had dropped significantly. She had persisted with macrocytosis that seemed somewhat worse. However her white cell count and differential as well as the platelets were unremarkable. Her renal function had remained poor but not particularly worse. She was transfused with symptomatic relief. She was admitted to the hospital complaining of \"I cannot function\". Very weak and tired. Heavy legs and unable to walk any significant distance. No hematochezia but persistent melena that she attributed to her daily use of iron. However she has been taking only one 321 mg capsule a day. No fevers or nocturnal diaphoresis. On exam ill-appearing but in no distress. No jaundice. Lungs diminished bilaterally and heart regular but tachycardic. Abdomen soft. The laboratory exams again were reviewed. She does have chronic kidney disease and her blood count is not particularly remarkable. She persists with very significant anemia but the platelet count is also not abnormal. My suspicion of anemia of chronic kidney disease is not substantiated by the progress her anemia has had. Under the circumstances I would not expect this degree of anemia. Additional investigations ordered. She will have a repeat bone marrow biopsy and aspiration. Discussed with her.     Subjective   9/12/2024:  Feels about the same. To have upper gastrointestinal endoscopy today. She has not noted any bleeding.     ROS:  Review of Systems   Constitutional:  Positive for activity " change. Negative for appetite change, chills, diaphoresis, fatigue, fever and unexpected weight change.   HENT:  Negative for congestion, dental problem, drooling, ear discharge, ear pain, facial swelling, hearing loss, mouth sores, nosebleeds, postnasal drip, rhinorrhea, sinus pressure, sinus pain, sneezing, sore throat, tinnitus, trouble swallowing and voice change.    Eyes:  Negative for photophobia, pain, discharge, redness, itching and visual disturbance.   Respiratory:  Positive for shortness of breath. Negative for apnea, cough, choking, chest tightness, wheezing and stridor.    Cardiovascular:  Negative for chest pain, palpitations and leg swelling.   Gastrointestinal:  Negative for abdominal distention, abdominal pain, anal bleeding, blood in stool, constipation, diarrhea, nausea, rectal pain and vomiting.   Endocrine: Negative for cold intolerance, heat intolerance, polydipsia and polyuria.   Genitourinary:  Negative for decreased urine volume, difficulty urinating, dysuria, flank pain, frequency, genital sores, hematuria and urgency.   Musculoskeletal:  Negative for arthralgias, back pain, gait problem, joint swelling, myalgias, neck pain and neck stiffness.   Skin:  Negative for color change, pallor and rash.   Neurological:  Positive for weakness. Negative for dizziness, tremors, seizures, syncope, facial asymmetry, speech difficulty, light-headedness, numbness and headaches.   Hematological:  Negative for adenopathy. Does not bruise/bleed easily.   Psychiatric/Behavioral:  Negative for agitation, behavioral problems, confusion, decreased concentration, hallucinations, self-injury, sleep disturbance and suicidal ideas. The patient is not nervous/anxious.       MEDICATIONS:    Scheduled Meds:  allopurinol, 100 mg, Oral, Daily  atorvastatin, 10 mg, Oral, Daily  calcitriol, 0.25 mcg, Oral, Daily  carvedilol, 12.5 mg, Oral, BID  cholestyramine light, 1 packet, Oral, Daily  ferrous sulfate, 324 mg, Oral,  "Daily With Breakfast  furosemide, 20 mg, Oral, Every Other Day  latanoprost, 1 drop, Both Eyes, Daily  pantoprazole, 40 mg, Oral, Q AM  polyethylene glycol, 17 g, Oral, Daily  sodium chloride, 10 mL, Intravenous, Q12H  vitamin D3, 5,000 Units, Oral, Daily       Continuous Infusions:      PRN Meds:    acetaminophen    aluminum-magnesium hydroxide-simethicone    senna-docusate sodium **AND** [DISCONTINUED] polyethylene glycol **AND** bisacodyl **AND** bisacodyl    melatonin    ondansetron ODT **OR** ondansetron    [COMPLETED] Insert Peripheral IV **AND** sodium chloride    sodium chloride    sodium chloride     ALLERGIES:  No Known Allergies    Objective    VITALS:   /47 (BP Location: Left arm, Patient Position: Lying)   Pulse 59   Temp 97.6 °F (36.4 °C) (Oral)   Resp 20   Ht 154.9 cm (61\")   Wt 60.4 kg (133 lb 2.5 oz)   LMP  (LMP Unknown)   SpO2 95%   BMI 25.16 kg/m²     PHYSICAL EXAM: (performed by MD)  Physical Exam  Constitutional:       General: She is not in acute distress.     Appearance: She is ill-appearing. She is not toxic-appearing or diaphoretic.      Comments: Well-built, chronically ill-appearing.  No distress.   HENT:      Head: Normocephalic and atraumatic.      Right Ear: External ear normal.      Left Ear: External ear normal.      Nose: Nose normal.      Mouth/Throat:      Mouth: Mucous membranes are moist.      Pharynx: Oropharynx is clear. No oropharyngeal exudate or posterior oropharyngeal erythema.      Comments: Edentulous.  No oral lesions.  Eyes:      General: No scleral icterus.        Right eye: No discharge.         Left eye: No discharge.      Conjunctiva/sclera: Conjunctivae normal.      Pupils: Pupils are equal, round, and reactive to light.   Cardiovascular:      Rate and Rhythm: Normal rate and regular rhythm.      Pulses: Normal pulses.      Heart sounds: No murmur heard.     No friction rub. No gallop.   Pulmonary:      Effort: No respiratory distress.      Breath " sounds: No stridor. No wheezing, rhonchi or rales.      Comments: Breath sounds markedly diminished bilaterally in a symmetrical fashion.  Abdominal:      General: Bowel sounds are normal. There is no distension.      Palpations: Abdomen is soft. There is no mass.      Tenderness: There is no abdominal tenderness. There is no right CVA tenderness, left CVA tenderness, guarding or rebound.      Hernia: No hernia is present.   Musculoskeletal:         General: No swelling, tenderness, deformity or signs of injury.      Cervical back: No rigidity.      Right lower leg: No edema.      Left lower leg: No edema.   Lymphadenopathy:      Cervical: No cervical adenopathy.   Skin:     Coloration: Skin is not jaundiced.      Findings: No bruising, lesion or rash.   Neurological:      General: No focal deficit present.      Mental Status: She is alert and oriented to person, place, and time.      Cranial Nerves: No cranial nerve deficit.      Motor: No weakness.      Gait: Gait normal.   Psychiatric:         Mood and Affect: Mood normal.         Behavior: Behavior normal.         Thought Content: Thought content normal.         Judgment: Judgment normal.     ALONZO Campos MD performed the physical exam on 9/12/2024 as documented above.     RECENT LABS:  Lab Results (last 24 hours)       Procedure Component Value Units Date/Time    Bone Marrow Exam [[id number]] Collected: 09/10/24 1432    Specimen: Bone Marrow Aspirate from Iliac Crest, Left - Biopsy Updated: 09/12/24 1511    Narrative:      The following orders were created for panel order Bone Marrow Exam.  Procedure                               Abnormality         Status                     ---------                               -----------         ------                     Bone Marrow Exam[[id number]]                                 Final result                 Please view results for these tests on the individual orders.    Bone Marrow Exam [[id number]] Collected:  09/10/24 1432    Specimen: Bone Marrow Aspirate from Iliac Crest, Left - Aspirate; Bone Marrow Aspirate from Iliac Crest, Left - Aspirate; Bone Marrow Aspirate from Iliac Crest, Left - Biopsy Updated: 09/12/24 1511     Case Report --     Surgical Pathology Report                         Case: [id number]                                  Authorizing Provider:  Jose Campos MD       Collected:           09/10/2024 02:32 PM          Ordering Location:     [facility]       Received:            09/10/2024 02:41 PM                                 PROGRESS CARE                                                                Pathologist:           Khang Mejia MD                                                            Specimens:   1) - Iliac Crest, Left - Aspirate                                                                   2) - Iliac Crest, Left - Aspirate                                                                   3) - Iliac Crest, Left - Biopsy                                                             Clinical Information --     Final Diagnosis --     Left iliac crest, smears of bone marrow aspirate with cell block (clot) preparation and marrow biopsy:  Normocellular bone marrow for age (30%)  Storage iron present  Negative for involvement by malignant lymphoma and metastatic malignancy  See comment    LAUREN       Comment --     Clinical correlation with flow cytometric and chromosomal studies is recommended.  LAUREN       Gross Description --     1. Iliac Crest, Left - Aspirate.  Received are multiple aspirate smears which will be evaluated using Zavala's Giemsa and iron stains.     2. Iliac Crest, Left - Aspirate.  Received in formalin designated left iliac aspirate is a portion of reddish blood clot measuring 1.8 cm in greatest dimension.  Sectioned and submitted in 1 cassette.  LAUREN    3. Iliac Crest, Left - Biopsy.  Received in formalin designated left iliac biopsy is a portion of yellowish  "bone measuring 1.2 cm in greatest dimension.  Submitted in 1 cassette after brief decalcification.  LAUREN         Microscopic Description --     Smears of the bone marrow aspirate (specimen 1) demonstrate particles of adequate cellularity.  Megakaryocytes appear morphologically and quantitatively normal.  Cells extrinsic to the hematopoietic system are not observed.  Erythropoiesis appears normoblastic.  Myelopoiesis shows a normal maturation sequence from blasts to segmented neutrophils.  No increased numbers of myeloblasts, lymphocytes or plasma cells are noted.  The M to E ratio is approximately formalin.  Iron stain is positive for storage.    Sections of the bone marrow clot (specimen 2) demonstrate scattered particles of bone marrow with an estimated cellularity of approximately 30%.  Megakaryocytes appear morphologically and quantitatively normal.  There is no evidence of a granulomatous or metastatic process.  Iron stain is positive for storage iron.    Sections of the bone marrow biopsy (specimen 3) demonstrated \"cancellous bone.  The bone marrow exhibits a cellularity estimated at approximately 30%.  Megakaryocytes appear morphologically and quantitatively normal.  There is no evidence of a granulomatous or metastatic process.  There is no morphologic evidence of malignant lymphoma.  LAUREN      POC Glucose Once [[id number]]  (Normal) Collected: 09/12/24 1014    Specimen: Blood Updated: 09/12/24 1015     Glucose 104 mg/dL      Comment: Serial Number: [id number]:  [id number]       Basic Metabolic Panel [[id number]]  (Abnormal) Collected: 09/12/24 0226    Specimen: Blood from Arm, Left Updated: 09/12/24 0252     Glucose 119 mg/dL      BUN 23 mg/dL      Creatinine 1.64 mg/dL      Sodium 141 mmol/L      Potassium 3.9 mmol/L      Chloride 108 mmol/L      CO2 23.9 mmol/L      Calcium 8.8 mg/dL      BUN/Creatinine Ratio 14.0     Anion Gap 9.1 mmol/L      eGFR 33.5 mL/min/1.73     Narrative:      GFR Normal " >60  Chronic Kidney Disease <60  Kidney Failure <15      CBC Auto Differential [[id number]]  (Abnormal) Collected: 09/12/24 0226    Specimen: Blood from Arm, Left Updated: 09/12/24 0231     WBC 4.34 10*3/mm3      RBC 3.20 10*6/mm3      Hemoglobin 9.8 g/dL      Hematocrit 30.9 %      MCV 96.6 fL      MCH 30.6 pg      MCHC 31.7 g/dL      RDW 19.8 %      RDW-SD 67.7 fl      MPV 10.0 fL      Platelets 136 10*3/mm3      Neutrophil % 57.8 %      Lymphocyte % 27.6 %      Monocyte % 12.7 %      Eosinophil % 1.2 %      Basophil % 0.2 %      Immature Grans % 0.5 %      Neutrophils, Absolute 2.51 10*3/mm3      Lymphocytes, Absolute 1.20 10*3/mm3      Monocytes, Absolute 0.55 10*3/mm3      Eosinophils, Absolute 0.05 10*3/mm3      Basophils, Absolute 0.01 10*3/mm3      Immature Grans, Absolute 0.02 10*3/mm3      nRBC 0.0 /100 WBC     Flow Cytometry [[id number]] Collected: 09/10/24 1432    Specimen: Bone Marrow from Iliac, Left Updated: 09/11/24 2236     Consult Global Result Comment^Text^TXT     Comment: Bone Marrow, Lt Iliac Crest:  No significant immunophenotypic abnormalities of myeloid   and lymphoid cells  DISCLAIMER: REFER TO HARDCOPY OR PDF FOR COMPLETE RESULT.   If synopsis provided, clinical decisions should not be   based on this interfaced synopsis alone.  Performed at:  [facility]  [address] [address]  :  Isela Medel M.D., Ph.D., Phone:  [phone]       Narrative:      Performed at:  [facility]  [address]  [address]  :  Isela Medel M.D., Ph.D., Phone:  [phone]    Folate [[id number]]  (Normal) Collected: 09/11/24 1021    Specimen: Blood Updated: 09/11/24 1629     Folate 13.60 ng/mL     Narrative:      Results may be falsely increased if patient taking Biotin.      Vitamin B12 [[id number]]  (Abnormal) Collected: 09/11/24 1021    Specimen: Blood Updated:  09/11/24 1629     Vitamin B-12 >2,000 pg/mL     Narrative:      Results may be falsely increased if patient taking Biotin.            IMAGING REVIEWED:  No radiology results for the last day    Assessment & Plan   ASSESSMENT:  Recurrent normocytic anemia: Underwent upper gastrointestinal endoscopy. Arteriovenous malformations in the duodenum, making gastrointestinal bleeding more likely as a cause of the recurrent anemia. There has been no evidence of hemolysis and so far the work up does not reveal a hematologic disorder as the cause of the anemia. Awaiting the final report of the bone marrow biopsy but the flow cytometry so far reveals no suggestion of pathology.   Thrombocytopenia: Remains minor and likely a result of the acute illness. No intervention at this time.       Jose Campos MD on 9/12/2024 at 16;45

## 2024-09-13 ENCOUNTER — READMISSION MANAGEMENT (OUTPATIENT)
Dept: CALL CENTER | Facility: HOSPITAL | Age: 69
End: 2024-09-13
Payer: MEDICARE

## 2024-09-13 VITALS
DIASTOLIC BLOOD PRESSURE: 96 MMHG | SYSTOLIC BLOOD PRESSURE: 145 MMHG | OXYGEN SATURATION: 96 % | TEMPERATURE: 97.6 F | RESPIRATION RATE: 14 BRPM | BODY MASS INDEX: 25.64 KG/M2 | HEART RATE: 67 BPM | HEIGHT: 61 IN | WEIGHT: 135.8 LBS

## 2024-09-13 LAB
ANION GAP SERPL CALCULATED.3IONS-SCNC: 8.5 MMOL/L (ref 5–15)
BASOPHILS # BLD AUTO: 0.01 10*3/MM3 (ref 0–0.2)
BASOPHILS NFR BLD AUTO: 0.2 % (ref 0–1.5)
BUN SERPL-MCNC: 22 MG/DL (ref 8–23)
BUN/CREAT SERPL: 16.2 (ref 7–25)
CALCIUM SPEC-SCNC: 8.7 MG/DL (ref 8.6–10.5)
CHLORIDE SERPL-SCNC: 109 MMOL/L (ref 98–107)
CO2 SERPL-SCNC: 23.5 MMOL/L (ref 22–29)
CREAT SERPL-MCNC: 1.36 MG/DL (ref 0.57–1)
DEPRECATED RDW RBC AUTO: 65.9 FL (ref 37–54)
EGFRCR SERPLBLD CKD-EPI 2021: 42 ML/MIN/1.73
EOSINOPHIL # BLD AUTO: 0.07 10*3/MM3 (ref 0–0.4)
EOSINOPHIL NFR BLD AUTO: 1.4 % (ref 0.3–6.2)
ERYTHROCYTE [DISTWIDTH] IN BLOOD BY AUTOMATED COUNT: 18.6 % (ref 12.3–15.4)
GLUCOSE SERPL-MCNC: 107 MG/DL (ref 65–99)
HCT VFR BLD AUTO: 30.9 % (ref 34–46.6)
HGB BLD-MCNC: 9.8 G/DL (ref 12–15.9)
IMM GRANULOCYTES # BLD AUTO: 0.02 10*3/MM3 (ref 0–0.05)
IMM GRANULOCYTES NFR BLD AUTO: 0.4 % (ref 0–0.5)
LYMPHOCYTES # BLD AUTO: 0.96 10*3/MM3 (ref 0.7–3.1)
LYMPHOCYTES NFR BLD AUTO: 19.4 % (ref 19.6–45.3)
MCH RBC QN AUTO: 31 PG (ref 26.6–33)
MCHC RBC AUTO-ENTMCNC: 31.7 G/DL (ref 31.5–35.7)
MCV RBC AUTO: 97.8 FL (ref 79–97)
MONOCYTES # BLD AUTO: 0.47 10*3/MM3 (ref 0.1–0.9)
MONOCYTES NFR BLD AUTO: 9.5 % (ref 5–12)
NEUTROPHILS NFR BLD AUTO: 3.42 10*3/MM3 (ref 1.7–7)
NEUTROPHILS NFR BLD AUTO: 69.1 % (ref 42.7–76)
NRBC BLD AUTO-RTO: 0 /100 WBC (ref 0–0.2)
PLATELET # BLD AUTO: 146 10*3/MM3 (ref 140–450)
PMV BLD AUTO: 9.7 FL (ref 6–12)
POTASSIUM SERPL-SCNC: 4.1 MMOL/L (ref 3.5–5.2)
RBC # BLD AUTO: 3.16 10*6/MM3 (ref 3.77–5.28)
SODIUM SERPL-SCNC: 141 MMOL/L (ref 136–145)
WBC NRBC COR # BLD AUTO: 4.95 10*3/MM3 (ref 3.4–10.8)

## 2024-09-13 PROCEDURE — 80048 BASIC METABOLIC PNL TOTAL CA: CPT | Performed by: NURSE PRACTITIONER

## 2024-09-13 PROCEDURE — 85025 COMPLETE CBC W/AUTO DIFF WBC: CPT | Performed by: NURSE PRACTITIONER

## 2024-09-13 PROCEDURE — 99232 SBSQ HOSP IP/OBS MODERATE 35: CPT | Performed by: INTERNAL MEDICINE

## 2024-09-13 RX ADMIN — Medication 10 ML: at 08:30

## 2024-09-13 RX ADMIN — Medication 5000 UNITS: at 08:29

## 2024-09-13 RX ADMIN — ALLOPURINOL 100 MG: 100 TABLET ORAL at 08:29

## 2024-09-13 RX ADMIN — ATORVASTATIN CALCIUM 10 MG: 10 TABLET, FILM COATED ORAL at 08:29

## 2024-09-13 RX ADMIN — CARVEDILOL 12.5 MG: 6.25 TABLET, FILM COATED ORAL at 08:29

## 2024-09-13 RX ADMIN — FERROUS SULFATE TAB EC 324 MG (65 MG FE EQUIVALENT) 324 MG: 324 (65 FE) TABLET DELAYED RESPONSE at 08:29

## 2024-09-13 RX ADMIN — FUROSEMIDE 20 MG: 20 TABLET ORAL at 08:29

## 2024-09-13 RX ADMIN — PANTOPRAZOLE SODIUM 40 MG: 40 TABLET, DELAYED RELEASE ORAL at 06:03

## 2024-09-13 RX ADMIN — CALCITRIOL CAPSULES 0.25 MCG 0.25 MCG: 0.25 CAPSULE ORAL at 08:29

## 2024-09-13 NOTE — DISCHARGE SUMMARY
[facility] Medicine Services  Discharge Summary    Date of Service: 24  Patient Name: Nancy Suárez  : 1955  MRN: [id number]    Date of Admission: 2024  Discharge Diagnosis: #Acute blood loss symptomatic anemia on chronic anemi  Date of Discharge:  24  Primary Care Physician: Marina Cruz MD      Presenting Problem:   Anemia [D64.9]  Anemia, unspecified type [D64.9]  Dyspnea, unspecified type [R06.00]    Active and Resolved Hospital Problems:  Active Hospital Problems    Diagnosis POA    **Anemia [D64.9] Yes    Iron deficiency [E61.1] Yes      Resolved Hospital Problems   No resolved problems to display.         Hospital Course     HPI:  Admitting team HPI   Nancy Suárez is a 68 y.o. female with a history of macrocytic anemia followed by hematologist Dr. Campos. In the past she has had iron deficiency anemia treated with IV iron with improvement but anemia has persisted. Her chronic kidney disease has likely contributed. She has had a previous CT guided bone marrow biopsy 2023. She also has had extensive GI workup in the past with Dr. Webster with no found source of active bleeding.     Hospital Course:  68-year-old female with a history of chronic anemia multiple endoscopic interventions in the past, CKD admitted to [facility]  generalized fatigue and dyspnea     #Acute blood loss symptomatic anemia on chronic anemia  Hemoglobin 5 on admission s/p PRBC transfusion.  Hemoglobin 6.81   1unit of PRBC transfused. Responded well since stable   Seen by GI status post push enteroscopy  which showed small bowel angioectasias approximately 3 to 4 mm in diameter.  Status post APC   Seen by hematology underwent bone marrow biopsy which was negative for malignancy    #hypertension  Continue Coreg 12.5 twice daily     #CKD stage IIIb  Creatinine at baseline       #HFpEF  Not exacerbation  Continue home diuretics     #COPD  Not In exacerbation  Continue home  nebs     #History of TIA  Continue aspirin statin     #Gout  Continue allopurinol     #PVD  CAROLINE reviewed. This was originally planned as out patient   No evidence of acute limb ischemia   Out pt follow up with vascular surgery   Continue aspirin statin      DISCHARGE Follow Up Recommendations for labs and diagnostics: Follow-up with vascular surgery and GI        Day of Discharge     Vital Signs:  Temp:  [97.6 °F (36.4 °C)-99.4 °F (37.4 °C)] 97.6 °F (36.4 °C)  Heart Rate:  [59-76] 67  Resp:  [14-21] 14  BP: (123-153)/(42-96) 145/96    Physical Exam:  Physical Exam AOx3 NAD  RRR S1-S2 audible  Lungs with fair air entry  Abdomen soft nontender nondistended            Pertinent  and/or Most Recent Results     LAB RESULTS:      Lab 09/13/24 0410 09/12/24 0226 09/11/24  1021 09/10/24  2358 09/10/24  1312 09/10/24  0840   WBC 4.95 4.34 4.77 3.78 4.56  --    HEMOGLOBIN 9.8* 9.8* 9.8* 6.8* 7.9*  --    HEMATOCRIT 30.9* 30.9* 31.0* 22.4* 26.5*  --    PLATELETS 146 136* 150 123* 107*  --    NEUTROS ABS 3.42 2.51 3.32 2.30 2.81  --    IMMATURE GRANS (ABS) 0.02 0.02 0.05 0.01 0.02  --    LYMPHS ABS 0.96 1.20 0.89 0.97 1.14  --    MONOS ABS 0.47 0.55 0.43 0.44 0.53  --    EOS ABS 0.07 0.05 0.07 0.05 0.06  --    MCV 97.8* 96.6 96.9 102.8* 103.1*  --    LDH  --   --  209  --   --   --    PROTIME  --   --   --   --   --  10.0   APTT  --   --   --   --   --  21.9*         Lab 09/13/24  0410 09/12/24  0226 09/10/24  2358 09/10/24  0128 09/09/24  0517   SODIUM 141 141 141 139 139   POTASSIUM 4.1 3.9 3.8 3.6 4.0   CHLORIDE 109* 108* 109* 107 108*   CO2 23.5 23.9 23.0 23.4 23.8   ANION GAP 8.5 9.1 9.0 8.6 7.2   BUN 22 23 24* 25* 26*   CREATININE 1.36* 1.64* 1.70* 1.58* 1.46*   EGFR 42.0* 33.5* 32.1* 35.1* 38.6*   GLUCOSE 107* 119* 117* 124* 104*   CALCIUM 8.7 8.8 8.2* 8.3* 8.3*             Lab 09/10/24  0840 09/09/24  0801 09/09/24  0517 09/08/24 2011 09/08/24  1854   PROBNP  --   --   --   --  2,681.0*   HSTROP T  --  30* 32* 33*  --     PROTIME 10.0  --   --   --   --    INR <0.93*  --   --   --   --              Lab 09/11/24  1021 09/08/24  2011   FOLATE 13.60  --    VITAMIN B 12 >2,000*  --    ABO TYPING  --  A   RH TYPING  --  Positive   ANTIBODY SCREEN  --  Negative         Brief Urine Lab Results       None          Microbiology Results (last 10 days)       ** No results found for the last 240 hours. **            CT Bone marrow biopsy and aspiration    Result Date: 9/10/2024  Impression: Technically successful bone marrow aspiration and biopsy of the left posterior superior iliac spine utilizing CT fluoroscopic guidance. Report dictated by: Earl Barth DNP  I have personally reviewed this case and agree with the findings above: Electronically Signed: Lico Campbell MD  9/10/2024 2:49 PM EDT  Workstation ID: [id number]    XR Chest 1 View    Result Date: 9/8/2024  Impression: Impression: 1.No acute radiographic abnormality is identified. Electronically Signed: Chris Kerr MD  9/8/2024 7:15 PM EDT  Workstation ID: [id number]     Results for orders placed during the hospital encounter of 09/08/24    Doppler Arterial Multi Level Lower Extremity - Bilateral CAR    Interpretation Summary    Right Conclusion: The right CAROLINE is mildly reduced. Waveforms are consistent with femoral disease and popliteal disease. Moderate digital insufficiency.    Left Conclusion: The left CAROLINE is moderately reduced. Waveforms are consistent with aorto-iliac disease, femoral disease and popliteal disease. Moderate digital insufficiency.      Results for orders placed during the hospital encounter of 09/08/24    Doppler Arterial Multi Level Lower Extremity - Bilateral CAR    Interpretation Summary    Right Conclusion: The right CAROLINE is mildly reduced. Waveforms are consistent with femoral disease and popliteal disease. Moderate digital insufficiency.    Left Conclusion: The left CAROLINE is moderately reduced. Waveforms are consistent with aorto-iliac disease, femoral disease  and popliteal disease. Moderate digital insufficiency.      Results for orders placed in visit on 06/25/20    SCANNED - ECHOCARDIOGRAM      Labs Pending at Discharge:  Pending Labs       Order Current Status    Cytogenetics ([facility]ated On[employer]) In process            Procedures Performed  Procedure(s):  ESOPHAGOGASTRODUODENOSCOPY WITH SMALL BOWEL ENTEROSCOPY Argon plasma coagulation of small bowel Arterial venous malformation x3  09/12 1127 Ambulatory Esophageal PH Monitoring      Consults:   Consults       Date and Time Order Name Status Description    9/8/2024  9:35 PM Hematology & Oncology Inpatient Consult Completed     9/8/2024  9:35 PM Inpatient Gastroenterology Consult Completed     9/8/2024  8:44 PM Hospitalist (on-call MD unless specified)                Discharge Details        Discharge Medications        Continue These Medications        Instructions Start Date   acetaminophen 500 MG tablet  Commonly known as: TYLENOL   500 mg, Oral, Every 6 Hours PRN      albuterol sulfate  (90 Base) MCG/ACT inhaler  Commonly known as: ProAir HFA   2 puffs, Inhalation, Every 4 Hours PRN, Proair - DX CODE J44.9      allopurinol 100 MG tablet  Commonly known as: ZYLOPRIM   100 mg, Oral, Daily      amLODIPine 10 MG tablet  Commonly known as: NORVASC   10 mg, Oral, Daily      aspirin 81 MG tablet   1 tablet, Oral, Daily      B-D SINGLE USE SWABS REGULAR pads   CHECK BLOOD SUGAR ONE TIME DAILY      calcitriol 0.25 MCG capsule  Commonly known as: ROCALTROL   0.25 mcg, Oral, Daily      carvedilol 12.5 MG tablet  Commonly known as: COREG   TAKE 1 TABLET BY MOUTH TWICE A DAY      Cholecalciferol 25 MCG (1000 UT) capsule   1 capsule, Oral, Daily      colestipol 1 g tablet  Commonly known as: COLESTID   1 g, Oral, 2 Times Daily      ferrous sulfate 325 (65 FE) MG tablet   TAKE 1 TABLET BY MOUTH EVERY DAY WITH BREAKFAST      fish oil 1000 MG capsule capsule   1 capsule, Oral, 2 Times Daily With Meals      furosemide 20 MG  tablet  Commonly known as: LASIX   TAKE 1 TABLET BY MOUTH EVERY OTHER DAY      hydrALAZINE 25 MG tablet  Commonly known as: APRESOLINE   25 mg, Oral, 2 Times Daily, Please schedule appt to follow up      ipratropium-albuterol 0.5-2.5 mg/3 ml nebulizer  Commonly known as: DUO-NEB   TAKE 3 ML BY NEBULIZATION EVERY 4 HOURS AS NEEDED FOR WHEEZE      latanoprost 0.005 % ophthalmic solution  Commonly known as: XALATAN   No dose, route, or frequency recorded.      omeprazole 40 MG capsule  Commonly known as: priLOSEC   1 capsule, Oral, Daily      potassium chloride 10 MEQ CR tablet   10 mEq, Oral, Every Other Day, TAKE 1 TABLET BY MOUTH EVERY OTHER DAY      pravastatin 40 MG tablet  Commonly known as: PRAVACHOL   TAKE 1 TABLET BY MOUTH EVERY DAY             Stop These Medications      B-12 1000 MCG capsule              No Known Allergies      Discharge Disposition:   Home or Self Care    Diet:  Hospital:  Diet Order   Procedures    Diet: Gastrointestinal; Fat-Restricted; Fluid Consistency: Thin (IDDSI 0)         Discharge Activity:         CODE STATUS:  Code Status and Medical Interventions: CPR (Attempt to Resuscitate); Full Support   Ordered at: 09/08/24 2133     Level Of Support Discussed With:    Patient     Code Status (Patient has no pulse and is not breathing):    CPR (Attempt to Resuscitate)     Medical Interventions (Patient has pulse or is breathing):    Full Support         Future Appointments   Date Time Provider Department Center   9/16/2024 10:15 AM [facility] NA ECHO  [facility]ARD [facility]   9/17/2024 11:15 AM Jose Campos MD [facility]C [facility]   1/30/2025  9:00 AM [facility] VASC MACHINE 2 BH [facility] CARDI [facility]   1/30/2025  9:45 AM Kathryn Castillo APRN [facility] VS [facility]           Time spent on Discharge including face to face service:  45 minutes    Signature: Electronically signed by Naresh Brand MD, 09/13/24, 10:28 EDT.  [facility] Hospitalist Team

## 2024-09-13 NOTE — CASE MANAGEMENT/SOCIAL WORK
Case Management Discharge Note      Final Note: Home with family.    Provided Post Acute Provider List?: N/A  Transportation Services  Private: Car    Final Discharge Disposition Code: 01 - home or self-care

## 2024-09-13 NOTE — PROGRESS NOTES
"      Hematology/Oncology Inpatient Progress Note    PATIENT NAME: Nancy Suárez  : 1955  MRN: [id number]    CHIEF COMPLAINT: Weakness    HISTORY OF PRESENT ILLNESS:      2023: Ms. Suárez came seeking attention about anemia, that she reported had been a recurring issue for her. She described being tired, \"I'm drained\". Unsteady on her feet and unable to do much activity. No other symptoms but she was concerned that her blood pressure had been in the low 100's over 60's. 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 She had also a history of chronic renal disease and in the chemistry closest to this visit she had an estimated glomerular filtration rate of about 33 ml/min. In 2023 her hemoglobin was 11 g/dl with mean corpuscular volume of 99 fL. However a blood count done closer to the time of this visit had reported a hemoglobin of 7.9 g/dl with mean corpuscular volume of 105 fL. The white cell count and the platelet count were unremarkable. On exam she was found to appear much older than the stated age. Neither pale nor jaundiced. Well hydrated. No oral lesions and no palpable adenopathy. Lungs markedly diminished bilaterally but clear. Heart regular. Abdomen rounded and protuberant but soft and not tender; no hepatomegaly or splenomegaly. No edema. Her social and family history were reviewed. Given her history of chronic gastrointestinal bleeding it is very possible that she indeed has iron deficiency anemia. The macrocytosis was surprising in this setting, and could be related to other nutritional deficiencies or to hemolysis and was to be investigated. I asked her to return to see me in one week with results.      2023: Underwent laboratory exams.  Still feeling tired.  Eating reasonably well and afebrile.  No bleeding.  On " exam pale.  Respirations labored with minimal exertion.  Lungs diminished.  Heart regular.  The laboratory exams revealed an elevated glucose and abnormal renal function with an estimated glomerular filtration rate of 35 mL/min.  Liver enzymes were however unremarkable.  White cell count was normal at 5100/mm³ with a rather unremarkable differential count.  The platelets were also within the normal range, at 213,000/mm³.  The hemoglobin, however, it was low at 7.9 g/dL with a mean corpuscular volume of 107.6 fL.  Her iron profile revealed a normal iron binding capacity at 367 mcg/dL with an elevated iron at 303 mcg/dL and abnormally elevated saturation at 83%.  Somewhat surprisingly the ferritin was not particularly elevated at 33 ng/mL.  Folic acid was unremarkable but the vitamin B12 was elevated at 1642 pg/mL.  BLAS was negative but erythropoietin was elevated at 191.6 µIU/mL.  With this picture it appeared as though she had ineffective erythropoiesis and in order to confirm this diagnosis it was felt necessary to obtain a bone marrow biopsy.  The role of the bone marrow and the procedure of taking a sample was discussed with her.  She reported that she had undergone one in the past but was not sure of where it was done.     7/25/2023: Feeling about the same. Had no laboratory exams. Tired but no more than before. Denies anorexia. No fevers. Denied chest pain or cough. No abdominal pain or diarrhea and no dysuria. On exam no changes. The bone marrow biopsy is unremarkable and no cytogenetic abnormalities were documented. I've asked her to have a blood count today and see me in a few weeks with new laboratory exams.      8/8/2023: Without new symptoms.  Still tired.  Smoking less.  Eating reasonably well and weight stable.  Afebrile.  On exam pale, chronically ill-appearing but in no distress.  No jaundice but pale.  Well-hydrated.  Lungs diminished.  Heart regular.  No edema.  Laboratory exams revealed persistent  "anemia again with macrocytosis.  Her kidney function had remained low with a creatinine of 1.71 mg/dL for an estimated glomerular filtration rate of 32 mL/min.  The iron studies were more consistent with anemia of chronic renal disease with a total iron binding capacity at low normal of 298 mcg/dL.  The unbound iron binding capacity was diminished at 68 mcg/dL but the iron saturation was increased at 77%.  The bone marrow revealed absent iron stores.  This was more suggestive of iron deficiency rather than of an accumulation.  A decision was made to start treatment with erythropoietin.  This was based on the clear evidence of anemia of chronic kidney disease.  In addition I requested soluble transferrin receptor testing to investigate further the possibility of iron deficiency.     8/29/2023: Feeling reasonably well and much stronger than before.  Had not received any injections of erythropoietin in spite of which her hemoglobin had increased to 10.5 g/dL.  She was eating well.  Still smoking but continued to work at cutting back.  No chest pains or cough.  No abdominal pain or diarrhea and no dysuria.  No peripheral edema no skin rash.  On exam alert, conversant and chronically ill-appearing.  No jaundice.  Lungs diminished bilaterally.  Heart regular.  Abdomen soft.  No edema.  Laboratory exams reviewed.  Plans to continue to monitor her blood count and to proceed with erythropoietin as needed to maintain a hemoglobin of at least 10 g/dL were discussed.     10/24/2023: Feeling much better.  \"I feel better than I have felt in a long time\".  Trying to stop smoking.  Eating well.  No weight loss.  Persists with dyspnea.  No chest pains.  On exam chronically ill-appearing.  Lungs markedly diminished but symmetrical.  Heart regular.  Abdomen soft.  No edema.  Laboratory exams reviewed.  Persists with macrocytic anemia.  Hemoglobin today was 10.7 g/dL with mean corpuscular volume of 101 fL.  This most likely is a " "reflection of her chronic kidney disease.  At this point no need for intervention but we will continue to follow.  I have asked her to return to see me in 6 months.     4/30/2024: Feeling well and without new symptoms. Energetic and with good appetite. No chest pain or cough and no abdominal pain or diarrhea. On exam alert and conversant. No jaundice and no oral lesions. Respirations not labored. Lungs diminished bilaterally and the heart is regular. Abdomen is soft and not tender. No edema. Reviewed the laboratory exams available and discussed with her at length. For now no need for intervention, particularly because she feels so well. Will recheck laboratory exams today and will see in a few months.      9/3/2024: In the office before the next scheduled appointment. Has not been feeling well. She reports \"my legs are heavy\". \"I can only walk a few steps\". She has been smoking one pack daily again. She has been eating well and her weight does not seem to have changed. She has been afebrile. No chest pain but continues to cough and has had dyspnea or exertion with minimal exertion. No abdominal pain and denies melena or hematochezia. No skin rash but she complains of edema of the lower extremities. On exam ill appearing.  Edentulous. No oral lesions. Respirations not labored. Lungs diminished bilaterally and crackles in both bases. Heart regular. Abdomen soft and without hepatomegaly or splenomegaly. No edema or minimal. The laboratory exams were reviewed and discussed with her. She does not seem to have iron deficiency at this point. Will investigate further as she has had anemia that requires transfusion on two occasions recently.     9/9/2024: I have known Ms. Perez for over 1 year now. She has a history of anemia and at some point in 2022 her hemoglobin had dropped to less than 8 g/dL. Multiple investigations, including a bone marrow biopsy in July 2023, failed to reveal a clear explanation for this. She did have " "a previous history of gastrointestinal bleeding and had arteriovenous malformations identified and with oral iron her hemoglobin improved to greater than 10 g/dL. Because of known chronic kidney disease the impression was that she had anemia of chronic kidney disease and attempt was made to treat her with erythropoietin but because her hemoglobin had been consistently above 10 g/dL, the treatment had been postponed. At the time of the last visit she was not feeling as well. Her hemoglobin had dropped significantly. She had persisted with macrocytosis that seemed somewhat worse. However her white cell count and differential as well as the platelets were unremarkable. Her renal function had remained poor but not particularly worse. She was transfused with symptomatic relief. She was admitted to the hospital complaining of \"I cannot function\". Very weak and tired. Heavy legs and unable to walk any significant distance. No hematochezia but persistent melena that she attributed to her daily use of iron. However she has been taking only one 321 mg capsule a day. No fevers or nocturnal diaphoresis. On exam ill-appearing but in no distress. No jaundice. Lungs diminished bilaterally and heart regular but tachycardic. Abdomen soft. The laboratory exams again were reviewed. She does have chronic kidney disease and her blood count is not particularly remarkable. She persists with very significant anemia but the platelet count is also not abnormal. My suspicion of anemia of chronic kidney disease is not substantiated by the progress her anemia has had. Under the circumstances I would not expect this degree of anemia. Additional investigations ordered. She will have a repeat bone marrow biopsy and aspiration. Discussed with her.     Subjective   9/13/2024: Feels well and wants to go home. Has not had any evident bleeding. She has been able to eat and slept well.     ROS:  Review of Systems   Constitutional:  Positive for activity " change. Negative for appetite change, chills, diaphoresis, fatigue, fever and unexpected weight change.   HENT:  Negative for congestion, dental problem, drooling, ear discharge, ear pain, facial swelling, hearing loss, mouth sores, nosebleeds, postnasal drip, rhinorrhea, sinus pressure, sinus pain, sneezing, sore throat, tinnitus, trouble swallowing and voice change.    Eyes:  Negative for photophobia, pain, discharge, redness, itching and visual disturbance.   Respiratory:  Positive for shortness of breath. Negative for apnea, cough, choking, chest tightness, wheezing and stridor.    Cardiovascular:  Negative for chest pain, palpitations and leg swelling.   Gastrointestinal:  Negative for abdominal distention, abdominal pain, anal bleeding, blood in stool, constipation, diarrhea, nausea, rectal pain and vomiting.   Endocrine: Negative for cold intolerance, heat intolerance, polydipsia and polyuria.   Genitourinary:  Negative for decreased urine volume, difficulty urinating, dysuria, flank pain, frequency, genital sores, hematuria and urgency.   Musculoskeletal:  Negative for arthralgias, back pain, gait problem, joint swelling, myalgias, neck pain and neck stiffness.   Skin:  Negative for color change, pallor and rash.   Neurological:  Positive for weakness. Negative for dizziness, tremors, seizures, syncope, facial asymmetry, speech difficulty, light-headedness, numbness and headaches.   Hematological:  Negative for adenopathy. Does not bruise/bleed easily.   Psychiatric/Behavioral:  Negative for agitation, behavioral problems, confusion, decreased concentration, hallucinations, self-injury, sleep disturbance and suicidal ideas. The patient is not nervous/anxious.       MEDICATIONS:    Scheduled Meds:  allopurinol, 100 mg, Oral, Daily  atorvastatin, 10 mg, Oral, Daily  calcitriol, 0.25 mcg, Oral, Daily  carvedilol, 12.5 mg, Oral, BID  cholestyramine light, 1 packet, Oral, Daily  ferrous sulfate, 324 mg, Oral,  "Daily With Breakfast  furosemide, 20 mg, Oral, Every Other Day  latanoprost, 1 drop, Both Eyes, Daily  pantoprazole, 40 mg, Oral, Q AM  polyethylene glycol, 17 g, Oral, Daily  sodium chloride, 10 mL, Intravenous, Q12H  vitamin D3, 5,000 Units, Oral, Daily       Continuous Infusions:      PRN Meds:    acetaminophen    aluminum-magnesium hydroxide-simethicone    senna-docusate sodium **AND** [DISCONTINUED] polyethylene glycol **AND** bisacodyl **AND** bisacodyl    melatonin    ondansetron ODT **OR** ondansetron    [COMPLETED] Insert Peripheral IV **AND** sodium chloride    sodium chloride    sodium chloride     ALLERGIES:  No Known Allergies    Objective    VITALS:   /96 (BP Location: Left arm, Patient Position: Sitting)   Pulse 67   Temp 97.6 °F (36.4 °C) (Oral)   Resp 14   Ht 154.9 cm (61\")   Wt 61.6 kg (135 lb 12.9 oz)   LMP  (LMP Unknown)   SpO2 96%   BMI 25.66 kg/m²     PHYSICAL EXAM: (performed by MD)  Physical Exam  Constitutional:       General: She is not in acute distress.     Appearance: She is ill-appearing. She is not toxic-appearing or diaphoretic.      Comments: Well-built, chronically ill-appearing.  No distress.   HENT:      Head: Normocephalic and atraumatic.      Right Ear: External ear normal.      Left Ear: External ear normal.      Nose: Nose normal.      Mouth/Throat:      Mouth: Mucous membranes are moist.      Pharynx: Oropharynx is clear. No oropharyngeal exudate or posterior oropharyngeal erythema.      Comments: Edentulous.  No oral lesions.  Eyes:      General: No scleral icterus.        Right eye: No discharge.         Left eye: No discharge.      Conjunctiva/sclera: Conjunctivae normal.      Pupils: Pupils are equal, round, and reactive to light.   Cardiovascular:      Rate and Rhythm: Normal rate and regular rhythm.      Pulses: Normal pulses.      Heart sounds: No murmur heard.     No friction rub. No gallop.   Pulmonary:      Effort: No respiratory distress.      Breath " sounds: No stridor. No wheezing, rhonchi or rales.      Comments: Breath sounds markedly diminished bilaterally in a symmetrical fashion.  Abdominal:      General: Bowel sounds are normal. There is no distension.      Palpations: Abdomen is soft. There is no mass.      Tenderness: There is no abdominal tenderness. There is no right CVA tenderness, left CVA tenderness, guarding or rebound.      Hernia: No hernia is present.   Musculoskeletal:         General: No swelling, tenderness, deformity or signs of injury.      Cervical back: No rigidity.      Right lower leg: No edema.      Left lower leg: No edema.   Lymphadenopathy:      Cervical: No cervical adenopathy.   Skin:     Coloration: Skin is not jaundiced.      Findings: No bruising, lesion or rash.   Neurological:      General: No focal deficit present.      Mental Status: She is alert and oriented to person, place, and time.      Cranial Nerves: No cranial nerve deficit.      Motor: No weakness.      Gait: Gait normal.   Psychiatric:         Mood and Affect: Mood normal.         Behavior: Behavior normal.         Thought Content: Thought content normal.         Judgment: Judgment normal.     ALONZO Campos MD performed the physical exam on 9/13/2024 as documented above.     RECENT LABS:  Lab Results (last 24 hours)       Procedure Component Value Units Date/Time    Basic Metabolic Panel [[id number]]  (Abnormal) Collected: 09/13/24 0410    Specimen: Blood Updated: 09/13/24 0451     Glucose 107 mg/dL      BUN 22 mg/dL      Creatinine 1.36 mg/dL      Sodium 141 mmol/L      Potassium 4.1 mmol/L      Chloride 109 mmol/L      CO2 23.5 mmol/L      Calcium 8.7 mg/dL      BUN/Creatinine Ratio 16.2     Anion Gap 8.5 mmol/L      eGFR 42.0 mL/min/1.73     Narrative:      GFR Normal >60  Chronic Kidney Disease <60  Kidney Failure <15      CBC Auto Differential [[id number]]  (Abnormal) Collected: 09/13/24 0410    Specimen: Blood Updated: 09/13/24 0422     WBC 4.95 10*3/mm3       RBC 3.16 10*6/mm3      Hemoglobin 9.8 g/dL      Hematocrit 30.9 %      MCV 97.8 fL      MCH 31.0 pg      MCHC 31.7 g/dL      RDW 18.6 %      RDW-SD 65.9 fl      MPV 9.7 fL      Platelets 146 10*3/mm3      Neutrophil % 69.1 %      Lymphocyte % 19.4 %      Monocyte % 9.5 %      Eosinophil % 1.4 %      Basophil % 0.2 %      Immature Grans % 0.4 %      Neutrophils, Absolute 3.42 10*3/mm3      Lymphocytes, Absolute 0.96 10*3/mm3      Monocytes, Absolute 0.47 10*3/mm3      Eosinophils, Absolute 0.07 10*3/mm3      Basophils, Absolute 0.01 10*3/mm3      Immature Grans, Absolute 0.02 10*3/mm3      nRBC 0.0 /100 WBC     Bone Marrow Exam [[id number]] Collected: 09/10/24 1432    Specimen: Bone Marrow Aspirate from Iliac Crest, Left - Biopsy Updated: 09/12/24 1511    Narrative:      The following orders were created for panel order Bone Marrow Exam.  Procedure                               Abnormality         Status                     ---------                               -----------         ------                     Bone Marrow Exam[[id number]]                                 Final result                 Please view results for these tests on the individual orders.    Bone Marrow Exam [[id number]] Collected: 09/10/24 1432    Specimen: Bone Marrow Aspirate from Iliac Crest, Left - Aspirate; Bone Marrow Aspirate from Iliac Crest, Left - Aspirate; Bone Marrow Aspirate from Iliac Crest, Left - Biopsy Updated: 09/12/24 1511     Case Report --     Surgical Pathology Report                         Case: [id number]                                  Authorizing Provider:  Jose Campos MD       Collected:           09/10/2024 02:32 PM          Ordering Location:     [facility]       Received:            09/10/2024 02:41 PM                                 PROGRESS CARE                                                                Pathologist:           Khang Mejia MD                                                             Specimens:   1) - Iliac Crest, Left - Aspirate                                                                   2) - Iliac Crest, Left - Aspirate                                                                   3) - Iliac Crest, Left - Biopsy                                                             Clinical Information --     Final Diagnosis --     Left iliac crest, smears of bone marrow aspirate with cell block (clot) preparation and marrow biopsy:  Normocellular bone marrow for age (30%)  Storage iron present  Negative for involvement by malignant lymphoma and metastatic malignancy  See comment    LAUREN       Comment --     Clinical correlation with flow cytometric and chromosomal studies is recommended.  LAUREN       Gross Description --     1. Iliac Crest, Left - Aspirate.  Received are multiple aspirate smears which will be evaluated using Zavala's Giemsa and iron stains.     2. Iliac Crest, Left - Aspirate.  Received in formalin designated left iliac aspirate is a portion of reddish blood clot measuring 1.8 cm in greatest dimension.  Sectioned and submitted in 1 cassette.  LAUREN    3. Iliac Crest, Left - Biopsy.  Received in formalin designated left iliac biopsy is a portion of yellowish bone measuring 1.2 cm in greatest dimension.  Submitted in 1 cassette after brief decalcification.  LAUREN         Microscopic Description --     Smears of the bone marrow aspirate (specimen 1) demonstrate particles of adequate cellularity.  Megakaryocytes appear morphologically and quantitatively normal.  Cells extrinsic to the hematopoietic system are not observed.  Erythropoiesis appears normoblastic.  Myelopoiesis shows a normal maturation sequence from blasts to segmented neutrophils.  No increased numbers of myeloblasts, lymphocytes or plasma cells are noted.  The M to E ratio is approximately formalin.  Iron stain is positive for storage.    Sections of the bone marrow clot (specimen 2) demonstrate scattered  "particles of bone marrow with an estimated cellularity of approximately 30%.  Megakaryocytes appear morphologically and quantitatively normal.  There is no evidence of a granulomatous or metastatic process.  Iron stain is positive for storage iron.    Sections of the bone marrow biopsy (specimen 3) demonstrated \"cancellous bone.  The bone marrow exhibits a cellularity estimated at approximately 30%.  Megakaryocytes appear morphologically and quantitatively normal.  There is no evidence of a granulomatous or metastatic process.  There is no morphologic evidence of malignant lymphoma.  LAUREN      POC Glucose Once [[id number]]  (Normal) Collected: 09/12/24 1014    Specimen: Blood Updated: 09/12/24 1015     Glucose 104 mg/dL      Comment: Serial Number: [id number]:  [id number]             IMAGING REVIEWED:  No radiology results for the last day    Assessment & Plan   ASSESSMENT:  Recurrent normocytic anemia: The findings on the endoscopy, the pattern of the anemia and all the laboratory exams confirm that Ms. Cumminss anemia is not a hematologic disorder but rather chronic and intermittent bleeding. No intervention from my part.   Thrombocytopenia: Resolved on today's blood count. No intervention at this time.     Jose Campos MD on 9/13/2024 at 8:10 AM.                "

## 2024-09-14 NOTE — OUTREACH NOTE
Prep Survey      Flowsheet Row Responses   [facility] patient discharged from? [facility]   Is LACE score < 7 ? 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 Other   Does the patient have Home health ordered? No   Is there a DME ordered? No   Prep survey completed? Yes            DOROTHY COHN - Registered Nurse

## 2024-09-16 ENCOUNTER — READMISSION MANAGEMENT (OUTPATIENT)
Dept: CALL CENTER | Facility: HOSPITAL | Age: 69
End: 2024-09-16
Payer: MEDICARE

## 2024-09-17 ENCOUNTER — OFFICE VISIT (OUTPATIENT)
Dept: ONCOLOGY | Facility: CLINIC | Age: 69
End: 2024-09-17
Payer: MEDICARE

## 2024-09-17 VITALS
OXYGEN SATURATION: 95 % | WEIGHT: 134 LBS | HEART RATE: 63 BPM | HEIGHT: 61 IN | TEMPERATURE: 97.7 F | BODY MASS INDEX: 25.3 KG/M2

## 2024-09-17 DIAGNOSIS — N18.30 ANEMIA OF CHRONIC KIDNEY FAILURE, STAGE 3 (MODERATE): Primary | ICD-10-CM

## 2024-09-17 DIAGNOSIS — D63.1 ANEMIA OF CHRONIC KIDNEY FAILURE, STAGE 3 (MODERATE): Primary | ICD-10-CM

## 2024-09-17 PROCEDURE — 1125F AMNT PAIN NOTED PAIN PRSNT: CPT | Performed by: INTERNAL MEDICINE

## 2024-09-17 PROCEDURE — 1160F RVW MEDS BY RX/DR IN RCRD: CPT | Performed by: INTERNAL MEDICINE

## 2024-09-17 PROCEDURE — 1159F MED LIST DOCD IN RCRD: CPT | Performed by: INTERNAL MEDICINE

## 2024-09-17 PROCEDURE — 99213 OFFICE O/P EST LOW 20 MIN: CPT | Performed by: INTERNAL MEDICINE

## 2024-09-19 LAB — CYTOGENETICS RESULT: NORMAL

## 2024-09-20 LAB — MDS TARGETGENE PANEL RESULT: NORMAL

## 2024-09-23 ENCOUNTER — OFFICE VISIT (OUTPATIENT)
Age: 69
End: 2024-09-23
Payer: MEDICARE

## 2024-09-23 VITALS
BODY MASS INDEX: 26.5 KG/M2 | HEIGHT: 60 IN | SYSTOLIC BLOOD PRESSURE: 169 MMHG | DIASTOLIC BLOOD PRESSURE: 56 MMHG | WEIGHT: 135 LBS

## 2024-09-23 DIAGNOSIS — I77.1 STENOSIS OF SUBCLAVIAN ARTERY: ICD-10-CM

## 2024-09-23 DIAGNOSIS — I10 PRIMARY HYPERTENSION: ICD-10-CM

## 2024-09-23 DIAGNOSIS — E78.2 MIXED HYPERLIPIDEMIA: ICD-10-CM

## 2024-09-23 DIAGNOSIS — I65.23 BILATERAL CAROTID ARTERY STENOSIS: ICD-10-CM

## 2024-09-23 DIAGNOSIS — I70.213 ATHEROSCLEROSIS OF NATIVE ARTERIES OF EXTREMITIES WITH INTERMITTENT CLAUDICATION, BILATERAL LEGS: Primary | ICD-10-CM

## 2024-09-23 DIAGNOSIS — E66.3 OVERWEIGHT WITH BODY MASS INDEX (BMI) OF 26 TO 26.9 IN ADULT: ICD-10-CM

## 2024-09-23 LAB
CYTO UR: NORMAL
LAB AP CASE REPORT: NORMAL
LAB AP CLINICAL INFORMATION: NORMAL
LAB AP DIAGNOSIS COMMENT: NORMAL
LAB AP FISH ANALYSIS REPORT,ADDENDUM: NORMAL
PATH REPORT.FINAL DX SPEC: NORMAL
PATH REPORT.GROSS SPEC: NORMAL
REF LAB TEST METHOD: NORMAL

## 2024-09-23 PROCEDURE — G2211 COMPLEX E/M VISIT ADD ON: HCPCS | Performed by: NURSE PRACTITIONER

## 2024-09-23 PROCEDURE — 1160F RVW MEDS BY RX/DR IN RCRD: CPT | Performed by: NURSE PRACTITIONER

## 2024-09-23 PROCEDURE — 3077F SYST BP >= 140 MM HG: CPT | Performed by: NURSE PRACTITIONER

## 2024-09-23 PROCEDURE — 3078F DIAST BP <80 MM HG: CPT | Performed by: NURSE PRACTITIONER

## 2024-09-23 PROCEDURE — 1159F MED LIST DOCD IN RCRD: CPT | Performed by: NURSE PRACTITIONER

## 2024-09-23 PROCEDURE — 99214 OFFICE O/P EST MOD 30 MIN: CPT | Performed by: NURSE PRACTITIONER

## 2024-09-26 ENCOUNTER — READMISSION MANAGEMENT (OUTPATIENT)
Dept: CALL CENTER | Facility: HOSPITAL | Age: 69
End: 2024-09-26
Payer: MEDICARE

## 2024-09-28 ENCOUNTER — APPOINTMENT (OUTPATIENT)
Dept: INFUSION THERAPY | Facility: HOSPITAL | Age: 69
End: 2024-09-28
Payer: MEDICARE

## 2024-10-01 ENCOUNTER — READMISSION MANAGEMENT (OUTPATIENT)
Dept: CALL CENTER | Facility: HOSPITAL | Age: 69
End: 2024-10-01
Payer: MEDICARE

## 2024-10-01 NOTE — OUTREACH NOTE
Medical Week 3 Survey      Flowsheet Row Responses   [facility] patient discharged from? [facility]   Does the patient have one of the following disease processes/diagnoses(primary or secondary)? Other   Week 3 attempt successful? 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 No   Does the patient have all medications ordered at discharge? N/A   Prescription comments no new medications added   Is the patient taking all medications as directed (includes completed medication regime)? Yes   Does the patient have a primary care provider?  Yes   Has the patient kept scheduled appointments due by today? Yes   Has home health visited the patient within 72 hours of discharge? N/A   Psychosocial issues? No   Did the patient receive a copy of their discharge instructions? Yes   Nursing interventions Reviewed instructions with patient   What is the patient's perception of their health status since discharge? Same  [family reports pt still c/o fatigue and some pain with her legs--unaware of f/u but appears pt has seen provider since discharge.  Family reports speaks with her a few times weekly]   Is the patient/caregiver able to teach back signs and symptoms related to disease process for when to call PCP? Yes   Week 3 Call Completed? Yes   Graduated Yes   Call end time 1608            FRANCESCA CUBA - Registered Nurse

## 2024-10-03 LAB
CYTO UR: NORMAL
INTELLIGEN MYELOID RESULT: NORMAL
LAB AP CASE REPORT: NORMAL
LAB AP CLINICAL INFORMATION: NORMAL
LAB AP DIAGNOSIS COMMENT: NORMAL
LAB AP FISH ANALYSIS REPORT,ADDENDUM: NORMAL
LAB AP INTEGRATED ONCOLOGY, ADDENDUM: NORMAL
PATH REPORT.FINAL DX SPEC: NORMAL
PATH REPORT.GROSS SPEC: NORMAL

## 2024-10-07 LAB — CCV RESULT: NORMAL

## 2024-10-08 ENCOUNTER — HOSPITAL ENCOUNTER (OUTPATIENT)
Dept: CARDIOLOGY | Facility: HOSPITAL | Age: 69
Discharge: HOME | End: 2024-10-08
Admitting: INTERNAL MEDICINE
Payer: MEDICAID

## 2024-10-08 VITALS — HEIGHT: 60 IN | WEIGHT: 135 LBS | BODY MASS INDEX: 26.5 KG/M2

## 2024-10-08 DIAGNOSIS — R53.1 WEAKNESS: ICD-10-CM

## 2024-10-08 DIAGNOSIS — D63.1 ANEMIA OF CHRONIC KIDNEY FAILURE, STAGE 3 (MODERATE): ICD-10-CM

## 2024-10-08 DIAGNOSIS — R06.00 DYSPNEA, UNSPECIFIED TYPE: ICD-10-CM

## 2024-10-08 DIAGNOSIS — N18.30 ANEMIA OF CHRONIC KIDNEY FAILURE, STAGE 3 (MODERATE): ICD-10-CM

## 2024-10-08 DIAGNOSIS — E61.1 IRON DEFICIENCY: ICD-10-CM

## 2024-10-08 PROCEDURE — 93306 TTE W/DOPPLER COMPLETE: CPT

## 2024-10-08 PROCEDURE — 93356 MYOCRD STRAIN IMG SPCKL TRCK: CPT

## 2024-10-09 LAB
AORTIC DIMENSIONLESS INDEX: 0.83 (DI)
BH CV ECHO LEFT VENTRICLE GLOBAL LONGITUDINAL STRAIN: -17.4 %
BH CV ECHO MEAS - AO MAX PG: 5.6 MMHG
BH CV ECHO MEAS - AO MEAN PG: 3 MMHG
BH CV ECHO MEAS - AO V2 MAX: 118 CM/SEC
BH CV ECHO MEAS - AO V2 VTI: 25.6 CM
BH CV ECHO MEAS - AVA(I,D): 2.22 CM2
BH CV ECHO MEAS - EDV(CUBED): 54.9 ML
BH CV ECHO MEAS - EDV(MOD-SP2): 69.3 ML
BH CV ECHO MEAS - EDV(MOD-SP4): 76.7 ML
BH CV ECHO MEAS - EF(MOD-BP): 59.4 %
BH CV ECHO MEAS - EF(MOD-SP2): 58.6 %
BH CV ECHO MEAS - EF(MOD-SP4): 59.2 %
BH CV ECHO MEAS - EF_3D-VOL: 61 %
BH CV ECHO MEAS - ESV(CUBED): 15.6 ML
BH CV ECHO MEAS - ESV(MOD-SP2): 28.7 ML
BH CV ECHO MEAS - ESV(MOD-SP4): 31.3 ML
BH CV ECHO MEAS - FS: 34.2 %
BH CV ECHO MEAS - IVS/LVPW: 1.25 CM
BH CV ECHO MEAS - IVSD: 1.5 CM
BH CV ECHO MEAS - LA DIMENSION: 4.1 CM
BH CV ECHO MEAS - LAT PEAK E' VEL: 7.6 CM/SEC
BH CV ECHO MEAS - LV DIASTOLIC VOL/BSA (35-75): 48.6 CM2
BH CV ECHO MEAS - LV MASS(C)D: 183.4 GRAMS
BH CV ECHO MEAS - LV MAX PG: 3.8 MMHG
BH CV ECHO MEAS - LV MEAN PG: 2 MMHG
BH CV ECHO MEAS - LV SYSTOLIC VOL/BSA (12-30): 19.8 CM2
BH CV ECHO MEAS - LV V1 MAX: 98 CM/SEC
BH CV ECHO MEAS - LV V1 VTI: 22.3 CM
BH CV ECHO MEAS - LVIDD: 3.8 CM
BH CV ECHO MEAS - LVIDS: 2.5 CM
BH CV ECHO MEAS - LVOT AREA: 2.5 CM2
BH CV ECHO MEAS - LVOT DIAM: 1.8 CM
BH CV ECHO MEAS - LVPWD: 1.2 CM
BH CV ECHO MEAS - MED PEAK E' VEL: 6.9 CM/SEC
BH CV ECHO MEAS - MR MAX PG: 109 MMHG
BH CV ECHO MEAS - MR MAX VEL: 522 CM/SEC
BH CV ECHO MEAS - MV A DUR: 0.16 SEC
BH CV ECHO MEAS - MV A MAX VEL: 135 CM/SEC
BH CV ECHO MEAS - MV DEC SLOPE: 673 CM/SEC2
BH CV ECHO MEAS - MV DEC TIME: 0.24 SEC
BH CV ECHO MEAS - MV E MAX VEL: 110 CM/SEC
BH CV ECHO MEAS - MV E/A: 0.81
BH CV ECHO MEAS - MV MAX PG: 8.9 MMHG
BH CV ECHO MEAS - MV MEAN PG: 5 MMHG
BH CV ECHO MEAS - MV P1/2T: 62.2 MSEC
BH CV ECHO MEAS - MV V2 VTI: 40.6 CM
BH CV ECHO MEAS - MVA(P1/2T): 3.5 CM2
BH CV ECHO MEAS - MVA(VTI): 1.4 CM2
BH CV ECHO MEAS - PA ACC TIME: 0.09 SEC
BH CV ECHO MEAS - PA V2 MAX: 92.7 CM/SEC
BH CV ECHO MEAS - RV MAX PG: 1.76 MMHG
BH CV ECHO MEAS - RV V1 MAX: 66.3 CM/SEC
BH CV ECHO MEAS - RV V1 VTI: 14.7 CM
BH CV ECHO MEAS - RVDD: 3.1 CM
BH CV ECHO MEAS - SV(LVOT): 56.7 ML
BH CV ECHO MEAS - SV(MOD-SP2): 40.6 ML
BH CV ECHO MEAS - SV(MOD-SP4): 45.4 ML
BH CV ECHO MEAS - SVI(LVOT): 35.9 ML/M2
BH CV ECHO MEAS - SVI(MOD-SP2): 25.7 ML/M2
BH CV ECHO MEAS - SVI(MOD-SP4): 28.7 ML/M2
BH CV ECHO MEAS - TAPSE (>1.6): 1.3 CM
BH CV ECHO MEASUREMENTS AVERAGE E/E' RATIO: 15.17
BH CV XLRA - RV BASE: 2.6 CM
BH CV XLRA - RV LENGTH: 7.4 CM
BH CV XLRA - RV MID: 1.5 CM
BH CV XLRA - TDI S': 13.7 CM/SEC
LEFT ATRIUM VOLUME INDEX: 32.8 ML/M2
SINUS: 2.4 CM
STJ: 2.1 CM

## 2025-01-30 ENCOUNTER — OFFICE VISIT (OUTPATIENT)
Age: 70
End: 2025-01-30
Payer: MEDICARE

## 2025-01-30 ENCOUNTER — HOSPITAL ENCOUNTER (OUTPATIENT)
Dept: CARDIOLOGY | Facility: HOSPITAL | Age: 70
Discharge: HOME | End: 2025-01-30
Payer: MEDICARE

## 2025-01-30 VITALS
SYSTOLIC BLOOD PRESSURE: 192 MMHG | BODY MASS INDEX: 26.9 KG/M2 | WEIGHT: 137 LBS | DIASTOLIC BLOOD PRESSURE: 67 MMHG | HEIGHT: 60 IN

## 2025-01-30 DIAGNOSIS — I70.213 ATHEROSCLEROSIS OF NATIVE ARTERIES OF EXTREMITIES WITH INTERMITTENT CLAUDICATION, BILATERAL LEGS: ICD-10-CM

## 2025-01-30 DIAGNOSIS — I65.23 BILATERAL CAROTID ARTERY STENOSIS: ICD-10-CM

## 2025-01-30 DIAGNOSIS — E66.3 OVERWEIGHT WITH BODY MASS INDEX (BMI) OF 26 TO 26.9 IN ADULT: ICD-10-CM

## 2025-01-30 DIAGNOSIS — I77.1 STENOSIS OF SUBCLAVIAN ARTERY: ICD-10-CM

## 2025-01-30 DIAGNOSIS — Z72.0 TOBACCO USE: ICD-10-CM

## 2025-01-30 DIAGNOSIS — I70.213 ATHEROSCLEROSIS OF NATIVE ARTERIES OF EXTREMITIES WITH INTERMITTENT CLAUDICATION, BILATERAL LEGS: Primary | ICD-10-CM

## 2025-01-30 DIAGNOSIS — I65.23 BILATERAL CAROTID ARTERY OCCLUSION: ICD-10-CM

## 2025-01-30 DIAGNOSIS — I73.9 PERIPHERAL VASCULAR DISEASE: ICD-10-CM

## 2025-01-30 LAB
BH CV LOWER ARTERIAL LEFT ABI RATIO: 0.71
BH CV LOWER ARTERIAL LEFT CALF RATIO: 0.78
BH CV LOWER ARTERIAL LEFT DORSALIS PEDIS SYS MAX: 116
BH CV LOWER ARTERIAL LEFT GREAT TOE SYS MAX: 83
BH CV LOWER ARTERIAL LEFT LOW THIGH RATIO: 0.92
BH CV LOWER ARTERIAL LEFT LOW THIGH SYS MAX: 160
BH CV LOWER ARTERIAL LEFT POPLITEAL SYS MAX: 135
BH CV LOWER ARTERIAL LEFT POST TIBIAL SYS MAX: 123
BH CV LOWER ARTERIAL LEFT TBI RATIO: 0.48
BH CV LOWER ARTERIAL RIGHT ABI RATIO: 0.71
BH CV LOWER ARTERIAL RIGHT CALF RATIO: 0.94
BH CV LOWER ARTERIAL RIGHT DORSALIS PEDIS SYS MAX: 107
BH CV LOWER ARTERIAL RIGHT GREAT TOE SYS MAX: 97
BH CV LOWER ARTERIAL RIGHT LOW THIGH RATIO: 0.99
BH CV LOWER ARTERIAL RIGHT LOW THIGH SYS MAX: 172
BH CV LOWER ARTERIAL RIGHT POPLITEAL SYS MAX: 163
BH CV LOWER ARTERIAL RIGHT POST TIBIAL SYS MAX: 124
BH CV LOWER ARTERIAL RIGHT TBI RATIO: 0.56
BH CV XLRA MEAS LEFT CAROTID BULB EDV: -27.4 CM/SEC
BH CV XLRA MEAS LEFT CAROTID BULB PSV: -148 CM/SEC
BH CV XLRA MEAS LEFT DIST CCA EDV: -18 CM/SEC
BH CV XLRA MEAS LEFT DIST CCA PSV: -104 CM/SEC
BH CV XLRA MEAS LEFT DIST ICA EDV: -12.3 CM/SEC
BH CV XLRA MEAS LEFT DIST ICA PSV: -57.1 CM/SEC
BH CV XLRA MEAS LEFT ICA/CCA RATIO: 1.49
BH CV XLRA MEAS LEFT PROX CCA EDV: 28.5 CM/SEC
BH CV XLRA MEAS LEFT PROX CCA PSV: 134 CM/SEC
BH CV XLRA MEAS LEFT PROX ECA PSV: -184 CM/SEC
BH CV XLRA MEAS LEFT PROX ICA EDV: 32.9 CM/SEC
BH CV XLRA MEAS LEFT PROX ICA PSV: 199 CM/SEC
BH CV XLRA MEAS LEFT VERTEBRAL A EDV: -7 CM/SEC
BH CV XLRA MEAS LEFT VERTEBRAL A PSV: -47.9 CM/SEC
BH CV XLRA MEAS RIGHT CAROTID BULB EDV: 27.4 CM/SEC
BH CV XLRA MEAS RIGHT CAROTID BULB PSV: 143 CM/SEC
BH CV XLRA MEAS RIGHT DIST CCA EDV: -17.4 CM/SEC
BH CV XLRA MEAS RIGHT DIST CCA PSV: -104 CM/SEC
BH CV XLRA MEAS RIGHT DIST ICA EDV: -31.2 CM/SEC
BH CV XLRA MEAS RIGHT DIST ICA PSV: -93.6 CM/SEC
BH CV XLRA MEAS RIGHT ICA/CCA RATIO: -1.97
BH CV XLRA MEAS RIGHT MID ICA EDV: 38.4 CM/SEC
BH CV XLRA MEAS RIGHT MID ICA PSV: 205 CM/SEC
BH CV XLRA MEAS RIGHT PROX CCA EDV: -14.9 CM/SEC
BH CV XLRA MEAS RIGHT PROX CCA PSV: -77.7 CM/SEC
BH CV XLRA MEAS RIGHT PROX ECA EDV: 31.4 CM/SEC
BH CV XLRA MEAS RIGHT PROX ECA PSV: 270 CM/SEC
BH CV XLRA MEAS RIGHT PROX ICA EDV: -37 CM/SEC
BH CV XLRA MEAS RIGHT PROX ICA PSV: -177 CM/SEC
BH CV XLRA MEAS RIGHT PROX SCLA PSV: 229 CM/SEC
BH CV XLRA MEAS RIGHT VERTEBRAL A EDV: -11.8 CM/SEC
BH CV XLRA MEAS RIGHT VERTEBRAL A PSV: -62.9 CM/SEC
LEFT ARM BP: 174 MMHG
RIGHT ARM BP: 164 MMHG
UPPER ARTERIAL LEFT ARM BRACHIAL SYS MAX: 174
UPPER ARTERIAL RIGHT ARM BRACHIAL SYS MAX: 164

## 2025-01-30 PROCEDURE — 93880 EXTRACRANIAL BILAT STUDY: CPT

## 2025-01-30 PROCEDURE — 93923 UPR/LXTR ART STDY 3+ LVLS: CPT

## 2025-01-30 NOTE — PROGRESS NOTES
[facility] VASCULAR SURGERY    Chief Complaint  Peripheral Vascular Disease and Carotid Artery Disease    Subjective          History of Present Illness  Nancy Suárez is a 69 y.o. female with peripheral arterial disease and carotid stenosis. She presents to the office today for follow up. She was followed by Dr. Mcintosh.  She has had the following vascular interventions performed:     Left subclavian arterial stent placement on 07/16/2015 by Dr. Eckert in [place], [place]     She denies any hospitalizations since we last saw her.  She does complain of having some headaches lately and was found to have back and neck issues.  She was prescribed a muscle relaxer, she did not like how this made her feel and has not taken it since then.  She has been using her TENS unit and goes to a massage therapist which has significantly helped her symptoms.  She denies any symptoms of TIA/CVA, including amaurosis fugax, slurred speech, or unilateral paresthesias or paralysis.  She tells me she is not walking as much as she used to because of the cold weather.  She does not normally walk very long distances due to her claudication symptoms, left is worse than the right.  She says that her right leg does not bother her much at all.  She is maintained on aspirin and a statin. She reports compliance with her medications.  She has a history of CHF and is not a candidate for Pletal.  She is a current every day smoker.  She is cutting back and is down to smoking a quart a pack of cigarettes daily.    Review of Systems   Musculoskeletal:  Positive for myalgias (stable BLE claudication, left worse than right).   Skin:  Negative for skin lesions and wound.   Neurological:  Negative for facial asymmetry, speech difficulty and weakness.        Allergies: Patient has no known allergies.    Prior to Admission medications    Medication Sig Start Date End Date Taking? Authorizing Provider   acetaminophen (TYLENOL) 500 MG tablet  Take 1 tablet by mouth Every 6 (Six) Hours As Needed for Mild Pain.   Yes Jeannine Edmondson MD   albuterol sulfate HFA (ProAir HFA) 108 (90 Base) MCG/ACT inhaler Inhale 2 puffs Every 4 (Four) Hours As Needed for Wheezing or Shortness of Air. Proair - DX CODE J44.9 3/9/23  Yes Lyssa Pineda APRN   Alcohol Swabs (B-D SINGLE USE SWABS REGULAR) pads CHECK BLOOD SUGAR ONE TIME DAILY 2/4/21  Yes Ingrid De Anda APRN   allopurinol (ZYLOPRIM) 100 MG tablet Take 1 tablet by mouth Daily. 10/29/20  Yes Jeannine Edmondson MD   amLODIPine (NORVASC) 10 MG tablet Take 1 tablet by mouth Daily.  Patient taking differently: Take 1 tablet by mouth Daily As Needed (SBP >160). 2/17/23  Yes Lyssa Pineda APRN   aspirin 81 MG tablet Take 1 tablet by mouth Daily. 6/22/15  Yes Jeannine Edmondson MD   calcitriol (ROCALTROL) 0.25 MCG capsule Take 1 capsule by mouth Daily. 2/22/23  Yes ProviderJeannnie MD   carvedilol (COREG) 12.5 MG tablet TAKE 1 TABLET BY MOUTH TWICE A DAY 8/17/23  Yes Lyssa Pineda APRN   Cholecalciferol 1000 units capsule Take 1 capsule by mouth Daily. 12/3/15  Yes Jeannine Edmondson MD   colestipol (COLESTID) 1 g tablet Take 1 tablet by mouth 2 (Two) Times a Day. 12/18/22  Yes Jeannine Edmondson MD   ferrous sulfate 325 (65 FE) MG tablet TAKE 1 TABLET BY MOUTH EVERY DAY WITH BREAKFAST  Patient taking differently: Take 1 tablet by mouth Daily With Breakfast. 4/25/24  Yes Lyssa Pineda APRN   furosemide (LASIX) 20 MG tablet TAKE 1 TABLET BY MOUTH EVERY OTHER DAY 8/17/23  Yes Lyssa Pineda APRN   hydrALAZINE (APRESOLINE) 25 MG tablet Take 1 tablet by mouth 2 (Two) Times a Day. Please schedule appt to follow up 8/16/22  Yes Ingrid De Anda APRN   ipratropium-albuterol (DUO-NEB) 0.5-2.5 mg/3 ml nebulizer TAKE 3 ML BY NEBULIZATION EVERY 4 HOURS AS NEEDED FOR WHEEZE 7/3/23  Yes Lyssa Pineda APRN   latanoprost (XALATAN) 0.005 % ophthalmic solution  10/17/22  Yes  "ProviderJeannine MD   Omega-3 Fatty Acids (FISH OIL) 1000 MG capsule capsule Take 1 capsule by mouth 2 (Two) Times a Day With Meals.   Yes Jeannine Edmondson MD   omeprazole (priLOSEC) 40 MG capsule Take 1 capsule by mouth Daily. 11/9/22  Yes Jeannine Edmondson MD   potassium chloride 10 MEQ CR tablet Take 1 tablet by mouth Every Other Day. TAKE 1 TABLET BY MOUTH EVERY OTHER DAY 3/6/23  Yes Lyssa Pineda APRN   pravastatin (PRAVACHOL) 40 MG tablet TAKE 1 TABLET BY MOUTH EVERY DAY 8/17/23  Yes Lyssa Pineda APRN         Objective   Vital Signs:  BP (!) 192/67 (BP Location: Right arm)   Ht 152.4 cm (60\")   Wt 62.1 kg (137 lb)   BMI 26.76 kg/m²   Estimated body mass index is 26.76 kg/m² as calculated from the following:    Height as of this encounter: 152.4 cm (60\").    Weight as of this encounter: 62.1 kg (137 lb).       Physical Exam  Vitals reviewed.   Constitutional:       General: She is not in acute distress.     Appearance: She is not ill-appearing.   Cardiovascular:      Pulses:           Radial pulses are 2+ on the right side and 2+ on the left side.        Dorsalis pedis pulses are detected w/ Doppler on the right side and detected w/ Doppler on the left side.        Posterior tibial pulses are detected w/ Doppler on the left side.   Pulmonary:      Effort: Pulmonary effort is normal. No respiratory distress.   Neurological:      General: No focal deficit present.      Mental Status: She is alert and oriented to person, place, and time.        Result Review :    The following data was reviewed by: LOVE Nelson on 01/30/2025:    Doppler Arterial Multi Level Lower Extremity - Bilateral CAR (01/30/2025 09:51)     Right Conclusion: The right CAROLINE is moderately reduced. Waveforms are consistent with tib-peroneal disease.    Left Conclusion: The left CAROLINE is moderately reduced. Waveforms are consistent with popliteal disease and tib-peroneal disease.    Duplex Carotid Ultrasound " CAR (01/30/2025 10:02)     Right internal carotid artery demonstrates a 50-69% stenosis.    Antegrade right vertebral flow.    Left internal carotid artery demonstrates a 50-69% stenosis.    Antegrade left vertebral flow.       Assessment and Plan     Diagnoses and all orders for this visit:    1. Atherosclerosis of native arteries of extremities with intermittent claudication, bilateral legs (Primary)  -     Doppler Ankle Brachial Index Single Level CAR; Future    2. Peripheral vascular disease  -     Doppler Ankle Brachial Index Single Level CAR; Future    3. Stenosis of subclavian artery  -     Duplex Carotid Ultrasound CAR; Future    4. Bilateral carotid artery stenosis  -     Duplex Carotid Ultrasound CAR; Future    5. Tobacco use    6. Overweight with body mass index (BMI) of 26 to 26.9 in adult    BMI is >= 25 and <30. (Overweight) The following options were offered after discussion;: Information on healthy weight added to patient's after visit summary.         Nancy Suárez is a 69 y.o. female with peripheral arterial disease and carotid stenosis.  She has stable lower extremity claudication, left worse than right.  She denies any rest pain.  She has no wounds.  Recent ABIs show moderate arterial insufficiency in the bilateral lower extremities, right 0.71 with digit pressure of 97 mmHg and left 0.71 with digit pressure of 83 mmHg.  This remains stable.  Due to lack of ischemic rest pain or wounds, we will continue monitoring this and plan for repeat CAROLINE in 1 year.  In regards to her carotid stenosis, she remains asymptomatic.  Carotid duplex performed today shows moderate carotid disease bilaterally, 50 to 69% stenosis, antegrade flow in the bilateral vertebral arteries.  This remains stable.  Will plan to repeat carotid duplex in 1 year.  We reviewed the signs and symptoms of stroke and she was instructed to seek emergency medical attention if she experienced any of these.  She verbalized understanding.   She is maintained on appropriate antiplatelet, statin, and antihypertensive medications which I recommend she continue.  We discussed the importance of complete smoking cessation, she verbalized understanding of this.  She is trying her best to quit on her own.  She was instructed to call our office if she had any questions or concerns.     Follow Up     Return in about 1 year (around 1/30/2026) for CAROLINE, Carotid duplex.    Patient was given instructions and counseling regarding her condition or for health maintenance advice. Please see specific information pulled into the AVS if appropriate.     Kathryn Castillo, APRN

## 2025-01-31 PROBLEM — Z72.0 TOBACCO USE: Status: ACTIVE | Noted: 2025-01-31

## 2025-03-17 NOTE — PROGRESS NOTES
"                         HEMATOLOGY ONCOLOGY OUTPATIENT FOLLOW-UP       Patient name: Nancy Suárez  : 1955  MRN: [id number]  Primary Care Physician: Marina Cruz MD  Referring Physician: Marina Cruz MD  Reason For Consult: Anemia.     History of Present Illness:    2023: Ms. Suárez came seeking attention about anemia, that she reported had been a recurring issue for her. She described being tired, \"I'm drained\". Unsteady on her feet and unable to do much activity. No other symptoms but she was concerned that her blood pressure had been in the low 100's over 60's. 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 She had also a history of chronic renal disease and in the chemistry closest to this visit she had an estimated glomerular filtration rate of about 33 ml/min. In 2023 her hemoglobin was 11 g/dl with mean corpuscular volume of 99 fL. However a blood count done closer to the time of this visit had reported a hemoglobin of 7.9 g/dl with mean corpuscular volume of 105 fL. The white cell count and the platelet count were unremarkable. On exam she was found to appear much older than the stated age. Neither pale nor jaundiced. Well hydrated. No oral lesions and no palpable adenopathy. Lungs markedly diminished bilaterally but clear. Heart regular. Abdomen rounded and protuberant but soft and not tender; no hepatomegaly or splenomegaly. No edema. Her social and family history were reviewed. Given her history of chronic gastrointestinal bleeding it is very possible that she indeed has iron deficiency anemia. The macrocytosis was surprising in this setting, and could be related to other nutritional deficiencies or to hemolysis and was to be investigated. I asked her to return to see me in one week with results. "     6/20/2023: Underwent laboratory exams.  Still feeling tired.  Eating reasonably well and afebrile.  No bleeding.  On exam pale.  Respirations labored with minimal exertion.  Lungs diminished.  Heart regular.  The laboratory exams revealed an elevated glucose and abnormal renal function with an estimated glomerular filtration rate of 35 mL/min.  Liver enzymes were however unremarkable.  White cell count was normal at 5100/mm³ with a rather unremarkable differential count.  The platelets were also within the normal range, at 213,000/mm³.  The hemoglobin, however, it was low at 7.9 g/dL with a mean corpuscular volume of 107.6 fL.  Her iron profile revealed a normal iron binding capacity at 367 mcg/dL with an elevated iron at 303 mcg/dL and abnormally elevated saturation at 83%.  Somewhat surprisingly the ferritin was not particularly elevated at 33 ng/mL.  Folic acid was unremarkable but the vitamin B12 was elevated at 1642 pg/mL.  BLAS was negative but erythropoietin was elevated at 191.6 µIU/mL.  With this picture it appeared as though she had ineffective erythropoiesis and in order to confirm this diagnosis it was felt necessary to obtain a bone marrow biopsy.  The role of the bone marrow and the procedure of taking a sample was discussed with her.  She reported that she had undergone one in the past but was not sure of where it was done.    7/25/2023: Feeling about the same. Had no laboratory exams. Tired but no more than before. Denies anorexia. No fevers. Denied chest pain or cough. No abdominal pain or diarrhea and no dysuria. On exam no changes. The bone marrow biopsy is unremarkable and no cytogenetic abnormalities were documented. I've asked her to have a blood count today and see me in a few weeks with new laboratory exams.     8/8/2023: Without new symptoms.  Still tired.  Smoking less.  Eating reasonably well and weight stable.  Afebrile.  On exam pale, chronically ill-appearing but in no distress.  No  "jaundice but pale.  Well-hydrated.  Lungs diminished.  Heart regular.  No edema.  Laboratory exams revealed persistent anemia again with macrocytosis.  Her kidney function had remained low with a creatinine of 1.71 mg/dL for an estimated glomerular filtration rate of 32 mL/min.  The iron studies were more consistent with anemia of chronic renal disease with a total iron binding capacity at low normal of 298 mcg/dL.  The unbound iron binding capacity was diminished at 68 mcg/dL but the iron saturation was increased at 77%.  The bone marrow revealed absent iron stores.  This was more suggestive of iron deficiency rather than of an accumulation.  A decision was made to start treatment with erythropoietin.  This was based on the clear evidence of anemia of chronic kidney disease.  In addition I requested soluble transferrin receptor testing to investigate further the possibility of iron deficiency.    8/29/2023: Feeling reasonably well and much stronger than before.  Had not received any injections of erythropoietin in spite of which her hemoglobin had increased to 10.5 g/dL.  She was eating well.  Still smoking but continued to work at cutting back.  No chest pains or cough.  No abdominal pain or diarrhea and no dysuria.  No peripheral edema no skin rash.  On exam alert, conversant and chronically ill-appearing.  No jaundice.  Lungs diminished bilaterally.  Heart regular.  Abdomen soft.  No edema.  Laboratory exams reviewed.  Plans to continue to monitor her blood count and to proceed with erythropoietin as needed to maintain a hemoglobin of at least 10 g/dL were discussed.    10/24/2023: Feeling much better.  \"I feel better than I have felt in a long time\".  Trying to stop smoking.  Eating well.  No weight loss.  Persists with dyspnea.  No chest pains.  On exam chronically ill-appearing.  Lungs markedly diminished but symmetrical.  Heart regular.  Abdomen soft.  No edema.  Laboratory exams reviewed.  Persists with " "macrocytic anemia.  Hemoglobin today was 10.7 g/dL with mean corpuscular volume of 101 fL.  This most likely is a reflection of her chronic kidney disease.  At this point no need for intervention but we will continue to follow.  I have asked her to return to see me in 6 months.    4/30/2024: Feeling well and without new symptoms. Energetic and with good appetite. No chest pain or cough and no abdominal pain or diarrhea. On exam alert and conversant. No jaundice and no oral lesions. Respirations not labored. Lungs diminished bilaterally and the heart is regular. Abdomen is soft and not tender. No edema. Reviewed the laboratory exams available and discussed with her at length. For now no need for intervention, particularly because she feels so well. Will recheck laboratory exams today and will see in a few months.     9/3/2024: In the office before the next scheduled appointment. Has not been feeling well. She reports \"my legs are heavy\". \"I can only walk a few steps\". She has been smoking one pack daily again. She has been eating well and her weight does not seem to have changed. She has been afebrile. No chest pain but continues to cough and has had dyspnea or exertion with minimal exertion. No abdominal pain and denies melena or hematochezia. No skin rash but she complains of edema of the lower extremities. On exam ill appearing.  Edentulous. No oral lesions. Respirations not labored. Lungs diminished bilaterally and crackles in both bases. Heart regular. Abdomen soft and without hepatomegaly or splenomegaly. No edema or minimal. The laboratory exams were reviewed and discussed with her. She does not seem to have iron deficiency at this point. Will investigate further as she has had anemia that requires transfusion on two occasions recently.     9/17/2024: Feeling better. No bleeding. She has been abstinent from smoking. Eating well and no nausea or vomiting. No chest pain; Persists with dyspnea of exertion. On " exam chronically ill appearing. No distress. No jaundice or pallor. The lungs are diminished bilaterally and the heart is regular. Abdomen is soft and not tender. No edema. Discussed with her. As before she does not have a significant hematologic disorder. 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 She needs to follow predominantly with primary care and gastroenterology. She is to see me in 6 months.     3/18/2025: Has been feeling well and has no new problems.  A few days ago she had to visit her primary care physician because of indigestion.  She had several tests that did not reveal any significant abnormalities, according to her report.  She has been energetic.  She continues to smoke.  Has had no chest pains or cough.  On exam alert and conversant.  Oriented.  No distress.  No jaundice.  The lungs are diminished bilaterally.  The heart is regular.  There is no edema.  The laboratory exams obtained at her primary care physician's office a few days before this visit were reviewed.  Her hemoglobin has increased to 12.8.  The white cell count and differential are rather unremarkable.  Platelets are within the normal range.  She persists with evidence of chronic kidney disease with a creatinine of 1.6 mg/dL.  Discussed with her.  She is to see me in 6 months.  I have asked her to have laboratory exams before the next visit.    Past Medical History:   Diagnosis Date    Artery stenosis     Bilateral subclavian s/p stent     Arthritis     AVM (arteriovenous malformation)     CAD (coronary artery disease)     CKD (chronic kidney disease)     Stage three     COPD (chronic obstructive pulmonary disease)     DM2 (diabetes mellitus, type 2)     Eye pressure     Elevated eye pressure    GERD (gastroesophageal reflux disease)     Gout     Hepatitis     Hepatitis c     HTN (hypertension)     Hyperlipidemia     Hyperparathyroidism     KIRIT (iron deficiency anemia)     Iron deficiency anemia      Myocardial infarction     Osteoporosis     PVD (peripheral vascular disease)     Stroke     TIA     Past Surgical History:   Procedure Laterality Date    CATARACT EXTRACTION Left     CHOLECYSTECTOMY      COLONOSCOPY N/A 01/26/2023    Procedure: COLONOSCOPY with biopsy x 2 areas and polypectomy x 4 and cautery of polyps x 2;  Surgeon: Hero Webster MD;  Location: [facility] ENDOSCOPY;  Service: Gastroenterology;  Laterality: N/A;  post op: polyps, diverticulosis, hemorrhoids    ENDOSCOPY N/A 01/26/2023    Procedure: ESOPHAGOGASTRODUODENOSCOPY;  Surgeon: Hero Webster MD;  Location: [facility] ENDOSCOPY;  Service: Gastroenterology;  Laterality: N/A;  post op: erosive gasritis, small hiatal hernia    ENTEROSCOPY SMALL BOWEL N/A 9/12/2024    Procedure: ESOPHAGOGASTRODUODENOSCOPY WITH SMALL BOWEL ENTEROSCOPY Argon plasma coagulation of small bowel Arterial venous malformation x3;  Surgeon: Kwame Walker MD;  Location: [facility] ENDOSCOPY;  Service: Gastroenterology;  Laterality: N/A;  small bowel avms x3    EYE SURGERY      TOTAL ABDOMINAL HYSTERECTOMY WITH SALPINGO OOPHORECTOMY         Current Outpatient Medications:     acetaminophen (TYLENOL) 500 MG tablet, Take 1 tablet by mouth Every 6 (Six) Hours As Needed for Mild Pain., Disp: , Rfl:     albuterol sulfate HFA (ProAir HFA) 108 (90 Base) MCG/ACT inhaler, Inhale 2 puffs Every 4 (Four) Hours As Needed for Wheezing or Shortness of Air. Proair - DX CODE J44.9, Disp: 18 g, Rfl: 4    Alcohol Swabs (B-D SINGLE USE SWABS REGULAR) pads, CHECK BLOOD SUGAR ONE TIME DAILY, Disp: 100 each, Rfl: 6    allopurinol (ZYLOPRIM) 100 MG tablet, Take 1 tablet by mouth Daily., Disp: , Rfl:     amLODIPine (NORVASC) 10 MG tablet, Take 1 tablet by mouth Daily. (Patient taking differently: Take 1 tablet by mouth Daily As Needed (SBP >160).), Disp: 90 tablet, Rfl: 3    aspirin 81 MG tablet, Take 1 tablet by mouth Daily., Disp: , Rfl:     calcitriol (ROCALTROL) 0.25 MCG capsule, Take 1 capsule  by mouth Daily., Disp: , Rfl:     carvedilol (COREG) 12.5 MG tablet, TAKE 1 TABLET BY MOUTH TWICE A DAY, Disp: 180 tablet, Rfl: 1    Cholecalciferol 1000 units capsule, Take 1 capsule by mouth Daily., Disp: , Rfl:     colestipol (COLESTID) 1 g tablet, Take 1 tablet by mouth 2 (Two) Times a Day., Disp: , Rfl:     ferrous sulfate 325 (65 FE) MG tablet, TAKE 1 TABLET BY MOUTH EVERY DAY WITH BREAKFAST (Patient taking differently: Take 1 tablet by mouth Daily With Breakfast.), Disp: 90 tablet, Rfl: 3    furosemide (LASIX) 20 MG tablet, TAKE 1 TABLET BY MOUTH EVERY OTHER DAY, Disp: 45 tablet, Rfl: 1    hydrALAZINE (APRESOLINE) 25 MG tablet, Take 1 tablet by mouth 2 (Two) Times a Day. Please schedule appt to follow up, Disp: 180 tablet, Rfl: 1    ipratropium-albuterol (DUO-NEB) 0.5-2.5 mg/3 ml nebulizer, TAKE 3 ML BY NEBULIZATION EVERY 4 HOURS AS NEEDED FOR WHEEZE, Disp: 180 mL, Rfl: 3    latanoprost (XALATAN) 0.005 % ophthalmic solution, , Disp: , Rfl:     Omega-3 Fatty Acids (FISH OIL) 1000 MG capsule capsule, Take 1 capsule by mouth 2 (Two) Times a Day With Meals., Disp: , Rfl:     omeprazole (priLOSEC) 40 MG capsule, Take 1 capsule by mouth Daily., Disp: , Rfl:     potassium chloride 10 MEQ CR tablet, Take 1 tablet by mouth Every Other Day. TAKE 1 TABLET BY MOUTH EVERY OTHER DAY, Disp: 60 tablet, Rfl: 1    pravastatin (PRAVACHOL) 40 MG tablet, TAKE 1 TABLET BY MOUTH EVERY DAY, Disp: 90 tablet, Rfl: 0    No Known Allergies    Family History   Problem Relation Age of Onset    Diabetes Mother     Anemia Mother     Heart attack Mother     Heart disease Mother     Heart failure Mother     Hypertension Mother     Coronary artery disease Sister     Diabetes Sister     Breast cancer Paternal Aunt 50     Cancer-related family history includes Breast cancer (age of onset: 50) in her paternal aunt.    Social History     Tobacco Use    Smoking status: Every Day     Current packs/day: 0.25     Average packs/day: 0.3 packs/day for  50.2 years (12.6 ttl pk-yrs)     Types: Cigarettes     Start date: 1/1/1975     Passive exposure: Current    Smokeless tobacco: Never   Vaping Use    Vaping status: Never Used   Substance Use Topics    Alcohol use: No    Drug use: No     Social History     Social History Narrative    She is single, retired from [employer], she lives in [place] and has two grown daughters.     ROS:   Review of Systems   Constitutional:  Negative for activity change, appetite change, chills, diaphoresis, fatigue, fever and unexpected weight change.   HENT:  Negative for congestion, dental problem, drooling, ear discharge, ear pain, facial swelling, hearing loss, mouth sores, nosebleeds, postnasal drip, rhinorrhea, sinus pressure, sinus pain, sneezing, sore throat, tinnitus, trouble swallowing and voice change.    Eyes:  Negative for photophobia, pain, discharge, redness, itching and visual disturbance.   Respiratory:  Negative for apnea, cough, choking, chest tightness, shortness of breath, wheezing and stridor.    Cardiovascular:  Negative for chest pain, palpitations and leg swelling.   Gastrointestinal:  Negative for abdominal distention, abdominal pain, anal bleeding, blood in stool, constipation, diarrhea, nausea, rectal pain and vomiting.   Endocrine: Negative for cold intolerance, heat intolerance, polydipsia and polyuria.   Genitourinary:  Negative for decreased urine volume, difficulty urinating, dysuria, flank pain, frequency, genital sores, hematuria and urgency.   Musculoskeletal:  Negative for arthralgias, back pain, gait problem, joint swelling, myalgias, neck pain and neck stiffness.   Skin:  Negative for color change, pallor and rash.   Neurological:  Negative for dizziness, tremors, seizures, syncope, facial asymmetry, speech difficulty, weakness, light-headedness, numbness and headaches.   Hematological:  Negative for adenopathy. Does not bruise/bleed easily.   Psychiatric/Behavioral:  Negative for agitation,  "behavioral problems, confusion, decreased concentration, hallucinations, self-injury, sleep disturbance and suicidal ideas. The patient is not nervous/anxious.      Objective:    Vital Signs:  Vitals:    03/18/25 1043   Pulse: 74   Resp: 17   Temp: 97.3 °F (36.3 °C)   SpO2: 98%   Weight: 63.5 kg (140 lb)   Height: 152.4 cm (60\")   PainSc: 0-No pain     Body mass index is 27.34 kg/m².    ECOG  (1) Restricted in physically strenuous activity, ambulatory and able to do work of light nature    Physical Exam:   Physical Exam  Constitutional:       General: She is not in acute distress.     Appearance: She is not ill-appearing, toxic-appearing or diaphoretic.      Comments: Slender, well-built.  Alert, well oriented and in good spirits.  Seems chronically ill.  No distress.   HENT:      Head: Normocephalic and atraumatic.      Right Ear: External ear normal.      Left Ear: External ear normal.      Nose: Nose normal.      Mouth/Throat:      Mouth: Mucous membranes are moist.      Pharynx: Oropharynx is clear. No oropharyngeal exudate or posterior oropharyngeal erythema.   Eyes:      General: No scleral icterus.        Right eye: No discharge.         Left eye: No discharge.      Conjunctiva/sclera: Conjunctivae normal.      Pupils: Pupils are equal, round, and reactive to light.   Cardiovascular:      Rate and Rhythm: Normal rate and regular rhythm.      Pulses: Normal pulses.      Heart sounds: No murmur heard.     No friction rub. No gallop.   Pulmonary:      Effort: No respiratory distress.      Breath sounds: No stridor. No wheezing, rhonchi or rales.      Comments: Symmetrically diminished to auscultation. There are crackles in both bases.   Abdominal:      General: Bowel sounds are normal. There is no distension.      Palpations: Abdomen is soft. There is no mass.      Tenderness: There is no abdominal tenderness. There is no right CVA tenderness, left CVA tenderness, guarding or rebound.      Hernia: No hernia is " present.      Comments: The liver and spleen are not enlarged.    Musculoskeletal:         General: No swelling, tenderness, deformity or signs of injury.      Cervical back: No rigidity.      Right lower leg: No edema.      Left lower leg: No edema.   Lymphadenopathy:      Cervical: No cervical adenopathy.   Skin:     Coloration: Skin is not jaundiced.      Findings: No bruising, lesion or rash.   Neurological:      General: No focal deficit present.      Mental Status: She is alert and oriented to person, place, and time.      Cranial Nerves: No cranial nerve deficit.      Motor: No weakness.      Gait: Gait normal.   Psychiatric:         Mood and Affect: Mood normal.         Behavior: Behavior normal.         Thought Content: Thought content normal.         Judgment: Judgment normal.     ALONZO Campos MD performed the physical exam on 3/18/2025 as documented above.    Lab Results - Last 18 Months   Lab Units 09/13/24  0410 09/12/24  0226 09/10/24  2358 09/08/24 2011 09/03/24  1156 04/30/24  1028   SODIUM mmol/L 141 141 141   < > 140 139   POTASSIUM mmol/L 4.1 3.9 3.8   < > 4.2 4.3   CHLORIDE mmol/L 109* 108* 109*   < > 107* 103   CO2 mmol/L 23.5 23.9 23.0   < > 19* 22   BUN mg/dL 22 23 24*   < > 26 21   CREATININE mg/dL 1.36* 1.64* 1.70*   < > 1.58* 1.48*   CALCIUM mg/dL 8.7 8.8 8.2*   < > 8.0* 8.9   BILIRUBIN mg/dL  --   --   --   --  <0.2 <0.2   ALK PHOS IU/L  --   --   --   --  54 64   ALT (SGPT) IU/L  --   --   --   --  9 11   AST (SGOT) IU/L  --   --   --   --  17 14   GLUCOSE mg/dL 107* 119* 117*   < > 175* 101*    < > = values in this interval not displayed.     Lab Results   Component Value Date    GLUCOSE 107 (H) 09/13/2024    BUN 22 09/13/2024    CREATININE 1.36 (H) 09/13/2024    EGFRIFNONA 30 (L) 01/14/2022    EGFRIFAFRI 35 (L) 01/14/2022    BCR 16.2 09/13/2024    K 4.1 09/13/2024    CO2 23.5 09/13/2024    CALCIUM 8.7 09/13/2024    ALBUMIN 3.7 (L) 09/03/2024    AST 17 09/03/2024    ALT 9  09/03/2024     Lab Results   Component Value Date    IRON 258 (H) 08/23/2024    TIBC 303 09/03/2024    FERRITIN 34 09/03/2024     Lab Results   Component Value Date    FOLATE 13.60 09/11/2024     Lab Results   Component Value Date    RETICCTPCT 3.4 03/05/2018     Lab Results   Component Value Date    IXQPQXXA28 >2,000 (H) 09/11/2024     LDH   Date Value Ref Range Status   09/11/2024 209 135 - 214 U/L Final     Comment:     Specimen hemolyzed.  Results may be affected.     Uric Acid   Date Value Ref Range Status   08/27/2019 6.2 2.5 - 7.1 mg/dL Final     Comment:                Therapeutic target for gout patients: <6.0     Lab Results   Component Value Date    SEDRATE 33 01/14/2022     Lab Results   Component Value Date    SEDRATE 33 01/14/2022      Assessment & Plan     Macrocytic anemia: Resolved.  Chronic kidney disease: Stage III with estimated glomerular filtration rate less than 40 ml/min.   Peripheral vascular disease.   4.  Cigarette smoker: Discussed again with her at length.  I have encouraged her to continue to work on becoming abstinent.  5.  She is to see me in 6 months.    Jose Campos MD on 3/18/2025 at 11:13 AM.

## 2025-03-18 ENCOUNTER — OFFICE VISIT (OUTPATIENT)
Dept: ONCOLOGY | Facility: CLINIC | Age: 70
End: 2025-03-18
Payer: MEDICARE

## 2025-03-18 VITALS
TEMPERATURE: 97.3 F | OXYGEN SATURATION: 98 % | HEIGHT: 60 IN | WEIGHT: 140 LBS | BODY MASS INDEX: 27.48 KG/M2 | HEART RATE: 74 BPM | RESPIRATION RATE: 17 BRPM

## 2025-03-18 DIAGNOSIS — D63.1 ANEMIA OF CHRONIC KIDNEY FAILURE, STAGE 3 (MODERATE): Primary | ICD-10-CM

## 2025-03-18 DIAGNOSIS — N18.30 ANEMIA OF CHRONIC KIDNEY FAILURE, STAGE 3 (MODERATE): Primary | ICD-10-CM

## 2025-03-19 ENCOUNTER — OFFICE (OUTPATIENT)
Dept: URBAN - METROPOLITAN AREA CLINIC 64 | Facility: CLINIC | Age: 70
End: 2025-03-19
Payer: MEDICARE

## 2025-03-19 VITALS
HEART RATE: 66 BPM | DIASTOLIC BLOOD PRESSURE: 82 MMHG | SYSTOLIC BLOOD PRESSURE: 167 MMHG | HEIGHT: 61 IN | SYSTOLIC BLOOD PRESSURE: 168 MMHG | DIASTOLIC BLOOD PRESSURE: 77 MMHG | WEIGHT: 139 LBS

## 2025-03-19 DIAGNOSIS — R19.7 DIARRHEA, UNSPECIFIED: ICD-10-CM

## 2025-03-19 DIAGNOSIS — R10.30 LOWER ABDOMINAL PAIN, UNSPECIFIED: ICD-10-CM

## 2025-03-19 PROCEDURE — 99213 OFFICE O/P EST LOW 20 MIN: CPT | Performed by: NURSE PRACTITIONER

## 2025-04-09 ENCOUNTER — OFFICE VISIT (OUTPATIENT)
Dept: CARDIOLOGY | Facility: CLINIC | Age: 70
End: 2025-04-09
Payer: MEDICARE

## 2025-04-09 VITALS
HEART RATE: 67 BPM | BODY MASS INDEX: 26.39 KG/M2 | HEIGHT: 60 IN | WEIGHT: 134.4 LBS | SYSTOLIC BLOOD PRESSURE: 170 MMHG | OXYGEN SATURATION: 98 % | DIASTOLIC BLOOD PRESSURE: 82 MMHG

## 2025-04-09 DIAGNOSIS — I44.7 LEFT BUNDLE BRANCH BLOCK: ICD-10-CM

## 2025-04-09 DIAGNOSIS — I10 PRIMARY HYPERTENSION: ICD-10-CM

## 2025-04-09 DIAGNOSIS — I25.10 CORONARY ARTERY DISEASE INVOLVING NATIVE CORONARY ARTERY OF NATIVE HEART WITHOUT ANGINA PECTORIS: Primary | ICD-10-CM

## 2025-04-09 RX ORDER — HYDRALAZINE HYDROCHLORIDE 50 MG/1
50 TABLET, FILM COATED ORAL 2 TIMES DAILY
Qty: 180 TABLET | Refills: 3 | Status: SHIPPED | OUTPATIENT
Start: 2025-04-09

## 2025-04-09 RX ORDER — HYDRALAZINE HYDROCHLORIDE 25 MG/1
TABLET, FILM COATED ORAL
Qty: 180 TABLET | Refills: 1 | OUTPATIENT
Start: 2025-04-09

## 2025-04-09 NOTE — PROGRESS NOTES
Progress note      Name: Nancy Suárez ADMIT: (Not on file)   : 1955  PCP: Marina Cruz MD    MRN: [id number] LOS: 0 days   AGE/SEX: 69 y.o. female  ROOM: Room/bed info not found     Chief Complaint   Patient presents with    Follow-up     6mth       Subjective       History of present illness  Nancy Suárez is a 69-year-old female patient who has history of coronary artery disease with chronically occluded RCA with left-to-right collaterals on heart cath in 2018, has carotid disease followed by vascular surgery, hypertension, active smoking, TIA, here today for follow-up.   She is doing well denies any chest pain or shortness of breath, no palpitations no lower extremity edema no syncopal episodes.    Past Medical History:   Diagnosis Date    Artery stenosis     Bilateral subclavian s/p stent     Arthritis     AVM (arteriovenous malformation)     CAD (coronary artery disease)     CKD (chronic kidney disease)     Stage three     COPD (chronic obstructive pulmonary disease)     DM2 (diabetes mellitus, type 2)     Eye pressure     Elevated eye pressure    GERD (gastroesophageal reflux disease)     Gout     Hepatitis     Hepatitis c     HTN (hypertension)     Hyperlipidemia     Hyperparathyroidism     KIRIT (iron deficiency anemia)     Iron deficiency anemia     Myocardial infarction     Osteoporosis     PVD (peripheral vascular disease)     Stroke     TIA     Past Surgical History:   Procedure Laterality Date    CATARACT EXTRACTION Left     CHOLECYSTECTOMY      COLONOSCOPY N/A 2023    Procedure: COLONOSCOPY with biopsy x 2 areas and polypectomy x 4 and cautery of polyps x 2;  Surgeon: Hero Webster MD;  Location: [facility] ENDOSCOPY;  Service: Gastroenterology;  Laterality: N/A;  post op: polyps, diverticulosis, hemorrhoids    ENDOSCOPY N/A 2023    Procedure: ESOPHAGOGASTRODUODENOSCOPY;  Surgeon: Hero Webster MD;  Location: [facility] ENDOSCOPY;  Service: Gastroenterology;  Laterality:  N/A;  post op: erosive gasritis, small hiatal hernia    ENTEROSCOPY SMALL BOWEL N/A 9/12/2024    Procedure: ESOPHAGOGASTRODUODENOSCOPY WITH SMALL BOWEL ENTEROSCOPY Argon plasma coagulation of small bowel Arterial venous malformation x3;  Surgeon: Kwame Walker MD;  Location: [facility] ENDOSCOPY;  Service: Gastroenterology;  Laterality: N/A;  small bowel avms x3    EYE SURGERY      TOTAL ABDOMINAL HYSTERECTOMY WITH SALPINGO OOPHORECTOMY       Family History   Problem Relation Age of Onset    Diabetes Mother     Anemia Mother     Heart attack Mother     Heart disease Mother     Heart failure Mother     Hypertension Mother     Coronary artery disease Sister     Diabetes Sister     Breast cancer Paternal Aunt 50     Social History     Tobacco Use    Smoking status: Every Day     Current packs/day: 0.25     Average packs/day: 0.3 packs/day for 50.3 years (12.6 ttl pk-yrs)     Types: Cigarettes     Start date: 1/1/1975     Passive exposure: Current    Smokeless tobacco: Never   Vaping Use    Vaping status: Never Used   Substance Use Topics    Alcohol use: No    Drug use: No       Current Outpatient Medications:     acetaminophen (TYLENOL) 500 MG tablet, Take 1 tablet by mouth Every 6 (Six) Hours As Needed for Mild Pain., Disp: , Rfl:     albuterol sulfate HFA (ProAir HFA) 108 (90 Base) MCG/ACT inhaler, Inhale 2 puffs Every 4 (Four) Hours As Needed for Wheezing or Shortness of Air. Proair - DX CODE J44.9, Disp: 18 g, Rfl: 4    Alcohol Swabs (B-D SINGLE USE SWABS REGULAR) pads, CHECK BLOOD SUGAR ONE TIME DAILY, Disp: 100 each, Rfl: 6    allopurinol (ZYLOPRIM) 100 MG tablet, Take 1 tablet by mouth Daily., Disp: , Rfl:     amLODIPine (NORVASC) 10 MG tablet, Take 1 tablet by mouth Daily. (Patient taking differently: Take 1 tablet by mouth Daily As Needed (SBP >160).), Disp: 90 tablet, Rfl: 3    aspirin 81 MG tablet, Take 1 tablet by mouth Daily., Disp: , Rfl:     calcitriol (ROCALTROL) 0.25 MCG capsule, Take 1 capsule by  mouth Daily., Disp: , Rfl:     carvedilol (COREG) 12.5 MG tablet, TAKE 1 TABLET BY MOUTH TWICE A DAY, Disp: 180 tablet, Rfl: 1    Cholecalciferol 1000 units capsule, Take 1 capsule by mouth Daily., Disp: , Rfl:     colestipol (COLESTID) 1 g tablet, Take 1 tablet by mouth 2 (Two) Times a Day., Disp: , Rfl:     ferrous sulfate 325 (65 FE) MG tablet, TAKE 1 TABLET BY MOUTH EVERY DAY WITH BREAKFAST (Patient taking differently: Take 1 tablet by mouth Daily With Breakfast.), Disp: 90 tablet, Rfl: 3    furosemide (LASIX) 20 MG tablet, TAKE 1 TABLET BY MOUTH EVERY OTHER DAY, Disp: 45 tablet, Rfl: 1    hydrALAZINE (APRESOLINE) 50 MG tablet, Take 1 tablet by mouth 2 (Two) Times a Day. Please schedule appt to follow up, Disp: 180 tablet, Rfl: 3    ipratropium-albuterol (DUO-NEB) 0.5-2.5 mg/3 ml nebulizer, TAKE 3 ML BY NEBULIZATION EVERY 4 HOURS AS NEEDED FOR WHEEZE, Disp: 180 mL, Rfl: 3    latanoprost (XALATAN) 0.005 % ophthalmic solution, , Disp: , Rfl:     Omega-3 Fatty Acids (FISH OIL) 1000 MG capsule capsule, Take 1 capsule by mouth 2 (Two) Times a Day With Meals., Disp: , Rfl:     omeprazole (priLOSEC) 40 MG capsule, Take 1 capsule by mouth Daily., Disp: , Rfl:     potassium chloride 10 MEQ CR tablet, Take 1 tablet by mouth Every Other Day. TAKE 1 TABLET BY MOUTH EVERY OTHER DAY, Disp: 60 tablet, Rfl: 1    pravastatin (PRAVACHOL) 40 MG tablet, TAKE 1 TABLET BY MOUTH EVERY DAY, Disp: 90 tablet, Rfl: 0  Allergies:  Patient has no known allergies.      Physical Exam  VITALS REVIEWED    General:      well developed, in no acute distress.    Head:      normocephalic and atraumatic.    Eyes:      PERRL/EOM intact, conjunctiva and sclera clear with out nystagmus.    Neck:      no masses, thyromegaly,  trachea central with normal respiratory effort and PMI displaced laterally  Lungs:      Clear to auscultation bilaterally  Heart:       Regular rate and rhythm, no murmur  Msk:      no deformity or scoliosis noted of thoracic or  lumbar spine.    Pulses:      pulses normal in all 4 extremities.    Extremities:       No lower extremity edema  Neurologic:      no focal deficits.   alert oriented x3  Skin:      intact without lesions or rashes.    Psych:      alert and cooperative; normal mood and affect; normal attention span and concentration.      Result Review :               Pertinent cardiac workup    EKG 4/9/2025 sinus rhythm with left bundle branch block  Echocardiogram 6/5/2020 oh low normal at 50%  Holter monitor for 17 days starting on 1/17/2023 no atrial fibrillation.      Procedures        Assessment and Plan      Nancy Suárez is a 69-year-old female patient who has history of coronary artery disease with occluded RCA, hypertension, peripheral vascular disease, smoking, TIA, here today for follow-up.  Patient denies any anginal symptoms or CHF symptoms at this time.   Her blood pressure is elevated today and was elevated at home to for some time, I will increase hydralazine from 25 to 50 mg twice a day, continue Norvasc and Coreg.  Will see her in 1 year with echocardiogram.    Diagnoses and all orders for this visit:    1. Coronary artery disease involving native coronary artery of native heart without angina pectoris [I25.10] (Primary)    2. Left bundle branch block    3. Primary hypertension    Other orders  -     hydrALAZINE (APRESOLINE) 50 MG tablet; Take 1 tablet by mouth 2 (Two) Times a Day. Please schedule appt to follow up  Dispense: 180 tablet; Refill: 3           No follow-ups on file.  Patient was given instructions and counseling regarding her condition or for health maintenance advice. Please see specific information pulled into the AVS if appropriate.     Electronically signed by Lupillo Domingo MD, 04/09/25, 9:57 AM EDT.

## 2025-04-15 ENCOUNTER — OFFICE (OUTPATIENT)
Dept: URBAN - METROPOLITAN AREA CLINIC 64 | Facility: CLINIC | Age: 70
End: 2025-04-15
Payer: MEDICARE

## 2025-04-15 VITALS
WEIGHT: 136 LBS | SYSTOLIC BLOOD PRESSURE: 169 MMHG | DIASTOLIC BLOOD PRESSURE: 89 MMHG | HEIGHT: 61 IN | SYSTOLIC BLOOD PRESSURE: 182 MMHG | HEART RATE: 63 BPM | DIASTOLIC BLOOD PRESSURE: 90 MMHG

## 2025-04-15 DIAGNOSIS — R10.30 LOWER ABDOMINAL PAIN, UNSPECIFIED: ICD-10-CM

## 2025-04-15 DIAGNOSIS — R15.2 FECAL URGENCY: ICD-10-CM

## 2025-04-15 DIAGNOSIS — R19.7 DIARRHEA, UNSPECIFIED: ICD-10-CM

## 2025-04-15 PROCEDURE — 99213 OFFICE O/P EST LOW 20 MIN: CPT | Performed by: NURSE PRACTITIONER

## 2025-04-22 ENCOUNTER — TELEPHONE (OUTPATIENT)
Dept: CARDIOLOGY | Facility: CLINIC | Age: 70
End: 2025-04-22
Payer: MEDICARE

## 2025-04-22 NOTE — TELEPHONE ENCOUNTER
Patient complaining of extreme fatigue with the extra hydralazine. She stopped taking it. Reports DBP 40-60s. She's not used to this low of blood pressure. Also reports taking amlodipine as needed.    Instructed patient to take amlodipine daily. If BP continues to drop too low or becomes increasingly symptomatic, will stop hydralazine. Pt verbalized understanding and will call in a few days with BP update.

## 2025-04-22 NOTE — TELEPHONE ENCOUNTER
Caller: Nancy Suárez    Relationship to patient: Self    Best call back number: [phone]    Patient is needing: PT TOOK THE hydrALAZINE (APRESOLINE) 50 MG tablet ONE TIME LAST WEEK AND IT DROPPED HER BP TOO LOW AND PT HAS NOT TAKEN SINCE. PT STATES SINCE THEN HER BOTTOM NUMBER FOR BP HAS STAYED IN THE RANGE OF 40-61 IN THE LAST WEEK. PLEASE CALL PT TO ADVISE.

## 2025-04-29 ENCOUNTER — TELEPHONE (OUTPATIENT)
Dept: CARDIOLOGY | Facility: CLINIC | Age: 70
End: 2025-04-29
Payer: MEDICARE

## 2025-04-29 NOTE — TELEPHONE ENCOUNTER
The systolic is a little high but the diastolic is kind of low therefore I do not want to increase anymore on her blood pressure medicines.

## 2025-04-29 NOTE — TELEPHONE ENCOUNTER
Caller: Nancy Suárez    Relationship: Self    Best call back number: [phone]      What was the call regarding: PATIENT HAD BLOOD PRESSURE MEDICATION CHANGED AND HAS BEEN TAKING READING. BP ISSUE ARE STILL ONGOING. TIREDNESS, WEAKNESS, EARS RINGING. PLEASE CALL AND ADVISE.    04.25.25  157/59  154/54    04.26.25  150/55  139/45    04.27.25  153/57  147/54    04.28.25  135/56  162/60    04.29.25  153/58  153/57

## 2025-04-29 NOTE — TELEPHONE ENCOUNTER
CALLED PT AND LET HER KNOW PER DR Domingo The systolic is a little high but the diastolic is kind of low therefore I do not want to increase anymore on her blood pressure medicines.   PT V/U WILL CALL BACK IN A WEEK WITH NEW BP READINGS

## 2025-05-02 ENCOUNTER — TELEPHONE (OUTPATIENT)
Dept: ONCOLOGY | Facility: CLINIC | Age: 70
End: 2025-05-02
Payer: MEDICARE

## 2025-05-02 LAB
ALBUMIN SERPL-MCNC: 4.1 G/DL (ref 3.8–4.8)
ALP SERPL-CCNC: 73 IU/L (ref 44–121)
ALT SERPL-CCNC: 9 IU/L (ref 0–32)
AST SERPL-CCNC: 18 IU/L (ref 0–40)
BASOPHILS # BLD AUTO: 0 X10E3/UL (ref 0–0.2)
BASOPHILS NFR BLD AUTO: 0 %
BILIRUB SERPL-MCNC: <0.2 MG/DL (ref 0–1.2)
BUN SERPL-MCNC: 17 MG/DL (ref 8–27)
BUN/CREAT SERPL: 12 (ref 12–28)
CALCIUM SERPL-MCNC: 8.6 MG/DL (ref 8.7–10.3)
CHLORIDE SERPL-SCNC: 97 MMOL/L (ref 96–106)
CO2 SERPL-SCNC: 23 MMOL/L (ref 20–29)
CREAT SERPL-MCNC: 1.42 MG/DL (ref 0.57–1)
EGFRCR SERPLBLD CKD-EPI 2021: 40 ML/MIN/1.73
EOSINOPHIL # BLD AUTO: 0.1 X10E3/UL (ref 0–0.4)
EOSINOPHIL NFR BLD AUTO: 1 %
ERYTHROCYTE [DISTWIDTH] IN BLOOD BY AUTOMATED COUNT: 13.7 % (ref 11.7–15.4)
FERRITIN SERPL-MCNC: 49 NG/ML (ref 15–150)
GLOBULIN SER CALC-MCNC: 2 G/DL (ref 1.5–4.5)
GLUCOSE SERPL-MCNC: 127 MG/DL (ref 70–99)
HCT VFR BLD AUTO: 26.7 % (ref 34–46.6)
HGB BLD-MCNC: 8.8 G/DL (ref 11.1–15.9)
IMM GRANULOCYTES # BLD AUTO: 0 X10E3/UL (ref 0–0.1)
IMM GRANULOCYTES NFR BLD AUTO: 0 %
IRON SATN MFR SERPL: 92 % (ref 15–55)
IRON SERPL-MCNC: 294 UG/DL (ref 27–139)
LYMPHOCYTES # BLD AUTO: 1.7 X10E3/UL (ref 0.7–3.1)
LYMPHOCYTES NFR BLD AUTO: 20 %
MCH RBC QN AUTO: 34 PG (ref 26.6–33)
MCHC RBC AUTO-ENTMCNC: 33 G/DL (ref 31.5–35.7)
MCV RBC AUTO: 103 FL (ref 79–97)
MONOCYTES # BLD AUTO: 0.9 X10E3/UL (ref 0.1–0.9)
MONOCYTES NFR BLD AUTO: 10 %
NEUTROPHILS # BLD AUTO: 5.9 X10E3/UL (ref 1.4–7)
NEUTROPHILS NFR BLD AUTO: 69 %
PLATELET # BLD AUTO: 204 X10E3/UL (ref 150–450)
POTASSIUM SERPL-SCNC: 3.8 MMOL/L (ref 3.5–5.2)
PROT SERPL-MCNC: 6.1 G/DL (ref 6–8.5)
RBC # BLD AUTO: 2.59 X10E6/UL (ref 3.77–5.28)
SODIUM SERPL-SCNC: 137 MMOL/L (ref 134–144)
TIBC SERPL-MCNC: 319 UG/DL (ref 250–450)
UIBC SERPL-MCNC: 25 UG/DL (ref 118–369)
WBC # BLD AUTO: 8.7 X10E3/UL (ref 3.4–10.8)

## 2025-05-02 NOTE — TELEPHONE ENCOUNTER
Hub staff attempted to follow warm transfer process and was unsuccessful     Caller: Alecia Suárez    Relationship to patient: Self    Best call back number: [phone]    Patient is needing: ALECIA IS CALLING TO GET HER LAB RESULTS DONE YESTERDAY MORNING     SHE IS NOT FEELING WELL AND NEEDED TO KNOW IF SHE NEEDED TO GO TO THE ER    SHE CAN NOT WALK ACROSS THE FLOOR AND MAY NEED BLOOD     PLEASE CALL PATIENT

## 2025-05-02 NOTE — TELEPHONE ENCOUNTER
Caller: Nancy Suárez    Relationship: Self    Best call back number: [phone]    Caller requesting test results: PATIENT     What test was performed: LABS    When was the test performed: 5-1-25    Where was the test performed:

## 2025-05-02 NOTE — TELEPHONE ENCOUNTER
"Contacted the patient and informed her that her HGB does not qualify for a blood transfusion as it is 8.8 however, if she is too weak to walk then we advise for her to go to the ER or urgent care to be evaluated. Patient asked what will be done at either the ER or UC if she cannot get blood. I informed her that they will further assess her to investigate the cause of her weakness. Pt stated she believes her HGB was around 11 a couple months ago therefore she has lost quite a bit. Pt stated \"I have been having stomach issues and I cannot get in with the gastro doctor until the 20th so I am not sure if my stomach is leaking again or what\". I informed her that we do not have those results as the last CBC result we have in her chart from us is from September 2024. She stated it was from her PCP. Patient stated it is hard for her to get around. I reiterated for her to go to the ER or UC. I advised for her to contact the office if she has any further needs she confirmed.     Upon further review, patient lab results from PCP are in her chart under media. HGB on 3/5/25 was 12.8.   "

## 2025-05-05 ENCOUNTER — TELEPHONE (OUTPATIENT)
Dept: ONCOLOGY | Facility: CLINIC | Age: 70
End: 2025-05-05

## 2025-05-05 NOTE — TELEPHONE ENCOUNTER
Caller: Nancy Suárez    Relationship to patient: Self    Best call back number: [phone]     Chief complaint: PATIENT REQUESTING TO SCHEDULE TOMORROW AT [place] OFFICE    PATIENT STATES THAT PER LABS HER HEMOGLOBIN HAS GONE FROM 11 TO AN 8 IN A MONTH AND A HALF      
  Caller: Nancy Suárez    Relationship: Self    Best call back number: [phone]    Who are you requesting to speak with (clinical staff, provider,  specific staff member): CLINICAL    What was the call regarding: PT CALLING BACK TO SEE IF SHE CAN BE SCHEDULED TOMORROW AT [place]   PLEASE ADVISE        
normal mucosa

## 2025-05-06 ENCOUNTER — OFFICE VISIT (OUTPATIENT)
Dept: ONCOLOGY | Facility: CLINIC | Age: 70
End: 2025-05-06
Payer: MEDICARE

## 2025-05-06 VITALS
HEIGHT: 60 IN | WEIGHT: 134 LBS | HEART RATE: 80 BPM | RESPIRATION RATE: 14 BRPM | BODY MASS INDEX: 26.31 KG/M2 | TEMPERATURE: 97.4 F | OXYGEN SATURATION: 97 %

## 2025-05-06 DIAGNOSIS — D63.1 ANEMIA OF CHRONIC KIDNEY FAILURE, STAGE 3 (MODERATE): Primary | ICD-10-CM

## 2025-05-06 DIAGNOSIS — E61.1 IRON DEFICIENCY: ICD-10-CM

## 2025-05-06 DIAGNOSIS — N18.30 ANEMIA OF CHRONIC KIDNEY FAILURE, STAGE 3 (MODERATE): Primary | ICD-10-CM

## 2025-05-06 DIAGNOSIS — N18.30 ANEMIA OF CHRONIC RENAL FAILURE, STAGE 3 (MODERATE), UNSPECIFIED WHETHER STAGE 3A OR 3B CKD: ICD-10-CM

## 2025-05-06 DIAGNOSIS — D63.1 ANEMIA OF CHRONIC RENAL FAILURE, STAGE 3 (MODERATE), UNSPECIFIED WHETHER STAGE 3A OR 3B CKD: ICD-10-CM

## 2025-05-06 PROCEDURE — 99214 OFFICE O/P EST MOD 30 MIN: CPT | Performed by: INTERNAL MEDICINE

## 2025-05-06 PROCEDURE — 1159F MED LIST DOCD IN RCRD: CPT | Performed by: INTERNAL MEDICINE

## 2025-05-06 PROCEDURE — 1126F AMNT PAIN NOTED NONE PRSNT: CPT | Performed by: INTERNAL MEDICINE

## 2025-05-06 PROCEDURE — 1160F RVW MEDS BY RX/DR IN RCRD: CPT | Performed by: INTERNAL MEDICINE

## 2025-05-06 NOTE — PROGRESS NOTES
"                         HEMATOLOGY ONCOLOGY OUTPATIENT FOLLOW-UP       Patient name: Nancy Suárez  : 1955  MRN: [id number]  Primary Care Physician: Marina Cruz MD  Referring Physician: Marina Cruz MD  Reason For Consult: Anemia.     History of Present Illness:    2023: Ms. Suárez came seeking attention about anemia, that she reported had been a recurring issue for her. She described being tired, \"I'm drained\". Unsteady on her feet and unable to do much activity. No other symptoms but she was concerned that her blood pressure had been in the low 100's over 60's. 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 She had also a history of chronic renal disease and in the chemistry closest to this visit she had an estimated glomerular filtration rate of about 33 ml/min. In 2023 her hemoglobin was 11 g/dl with mean corpuscular volume of 99 fL. However a blood count done closer to the time of this visit had reported a hemoglobin of 7.9 g/dl with mean corpuscular volume of 105 fL. The white cell count and the platelet count were unremarkable. On exam she was found to appear much older than the stated age. Neither pale nor jaundiced. Well hydrated. No oral lesions and no palpable adenopathy. Lungs markedly diminished bilaterally but clear. Heart regular. Abdomen rounded and protuberant but soft and not tender; no hepatomegaly or splenomegaly. No edema. Her social and family history were reviewed. Given her history of chronic gastrointestinal bleeding it is very possible that she indeed has iron deficiency anemia. The macrocytosis was surprising in this setting, and could be related to other nutritional deficiencies or to hemolysis and was to be investigated. I asked her to return to see me in one week with results. "     6/20/2023: Underwent laboratory exams.  Still feeling tired.  Eating reasonably well and afebrile.  No bleeding.  On exam pale.  Respirations labored with minimal exertion.  Lungs diminished.  Heart regular.  The laboratory exams revealed an elevated glucose and abnormal renal function with an estimated glomerular filtration rate of 35 mL/min.  Liver enzymes were however unremarkable.  White cell count was normal at 5100/mm³ with a rather unremarkable differential count.  The platelets were also within the normal range, at 213,000/mm³.  The hemoglobin, however, it was low at 7.9 g/dL with a mean corpuscular volume of 107.6 fL.  Her iron profile revealed a normal iron binding capacity at 367 mcg/dL with an elevated iron at 303 mcg/dL and abnormally elevated saturation at 83%.  Somewhat surprisingly the ferritin was not particularly elevated at 33 ng/mL.  Folic acid was unremarkable but the vitamin B12 was elevated at 1642 pg/mL.  BLAS was negative but erythropoietin was elevated at 191.6 µIU/mL.  With this picture it appeared as though she had ineffective erythropoiesis and in order to confirm this diagnosis it was felt necessary to obtain a bone marrow biopsy.  The role of the bone marrow and the procedure of taking a sample was discussed with her.  She reported that she had undergone one in the past but was not sure of where it was done.    7/25/2023: Feeling about the same. Had no laboratory exams. Tired but no more than before. Denies anorexia. No fevers. Denied chest pain or cough. No abdominal pain or diarrhea and no dysuria. On exam no changes. The bone marrow biopsy is unremarkable and no cytogenetic abnormalities were documented. I've asked her to have a blood count today and see me in a few weeks with new laboratory exams.     8/8/2023: Without new symptoms.  Still tired.  Smoking less.  Eating reasonably well and weight stable.  Afebrile.  On exam pale, chronically ill-appearing but in no distress.  No  "jaundice but pale.  Well-hydrated.  Lungs diminished.  Heart regular.  No edema.  Laboratory exams revealed persistent anemia again with macrocytosis.  Her kidney function had remained low with a creatinine of 1.71 mg/dL for an estimated glomerular filtration rate of 32 mL/min.  The iron studies were more consistent with anemia of chronic renal disease with a total iron binding capacity at low normal of 298 mcg/dL.  The unbound iron binding capacity was diminished at 68 mcg/dL but the iron saturation was increased at 77%.  The bone marrow revealed absent iron stores.  This was more suggestive of iron deficiency rather than of an accumulation.  A decision was made to start treatment with erythropoietin.  This was based on the clear evidence of anemia of chronic kidney disease.  In addition I requested soluble transferrin receptor testing to investigate further the possibility of iron deficiency.    8/29/2023: Feeling reasonably well and much stronger than before.  Had not received any injections of erythropoietin in spite of which her hemoglobin had increased to 10.5 g/dL.  She was eating well.  Still smoking but continued to work at cutting back.  No chest pains or cough.  No abdominal pain or diarrhea and no dysuria.  No peripheral edema no skin rash.  On exam alert, conversant and chronically ill-appearing.  No jaundice.  Lungs diminished bilaterally.  Heart regular.  Abdomen soft.  No edema.  Laboratory exams reviewed.  Plans to continue to monitor her blood count and to proceed with erythropoietin as needed to maintain a hemoglobin of at least 10 g/dL were discussed.    10/24/2023: Feeling much better.  \"I feel better than I have felt in a long time\".  Trying to stop smoking.  Eating well.  No weight loss.  Persists with dyspnea.  No chest pains.  On exam chronically ill-appearing.  Lungs markedly diminished but symmetrical.  Heart regular.  Abdomen soft.  No edema.  Laboratory exams reviewed.  Persists with " "macrocytic anemia.  Hemoglobin today was 10.7 g/dL with mean corpuscular volume of 101 fL.  This most likely is a reflection of her chronic kidney disease.  At this point no need for intervention but we will continue to follow.  I have asked her to return to see me in 6 months.    4/30/2024: Feeling well and without new symptoms. Energetic and with good appetite. No chest pain or cough and no abdominal pain or diarrhea. On exam alert and conversant. No jaundice and no oral lesions. Respirations not labored. Lungs diminished bilaterally and the heart is regular. Abdomen is soft and not tender. No edema. Reviewed the laboratory exams available and discussed with her at length. For now no need for intervention, particularly because she feels so well. Will recheck laboratory exams today and will see in a few months.     9/3/2024: In the office before the next scheduled appointment. Has not been feeling well. She reports \"my legs are heavy\". \"I can only walk a few steps\". She has been smoking one pack daily again. She has been eating well and her weight does not seem to have changed. She has been afebrile. No chest pain but continues to cough and has had dyspnea or exertion with minimal exertion. No abdominal pain and denies melena or hematochezia. No skin rash but she complains of edema of the lower extremities. On exam ill appearing.  Edentulous. No oral lesions. Respirations not labored. Lungs diminished bilaterally and crackles in both bases. Heart regular. Abdomen soft and without hepatomegaly or splenomegaly. No edema or minimal. The laboratory exams were reviewed and discussed with her. She does not seem to have iron deficiency at this point. Will investigate further as she has had anemia that requires transfusion on two occasions recently.     9/17/2024: Feeling better. No bleeding. She has been abstinent from smoking. Eating well and no nausea or vomiting. No chest pain; Persists with dyspnea of exertion. On " exam chronically ill appearing. No distress. No jaundice or pallor. The lungs are diminished bilaterally and the heart is regular. Abdomen is soft and not tender. No edema. Discussed with her. As before she does not have a significant hematologic disorder. 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 She needs to follow predominantly with primary care and gastroenterology. She is to see me in 6 months.     3/18/2025: Has been feeling well and has no new problems.  A few days ago she had to visit her primary care physician because of indigestion.  She had several tests that did not reveal any significant abnormalities, according to her report.  She has been energetic.  She continues to smoke.  Has had no chest pains or cough.  On exam alert and conversant.  Oriented.  No distress.  No jaundice.  The lungs are diminished bilaterally.  The heart is regular.  There is no edema.  The laboratory exams obtained at her primary care physician's office a few days before this visit were reviewed.  Her hemoglobin has increased to 12.8.  The white cell count and differential are rather unremarkable.  Platelets are within the normal range.  She persists with evidence of chronic kidney disease with a creatinine of 1.6 mg/dL.  Discussed with her.  She is to see me in 6 months.  I have asked her to have laboratory exams before the next visit.    5/6/2025: In the office sooner than the next scheduled appointment.  She believes she has been bleeding from the gastrointestinal tract.  Again she has discomfort in the abdomen that is worsened by certain foods.  She is also much weaker than before.  She has noted melanotic stools although not as dark as in previous locations.  She has been eating a different diet and that seems to provide some relief but has not been in a solution.  She has no chest pains.  She remains dyspneic and has been more dyspneic now than before.  No diarrhea or dysuria.  No edema.  On  exam ill-appearing.  Pale.  No distress.  Edentulous.  No oral lesions.  Respirations not labored.  Lungs diminished.  Heart regular.  Abdomen soft.  Reviewed the previous laboratory exams and the recent ones with her.  Her hemoglobin has indeed decreased from slightly more than 12 g/dL in March of this year to less than 9 g/dL at the time of the last check.  She probably is bleeding although I will again investigate hemolysis.  She is to have an endoscopy soon.    Past Medical History:   Diagnosis Date    Artery stenosis     Bilateral subclavian s/p stent     Arthritis     AVM (arteriovenous malformation)     CAD (coronary artery disease)     CKD (chronic kidney disease)     Stage three     COPD (chronic obstructive pulmonary disease)     DM2 (diabetes mellitus, type 2)     Eye pressure     Elevated eye pressure    GERD (gastroesophageal reflux disease)     Gout     Hepatitis     Hepatitis c     HTN (hypertension)     Hyperlipidemia     Hyperparathyroidism     KIRIT (iron deficiency anemia)     Iron deficiency anemia     Myocardial infarction     Osteoporosis     PVD (peripheral vascular disease)     Stroke     TIA     Past Surgical History:   Procedure Laterality Date    CATARACT EXTRACTION Left     CHOLECYSTECTOMY      COLONOSCOPY N/A 01/26/2023    Procedure: COLONOSCOPY with biopsy x 2 areas and polypectomy x 4 and cautery of polyps x 2;  Surgeon: Hero Webster MD;  Location: [facility] ENDOSCOPY;  Service: Gastroenterology;  Laterality: N/A;  post op: polyps, diverticulosis, hemorrhoids    ENDOSCOPY N/A 01/26/2023    Procedure: ESOPHAGOGASTRODUODENOSCOPY;  Surgeon: Hero Webster MD;  Location: [facility] ENDOSCOPY;  Service: Gastroenterology;  Laterality: N/A;  post op: erosive gasritis, small hiatal hernia    ENTEROSCOPY SMALL BOWEL N/A 9/12/2024    Procedure: ESOPHAGOGASTRODUODENOSCOPY WITH SMALL BOWEL ENTEROSCOPY Argon plasma coagulation of small bowel Arterial venous malformation x3;  Surgeon: Kwame Walker  MD Joe;  Location: [facility] ENDOSCOPY;  Service: Gastroenterology;  Laterality: N/A;  small bowel avms x3    EYE SURGERY      TOTAL ABDOMINAL HYSTERECTOMY WITH SALPINGO OOPHORECTOMY         Current Outpatient Medications:     acetaminophen (TYLENOL) 500 MG tablet, Take 1 tablet by mouth Every 6 (Six) Hours As Needed for Mild Pain., Disp: , Rfl:     albuterol sulfate HFA (ProAir HFA) 108 (90 Base) MCG/ACT inhaler, Inhale 2 puffs Every 4 (Four) Hours As Needed for Wheezing or Shortness of Air. Proair - DX CODE J44.9, Disp: 18 g, Rfl: 4    Alcohol Swabs (B-D SINGLE USE SWABS REGULAR) pads, CHECK BLOOD SUGAR ONE TIME DAILY, Disp: 100 each, Rfl: 6    allopurinol (ZYLOPRIM) 100 MG tablet, Take 1 tablet by mouth Daily., Disp: , Rfl:     amLODIPine (NORVASC) 10 MG tablet, Take 1 tablet by mouth Daily. (Patient taking differently: Take 1 tablet by mouth Daily As Needed (SBP >160).), Disp: 90 tablet, Rfl: 3    aspirin 81 MG tablet, Take 1 tablet by mouth Daily., Disp: , Rfl:     calcitriol (ROCALTROL) 0.25 MCG capsule, Take 1 capsule by mouth Daily., Disp: , Rfl:     carvedilol (COREG) 12.5 MG tablet, TAKE 1 TABLET BY MOUTH TWICE A DAY, Disp: 180 tablet, Rfl: 1    Cholecalciferol 1000 units capsule, Take 1 capsule by mouth Daily., Disp: , Rfl:     colestipol (COLESTID) 1 g tablet, Take 1 tablet by mouth 2 (Two) Times a Day., Disp: , Rfl:     ferrous sulfate 325 (65 FE) MG tablet, TAKE 1 TABLET BY MOUTH EVERY DAY WITH BREAKFAST (Patient taking differently: Take 1 tablet by mouth Daily With Breakfast.), Disp: 90 tablet, Rfl: 3    furosemide (LASIX) 20 MG tablet, TAKE 1 TABLET BY MOUTH EVERY OTHER DAY, Disp: 45 tablet, Rfl: 1    hydrALAZINE (APRESOLINE) 50 MG tablet, Take 1 tablet by mouth 2 (Two) Times a Day. Please schedule appt to follow up, Disp: 180 tablet, Rfl: 3    ipratropium-albuterol (DUO-NEB) 0.5-2.5 mg/3 ml nebulizer, TAKE 3 ML BY NEBULIZATION EVERY 4 HOURS AS NEEDED FOR WHEEZE, Disp: 180 mL, Rfl: 3    latanoprost  (XALATAN) 0.005 % ophthalmic solution, , Disp: , Rfl:     Omega-3 Fatty Acids (FISH OIL) 1000 MG capsule capsule, Take 1 capsule by mouth 2 (Two) Times a Day With Meals., Disp: , Rfl:     omeprazole (priLOSEC) 40 MG capsule, Take 1 capsule by mouth Daily., Disp: , Rfl:     potassium chloride 10 MEQ CR tablet, Take 1 tablet by mouth Every Other Day. TAKE 1 TABLET BY MOUTH EVERY OTHER DAY, Disp: 60 tablet, Rfl: 1    pravastatin (PRAVACHOL) 40 MG tablet, TAKE 1 TABLET BY MOUTH EVERY DAY, Disp: 90 tablet, Rfl: 0    No Known Allergies    Family History   Problem Relation Age of Onset    Diabetes Mother     Anemia Mother     Heart attack Mother     Heart disease Mother     Heart failure Mother     Hypertension Mother     Coronary artery disease Sister     Diabetes Sister     Breast cancer Paternal Aunt 50     Cancer-related family history includes Breast cancer (age of onset: 50) in her paternal aunt.    Social History     Tobacco Use    Smoking status: Every Day     Current packs/day: 0.25     Average packs/day: 0.3 packs/day for 50.3 years (12.6 ttl pk-yrs)     Types: Cigarettes     Start date: 1/1/1975     Passive exposure: Current    Smokeless tobacco: Never   Vaping Use    Vaping status: Never Used   Substance Use Topics    Alcohol use: No    Drug use: No     Social History     Social History Narrative    She is single, retired from [employer], she lives in [place] and has two grown daughters.     ROS:   Review of Systems   Constitutional:  Negative for activity change, appetite change, chills, diaphoresis, fatigue, fever and unexpected weight change.   HENT:  Negative for congestion, dental problem, drooling, ear discharge, ear pain, facial swelling, hearing loss, mouth sores, nosebleeds, postnasal drip, rhinorrhea, sinus pressure, sinus pain, sneezing, sore throat, tinnitus, trouble swallowing and voice change.    Eyes:  Negative for photophobia, pain, discharge, redness, itching and visual disturbance.  "  Respiratory:  Negative for apnea, cough, choking, chest tightness, shortness of breath, wheezing and stridor.    Cardiovascular:  Negative for chest pain, palpitations and leg swelling.   Gastrointestinal:  Negative for abdominal distention, abdominal pain, anal bleeding, blood in stool, constipation, diarrhea, nausea, rectal pain and vomiting.   Endocrine: Negative for cold intolerance, heat intolerance, polydipsia and polyuria.   Genitourinary:  Negative for decreased urine volume, difficulty urinating, dysuria, flank pain, frequency, genital sores, hematuria and urgency.   Musculoskeletal:  Negative for arthralgias, back pain, gait problem, joint swelling, myalgias, neck pain and neck stiffness.   Skin:  Negative for color change, pallor and rash.   Neurological:  Negative for dizziness, tremors, seizures, syncope, facial asymmetry, speech difficulty, weakness, light-headedness, numbness and headaches.   Hematological:  Negative for adenopathy. Does not bruise/bleed easily.   Psychiatric/Behavioral:  Negative for agitation, behavioral problems, confusion, decreased concentration, hallucinations, self-injury, sleep disturbance and suicidal ideas. The patient is not nervous/anxious.      Objective:    Vital Signs:  Vitals:    05/06/25 1518   Pulse: 80   Resp: 14   Temp: 97.4 °F (36.3 °C)   SpO2: 97%   Weight: 60.8 kg (134 lb)   Height: 152.4 cm (60\")   PainSc: 0-No pain     Body mass index is 26.17 kg/m².    ECOG  (1) Restricted in physically strenuous activity, ambulatory and able to do work of light nature    Physical Exam:   Physical Exam  Constitutional:       General: She is not in acute distress.     Appearance: She is not ill-appearing, toxic-appearing or diaphoretic.      Comments: Slender, well-built.  Alert, well oriented and in good spirits.  Seems chronically ill.  No distress.   HENT:      Head: Normocephalic and atraumatic.      Right Ear: External ear normal.      Left Ear: External ear normal.     "  Nose: Nose normal.      Mouth/Throat:      Mouth: Mucous membranes are moist.      Pharynx: Oropharynx is clear. No oropharyngeal exudate or posterior oropharyngeal erythema.   Eyes:      General: No scleral icterus.        Right eye: No discharge.         Left eye: No discharge.      Conjunctiva/sclera: Conjunctivae normal.      Pupils: Pupils are equal, round, and reactive to light.   Cardiovascular:      Rate and Rhythm: Normal rate and regular rhythm.      Pulses: Normal pulses.      Heart sounds: No murmur heard.     No friction rub. No gallop.   Pulmonary:      Effort: No respiratory distress.      Breath sounds: No stridor. No wheezing, rhonchi or rales.      Comments: Symmetrically diminished to auscultation. There are crackles in both bases.   Abdominal:      General: Bowel sounds are normal. There is no distension.      Palpations: Abdomen is soft. There is no mass.      Tenderness: There is no abdominal tenderness. There is no right CVA tenderness, left CVA tenderness, guarding or rebound.      Hernia: No hernia is present.      Comments: The liver and spleen are not enlarged.    Musculoskeletal:         General: No swelling, tenderness, deformity or signs of injury.      Cervical back: No rigidity.      Right lower leg: No edema.      Left lower leg: No edema.   Lymphadenopathy:      Cervical: No cervical adenopathy.   Skin:     Coloration: Skin is not jaundiced.      Findings: No bruising, lesion or rash.   Neurological:      General: No focal deficit present.      Mental Status: She is alert and oriented to person, place, and time.      Cranial Nerves: No cranial nerve deficit.      Motor: No weakness.      Gait: Gait normal.   Psychiatric:         Mood and Affect: Mood normal.         Behavior: Behavior normal.         Thought Content: Thought content normal.         Judgment: Judgment normal.     ALONZO Campos MD performed the physical exam on 5/6/2025 as documented above.    Lab Results - Last  18 Months   Lab Units 05/01/25  1023 09/13/24  0410 09/12/24  0226 09/08/24 2011 09/03/24  1156 04/30/24  1028   SODIUM mmol/L 137 141 141   < > 140 139   POTASSIUM mmol/L 3.8 4.1 3.9   < > 4.2 4.3   CHLORIDE mmol/L 97 109* 108*   < > 107* 103   CO2 mmol/L 23 23.5 23.9   < > 19* 22   BUN mg/dL 17 22 23   < > 26 21   CREATININE mg/dL 1.42* 1.36* 1.64*   < > 1.58* 1.48*   CALCIUM mg/dL 8.6* 8.7 8.8   < > 8.0* 8.9   BILIRUBIN mg/dL <0.2  --   --   --  <0.2 <0.2   ALK PHOS IU/L 73  --   --   --  54 64   ALT (SGPT) IU/L 9  --   --   --  9 11   AST (SGOT) IU/L 18  --   --   --  17 14   GLUCOSE mg/dL 127* 107* 119*   < > 175* 101*    < > = values in this interval not displayed.     Lab Results   Component Value Date    GLUCOSE 127 (H) 05/01/2025    BUN 17 05/01/2025    CREATININE 1.42 (H) 05/01/2025    EGFRIFNONA 30 (L) 01/14/2022    EGFRIFAFRI 35 (L) 01/14/2022    BCR 12 05/01/2025    K 3.8 05/01/2025    CO2 23 05/01/2025    CALCIUM 8.6 (L) 05/01/2025    ALBUMIN 4.1 05/01/2025    AST 18 05/01/2025    ALT 9 05/01/2025     Lab Results   Component Value Date    IRON 258 (H) 08/23/2024    TIBC 319 05/01/2025    FERRITIN 49 05/01/2025     Lab Results   Component Value Date    FOLATE 13.60 09/11/2024     Lab Results   Component Value Date    RETICCTPCT 3.4 03/05/2018     Lab Results   Component Value Date    LBAFIMUJ05 >2,000 (H) 09/11/2024     LDH   Date Value Ref Range Status   09/11/2024 209 135 - 214 U/L Final     Comment:     Specimen hemolyzed.  Results may be affected.     Uric Acid   Date Value Ref Range Status   08/27/2019 6.2 2.5 - 7.1 mg/dL Final     Comment:                Therapeutic target for gout patients: <6.0     Lab Results   Component Value Date    SEDRATE 33 01/14/2022     Lab Results   Component Value Date    SEDRATE 33 01/14/2022      Assessment & Plan     Macrocytic anemia: Recurred.  Seems to be having gastrointestinal bleeding.  Will investigate again hemolysis.  She needs endoscopies as she does have  a history of gastrointestinal bleeding.  Chronic kidney disease: Stage III with estimated glomerular filtration rate less than 40 ml/min.  Remains unchanged.  Peripheral vascular disease.   4.  Reviewed notes from primary care and discussed laboratory exams with her.  5.  Obtain laboratory exams today and see me in a week.    Jose Campos MD on 5/6/2025 at 1605.

## 2025-05-07 ENCOUNTER — TELEPHONE (OUTPATIENT)
Dept: ONCOLOGY | Facility: CLINIC | Age: 70
End: 2025-05-07

## 2025-05-07 LAB
BASOPHILS # BLD AUTO: 0 X10E3/UL (ref 0–0.2)
BASOPHILS NFR BLD AUTO: 0 %
EOSINOPHIL # BLD AUTO: 0.1 X10E3/UL (ref 0–0.4)
EOSINOPHIL NFR BLD AUTO: 1 %
ERYTHROCYTE [DISTWIDTH] IN BLOOD BY AUTOMATED COUNT: 13.6 % (ref 11.7–15.4)
FOLATE SERPL-MCNC: 10.6 NG/ML
HAPTOGLOB SERPL-MCNC: 320 MG/DL (ref 42–346)
HCT VFR BLD AUTO: 27.4 % (ref 34–46.6)
HGB BLD-MCNC: 8.8 G/DL (ref 11.1–15.9)
IMM GRANULOCYTES # BLD AUTO: 0 X10E3/UL (ref 0–0.1)
IMM GRANULOCYTES NFR BLD AUTO: 0 %
LDH SERPL L TO P-CCNC: 234 IU/L (ref 119–226)
LYMPHOCYTES # BLD AUTO: 1.5 X10E3/UL (ref 0.7–3.1)
LYMPHOCYTES NFR BLD AUTO: 18 %
MCH RBC QN AUTO: 33.6 PG (ref 26.6–33)
MCHC RBC AUTO-ENTMCNC: 32.1 G/DL (ref 31.5–35.7)
MCV RBC AUTO: 105 FL (ref 79–97)
MONOCYTES # BLD AUTO: 0.8 X10E3/UL (ref 0.1–0.9)
MONOCYTES NFR BLD AUTO: 10 %
NEUTROPHILS # BLD AUTO: 5.6 X10E3/UL (ref 1.4–7)
NEUTROPHILS NFR BLD AUTO: 71 %
PLATELET # BLD AUTO: 230 X10E3/UL (ref 150–450)
RBC # BLD AUTO: 2.62 X10E6/UL (ref 3.77–5.28)
RETICS/RBC NFR AUTO: 9.9 % (ref 0.6–2.6)
VIT B12 SERPL-MCNC: >2000 PG/ML (ref 232–1245)
WBC # BLD AUTO: 8 X10E3/UL (ref 3.4–10.8)

## 2025-05-07 NOTE — TELEPHONE ENCOUNTER
Caller: [facility]    Relationship:     Best call back number: [phone]    What was the call regarding: JAMES CALLING FROM [facility]   PROVIDER LOBO ALSTON DO, PATIENT'S PCP.    RECEIVED A FAX FROM DR. GARRETT OFFICE , IT STATED UNOFFICIAL MEDICAL RECORDS, BUT ONLY RECEIVED 2 OF 12 PAGES.  PLEASE REFAX PAGES -[phone].

## 2025-05-08 ENCOUNTER — TELEPHONE (OUTPATIENT)
Dept: ONCOLOGY | Facility: CLINIC | Age: 70
End: 2025-05-08
Payer: MEDICARE

## 2025-05-08 NOTE — TELEPHONE ENCOUNTER
Caller: Nancy Suárez    Relationship: Self    Best call back number: [phone]     What is the best time to reach you: ANYTIME    Who are you requesting to speak with (clinical staff, provider,  specific staff member): CLINICAL    What was the call regarding: PLEASE CALL PATIENT ASAP TO DISCUSS 5/6 LAB RESULTS AND IF SHE NEEDS A TRANSFUSION. SHE NEEDS TO KNOW FIRST THING THIS MORNING SO SHE CAN GET TRANSPORTATION IF NEEDED FOR TRANSFUSION.

## 2025-05-08 NOTE — TELEPHONE ENCOUNTER
After speaking with Dr. Campos, I contacted the patient. I informed her that her HGB has remained stable at 8.8 therefore she does not qualify for a blood transfusion. I informed her per Dr. Campos, he advises for her to have her blood rechecked next week. She confirmed and stated she has an appt on Tuesday with us. I confirmed and advised for her to see us on Tuesday at her appt then we will have her labs drawn after the appt. She confirmed. No further questions at this time.

## 2025-05-09 NOTE — PROGRESS NOTES
"                         HEMATOLOGY ONCOLOGY OUTPATIENT FOLLOW-UP       Patient name: Nancy Suárez  : 1955  MRN: [id number]  Primary Care Physician: Marina Cruz MD  Referring Physician: Marina Cruz MD  Reason For Consult: Anemia.     History of Present Illness:    2023: Ms. Suárez came seeking attention about anemia, that she reported had been a recurring issue for her. She described being tired, \"I'm drained\". Unsteady on her feet and unable to do much activity. No other symptoms but she was concerned that her blood pressure had been in the low 100's over 60's. 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 She had also a history of chronic renal disease and in the chemistry closest to this visit she had an estimated glomerular filtration rate of about 33 ml/min. In 2023 her hemoglobin was 11 g/dl with mean corpuscular volume of 99 fL. However a blood count done closer to the time of this visit had reported a hemoglobin of 7.9 g/dl with mean corpuscular volume of 105 fL. The white cell count and the platelet count were unremarkable. On exam she was found to appear much older than the stated age. Neither pale nor jaundiced. Well hydrated. No oral lesions and no palpable adenopathy. Lungs markedly diminished bilaterally but clear. Heart regular. Abdomen rounded and protuberant but soft and not tender; no hepatomegaly or splenomegaly. No edema. Her social and family history were reviewed. Given her history of chronic gastrointestinal bleeding it is very possible that she indeed has iron deficiency anemia. The macrocytosis was surprising in this setting, and could be related to other nutritional deficiencies or to hemolysis and was to be investigated. I asked her to return to see me in one week with results. "     6/20/2023: Underwent laboratory exams.  Still feeling tired.  Eating reasonably well and afebrile.  No bleeding.  On exam pale.  Respirations labored with minimal exertion.  Lungs diminished.  Heart regular.  The laboratory exams revealed an elevated glucose and abnormal renal function with an estimated glomerular filtration rate of 35 mL/min.  Liver enzymes were however unremarkable.  White cell count was normal at 5100/mm³ with a rather unremarkable differential count.  The platelets were also within the normal range, at 213,000/mm³.  The hemoglobin, however, it was low at 7.9 g/dL with a mean corpuscular volume of 107.6 fL.  Her iron profile revealed a normal iron binding capacity at 367 mcg/dL with an elevated iron at 303 mcg/dL and abnormally elevated saturation at 83%.  Somewhat surprisingly the ferritin was not particularly elevated at 33 ng/mL.  Folic acid was unremarkable but the vitamin B12 was elevated at 1642 pg/mL.  BLAS was negative but erythropoietin was elevated at 191.6 µIU/mL.  With this picture it appeared as though she had ineffective erythropoiesis and in order to confirm this diagnosis it was felt necessary to obtain a bone marrow biopsy.  The role of the bone marrow and the procedure of taking a sample was discussed with her.  She reported that she had undergone one in the past but was not sure of where it was done.    7/25/2023: Feeling about the same. Had no laboratory exams. Tired but no more than before. Denies anorexia. No fevers. Denied chest pain or cough. No abdominal pain or diarrhea and no dysuria. On exam no changes. The bone marrow biopsy is unremarkable and no cytogenetic abnormalities were documented. I've asked her to have a blood count today and see me in a few weeks with new laboratory exams.     8/8/2023: Without new symptoms.  Still tired.  Smoking less.  Eating reasonably well and weight stable.  Afebrile.  On exam pale, chronically ill-appearing but in no distress.  No  "jaundice but pale.  Well-hydrated.  Lungs diminished.  Heart regular.  No edema.  Laboratory exams revealed persistent anemia again with macrocytosis.  Her kidney function had remained low with a creatinine of 1.71 mg/dL for an estimated glomerular filtration rate of 32 mL/min.  The iron studies were more consistent with anemia of chronic renal disease with a total iron binding capacity at low normal of 298 mcg/dL.  The unbound iron binding capacity was diminished at 68 mcg/dL but the iron saturation was increased at 77%.  The bone marrow revealed absent iron stores.  This was more suggestive of iron deficiency rather than of an accumulation.  A decision was made to start treatment with erythropoietin.  This was based on the clear evidence of anemia of chronic kidney disease.  In addition I requested soluble transferrin receptor testing to investigate further the possibility of iron deficiency.    8/29/2023: Feeling reasonably well and much stronger than before.  Had not received any injections of erythropoietin in spite of which her hemoglobin had increased to 10.5 g/dL.  She was eating well.  Still smoking but continued to work at cutting back.  No chest pains or cough.  No abdominal pain or diarrhea and no dysuria.  No peripheral edema no skin rash.  On exam alert, conversant and chronically ill-appearing.  No jaundice.  Lungs diminished bilaterally.  Heart regular.  Abdomen soft.  No edema.  Laboratory exams reviewed.  Plans to continue to monitor her blood count and to proceed with erythropoietin as needed to maintain a hemoglobin of at least 10 g/dL were discussed.    10/24/2023: Feeling much better.  \"I feel better than I have felt in a long time\".  Trying to stop smoking.  Eating well.  No weight loss.  Persists with dyspnea.  No chest pains.  On exam chronically ill-appearing.  Lungs markedly diminished but symmetrical.  Heart regular.  Abdomen soft.  No edema.  Laboratory exams reviewed.  Persists with " "macrocytic anemia.  Hemoglobin today was 10.7 g/dL with mean corpuscular volume of 101 fL.  This most likely is a reflection of her chronic kidney disease.  At this point no need for intervention but we will continue to follow.  I have asked her to return to see me in 6 months.    4/30/2024: Feeling well and without new symptoms. Energetic and with good appetite. No chest pain or cough and no abdominal pain or diarrhea. On exam alert and conversant. No jaundice and no oral lesions. Respirations not labored. Lungs diminished bilaterally and the heart is regular. Abdomen is soft and not tender. No edema. Reviewed the laboratory exams available and discussed with her at length. For now no need for intervention, particularly because she feels so well. Will recheck laboratory exams today and will see in a few months.     9/3/2024: In the office before the next scheduled appointment. Has not been feeling well. She reports \"my legs are heavy\". \"I can only walk a few steps\". She has been smoking one pack daily again. She has been eating well and her weight does not seem to have changed. She has been afebrile. No chest pain but continues to cough and has had dyspnea or exertion with minimal exertion. No abdominal pain and denies melena or hematochezia. No skin rash but she complains of edema of the lower extremities. On exam ill appearing.  Edentulous. No oral lesions. Respirations not labored. Lungs diminished bilaterally and crackles in both bases. Heart regular. Abdomen soft and without hepatomegaly or splenomegaly. No edema or minimal. The laboratory exams were reviewed and discussed with her. She does not seem to have iron deficiency at this point. Will investigate further as she has had anemia that requires transfusion on two occasions recently.     9/17/2024: Feeling better. No bleeding. She has been abstinent from smoking. Eating well and no nausea or vomiting. No chest pain; Persists with dyspnea of exertion. On " exam chronically ill appearing. No distress. No jaundice or pallor. The lungs are diminished bilaterally and the heart is regular. Abdomen is soft and not tender. No edema. Discussed with her. As before she does not have a significant hematologic disorder. 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 She needs to follow predominantly with primary care and gastroenterology. She is to see me in 6 months.     3/18/2025: Has been feeling well and has no new problems.  A few days ago she had to visit her primary care physician because of indigestion.  She had several tests that did not reveal any significant abnormalities, according to her report.  She has been energetic.  She continues to smoke.  Has had no chest pains or cough.  On exam alert and conversant.  Oriented.  No distress.  No jaundice.  The lungs are diminished bilaterally.  The heart is regular.  There is no edema.  The laboratory exams obtained at her primary care physician's office a few days before this visit were reviewed.  Her hemoglobin has increased to 12.8.  The white cell count and differential are rather unremarkable.  Platelets are within the normal range.  She persists with evidence of chronic kidney disease with a creatinine of 1.6 mg/dL.  Discussed with her.  She is to see me in 6 months.  I have asked her to have laboratory exams before the next visit.    5/6/2025: In the office sooner than the next scheduled appointment.  She believes she has been bleeding from the gastrointestinal tract.  Again she has discomfort in the abdomen that is worsened by certain foods.  She is also much weaker than before.  She has noted melanotic stools although not as dark as in previous locations.  She has been eating a different diet and that seems to provide some relief but has not been in a solution.  She has no chest pains.  She remains dyspneic and has been more dyspneic now than before.  No diarrhea or dysuria.  No edema.  On  exam ill-appearing.  Pale.  No distress.  Edentulous.  No oral lesions.  Respirations not labored.  Lungs diminished.  Heart regular.  Abdomen soft.  Reviewed the previous laboratory exams and the recent ones with her.  Her hemoglobin has indeed decreased from slightly more than 12 g/dL in March of this year to less than 9 g/dL at the time of the last check.  She probably is bleeding although I will again investigate hemolysis.  She is to have an endoscopy soon.    5/13/2025: Feels better. Still tired and weak.Still having epigastric discomfort and frequent episodes of epigastric burning type pain. She has not lost weight and has remained afebrile. No chest pain but still dyspneic. No hemoptysis. Denies diarrhea, melena or hematochezia. No dysuria. No edema. On exam chronically ill appearing but in no distress. No jaundice. No oral lesions. Lungs diminished bilaterally and heart regular. Abdomen soft. No edema. No skin rash. Reviewed the laboratory exams and discussed with her. No clear evidence of iron, folate or B12 deficiency. No suggestion of hemolysis. To proceed with endoscopies as I'm concerned about gastrointestinal bleeding.     Past Medical History:   Diagnosis Date    Artery stenosis     Bilateral subclavian s/p stent     Arthritis     AVM (arteriovenous malformation)     CAD (coronary artery disease)     CKD (chronic kidney disease)     Stage three     COPD (chronic obstructive pulmonary disease)     DM2 (diabetes mellitus, type 2)     Eye pressure     Elevated eye pressure    GERD (gastroesophageal reflux disease)     Gout     Hepatitis     Hepatitis c     HTN (hypertension)     Hyperlipidemia     Hyperparathyroidism     KIRTI (iron deficiency anemia)     Iron deficiency anemia     Myocardial infarction     Osteoporosis     PVD (peripheral vascular disease)     Stroke     TIA     Past Surgical History:   Procedure Laterality Date    CATARACT EXTRACTION Left     CHOLECYSTECTOMY      COLONOSCOPY N/A  01/26/2023    Procedure: COLONOSCOPY with biopsy x 2 areas and polypectomy x 4 and cautery of polyps x 2;  Surgeon: Hero Webster MD;  Location: [facility] ENDOSCOPY;  Service: Gastroenterology;  Laterality: N/A;  post op: polyps, diverticulosis, hemorrhoids    ENDOSCOPY N/A 01/26/2023    Procedure: ESOPHAGOGASTRODUODENOSCOPY;  Surgeon: Hero Webster MD;  Location: [facility] ENDOSCOPY;  Service: Gastroenterology;  Laterality: N/A;  post op: erosive gasritis, small hiatal hernia    ENTEROSCOPY SMALL BOWEL N/A 9/12/2024    Procedure: ESOPHAGOGASTRODUODENOSCOPY WITH SMALL BOWEL ENTEROSCOPY Argon plasma coagulation of small bowel Arterial venous malformation x3;  Surgeon: Kwame Walekr MD;  Location: [facility] ENDOSCOPY;  Service: Gastroenterology;  Laterality: N/A;  small bowel avms x3    EYE SURGERY      TOTAL ABDOMINAL HYSTERECTOMY WITH SALPINGO OOPHORECTOMY         Current Outpatient Medications:     acetaminophen (TYLENOL) 500 MG tablet, Take 1 tablet by mouth Every 6 (Six) Hours As Needed for Mild Pain., Disp: , Rfl:     albuterol sulfate HFA (ProAir HFA) 108 (90 Base) MCG/ACT inhaler, Inhale 2 puffs Every 4 (Four) Hours As Needed for Wheezing or Shortness of Air. Proair - DX CODE J44.9, Disp: 18 g, Rfl: 4    Alcohol Swabs (B-D SINGLE USE SWABS REGULAR) pads, CHECK BLOOD SUGAR ONE TIME DAILY, Disp: 100 each, Rfl: 6    allopurinol (ZYLOPRIM) 100 MG tablet, Take 1 tablet by mouth Daily., Disp: , Rfl:     amLODIPine (NORVASC) 10 MG tablet, Take 1 tablet by mouth Daily. (Patient taking differently: Take 1 tablet by mouth Daily As Needed (SBP >160).), Disp: 90 tablet, Rfl: 3    aspirin 81 MG tablet, Take 1 tablet by mouth Daily., Disp: , Rfl:     calcitriol (ROCALTROL) 0.25 MCG capsule, Take 1 capsule by mouth Daily., Disp: , Rfl:     carvedilol (COREG) 12.5 MG tablet, TAKE 1 TABLET BY MOUTH TWICE A DAY, Disp: 180 tablet, Rfl: 1    Cholecalciferol 1000 units capsule, Take 1 capsule by mouth Daily., Disp: , Rfl:      colestipol (COLESTID) 1 g tablet, Take 1 tablet by mouth 2 (Two) Times a Day., Disp: , Rfl:     ferrous sulfate 325 (65 FE) MG tablet, TAKE 1 TABLET BY MOUTH EVERY DAY WITH BREAKFAST (Patient taking differently: Take 1 tablet by mouth Daily With Breakfast.), Disp: 90 tablet, Rfl: 3    furosemide (LASIX) 20 MG tablet, TAKE 1 TABLET BY MOUTH EVERY OTHER DAY, Disp: 45 tablet, Rfl: 1    hydrALAZINE (APRESOLINE) 50 MG tablet, Take 1 tablet by mouth 2 (Two) Times a Day. Please schedule appt to follow up, Disp: 180 tablet, Rfl: 3    ipratropium-albuterol (DUO-NEB) 0.5-2.5 mg/3 ml nebulizer, TAKE 3 ML BY NEBULIZATION EVERY 4 HOURS AS NEEDED FOR WHEEZE, Disp: 180 mL, Rfl: 3    latanoprost (XALATAN) 0.005 % ophthalmic solution, , Disp: , Rfl:     Omega-3 Fatty Acids (FISH OIL) 1000 MG capsule capsule, Take 1 capsule by mouth 2 (Two) Times a Day With Meals., Disp: , Rfl:     omeprazole (priLOSEC) 40 MG capsule, Take 1 capsule by mouth Daily., Disp: , Rfl:     potassium chloride 10 MEQ CR tablet, Take 1 tablet by mouth Every Other Day. TAKE 1 TABLET BY MOUTH EVERY OTHER DAY, Disp: 60 tablet, Rfl: 1    pravastatin (PRAVACHOL) 40 MG tablet, TAKE 1 TABLET BY MOUTH EVERY DAY, Disp: 90 tablet, Rfl: 0    No Known Allergies    Family History   Problem Relation Age of Onset    Diabetes Mother     Anemia Mother     Heart attack Mother     Heart disease Mother     Heart failure Mother     Hypertension Mother     Coronary artery disease Sister     Diabetes Sister     Breast cancer Paternal Aunt 50     Cancer-related family history includes Breast cancer (age of onset: 50) in her paternal aunt.    Social History     Tobacco Use    Smoking status: Every Day     Current packs/day: 0.25     Average packs/day: 0.3 packs/day for 50.4 years (12.6 ttl pk-yrs)     Types: Cigarettes     Start date: 1/1/1975     Passive exposure: Current    Smokeless tobacco: Never   Vaping Use    Vaping status: Never Used   Substance Use Topics    Alcohol use: No     Drug use: No     Social History     Social History Narrative    She is single, retired from [employer], she lives in [place] and has two grown daughters.     ROS:   Review of Systems   Constitutional:  Negative for activity change, appetite change, chills, diaphoresis, fatigue, fever and unexpected weight change.   HENT:  Negative for congestion, dental problem, drooling, ear discharge, ear pain, facial swelling, hearing loss, mouth sores, nosebleeds, postnasal drip, rhinorrhea, sinus pressure, sinus pain, sneezing, sore throat, tinnitus, trouble swallowing and voice change.    Eyes:  Negative for photophobia, pain, discharge, redness, itching and visual disturbance.   Respiratory:  Negative for apnea, cough, choking, chest tightness, shortness of breath, wheezing and stridor.    Cardiovascular:  Negative for chest pain, palpitations and leg swelling.   Gastrointestinal:  Negative for abdominal distention, abdominal pain, anal bleeding, blood in stool, constipation, diarrhea, nausea, rectal pain and vomiting.   Endocrine: Negative for cold intolerance, heat intolerance, polydipsia and polyuria.   Genitourinary:  Negative for decreased urine volume, difficulty urinating, dysuria, flank pain, frequency, genital sores, hematuria and urgency.   Musculoskeletal:  Negative for arthralgias, back pain, gait problem, joint swelling, myalgias, neck pain and neck stiffness.   Skin:  Negative for color change, pallor and rash.   Neurological:  Negative for dizziness, tremors, seizures, syncope, facial asymmetry, speech difficulty, weakness, light-headedness, numbness and headaches.   Hematological:  Negative for adenopathy. Does not bruise/bleed easily.   Psychiatric/Behavioral:  Negative for agitation, behavioral problems, confusion, decreased concentration, hallucinations, self-injury, sleep disturbance and suicidal ideas. The patient is not nervous/anxious.      Objective:    Vital Signs:  Vitals:    05/13/25 0957  "  Pulse: 71   Resp: 14   Temp: 97.1 °F (36.2 °C)   SpO2: 99%   Weight: 61.2 kg (135 lb)   Height: 152.4 cm (60\")   PainSc: 0-No pain       Body mass index is 26.37 kg/m².    ECOG  (1) Restricted in physically strenuous activity, ambulatory and able to do work of light nature    Physical Exam:   Physical Exam  Constitutional:       General: She is not in acute distress.     Appearance: She is not ill-appearing, toxic-appearing or diaphoretic.      Comments: Slender, well-built.  Alert, well oriented and in good spirits.  Seems chronically ill.  No distress.   HENT:      Head: Normocephalic and atraumatic.      Right Ear: External ear normal.      Left Ear: External ear normal.      Nose: Nose normal.      Mouth/Throat:      Mouth: Mucous membranes are moist.      Pharynx: Oropharynx is clear. No oropharyngeal exudate or posterior oropharyngeal erythema.   Eyes:      General: No scleral icterus.        Right eye: No discharge.         Left eye: No discharge.      Conjunctiva/sclera: Conjunctivae normal.      Pupils: Pupils are equal, round, and reactive to light.   Cardiovascular:      Rate and Rhythm: Normal rate and regular rhythm.      Pulses: Normal pulses.      Heart sounds: No murmur heard.     No friction rub. No gallop.   Pulmonary:      Effort: No respiratory distress.      Breath sounds: No stridor. No wheezing, rhonchi or rales.      Comments: Symmetrically diminished to auscultation. There are crackles in both bases.   Abdominal:      General: Bowel sounds are normal. There is no distension.      Palpations: Abdomen is soft. There is no mass.      Tenderness: There is no abdominal tenderness. There is no right CVA tenderness, left CVA tenderness, guarding or rebound.      Hernia: No hernia is present.      Comments: The liver and spleen are not enlarged.    Musculoskeletal:         General: No swelling, tenderness, deformity or signs of injury.      Cervical back: No rigidity.      Right lower leg: No " edema.      Left lower leg: No edema.   Lymphadenopathy:      Cervical: No cervical adenopathy.   Skin:     Coloration: Skin is not jaundiced.      Findings: No bruising, lesion or rash.   Neurological:      General: No focal deficit present.      Mental Status: She is alert and oriented to person, place, and time.      Cranial Nerves: No cranial nerve deficit.      Motor: No weakness.      Gait: Gait normal.   Psychiatric:         Mood and Affect: Mood normal.         Behavior: Behavior normal.         Thought Content: Thought content normal.         Judgment: Judgment normal.     ALONZO Campos MD performed the physical exam on 5/13/2025 as documented above.     Lab Results - Last 18 Months   Lab Units 05/01/25  1023 09/13/24  0410 09/12/24  0226 09/08/24 2011 09/03/24  1156 04/30/24  1028   SODIUM mmol/L 137 141 141   < > 140 139   POTASSIUM mmol/L 3.8 4.1 3.9   < > 4.2 4.3   CHLORIDE mmol/L 97 109* 108*   < > 107* 103   CO2 mmol/L 23 23.5 23.9   < > 19* 22   BUN mg/dL 17 22 23   < > 26 21   CREATININE mg/dL 1.42* 1.36* 1.64*   < > 1.58* 1.48*   CALCIUM mg/dL 8.6* 8.7 8.8   < > 8.0* 8.9   BILIRUBIN mg/dL <0.2  --   --   --  <0.2 <0.2   ALK PHOS IU/L 73  --   --   --  54 64   ALT (SGPT) IU/L 9  --   --   --  9 11   AST (SGOT) IU/L 18  --   --   --  17 14   GLUCOSE mg/dL 127* 107* 119*   < > 175* 101*    < > = values in this interval not displayed.     Lab Results   Component Value Date    GLUCOSE 127 (H) 05/01/2025    BUN 17 05/01/2025    CREATININE 1.42 (H) 05/01/2025    EGFRIFNONA 30 (L) 01/14/2022    EGFRIFAFRI 35 (L) 01/14/2022    BCR 12 05/01/2025    K 3.8 05/01/2025    CO2 23 05/01/2025    CALCIUM 8.6 (L) 05/01/2025    ALBUMIN 4.1 05/01/2025    AST 18 05/01/2025    ALT 9 05/01/2025     Lab Results   Component Value Date    IRON 258 (H) 08/23/2024    TIBC 319 05/01/2025    FERRITIN 49 05/01/2025     Lab Results   Component Value Date    FOLATE 10.6 05/06/2025     Lab Results   Component Value Date     RETICCTPCT 3.4 03/05/2018     Lab Results   Component Value Date    CIRCFBLO88 >2000 (H) 05/06/2025     LDH   Date Value Ref Range Status   05/06/2025 234 (H) 119 - 226 IU/L Final   09/11/2024 209 135 - 214 U/L Final     Comment:     Specimen hemolyzed.  Results may be affected.     Uric Acid   Date Value Ref Range Status   08/27/2019 6.2 2.5 - 7.1 mg/dL Final     Comment:                Therapeutic target for gout patients: <6.0     Lab Results   Component Value Date    SEDRATE 33 01/14/2022     Lab Results   Component Value Date    SEDRATE 33 01/14/2022      Assessment & Plan     Macrocytic anemia: Recurred.  Seems to be having gastrointestinal bleeding.  No evidence of nutrient deficiency. No hemolysis. To proceed with endoscopies.   Chronic kidney disease: Stage III with estimated glomerular filtration rate less than 40 ml/min.  Probably participates in her anemia but the pattern or her hemoglobin drop is more consistent with bleeding.   Peripheral vascular disease.   4.  Reviewed and discussed with her all the laboratory exams.   5.  Blood count in 3 weeks. See me in 6 weeks.     Jose Campos MD on 5/13/2025 at 10:39 AM

## 2025-05-13 ENCOUNTER — OFFICE VISIT (OUTPATIENT)
Dept: ONCOLOGY | Facility: CLINIC | Age: 70
End: 2025-05-13
Payer: MEDICARE

## 2025-05-13 VITALS
HEART RATE: 71 BPM | RESPIRATION RATE: 14 BRPM | OXYGEN SATURATION: 99 % | WEIGHT: 135 LBS | HEIGHT: 60 IN | TEMPERATURE: 97.1 F | BODY MASS INDEX: 26.5 KG/M2

## 2025-05-13 DIAGNOSIS — N18.30 ANEMIA OF CHRONIC KIDNEY FAILURE, STAGE 3 (MODERATE): Primary | ICD-10-CM

## 2025-05-13 DIAGNOSIS — D63.1 ANEMIA OF CHRONIC KIDNEY FAILURE, STAGE 3 (MODERATE): Primary | ICD-10-CM

## 2025-05-13 PROCEDURE — 1159F MED LIST DOCD IN RCRD: CPT | Performed by: INTERNAL MEDICINE

## 2025-05-13 PROCEDURE — 1126F AMNT PAIN NOTED NONE PRSNT: CPT | Performed by: INTERNAL MEDICINE

## 2025-05-13 PROCEDURE — 99213 OFFICE O/P EST LOW 20 MIN: CPT | Performed by: INTERNAL MEDICINE

## 2025-05-13 PROCEDURE — 1160F RVW MEDS BY RX/DR IN RCRD: CPT | Performed by: INTERNAL MEDICINE

## 2025-05-20 ENCOUNTER — ON CAMPUS - OUTPATIENT (AMBULATORY)
Dept: URBAN - METROPOLITAN AREA HOSPITAL 2 | Facility: HOSPITAL | Age: 70
End: 2025-05-20
Payer: MEDICARE

## 2025-05-20 ENCOUNTER — OFFICE (AMBULATORY)
Dept: URBAN - METROPOLITAN AREA PATHOLOGY 19 | Facility: PATHOLOGY | Age: 70
End: 2025-05-20
Payer: COMMERCIAL

## 2025-05-20 ENCOUNTER — OFFICE (AMBULATORY)
Dept: URBAN - METROPOLITAN AREA PATHOLOGY 19 | Facility: PATHOLOGY | Age: 70
End: 2025-05-20
Payer: MEDICARE

## 2025-05-20 VITALS
SYSTOLIC BLOOD PRESSURE: 131 MMHG | HEART RATE: 74 BPM | DIASTOLIC BLOOD PRESSURE: 54 MMHG | DIASTOLIC BLOOD PRESSURE: 74 MMHG | OXYGEN SATURATION: 100 % | WEIGHT: 130 LBS | SYSTOLIC BLOOD PRESSURE: 142 MMHG | SYSTOLIC BLOOD PRESSURE: 147 MMHG | HEART RATE: 73 BPM | SYSTOLIC BLOOD PRESSURE: 166 MMHG | OXYGEN SATURATION: 96 % | HEIGHT: 61 IN | RESPIRATION RATE: 15 BRPM | DIASTOLIC BLOOD PRESSURE: 62 MMHG | DIASTOLIC BLOOD PRESSURE: 87 MMHG | SYSTOLIC BLOOD PRESSURE: 158 MMHG | DIASTOLIC BLOOD PRESSURE: 67 MMHG | HEART RATE: 72 BPM | SYSTOLIC BLOOD PRESSURE: 186 MMHG | DIASTOLIC BLOOD PRESSURE: 60 MMHG | HEART RATE: 76 BPM | SYSTOLIC BLOOD PRESSURE: 185 MMHG | TEMPERATURE: 96.2 F | OXYGEN SATURATION: 98 % | SYSTOLIC BLOOD PRESSURE: 155 MMHG | OXYGEN SATURATION: 97 % | SYSTOLIC BLOOD PRESSURE: 139 MMHG | HEART RATE: 71 BPM | DIASTOLIC BLOOD PRESSURE: 77 MMHG | RESPIRATION RATE: 14 BRPM | DIASTOLIC BLOOD PRESSURE: 56 MMHG | DIASTOLIC BLOOD PRESSURE: 63 MMHG | RESPIRATION RATE: 16 BRPM

## 2025-05-20 DIAGNOSIS — K63.89 OTHER SPECIFIED DISEASES OF INTESTINE: ICD-10-CM

## 2025-05-20 DIAGNOSIS — K64.1 SECOND DEGREE HEMORRHOIDS: ICD-10-CM

## 2025-05-20 DIAGNOSIS — K52.9 NONINFECTIVE GASTROENTERITIS AND COLITIS, UNSPECIFIED: ICD-10-CM

## 2025-05-20 DIAGNOSIS — K59.1 FUNCTIONAL DIARRHEA: ICD-10-CM

## 2025-05-20 PROCEDURE — 88305 TISSUE EXAM BY PATHOLOGIST: CPT | Mod: 26 | Performed by: PATHOLOGY

## 2025-05-20 PROCEDURE — 45380 COLONOSCOPY AND BIOPSY: CPT | Performed by: INTERNAL MEDICINE

## 2025-06-03 DIAGNOSIS — N18.30 ANEMIA OF CHRONIC KIDNEY FAILURE, STAGE 3 (MODERATE): ICD-10-CM

## 2025-06-03 DIAGNOSIS — D63.1 ANEMIA OF CHRONIC KIDNEY FAILURE, STAGE 3 (MODERATE): ICD-10-CM

## 2025-06-04 LAB
BASOPHILS # BLD AUTO: 0 X10E3/UL (ref 0–0.2)
BASOPHILS NFR BLD AUTO: 0 %
EOSINOPHIL # BLD AUTO: 0.1 X10E3/UL (ref 0–0.4)
EOSINOPHIL NFR BLD AUTO: 2 %
ERYTHROCYTE [DISTWIDTH] IN BLOOD BY AUTOMATED COUNT: 12.9 % (ref 11.7–15.4)
HCT VFR BLD AUTO: 30.6 % (ref 34–46.6)
HGB BLD-MCNC: 9.2 G/DL (ref 11.1–15.9)
IMM GRANULOCYTES # BLD AUTO: 0 X10E3/UL (ref 0–0.1)
IMM GRANULOCYTES NFR BLD AUTO: 0 %
LYMPHOCYTES # BLD AUTO: 1 X10E3/UL (ref 0.7–3.1)
LYMPHOCYTES NFR BLD AUTO: 21 %
MCH RBC QN AUTO: 31.2 PG (ref 26.6–33)
MCHC RBC AUTO-ENTMCNC: 30.1 G/DL (ref 31.5–35.7)
MCV RBC AUTO: 104 FL (ref 79–97)
MONOCYTES # BLD AUTO: 0.4 X10E3/UL (ref 0.1–0.9)
MONOCYTES NFR BLD AUTO: 10 %
NEUTROPHILS # BLD AUTO: 3.1 X10E3/UL (ref 1.4–7)
NEUTROPHILS NFR BLD AUTO: 67 %
PLATELET # BLD AUTO: 168 X10E3/UL (ref 150–450)
RBC # BLD AUTO: 2.95 X10E6/UL (ref 3.77–5.28)
WBC # BLD AUTO: 4.6 X10E3/UL (ref 3.4–10.8)

## 2025-06-23 ENCOUNTER — TELEPHONE (OUTPATIENT)
Dept: ONCOLOGY | Facility: CLINIC | Age: 70
End: 2025-06-23
Payer: MEDICARE

## 2025-06-23 NOTE — TELEPHONE ENCOUNTER
Caller: Nancy Suárez    Relationship: Self    Best call back number:   Telephone Information:   Mobile [phone]     What is the best time to reach you: ANYTIME     What was the call regarding: PT WAS CALLING TO CONFIRM HER 7/1 THEN ADV SHE NORMALLY HAS A LAB APPT IN [place] BEFORE THE F/U. DOES NOT HAVE ONE SCHED. PLEASE CB TO ADVISE.

## 2025-06-25 LAB
ALBUMIN SERPL-MCNC: 4.1 G/DL (ref 3.8–4.8)
ALP SERPL-CCNC: 66 IU/L (ref 44–121)
ALT SERPL-CCNC: 6 IU/L (ref 0–32)
AMBIG ABBREV CMP14 DEFAULT: NORMAL
AST SERPL-CCNC: 16 IU/L (ref 0–40)
BASOPHILS # BLD AUTO: 0 X10E3/UL (ref 0–0.2)
BASOPHILS NFR BLD AUTO: 0 %
BILIRUB SERPL-MCNC: <0.2 MG/DL (ref 0–1.2)
BUN SERPL-MCNC: 18 MG/DL (ref 8–27)
BUN/CREAT SERPL: 14 (ref 12–28)
CALCIUM SERPL-MCNC: 9.2 MG/DL (ref 8.7–10.3)
CHLORIDE SERPL-SCNC: 103 MMOL/L (ref 96–106)
CO2 SERPL-SCNC: 20 MMOL/L (ref 20–29)
CREAT SERPL-MCNC: 1.3 MG/DL (ref 0.57–1)
EGFRCR SERPLBLD CKD-EPI 2021: 44 ML/MIN/1.73
EOSINOPHIL # BLD AUTO: 0.1 X10E3/UL (ref 0–0.4)
EOSINOPHIL NFR BLD AUTO: 2 %
ERYTHROCYTE [DISTWIDTH] IN BLOOD BY AUTOMATED COUNT: 13.9 % (ref 11.7–15.4)
GLOBULIN SER CALC-MCNC: 2 G/DL (ref 1.5–4.5)
GLUCOSE SERPL-MCNC: 110 MG/DL (ref 70–99)
HCT VFR BLD AUTO: 36.1 % (ref 34–46.6)
HGB BLD-MCNC: 10.7 G/DL (ref 11.1–15.9)
IMM GRANULOCYTES # BLD AUTO: 0 X10E3/UL (ref 0–0.1)
IMM GRANULOCYTES NFR BLD AUTO: 0 %
LYMPHOCYTES # BLD AUTO: 0.9 X10E3/UL (ref 0.7–3.1)
LYMPHOCYTES NFR BLD AUTO: 21 %
MCH RBC QN AUTO: 30.8 PG (ref 26.6–33)
MCHC RBC AUTO-ENTMCNC: 29.6 G/DL (ref 31.5–35.7)
MCV RBC AUTO: 104 FL (ref 79–97)
MONOCYTES # BLD AUTO: 0.4 X10E3/UL (ref 0.1–0.9)
MONOCYTES NFR BLD AUTO: 10 %
NEUTROPHILS # BLD AUTO: 3 X10E3/UL (ref 1.4–7)
NEUTROPHILS NFR BLD AUTO: 67 %
PLATELET # BLD AUTO: 191 X10E3/UL (ref 150–450)
POTASSIUM SERPL-SCNC: 4 MMOL/L (ref 3.5–5.2)
PROT SERPL-MCNC: 6.1 G/DL (ref 6–8.5)
RBC # BLD AUTO: 3.47 X10E6/UL (ref 3.77–5.28)
SODIUM SERPL-SCNC: 140 MMOL/L (ref 134–144)
WBC # BLD AUTO: 4.5 X10E3/UL (ref 3.4–10.8)

## 2025-06-30 NOTE — PROGRESS NOTES
"                         HEMATOLOGY ONCOLOGY OUTPATIENT FOLLOW-UP       Patient name: Nancy Suárez  : 1955  MRN: [id number]  Primary Care Physician: Marina Cruz MD  Referring Physician: Marina Cruz MD  Reason For Consult: Anemia.     History of Present Illness:    2023: Ms. Suárez came seeking attention about anemia, that she reported had been a recurring issue for her. She described being tired, \"I'm drained\". Unsteady on her feet and unable to do much activity. No other symptoms but she was concerned that her blood pressure had been in the low 100's over 60's. 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 She had also a history of chronic renal disease and in the chemistry closest to this visit she had an estimated glomerular filtration rate of about 33 ml/min. In 2023 her hemoglobin was 11 g/dl with mean corpuscular volume of 99 fL. However a blood count done closer to the time of this visit had reported a hemoglobin of 7.9 g/dl with mean corpuscular volume of 105 fL. The white cell count and the platelet count were unremarkable. On exam she was found to appear much older than the stated age. Neither pale nor jaundiced. Well hydrated. No oral lesions and no palpable adenopathy. Lungs markedly diminished bilaterally but clear. Heart regular. Abdomen rounded and protuberant but soft and not tender; no hepatomegaly or splenomegaly. No edema. Her social and family history were reviewed. Given her history of chronic gastrointestinal bleeding it is very possible that she indeed has iron deficiency anemia. The macrocytosis was surprising in this setting, and could be related to other nutritional deficiencies or to hemolysis and was to be investigated. I asked her to return to see me in one week with results. "     6/20/2023: Underwent laboratory exams.  Still feeling tired.  Eating reasonably well and afebrile.  No bleeding.  On exam pale.  Respirations labored with minimal exertion.  Lungs diminished.  Heart regular.  The laboratory exams revealed an elevated glucose and abnormal renal function with an estimated glomerular filtration rate of 35 mL/min.  Liver enzymes were however unremarkable.  White cell count was normal at 5100/mm³ with a rather unremarkable differential count.  The platelets were also within the normal range, at 213,000/mm³.  The hemoglobin, however, it was low at 7.9 g/dL with a mean corpuscular volume of 107.6 fL.  Her iron profile revealed a normal iron binding capacity at 367 mcg/dL with an elevated iron at 303 mcg/dL and abnormally elevated saturation at 83%.  Somewhat surprisingly the ferritin was not particularly elevated at 33 ng/mL.  Folic acid was unremarkable but the vitamin B12 was elevated at 1642 pg/mL.  BLAS was negative but erythropoietin was elevated at 191.6 µIU/mL.  With this picture it appeared as though she had ineffective erythropoiesis and in order to confirm this diagnosis it was felt necessary to obtain a bone marrow biopsy.  The role of the bone marrow and the procedure of taking a sample was discussed with her.  She reported that she had undergone one in the past but was not sure of where it was done.    7/25/2023: Feeling about the same. Had no laboratory exams. Tired but no more than before. Denies anorexia. No fevers. Denied chest pain or cough. No abdominal pain or diarrhea and no dysuria. On exam no changes. The bone marrow biopsy is unremarkable and no cytogenetic abnormalities were documented. I've asked her to have a blood count today and see me in a few weeks with new laboratory exams.     8/8/2023: Without new symptoms.  Still tired.  Smoking less.  Eating reasonably well and weight stable.  Afebrile.  On exam pale, chronically ill-appearing but in no distress.  No  "jaundice but pale.  Well-hydrated.  Lungs diminished.  Heart regular.  No edema.  Laboratory exams revealed persistent anemia again with macrocytosis.  Her kidney function had remained low with a creatinine of 1.71 mg/dL for an estimated glomerular filtration rate of 32 mL/min.  The iron studies were more consistent with anemia of chronic renal disease with a total iron binding capacity at low normal of 298 mcg/dL.  The unbound iron binding capacity was diminished at 68 mcg/dL but the iron saturation was increased at 77%.  The bone marrow revealed absent iron stores.  This was more suggestive of iron deficiency rather than of an accumulation.  A decision was made to start treatment with erythropoietin.  This was based on the clear evidence of anemia of chronic kidney disease.  In addition I requested soluble transferrin receptor testing to investigate further the possibility of iron deficiency.    8/29/2023: Feeling reasonably well and much stronger than before.  Had not received any injections of erythropoietin in spite of which her hemoglobin had increased to 10.5 g/dL.  She was eating well.  Still smoking but continued to work at cutting back.  No chest pains or cough.  No abdominal pain or diarrhea and no dysuria.  No peripheral edema no skin rash.  On exam alert, conversant and chronically ill-appearing.  No jaundice.  Lungs diminished bilaterally.  Heart regular.  Abdomen soft.  No edema.  Laboratory exams reviewed.  Plans to continue to monitor her blood count and to proceed with erythropoietin as needed to maintain a hemoglobin of at least 10 g/dL were discussed.    10/24/2023: Feeling much better.  \"I feel better than I have felt in a long time\".  Trying to stop smoking.  Eating well.  No weight loss.  Persists with dyspnea.  No chest pains.  On exam chronically ill-appearing.  Lungs markedly diminished but symmetrical.  Heart regular.  Abdomen soft.  No edema.  Laboratory exams reviewed.  Persists with " "macrocytic anemia.  Hemoglobin today was 10.7 g/dL with mean corpuscular volume of 101 fL.  This most likely is a reflection of her chronic kidney disease.  At this point no need for intervention but we will continue to follow.  I have asked her to return to see me in 6 months.    4/30/2024: Feeling well and without new symptoms. Energetic and with good appetite. No chest pain or cough and no abdominal pain or diarrhea. On exam alert and conversant. No jaundice and no oral lesions. Respirations not labored. Lungs diminished bilaterally and the heart is regular. Abdomen is soft and not tender. No edema. Reviewed the laboratory exams available and discussed with her at length. For now no need for intervention, particularly because she feels so well. Will recheck laboratory exams today and will see in a few months.     9/3/2024: In the office before the next scheduled appointment. Has not been feeling well. She reports \"my legs are heavy\". \"I can only walk a few steps\". She has been smoking one pack daily again. She has been eating well and her weight does not seem to have changed. She has been afebrile. No chest pain but continues to cough and has had dyspnea or exertion with minimal exertion. No abdominal pain and denies melena or hematochezia. No skin rash but she complains of edema of the lower extremities. On exam ill appearing.  Edentulous. No oral lesions. Respirations not labored. Lungs diminished bilaterally and crackles in both bases. Heart regular. Abdomen soft and without hepatomegaly or splenomegaly. No edema or minimal. The laboratory exams were reviewed and discussed with her. She does not seem to have iron deficiency at this point. Will investigate further as she has had anemia that requires transfusion on two occasions recently.     9/17/2024: Feeling better. No bleeding. She has been abstinent from smoking. Eating well and no nausea or vomiting. No chest pain; Persists with dyspnea of exertion. On " exam chronically ill appearing. No distress. No jaundice or pallor. The lungs are diminished bilaterally and the heart is regular. Abdomen is soft and not tender. No edema. Discussed with her. As before she does not have a significant hematologic disorder. 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 She needs to follow predominantly with primary care and gastroenterology. She is to see me in 6 months.     3/18/2025: Has been feeling well and has no new problems.  A few days ago she had to visit her primary care physician because of indigestion.  She had several tests that did not reveal any significant abnormalities, according to her report.  She has been energetic.  She continues to smoke.  Has had no chest pains or cough.  On exam alert and conversant.  Oriented.  No distress.  No jaundice.  The lungs are diminished bilaterally.  The heart is regular.  There is no edema.  The laboratory exams obtained at her primary care physician's office a few days before this visit were reviewed.  Her hemoglobin has increased to 12.8.  The white cell count and differential are rather unremarkable.  Platelets are within the normal range.  She persists with evidence of chronic kidney disease with a creatinine of 1.6 mg/dL.  Discussed with her.  She is to see me in 6 months.  I have asked her to have laboratory exams before the next visit.    5/6/2025: In the office sooner than the next scheduled appointment.  She believes she has been bleeding from the gastrointestinal tract.  Again she has discomfort in the abdomen that is worsened by certain foods.  She is also much weaker than before.  She has noted melanotic stools although not as dark as in previous locations.  She has been eating a different diet and that seems to provide some relief but has not been in a solution.  She has no chest pains.  She remains dyspneic and has been more dyspneic now than before.  No diarrhea or dysuria.  No edema.  On  exam ill-appearing.  Pale.  No distress.  Edentulous.  No oral lesions.  Respirations not labored.  Lungs diminished.  Heart regular.  Abdomen soft.  Reviewed the previous laboratory exams and the recent ones with her.  Her hemoglobin has indeed decreased from slightly more than 12 g/dL in March of this year to less than 9 g/dL at the time of the last check.  She probably is bleeding although I will again investigate hemolysis.  She is to have an endoscopy soon.    5/13/2025: Feels better. Still tired and weak.Still having epigastric discomfort and frequent episodes of epigastric burning type pain. She has not lost weight and has remained afebrile. No chest pain but still dyspneic. No hemoptysis. Denies diarrhea, melena or hematochezia. No dysuria. No edema. On exam chronically ill appearing but in no distress. No jaundice. No oral lesions. Lungs diminished bilaterally and heart regular. Abdomen soft. No edema. No skin rash. Reviewed the laboratory exams and discussed with her. No clear evidence of iron, folate or B12 deficiency. No suggestion of hemolysis. To proceed with endoscopies as I'm concerned about gastrointestinal bleeding.     7/1/2025: Underwent a colonoscopy that by her report did not reveal source of blood loss.  She feels well at this time.  She is more energetic and has been able to do some work.  She has been eating well and has had no nausea or vomiting.  As well she denies chest pains or abdominal pain.  She has not had melena or hematochezia.  On exam she is chronically ill-appearing but does not seem in distress.  No jaundice.  Lungs are diminished bilaterally.  The heart is regular.  The abdomen is soft and there is no hepatomegaly or splenomegaly.  No edema.  Laboratory exams were reviewed and discussed with her.  Her hemoglobin seems to be increasing slowly.  This is consistent with a large bleed in the recent past.  I believe she needs an upper gastrointestinal endoscopy.  Communicated with  Dr. Webster for coordination of care.    Past Medical History:   Diagnosis Date    Artery stenosis     Bilateral subclavian s/p stent     Arthritis     AVM (arteriovenous malformation)     CAD (coronary artery disease)     CKD (chronic kidney disease)     Stage three     COPD (chronic obstructive pulmonary disease)     DM2 (diabetes mellitus, type 2)     Eye pressure     Elevated eye pressure    GERD (gastroesophageal reflux disease)     Gout     Hepatitis     Hepatitis c     HTN (hypertension)     Hyperlipidemia     Hyperparathyroidism     KIRIT (iron deficiency anemia)     Iron deficiency anemia     Myocardial infarction     Osteoporosis     PVD (peripheral vascular disease)     Stroke     TIA     Past Surgical History:   Procedure Laterality Date    CATARACT EXTRACTION Left     CHOLECYSTECTOMY      COLONOSCOPY N/A 01/26/2023    Procedure: COLONOSCOPY with biopsy x 2 areas and polypectomy x 4 and cautery of polyps x 2;  Surgeon: Hero Webster MD;  Location: [facility] ENDOSCOPY;  Service: Gastroenterology;  Laterality: N/A;  post op: polyps, diverticulosis, hemorrhoids    ENDOSCOPY N/A 01/26/2023    Procedure: ESOPHAGOGASTRODUODENOSCOPY;  Surgeon: Hero Webster MD;  Location: [facility] ENDOSCOPY;  Service: Gastroenterology;  Laterality: N/A;  post op: erosive gasritis, small hiatal hernia    ENTEROSCOPY SMALL BOWEL N/A 9/12/2024    Procedure: ESOPHAGOGASTRODUODENOSCOPY WITH SMALL BOWEL ENTEROSCOPY Argon plasma 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 Proair - DX CODE J44.9, Disp: 18 g, Rfl: 4    Alcohol Swabs (B-D SINGLE USE SWABS REGULAR) pads, CHECK BLOOD SUGAR ONE TIME DAILY, Disp: 100 each, Rfl: 6    allopurinol (ZYLOPRIM) 100 MG tablet, Take 1 tablet by mouth Daily., Disp: , Rfl:     amLODIPine (NORVASC) 10 MG tablet, Take 1 tablet by mouth Daily. (Patient taking differently: Take 1 tablet by mouth Daily As Needed (SBP >160).), Disp: 90 tablet, Rfl: 3    aspirin 81 MG tablet, Take 1 tablet by mouth Daily., Disp: , Rfl:     calcitriol (ROCALTROL) 0.25 MCG capsule, Take 1 capsule by mouth Daily., Disp: , Rfl:     carvedilol (COREG) 12.5 MG tablet, TAKE 1 TABLET BY MOUTH TWICE A DAY, Disp: 180 tablet, Rfl: 1    Cholecalciferol 1000 units capsule, Take 1 capsule by mouth Daily., Disp: , Rfl:     colestipol (COLESTID) 1 g tablet, Take 1 tablet by mouth 2 (Two) Times a Day., Disp: , Rfl:     ferrous sulfate 325 (65 FE) MG tablet, TAKE 1 TABLET BY MOUTH EVERY DAY WITH BREAKFAST (Patient taking differently: Take 1 tablet by mouth Daily With Breakfast.), Disp: 90 tablet, Rfl: 3    furosemide (LASIX) 20 MG tablet, TAKE 1 TABLET BY MOUTH EVERY OTHER DAY, Disp: 45 tablet, Rfl: 1    hydrALAZINE (APRESOLINE) 50 MG tablet, Take 1 tablet by mouth 2 (Two) Times a Day. Please schedule appt to follow up, Disp: 180 tablet, Rfl: 3    ipratropium-albuterol (DUO-NEB) 0.5-2.5 mg/3 ml nebulizer, TAKE 3 ML BY NEBULIZATION EVERY 4 HOURS AS NEEDED FOR WHEEZE, Disp: 180 mL, Rfl: 3    latanoprost (XALATAN) 0.005 % ophthalmic solution, , Disp: , Rfl:     Omega-3 Fatty Acids (FISH OIL) 1000 MG capsule capsule, Take 1 capsule by mouth 2 (Two) Times a Day With Meals., Disp: , Rfl:     omeprazole (priLOSEC) 40 MG capsule, Take 1 capsule by mouth Daily., Disp: , Rfl:     potassium chloride 10 MEQ CR tablet, Take 1 tablet by mouth Every Other Day. TAKE 1 TABLET BY MOUTH EVERY OTHER DAY, Disp: 60 tablet, Rfl: 1    pravastatin  (PRAVACHOL) 40 MG tablet, TAKE 1 TABLET BY MOUTH EVERY DAY, Disp: 90 tablet, Rfl: 0    No Known Allergies    Family History   Problem Relation Age of Onset    Diabetes Mother     Anemia Mother     Heart attack Mother     Heart disease Mother     Heart failure Mother     Hypertension Mother     Coronary artery disease Sister     Diabetes Sister     Breast cancer Paternal Aunt 50     Cancer-related family history includes Breast cancer (age of onset: 50) in her paternal aunt.    Social History     Tobacco Use    Smoking status: Every Day     Current packs/day: 0.25     Average packs/day: 0.3 packs/day for 50.5 years (12.6 ttl pk-yrs)     Types: Cigarettes     Start date: 1/1/1975     Passive exposure: Current    Smokeless tobacco: Never   Vaping Use    Vaping status: Never Used   Substance Use Topics    Alcohol use: No    Drug use: No     Social History     Social History Narrative    She is single, retired from [employer], she lives in [place] and has two grown daughters.     ROS:   Review of Systems   Constitutional:  Negative for activity change, appetite change, chills, diaphoresis, fatigue, fever and unexpected weight change.   HENT:  Negative for congestion, dental problem, drooling, ear discharge, ear pain, facial swelling, hearing loss, mouth sores, nosebleeds, postnasal drip, rhinorrhea, sinus pressure, sinus pain, sneezing, sore throat, tinnitus, trouble swallowing and voice change.    Eyes:  Negative for photophobia, pain, discharge, redness, itching and visual disturbance.   Respiratory:  Negative for apnea, cough, choking, chest tightness, shortness of breath, wheezing and stridor.    Cardiovascular:  Negative for chest pain, palpitations and leg swelling.   Gastrointestinal:  Negative for abdominal distention, abdominal pain, anal bleeding, blood in stool, constipation, diarrhea, nausea, rectal pain and vomiting.   Endocrine: Negative for cold intolerance, heat intolerance, polydipsia and  "polyuria.   Genitourinary:  Negative for decreased urine volume, difficulty urinating, dysuria, flank pain, frequency, genital sores, hematuria and urgency.   Musculoskeletal:  Negative for arthralgias, back pain, gait problem, joint swelling, myalgias, neck pain and neck stiffness.   Skin:  Negative for color change, pallor and rash.   Neurological:  Negative for dizziness, tremors, seizures, syncope, facial asymmetry, speech difficulty, weakness, light-headedness, numbness and headaches.   Hematological:  Negative for adenopathy. Does not bruise/bleed easily.   Psychiatric/Behavioral:  Negative for agitation, behavioral problems, confusion, decreased concentration, hallucinations, self-injury, sleep disturbance and suicidal ideas. The patient is not nervous/anxious.      Objective:    Vital Signs:  Vitals:    07/01/25 1402   Pulse: 74   Resp: 15   Temp: 97.1 °F (36.2 °C)   SpO2: 98%   Weight: 58.5 kg (129 lb)   Height: 152.4 cm (60\")   PainSc: 0-No pain     Body mass index is 25.19 kg/m².    ECOG  (1) Restricted in physically strenuous activity, ambulatory and able to do work of light nature    Physical Exam:   Physical Exam  Constitutional:       General: She is not in acute distress.     Appearance: She is not ill-appearing, toxic-appearing or diaphoretic.      Comments: Slender, well-built.  Alert, well oriented and in good spirits.  Seems chronically ill.  No distress.   HENT:      Head: Normocephalic and atraumatic.      Right Ear: External ear normal.      Left Ear: External ear normal.      Nose: Nose normal.      Mouth/Throat:      Mouth: Mucous membranes are moist.      Pharynx: Oropharynx is clear. No oropharyngeal exudate or posterior oropharyngeal erythema.   Eyes:      General: No scleral icterus.        Right eye: No discharge.         Left eye: No discharge.      Conjunctiva/sclera: Conjunctivae normal.      Pupils: Pupils are equal, round, and reactive to light.   Cardiovascular:      Rate and " Rhythm: Normal rate and regular rhythm.      Pulses: Normal pulses.      Heart sounds: No murmur heard.     No friction rub. No gallop.   Pulmonary:      Effort: No respiratory distress.      Breath sounds: No stridor. No wheezing, rhonchi or rales.      Comments: Symmetrically diminished to auscultation. There are crackles in both bases.   Abdominal:      General: Bowel sounds are normal. There is no distension.      Palpations: Abdomen is soft. There is no mass.      Tenderness: There is no abdominal tenderness. There is no right CVA tenderness, left CVA tenderness, guarding or rebound.      Hernia: No hernia is present.      Comments: The liver and spleen are not enlarged.    Musculoskeletal:         General: No swelling, tenderness, deformity or signs of injury.      Cervical back: No rigidity.      Right lower leg: No edema.      Left lower leg: No edema.   Lymphadenopathy:      Cervical: No cervical adenopathy.   Skin:     Coloration: Skin is not jaundiced.      Findings: No bruising, lesion or rash.   Neurological:      General: No focal deficit present.      Mental Status: She is alert and oriented to person, place, and time.      Cranial Nerves: No cranial nerve deficit.      Motor: No weakness.      Gait: Gait normal.   Psychiatric:         Mood and Affect: Mood normal.         Behavior: Behavior normal.         Thought Content: Thought content normal.         Judgment: Judgment normal.     ALONZO Campos MD performed the physical exam on 7/1/2025 as documented above.    Lab Results - Last 18 Months   Lab Units 06/24/25  0943 05/01/25  1023 09/13/24  0410 09/08/24 2011 09/03/24  1156   SODIUM mmol/L 140 137 141   < > 140   POTASSIUM mmol/L 4.0 3.8 4.1   < > 4.2   CHLORIDE mmol/L 103 97 109*   < > 107*   CO2 mmol/L 20 23 23.5   < > 19*   BUN mg/dL 18 17 22   < > 26   CREATININE mg/dL 1.30* 1.42* 1.36*   < > 1.58*   CALCIUM mg/dL 9.2 8.6* 8.7   < > 8.0*   BILIRUBIN mg/dL <0.2 <0.2  --   --  <0.2   ALK  PHOS IU/L 66 73  --   --  54   ALT (SGPT) IU/L 6 9  --   --  9   AST (SGOT) IU/L 16 18  --   --  17   GLUCOSE mg/dL 110* 127* 107*   < > 175*    < > = values in this interval not displayed.     Lab Results   Component Value Date    GLUCOSE 110 (H) 06/24/2025    BUN 18 06/24/2025    CREATININE 1.30 (H) 06/24/2025    EGFRIFNONA 30 (L) 01/14/2022    EGFRIFAFRI 35 (L) 01/14/2022    BCR 14 06/24/2025    K 4.0 06/24/2025    CO2 20 06/24/2025    CALCIUM 9.2 06/24/2025    ALBUMIN 4.1 06/24/2025    AST 16 06/24/2025    ALT 6 06/24/2025     Lab Results   Component Value Date    IRON 258 (H) 08/23/2024    TIBC 319 05/01/2025    FERRITIN 49 05/01/2025     Lab Results   Component Value Date    FOLATE 10.6 05/06/2025     Lab Results   Component Value Date    RETICCTPCT 3.4 03/05/2018     Lab Results   Component Value Date    TAHPABVO44 >2000 (H) 05/06/2025     LDH   Date Value Ref Range Status   05/06/2025 234 (H) 119 - 226 IU/L Final   09/11/2024 209 135 - 214 U/L Final     Comment:     Specimen hemolyzed.  Results may be affected.     Uric Acid   Date Value Ref Range Status   08/27/2019 6.2 2.5 - 7.1 mg/dL Final     Comment:                Therapeutic target for gout patients: <6.0     Lab Results   Component Value Date    SEDRATE 33 01/14/2022     Lab Results   Component Value Date    SEDRATE 33 01/14/2022      Assessment & Plan     Macrocytic anemia: Recurred.  Seems to be having gastrointestinal bleeding.  No evidence of nutrient deficiency. No hemolysis.  The colonoscopy apparently did not reveal a source of blood loss.  Needs an upper gastrointestinal endoscopy.  Communicated with Dr. Webster for coordination of care.  Likely participates, to some extent in her chronic anemia.  Chronic kidney disease: Stage III with estimated glomerular filtration rate less than 40 ml/min.  Probably participates in her anemia but the pattern or her hemoglobin drop is more consistent with bleeding.   Peripheral vascular disease.   4.   Reviewed and discussed with her all the laboratory exams available.  5.  She will return to see me in approximately 6 weeks.    Jose Campos MD on 7/1/2025 at 1441.

## 2025-07-01 ENCOUNTER — OFFICE VISIT (OUTPATIENT)
Dept: ONCOLOGY | Facility: CLINIC | Age: 70
End: 2025-07-01
Payer: MEDICARE

## 2025-07-01 VITALS
HEART RATE: 74 BPM | BODY MASS INDEX: 25.32 KG/M2 | OXYGEN SATURATION: 98 % | TEMPERATURE: 97.1 F | WEIGHT: 129 LBS | RESPIRATION RATE: 15 BRPM | HEIGHT: 60 IN

## 2025-07-01 DIAGNOSIS — N18.30 ANEMIA OF CHRONIC KIDNEY FAILURE, STAGE 3 (MODERATE): Primary | ICD-10-CM

## 2025-07-01 DIAGNOSIS — E61.1 IRON DEFICIENCY: ICD-10-CM

## 2025-07-01 DIAGNOSIS — D63.1 ANEMIA OF CHRONIC KIDNEY FAILURE, STAGE 3 (MODERATE): Primary | ICD-10-CM

## 2025-08-02 LAB
ALBUMIN SERPL-MCNC: 4.1 G/DL (ref 3.8–4.8)
ALP SERPL-CCNC: 69 IU/L (ref 44–121)
ALT SERPL-CCNC: 12 IU/L (ref 0–32)
AMBIG ABBREV CMP14 DEFAULT: NORMAL
AST SERPL-CCNC: 14 IU/L (ref 0–40)
BASOPHILS # BLD AUTO: 0 X10E3/UL (ref 0–0.2)
BASOPHILS NFR BLD AUTO: 0 %
BILIRUB SERPL-MCNC: <0.2 MG/DL (ref 0–1.2)
BUN SERPL-MCNC: 14 MG/DL (ref 8–27)
BUN/CREAT SERPL: 9 (ref 12–28)
CALCIUM SERPL-MCNC: 8.9 MG/DL (ref 8.7–10.3)
CHLORIDE SERPL-SCNC: 101 MMOL/L (ref 96–106)
CO2 SERPL-SCNC: 21 MMOL/L (ref 20–29)
CREAT SERPL-MCNC: 1.54 MG/DL (ref 0.57–1)
EGFRCR SERPLBLD CKD-EPI 2021: 36 ML/MIN/1.73
EOSINOPHIL # BLD AUTO: 0.1 X10E3/UL (ref 0–0.4)
EOSINOPHIL NFR BLD AUTO: 3 %
ERYTHROCYTE [DISTWIDTH] IN BLOOD BY AUTOMATED COUNT: 14.4 % (ref 11.7–15.4)
GLOBULIN SER CALC-MCNC: 2.2 G/DL (ref 1.5–4.5)
GLUCOSE SERPL-MCNC: 134 MG/DL (ref 70–99)
HCT VFR BLD AUTO: 36.1 % (ref 34–46.6)
HGB BLD-MCNC: 11 G/DL (ref 11.1–15.9)
IMM GRANULOCYTES # BLD AUTO: 0 X10E3/UL (ref 0–0.1)
IMM GRANULOCYTES NFR BLD AUTO: 0 %
LYMPHOCYTES # BLD AUTO: 1.1 X10E3/UL (ref 0.7–3.1)
LYMPHOCYTES NFR BLD AUTO: 25 %
MCH RBC QN AUTO: 29.6 PG (ref 26.6–33)
MCHC RBC AUTO-ENTMCNC: 30.5 G/DL (ref 31.5–35.7)
MCV RBC AUTO: 97 FL (ref 79–97)
MONOCYTES # BLD AUTO: 0.4 X10E3/UL (ref 0.1–0.9)
MONOCYTES NFR BLD AUTO: 8 %
NEUTROPHILS # BLD AUTO: 2.8 X10E3/UL (ref 1.4–7)
NEUTROPHILS NFR BLD AUTO: 64 %
PLATELET # BLD AUTO: 186 X10E3/UL (ref 150–450)
POTASSIUM SERPL-SCNC: 4.1 MMOL/L (ref 3.5–5.2)
PROT SERPL-MCNC: 6.3 G/DL (ref 6–8.5)
RBC # BLD AUTO: 3.71 X10E6/UL (ref 3.77–5.28)
SODIUM SERPL-SCNC: 139 MMOL/L (ref 134–144)
WBC # BLD AUTO: 4.5 X10E3/UL (ref 3.4–10.8)

## 2025-08-05 ENCOUNTER — OFFICE VISIT (OUTPATIENT)
Dept: ONCOLOGY | Facility: CLINIC | Age: 70
End: 2025-08-05
Payer: MEDICARE

## 2025-08-05 VITALS
HEIGHT: 60 IN | BODY MASS INDEX: 25.32 KG/M2 | TEMPERATURE: 98.2 F | WEIGHT: 129 LBS | HEART RATE: 72 BPM | RESPIRATION RATE: 18 BRPM | OXYGEN SATURATION: 97 %

## 2025-08-05 DIAGNOSIS — D63.1 ANEMIA OF CHRONIC KIDNEY FAILURE, STAGE 3 (MODERATE): Primary | ICD-10-CM

## 2025-08-05 DIAGNOSIS — N18.30 ANEMIA OF CHRONIC KIDNEY FAILURE, STAGE 3 (MODERATE): Primary | ICD-10-CM

## 2025-08-05 PROCEDURE — 1126F AMNT PAIN NOTED NONE PRSNT: CPT | Performed by: INTERNAL MEDICINE

## 2025-08-05 PROCEDURE — 1159F MED LIST DOCD IN RCRD: CPT | Performed by: INTERNAL MEDICINE

## 2025-08-05 PROCEDURE — 1160F RVW MEDS BY RX/DR IN RCRD: CPT | Performed by: INTERNAL MEDICINE

## 2025-08-05 PROCEDURE — 99213 OFFICE O/P EST LOW 20 MIN: CPT | Performed by: INTERNAL MEDICINE

## (undated) DEVICE — TRAP WIDEEYE POLYP

## (undated) DEVICE — 3M™ PATIENT PLATE, CORDED, SPLIT, LARGE, 40 PER CASE, 1179: Brand: 3M™

## (undated) DEVICE — APC PROBE 2200 A, SINGLE USE OD 2.3MM/6.9FR; L. 2.2M/7.2FT: Brand: ERBE

## (undated) DEVICE — SNAR POLYP HOTSNARE/BRAIDED OVL/MINI 7F 2.8X10MM 230CM 1P/U

## (undated) DEVICE — PK ENDO GI 50

## (undated) DEVICE — ERBE NESSY®PLATE 170 SPLIT; 168CM²; CABLE 3M: Brand: ERBE

## (undated) DEVICE — BITEBLOCK ENDO W/STRAP 60F A/ LF DISP

## (undated) DEVICE — SINGLE-USE BIOPSY FORCEPS: Brand: RADIAL JAW 4